# Patient Record
Sex: MALE | Race: WHITE | NOT HISPANIC OR LATINO | URBAN - METROPOLITAN AREA
[De-identification: names, ages, dates, MRNs, and addresses within clinical notes are randomized per-mention and may not be internally consistent; named-entity substitution may affect disease eponyms.]

---

## 2015-12-30 RX ORDER — DOCUSATE SODIUM 100 MG
3 CAPSULE ORAL
Qty: 0 | Refills: 0 | COMMUNITY
Start: 2015-12-30

## 2017-04-06 ENCOUNTER — INPATIENT (INPATIENT)
Facility: HOSPITAL | Age: 82
LOS: 6 days | Discharge: HOME | DRG: 202 | End: 2017-04-13
Attending: INTERNAL MEDICINE | Admitting: INTERNAL MEDICINE
Payer: MEDICARE

## 2017-04-06 VITALS
RESPIRATION RATE: 18 BRPM | DIASTOLIC BLOOD PRESSURE: 65 MMHG | TEMPERATURE: 103 F | HEART RATE: 85 BPM | OXYGEN SATURATION: 95 % | HEIGHT: 67 IN | WEIGHT: 165.35 LBS | SYSTOLIC BLOOD PRESSURE: 165 MMHG

## 2017-04-06 PROCEDURE — 71010: CPT | Mod: 26

## 2017-04-06 RX ORDER — ASPIRIN/CALCIUM CARB/MAGNESIUM 324 MG
81 TABLET ORAL ONCE
Qty: 0 | Refills: 0 | Status: COMPLETED | OUTPATIENT
Start: 2017-04-06 | End: 2017-04-06

## 2017-04-07 DIAGNOSIS — R07.9 CHEST PAIN, UNSPECIFIED: ICD-10-CM

## 2017-04-07 DIAGNOSIS — I63.9 CEREBRAL INFARCTION, UNSPECIFIED: ICD-10-CM

## 2017-04-07 DIAGNOSIS — D64.9 ANEMIA, UNSPECIFIED: ICD-10-CM

## 2017-04-07 DIAGNOSIS — Z41.8 ENCOUNTER FOR OTHER PROCEDURES FOR PURPOSES OTHER THAN REMEDYING HEALTH STATE: ICD-10-CM

## 2017-04-07 DIAGNOSIS — I10 ESSENTIAL (PRIMARY) HYPERTENSION: ICD-10-CM

## 2017-04-07 DIAGNOSIS — J11.1 INFLUENZA DUE TO UNIDENTIFIED INFLUENZA VIRUS WITH OTHER RESPIRATORY MANIFESTATIONS: ICD-10-CM

## 2017-04-07 DIAGNOSIS — J45.901 UNSPECIFIED ASTHMA WITH (ACUTE) EXACERBATION: ICD-10-CM

## 2017-04-07 LAB
ACANTHOCYTES BLD QL SMEAR: SLIGHT — SIGNIFICANT CHANGE UP
ALBUMIN SERPL ELPH-MCNC: 3.2 G/DL — LOW (ref 3.5–5)
ALP SERPL-CCNC: 46 U/L — SIGNIFICANT CHANGE UP (ref 40–120)
ALT FLD-CCNC: 19 U/L DA — SIGNIFICANT CHANGE UP (ref 10–60)
ANION GAP SERPL CALC-SCNC: 8 MMOL/L — SIGNIFICANT CHANGE UP (ref 5–17)
ANISOCYTOSIS BLD QL: SLIGHT — SIGNIFICANT CHANGE UP
APTT BLD: 31.5 SEC — SIGNIFICANT CHANGE UP (ref 27.5–37.4)
AST SERPL-CCNC: 17 U/L — SIGNIFICANT CHANGE UP (ref 10–40)
BASE EXCESS BLDA CALC-SCNC: -2.5 MMOL/L — LOW (ref -2–2)
BASOPHILS # BLD AUTO: 0.1 K/UL — SIGNIFICANT CHANGE UP (ref 0–0.2)
BASOPHILS NFR BLD AUTO: 1.1 % — SIGNIFICANT CHANGE UP (ref 0–2)
BILIRUB SERPL-MCNC: 0.4 MG/DL — SIGNIFICANT CHANGE UP (ref 0.2–1.2)
BUN SERPL-MCNC: 20 MG/DL — HIGH (ref 7–18)
CALCIUM SERPL-MCNC: 8.2 MG/DL — LOW (ref 8.4–10.5)
CHLORIDE SERPL-SCNC: 101 MMOL/L — SIGNIFICANT CHANGE UP (ref 96–108)
CK MB BLD-MCNC: 0.9 % — SIGNIFICANT CHANGE UP (ref 0–3.5)
CK MB BLD-MCNC: <0.9 % — SIGNIFICANT CHANGE UP (ref 0–3.5)
CK MB CFR SERPL CALC: 2.1 NG/ML — SIGNIFICANT CHANGE UP (ref 0–3.6)
CK MB CFR SERPL CALC: <1 NG/ML — SIGNIFICANT CHANGE UP (ref 0–3.6)
CK SERPL-CCNC: 114 U/L — SIGNIFICANT CHANGE UP (ref 35–232)
CK SERPL-CCNC: 162 U/L — SIGNIFICANT CHANGE UP (ref 35–232)
CK SERPL-CCNC: 232 U/L — SIGNIFICANT CHANGE UP (ref 35–232)
CO2 SERPL-SCNC: 28 MMOL/L — SIGNIFICANT CHANGE UP (ref 22–31)
CREAT SERPL-MCNC: 1.14 MG/DL — SIGNIFICANT CHANGE UP (ref 0.5–1.3)
EOSINOPHIL # BLD AUTO: 0 K/UL — SIGNIFICANT CHANGE UP (ref 0–0.5)
EOSINOPHIL NFR BLD AUTO: 0.1 % — SIGNIFICANT CHANGE UP (ref 0–6)
FLUBV RNA SPEC QL NAA+PROBE: DETECTED
GLUCOSE SERPL-MCNC: 99 MG/DL — SIGNIFICANT CHANGE UP (ref 70–99)
HCO3 BLDA-SCNC: 20 MMOL/L — LOW (ref 23–27)
HCT VFR BLD CALC: 36.5 % — LOW (ref 39–50)
HGB BLD-MCNC: 11.4 G/DL — LOW (ref 13–17)
HOROWITZ INDEX BLDA+IHG-RTO: 21 — SIGNIFICANT CHANGE UP
HYPERCHROMIA BLD QL AUTO: SLIGHT — SIGNIFICANT CHANGE UP
INR BLD: 1.27 RATIO — HIGH (ref 0.88–1.16)
LACTATE SERPL-SCNC: 1 MMOL/L — SIGNIFICANT CHANGE UP (ref 0.7–2)
LG PLATELETS BLD QL AUTO: SLIGHT — SIGNIFICANT CHANGE UP
LYMPHOCYTES # BLD AUTO: 0.7 K/UL — LOW (ref 1–3.3)
LYMPHOCYTES # BLD AUTO: 9.1 % — LOW (ref 13–44)
MACROCYTES BLD QL: SLIGHT — SIGNIFICANT CHANGE UP
MACROCYTES OVAL BLD QL SMEAR: SLIGHT — SIGNIFICANT CHANGE UP
MANUAL DIF COMMENT BLD-IMP: SIGNIFICANT CHANGE UP
MCHC RBC-ENTMCNC: 22.7 PG — LOW (ref 27–34)
MCHC RBC-ENTMCNC: 31.3 GM/DL — LOW (ref 32–36)
MCV RBC AUTO: 72.5 FL — LOW (ref 80–100)
MICROCYTES BLD QL: SLIGHT — SIGNIFICANT CHANGE UP
MONOCYTES # BLD AUTO: 0.9 K/UL — SIGNIFICANT CHANGE UP (ref 0–0.9)
MONOCYTES NFR BLD AUTO: 11.9 % — SIGNIFICANT CHANGE UP (ref 2–14)
NEUTROPHILS # BLD AUTO: 5.8 K/UL — SIGNIFICANT CHANGE UP (ref 1.8–7.4)
NEUTROPHILS NFR BLD AUTO: 77.8 % — HIGH (ref 43–77)
NT-PROBNP SERPL-SCNC: 2530 PG/ML — HIGH (ref 0–450)
OVALOCYTES BLD QL SMEAR: SLIGHT — SIGNIFICANT CHANGE UP
PCO2 BLDA: 29 MMHG — LOW (ref 32–46)
PH BLDA: 7.45 — SIGNIFICANT CHANGE UP (ref 7.35–7.45)
PLAT MORPH BLD: NORMAL — SIGNIFICANT CHANGE UP
PLATELET # BLD AUTO: 204 K/UL — SIGNIFICANT CHANGE UP (ref 150–400)
PO2 BLDA: 81 MMHG — SIGNIFICANT CHANGE UP (ref 74–108)
POIKILOCYTOSIS BLD QL AUTO: SLIGHT — SIGNIFICANT CHANGE UP
POLYCHROMASIA BLD QL SMEAR: SLIGHT — SIGNIFICANT CHANGE UP
POTASSIUM SERPL-MCNC: 4.2 MMOL/L — SIGNIFICANT CHANGE UP (ref 3.5–5.3)
POTASSIUM SERPL-SCNC: 4.2 MMOL/L — SIGNIFICANT CHANGE UP (ref 3.5–5.3)
PROT SERPL-MCNC: 7 G/DL — SIGNIFICANT CHANGE UP (ref 6–8.3)
PROTHROM AB SERPL-ACNC: 13.9 SEC — HIGH (ref 9.8–12.7)
RAPID RVP RESULT: DETECTED
RBC # BLD: 5.03 M/UL — SIGNIFICANT CHANGE UP (ref 4.2–5.8)
RBC # FLD: 17.1 % — HIGH (ref 10.3–14.5)
RBC BLD AUTO: ABNORMAL
SAO2 % BLDA: 96 % — SIGNIFICANT CHANGE UP (ref 92–96)
SODIUM SERPL-SCNC: 137 MMOL/L — SIGNIFICANT CHANGE UP (ref 135–145)
SPHEROCYTES BLD QL SMEAR: SLIGHT — SIGNIFICANT CHANGE UP
TROPONIN I SERPL-MCNC: 0.02 NG/ML — SIGNIFICANT CHANGE UP (ref 0–0.04)
TROPONIN I SERPL-MCNC: 0.02 NG/ML — SIGNIFICANT CHANGE UP (ref 0–0.04)
TROPONIN I SERPL-MCNC: <0.015 NG/ML — SIGNIFICANT CHANGE UP (ref 0–0.04)
WBC # BLD: 7.5 K/UL — SIGNIFICANT CHANGE UP (ref 3.8–10.5)
WBC # FLD AUTO: 7.5 K/UL — SIGNIFICANT CHANGE UP (ref 3.8–10.5)

## 2017-04-07 PROCEDURE — 99285 EMERGENCY DEPT VISIT HI MDM: CPT | Mod: 25

## 2017-04-07 RX ORDER — MONTELUKAST 4 MG/1
10 TABLET, CHEWABLE ORAL AT BEDTIME
Qty: 0 | Refills: 0 | Status: DISCONTINUED | OUTPATIENT
Start: 2017-04-07 | End: 2017-04-13

## 2017-04-07 RX ORDER — ASPIRIN/CALCIUM CARB/MAGNESIUM 324 MG
81 TABLET ORAL DAILY
Qty: 0 | Refills: 0 | Status: DISCONTINUED | OUTPATIENT
Start: 2017-04-07 | End: 2017-04-13

## 2017-04-07 RX ORDER — SODIUM CHLORIDE 9 MG/ML
1000 INJECTION INTRAMUSCULAR; INTRAVENOUS; SUBCUTANEOUS
Qty: 0 | Refills: 0 | Status: DISCONTINUED | OUTPATIENT
Start: 2017-04-07 | End: 2017-04-07

## 2017-04-07 RX ORDER — ATORVASTATIN CALCIUM 80 MG/1
40 TABLET, FILM COATED ORAL AT BEDTIME
Qty: 0 | Refills: 0 | Status: DISCONTINUED | OUTPATIENT
Start: 2017-04-07 | End: 2017-04-13

## 2017-04-07 RX ORDER — PANTOPRAZOLE SODIUM 20 MG/1
40 TABLET, DELAYED RELEASE ORAL
Qty: 0 | Refills: 0 | Status: DISCONTINUED | OUTPATIENT
Start: 2017-04-07 | End: 2017-04-13

## 2017-04-07 RX ORDER — METOPROLOL TARTRATE 50 MG
25 TABLET ORAL DAILY
Qty: 0 | Refills: 0 | Status: DISCONTINUED | OUTPATIENT
Start: 2017-04-07 | End: 2017-04-07

## 2017-04-07 RX ORDER — ENOXAPARIN SODIUM 100 MG/ML
40 INJECTION SUBCUTANEOUS DAILY
Qty: 0 | Refills: 0 | Status: DISCONTINUED | OUTPATIENT
Start: 2017-04-07 | End: 2017-04-13

## 2017-04-07 RX ORDER — METOPROLOL TARTRATE 50 MG
25 TABLET ORAL DAILY
Qty: 0 | Refills: 0 | Status: DISCONTINUED | OUTPATIENT
Start: 2017-04-07 | End: 2017-04-13

## 2017-04-07 RX ORDER — TIOTROPIUM BROMIDE 18 UG/1
1 CAPSULE ORAL; RESPIRATORY (INHALATION) DAILY
Qty: 0 | Refills: 0 | Status: DISCONTINUED | OUTPATIENT
Start: 2017-04-07 | End: 2017-04-09

## 2017-04-07 RX ORDER — ACETAMINOPHEN 500 MG
650 TABLET ORAL EVERY 6 HOURS
Qty: 0 | Refills: 0 | Status: DISCONTINUED | OUTPATIENT
Start: 2017-04-07 | End: 2017-04-13

## 2017-04-07 RX ORDER — ACETAMINOPHEN 500 MG
650 TABLET ORAL ONCE
Qty: 0 | Refills: 0 | Status: COMPLETED | OUTPATIENT
Start: 2017-04-07 | End: 2017-04-07

## 2017-04-07 RX ORDER — IPRATROPIUM/ALBUTEROL SULFATE 18-103MCG
3 AEROSOL WITH ADAPTER (GRAM) INHALATION EVERY 6 HOURS
Qty: 0 | Refills: 0 | Status: DISCONTINUED | OUTPATIENT
Start: 2017-04-07 | End: 2017-04-09

## 2017-04-07 RX ORDER — IPRATROPIUM/ALBUTEROL SULFATE 18-103MCG
3 AEROSOL WITH ADAPTER (GRAM) INHALATION
Qty: 0 | Refills: 0 | Status: COMPLETED | OUTPATIENT
Start: 2017-04-07 | End: 2017-04-07

## 2017-04-07 RX ORDER — ALBUTEROL 90 UG/1
1 AEROSOL, METERED ORAL EVERY 4 HOURS
Qty: 0 | Refills: 0 | Status: COMPLETED | OUTPATIENT
Start: 2017-04-07 | End: 2018-03-06

## 2017-04-07 RX ORDER — DOCUSATE SODIUM 100 MG
100 CAPSULE ORAL DAILY
Qty: 0 | Refills: 0 | Status: DISCONTINUED | OUTPATIENT
Start: 2017-04-07 | End: 2017-04-13

## 2017-04-07 RX ORDER — INSULIN LISPRO 100/ML
VIAL (ML) SUBCUTANEOUS
Qty: 0 | Refills: 0 | Status: DISCONTINUED | OUTPATIENT
Start: 2017-04-07 | End: 2017-04-13

## 2017-04-07 RX ORDER — ASPIRIN/CALCIUM CARB/MAGNESIUM 324 MG
81 TABLET ORAL DAILY
Qty: 0 | Refills: 0 | Status: DISCONTINUED | OUTPATIENT
Start: 2017-04-07 | End: 2017-04-07

## 2017-04-07 RX ORDER — TAMSULOSIN HYDROCHLORIDE 0.4 MG/1
0.4 CAPSULE ORAL AT BEDTIME
Qty: 0 | Refills: 0 | Status: DISCONTINUED | OUTPATIENT
Start: 2017-04-07 | End: 2017-04-13

## 2017-04-07 RX ORDER — CLOPIDOGREL BISULFATE 75 MG/1
75 TABLET, FILM COATED ORAL DAILY
Qty: 0 | Refills: 0 | Status: DISCONTINUED | OUTPATIENT
Start: 2017-04-07 | End: 2017-04-13

## 2017-04-07 RX ADMIN — Medication 3 MILLILITER(S): at 04:00

## 2017-04-07 RX ADMIN — Medication 40 MILLIGRAM(S): at 15:53

## 2017-04-07 RX ADMIN — Medication 650 MILLIGRAM(S): at 04:00

## 2017-04-07 RX ADMIN — Medication 3 MILLILITER(S): at 17:05

## 2017-04-07 RX ADMIN — Medication 3 MILLILITER(S): at 05:04

## 2017-04-07 RX ADMIN — ATORVASTATIN CALCIUM 40 MILLIGRAM(S): 80 TABLET, FILM COATED ORAL at 21:05

## 2017-04-07 RX ADMIN — Medication 75 MILLIGRAM(S): at 21:05

## 2017-04-07 RX ADMIN — Medication 81 MILLIGRAM(S): at 12:51

## 2017-04-07 RX ADMIN — ENOXAPARIN SODIUM 40 MILLIGRAM(S): 100 INJECTION SUBCUTANEOUS at 12:51

## 2017-04-07 RX ADMIN — Medication 81 MILLIGRAM(S): at 00:14

## 2017-04-07 RX ADMIN — Medication 3 MILLILITER(S): at 20:57

## 2017-04-07 RX ADMIN — Medication 100 MILLIGRAM(S): at 21:05

## 2017-04-07 RX ADMIN — Medication 75 MILLIGRAM(S): at 16:05

## 2017-04-07 RX ADMIN — TAMSULOSIN HYDROCHLORIDE 0.4 MILLIGRAM(S): 0.4 CAPSULE ORAL at 21:05

## 2017-04-07 RX ADMIN — Medication 125 MILLIGRAM(S): at 03:16

## 2017-04-07 RX ADMIN — Medication 3 MILLILITER(S): at 11:43

## 2017-04-07 RX ADMIN — Medication 3 MILLILITER(S): at 03:17

## 2017-04-07 RX ADMIN — Medication 40 MILLIGRAM(S): at 21:05

## 2017-04-07 NOTE — ED PROVIDER NOTE - MUSCULOSKELETAL, MLM
Spine appears normal, range of motion is not limited, no muscle or joint tenderness, no calves tenderness

## 2017-04-07 NOTE — ED PROVIDER NOTE - CARE PLAN
Principal Discharge DX:	Chest pain  Secondary Diagnosis:	Asthma exacerbation  Secondary Diagnosis:	PNA (pneumonia)

## 2017-04-07 NOTE — H&P ADULT. - HISTORY OF PRESENT ILLNESS
82 y.o. male son translates with h/o asthma, c/o sob for 2 days, coughing, chest congestion, Lt sided chest pain on & off, no fever, no wheezing, appetite decreased, weakness, pt went to Urgent Care & sent to ED, no sick contact, no flu vaccine 83 yo Male from home, independent at baseline, [facility] Asthma, CVA (2015) p/w complaints of sob for 2 days, coughing, chest congestion, Lt sided chest pain on & off, no fever, no wheezing, appetite decreased, weakness, pt went to Urgent Care & sent to ED, no sick contact, no flu vaccine 81 yo Male from home, independent at baseline, PMH Asthma, HTN, HLD, CVA (2015) p/w complaints of sob for 2 days, coughing, chest congestion, Lt sided chest pain on & off, no fever, no wheezing, appetite decreased, weakness, pt went to Urgent Care & sent to ED, no sick contact, no flu vaccine 83 yo Male from home, [place] speaking, independent at baseline, PMH of Asthma, HTN, HLD, CVA (2015) p/w complaints of chest pain , sob for 2 days, cough, chest congestion. History was obtained from son at bedside, as per the son, patient has been complaining of cough x 2 days which is dry and associated with sob. Patient also complains of generalized weakness. Patient went to Urgent care clinic yesterday and was pending Xray, but at the clinic patient complained of sharp substernal pain, non radiating , 8/10 in severity associated with diaphoresis but no radiation and EMS was called. On arrival of EMS patient was found to have sP02% of 90%.  Patient also complains of generalzied body aches and weakness. Patient complains of Orthopnea but no PND. Denies any other complaints other than above  mentioned.    Ed course:  temp: 103, Influenza b positive, ekg: NSR

## 2017-04-07 NOTE — ED PROVIDER NOTE - OBJECTIVE STATEMENT
82 y.o. male son translates with h/o asthma, c/o sob for 2 days, coughing, chest congestion, Lt sided chest pain on & off, no fever, no wheezing, appetite decreased, weakness, pt went to Urgent Care & sent to ED, no sick contact 82 y.o. male son translates with h/o asthma, c/o sob for 2 days, coughing, chest congestion, Lt sided chest pain on & off, no fever, no wheezing, appetite decreased, weakness, pt went to Urgent Care & sent to ED, no sick contact, no flu vaccine

## 2017-04-07 NOTE — H&P ADULT. - PROBLEM SELECTOR PLAN 1
Asthma exacerbation 2/2 to Influenza B positive +ve  c/w tamiflu   Supportive management  c/w Steroids and taper as needed, Patient with diffuse wheezing mild  Peak flow daily

## 2017-04-07 NOTE — H&P ADULT. - ASSESSMENT
81 yo Male from home, Malagasy speaking, independent at baseline, PMH of Asthma, HTN, HLD, CVA (2015) p/w complaints of chest pain , sob for 2 days, cough, chest congestion found to have Influenza B and admitted for r/o ACS

## 2017-04-07 NOTE — H&P ADULT. - NEUROLOGICAL DETAILS
deep reflexes intact/sensation intact/cranial nerves intact/responds to pain/superficial reflexes intact/alert and oriented x 3/no spontaneous movement

## 2017-04-07 NOTE — H&P ADULT. - RS GEN PE MLT RESP DETAILS PC
wheezes/breath sounds equal/no intercostal retractions/airway patent/no chest wall tenderness/respirations non-labored/good air movement

## 2017-04-07 NOTE — ED PROVIDER NOTE - ENMT, MLM
Airway patent, Nasal mucosa clear. Mouth with sl dry mucosa. Throat has no vesicles, no oropharyngeal exudates and uvula is midline.

## 2017-04-07 NOTE — H&P ADULT. - PROBLEM SELECTOR PLAN 3
Patient with hx of HTN, HLD and Age being risk factors coming with Chest pain and complains of orthopnea with elevated Bnp  admit to tele, trend cardiac enzymes  f/up echo Cardio: Dr. Donald  EKG: WNL

## 2017-04-08 LAB
24R-OH-CALCIDIOL SERPL-MCNC: 27.6 NG/ML — LOW (ref 30–100)
ALBUMIN SERPL ELPH-MCNC: 3.2 G/DL — LOW (ref 3.5–5)
ALP SERPL-CCNC: 43 U/L — SIGNIFICANT CHANGE UP (ref 40–120)
ALT FLD-CCNC: 26 U/L DA — SIGNIFICANT CHANGE UP (ref 10–60)
ANION GAP SERPL CALC-SCNC: 10 MMOL/L — SIGNIFICANT CHANGE UP (ref 5–17)
AST SERPL-CCNC: 34 U/L — SIGNIFICANT CHANGE UP (ref 10–40)
BASOPHILS # BLD AUTO: 0 K/UL — SIGNIFICANT CHANGE UP (ref 0–0.2)
BASOPHILS NFR BLD AUTO: 0.2 % — SIGNIFICANT CHANGE UP (ref 0–2)
BILIRUB SERPL-MCNC: 0.3 MG/DL — SIGNIFICANT CHANGE UP (ref 0.2–1.2)
BUN SERPL-MCNC: 30 MG/DL — HIGH (ref 7–18)
CALCIUM SERPL-MCNC: 8.5 MG/DL — SIGNIFICANT CHANGE UP (ref 8.4–10.5)
CHLORIDE SERPL-SCNC: 107 MMOL/L — SIGNIFICANT CHANGE UP (ref 96–108)
CHOLEST SERPL-MCNC: 154 MG/DL — SIGNIFICANT CHANGE UP (ref 10–199)
CO2 SERPL-SCNC: 24 MMOL/L — SIGNIFICANT CHANGE UP (ref 22–31)
CREAT SERPL-MCNC: 1.23 MG/DL — SIGNIFICANT CHANGE UP (ref 0.5–1.3)
EOSINOPHIL # BLD AUTO: 0 K/UL — SIGNIFICANT CHANGE UP (ref 0–0.5)
EOSINOPHIL NFR BLD AUTO: 0 % — SIGNIFICANT CHANGE UP (ref 0–6)
FERRITIN SERPL-MCNC: 52.3 NG/ML — SIGNIFICANT CHANGE UP (ref 30–400)
FOLATE SERPL-MCNC: 11.3 NG/ML — SIGNIFICANT CHANGE UP (ref 4.8–24.2)
GLUCOSE SERPL-MCNC: 131 MG/DL — HIGH (ref 70–99)
HBA1C BLD-MCNC: 5.9 % — HIGH (ref 4–5.6)
HCT VFR BLD CALC: 38.4 % — LOW (ref 39–50)
HDLC SERPL-MCNC: 60 MG/DL — SIGNIFICANT CHANGE UP (ref 40–125)
HGB BLD-MCNC: 12 G/DL — LOW (ref 13–17)
INR BLD: 1.12 RATIO — SIGNIFICANT CHANGE UP (ref 0.88–1.16)
IRON SATN MFR SERPL: 14 UG/DL — LOW (ref 65–170)
IRON SATN MFR SERPL: 3 % — LOW (ref 20–55)
LIPID PNL WITH DIRECT LDL SERPL: 83 MG/DL — SIGNIFICANT CHANGE UP
LYMPHOCYTES # BLD AUTO: 1.1 K/UL — SIGNIFICANT CHANGE UP (ref 1–3.3)
LYMPHOCYTES # BLD AUTO: 8.2 % — LOW (ref 13–44)
MAGNESIUM SERPL-MCNC: 2.6 MG/DL — HIGH (ref 1.8–2.4)
MCHC RBC-ENTMCNC: 22.7 PG — LOW (ref 27–34)
MCHC RBC-ENTMCNC: 31.3 GM/DL — LOW (ref 32–36)
MCV RBC AUTO: 72.5 FL — LOW (ref 80–100)
MONOCYTES # BLD AUTO: 0.5 K/UL — SIGNIFICANT CHANGE UP (ref 0–0.9)
MONOCYTES NFR BLD AUTO: 4.2 % — SIGNIFICANT CHANGE UP (ref 2–14)
NEUTROPHILS # BLD AUTO: 11.4 K/UL — HIGH (ref 1.8–7.4)
NEUTROPHILS NFR BLD AUTO: 87.4 % — HIGH (ref 43–77)
PHOSPHATE SERPL-MCNC: 3.6 MG/DL — SIGNIFICANT CHANGE UP (ref 2.5–4.5)
PLATELET # BLD AUTO: 212 K/UL — SIGNIFICANT CHANGE UP (ref 150–400)
POTASSIUM SERPL-MCNC: 4.3 MMOL/L — SIGNIFICANT CHANGE UP (ref 3.5–5.3)
POTASSIUM SERPL-SCNC: 4.3 MMOL/L — SIGNIFICANT CHANGE UP (ref 3.5–5.3)
PROT SERPL-MCNC: 7.1 G/DL — SIGNIFICANT CHANGE UP (ref 6–8.3)
PROTHROM AB SERPL-ACNC: 12.2 SEC — SIGNIFICANT CHANGE UP (ref 9.8–12.7)
RBC # BLD: 5.3 M/UL — SIGNIFICANT CHANGE UP (ref 4.2–5.8)
RBC # FLD: 16.8 % — HIGH (ref 10.3–14.5)
SODIUM SERPL-SCNC: 141 MMOL/L — SIGNIFICANT CHANGE UP (ref 135–145)
TIBC SERPL-MCNC: 442 UG/DL — SIGNIFICANT CHANGE UP (ref 250–450)
TOTAL CHOLESTEROL/HDL RATIO MEASUREMENT: 2.6 RATIO — LOW (ref 3.4–9.6)
TRIGL SERPL-MCNC: 56 MG/DL — SIGNIFICANT CHANGE UP (ref 10–149)
TSH SERPL-MCNC: 0.73 UU/ML — SIGNIFICANT CHANGE UP (ref 0.34–4.82)
UIBC SERPL-MCNC: 428 UG/DL — HIGH (ref 110–370)
VIT B12 SERPL-MCNC: 515 PG/ML — SIGNIFICANT CHANGE UP (ref 243–894)
WBC # BLD: 13.1 K/UL — HIGH (ref 3.8–10.5)
WBC # FLD AUTO: 13.1 K/UL — HIGH (ref 3.8–10.5)

## 2017-04-08 RX ADMIN — Medication 40 MILLIGRAM(S): at 06:39

## 2017-04-08 RX ADMIN — CLOPIDOGREL BISULFATE 75 MILLIGRAM(S): 75 TABLET, FILM COATED ORAL at 12:55

## 2017-04-08 RX ADMIN — Medication 81 MILLIGRAM(S): at 12:55

## 2017-04-08 RX ADMIN — Medication 100 MILLIGRAM(S): at 12:55

## 2017-04-08 RX ADMIN — Medication 75 MILLIGRAM(S): at 06:39

## 2017-04-08 RX ADMIN — ENOXAPARIN SODIUM 40 MILLIGRAM(S): 100 INJECTION SUBCUTANEOUS at 12:54

## 2017-04-08 RX ADMIN — Medication 1: at 12:56

## 2017-04-08 RX ADMIN — Medication 75 MILLIGRAM(S): at 17:23

## 2017-04-08 RX ADMIN — Medication 3 MILLILITER(S): at 14:21

## 2017-04-08 RX ADMIN — Medication 40 MILLIGRAM(S): at 22:42

## 2017-04-08 RX ADMIN — Medication 3 MILLILITER(S): at 20:22

## 2017-04-08 RX ADMIN — ATORVASTATIN CALCIUM 40 MILLIGRAM(S): 80 TABLET, FILM COATED ORAL at 22:42

## 2017-04-08 RX ADMIN — Medication 40 MILLIGRAM(S): at 13:05

## 2017-04-08 RX ADMIN — MONTELUKAST 10 MILLIGRAM(S): 4 TABLET, CHEWABLE ORAL at 12:55

## 2017-04-08 RX ADMIN — Medication 25 MILLIGRAM(S): at 06:39

## 2017-04-08 RX ADMIN — PANTOPRAZOLE SODIUM 40 MILLIGRAM(S): 20 TABLET, DELAYED RELEASE ORAL at 06:39

## 2017-04-08 RX ADMIN — Medication 3 MILLILITER(S): at 08:05

## 2017-04-08 RX ADMIN — Medication 1: at 17:23

## 2017-04-08 RX ADMIN — TAMSULOSIN HYDROCHLORIDE 0.4 MILLIGRAM(S): 0.4 CAPSULE ORAL at 22:42

## 2017-04-09 LAB
ANION GAP SERPL CALC-SCNC: 7 MMOL/L — SIGNIFICANT CHANGE UP (ref 5–17)
BUN SERPL-MCNC: 31 MG/DL — HIGH (ref 7–18)
CALCIUM SERPL-MCNC: 8.3 MG/DL — LOW (ref 8.4–10.5)
CHLORIDE SERPL-SCNC: 106 MMOL/L — SIGNIFICANT CHANGE UP (ref 96–108)
CO2 SERPL-SCNC: 28 MMOL/L — SIGNIFICANT CHANGE UP (ref 22–31)
CREAT SERPL-MCNC: 1.14 MG/DL — SIGNIFICANT CHANGE UP (ref 0.5–1.3)
GLUCOSE SERPL-MCNC: 123 MG/DL — HIGH (ref 70–99)
HCT VFR BLD CALC: 35.7 % — LOW (ref 39–50)
HGB BLD-MCNC: 11.2 G/DL — LOW (ref 13–17)
MCHC RBC-ENTMCNC: 23 PG — LOW (ref 27–34)
MCHC RBC-ENTMCNC: 31.2 GM/DL — LOW (ref 32–36)
MCV RBC AUTO: 73.6 FL — LOW (ref 80–100)
PLATELET # BLD AUTO: 234 K/UL — SIGNIFICANT CHANGE UP (ref 150–400)
POTASSIUM SERPL-MCNC: 4.8 MMOL/L — SIGNIFICANT CHANGE UP (ref 3.5–5.3)
POTASSIUM SERPL-SCNC: 4.8 MMOL/L — SIGNIFICANT CHANGE UP (ref 3.5–5.3)
RBC # BLD: 4.85 M/UL — SIGNIFICANT CHANGE UP (ref 4.2–5.8)
RBC # FLD: 17.3 % — HIGH (ref 10.3–14.5)
SODIUM SERPL-SCNC: 141 MMOL/L — SIGNIFICANT CHANGE UP (ref 135–145)
WBC # BLD: 17.6 K/UL — HIGH (ref 3.8–10.5)
WBC # FLD AUTO: 17.6 K/UL — HIGH (ref 3.8–10.5)

## 2017-04-09 RX ORDER — TIOTROPIUM BROMIDE 18 UG/1
1 CAPSULE ORAL; RESPIRATORY (INHALATION) DAILY
Qty: 0 | Refills: 0 | Status: DISCONTINUED | OUTPATIENT
Start: 2017-04-09 | End: 2017-04-13

## 2017-04-09 RX ORDER — ALBUTEROL 90 UG/1
1 AEROSOL, METERED ORAL EVERY 4 HOURS
Qty: 0 | Refills: 0 | Status: DISCONTINUED | OUTPATIENT
Start: 2017-04-09 | End: 2017-04-13

## 2017-04-09 RX ADMIN — Medication 75 MILLIGRAM(S): at 18:50

## 2017-04-09 RX ADMIN — Medication 40 MILLIGRAM(S): at 15:38

## 2017-04-09 RX ADMIN — ENOXAPARIN SODIUM 40 MILLIGRAM(S): 100 INJECTION SUBCUTANEOUS at 11:39

## 2017-04-09 RX ADMIN — Medication 3: at 11:38

## 2017-04-09 RX ADMIN — ATORVASTATIN CALCIUM 40 MILLIGRAM(S): 80 TABLET, FILM COATED ORAL at 22:26

## 2017-04-09 RX ADMIN — Medication 81 MILLIGRAM(S): at 11:39

## 2017-04-09 RX ADMIN — ALBUTEROL 1 PUFF(S): 90 AEROSOL, METERED ORAL at 22:25

## 2017-04-09 RX ADMIN — MONTELUKAST 10 MILLIGRAM(S): 4 TABLET, CHEWABLE ORAL at 11:39

## 2017-04-09 RX ADMIN — Medication 25 MILLIGRAM(S): at 06:15

## 2017-04-09 RX ADMIN — ALBUTEROL 1 PUFF(S): 90 AEROSOL, METERED ORAL at 18:51

## 2017-04-09 RX ADMIN — TAMSULOSIN HYDROCHLORIDE 0.4 MILLIGRAM(S): 0.4 CAPSULE ORAL at 22:26

## 2017-04-09 RX ADMIN — CLOPIDOGREL BISULFATE 75 MILLIGRAM(S): 75 TABLET, FILM COATED ORAL at 11:39

## 2017-04-09 RX ADMIN — Medication 75 MILLIGRAM(S): at 06:15

## 2017-04-09 RX ADMIN — Medication 3 MILLILITER(S): at 08:07

## 2017-04-09 RX ADMIN — Medication 100 MILLIGRAM(S): at 11:39

## 2017-04-09 RX ADMIN — PANTOPRAZOLE SODIUM 40 MILLIGRAM(S): 20 TABLET, DELAYED RELEASE ORAL at 06:15

## 2017-04-09 RX ADMIN — Medication 3 MILLILITER(S): at 02:22

## 2017-04-09 RX ADMIN — TIOTROPIUM BROMIDE 1 CAPSULE(S): 18 CAPSULE ORAL; RESPIRATORY (INHALATION) at 22:44

## 2017-04-09 RX ADMIN — Medication 40 MILLIGRAM(S): at 22:26

## 2017-04-09 RX ADMIN — Medication 40 MILLIGRAM(S): at 06:15

## 2017-04-10 LAB
ANION GAP SERPL CALC-SCNC: 7 MMOL/L — SIGNIFICANT CHANGE UP (ref 5–17)
BUN SERPL-MCNC: 30 MG/DL — HIGH (ref 7–18)
CALCIUM SERPL-MCNC: 8.8 MG/DL — SIGNIFICANT CHANGE UP (ref 8.4–10.5)
CHLORIDE SERPL-SCNC: 103 MMOL/L — SIGNIFICANT CHANGE UP (ref 96–108)
CO2 SERPL-SCNC: 30 MMOL/L — SIGNIFICANT CHANGE UP (ref 22–31)
CREAT SERPL-MCNC: 1.06 MG/DL — SIGNIFICANT CHANGE UP (ref 0.5–1.3)
GLUCOSE SERPL-MCNC: 107 MG/DL — HIGH (ref 70–99)
HCT VFR BLD CALC: 41.9 % — SIGNIFICANT CHANGE UP (ref 39–50)
HGB BLD-MCNC: 12.7 G/DL — LOW (ref 13–17)
MCHC RBC-ENTMCNC: 22.3 PG — LOW (ref 27–34)
MCHC RBC-ENTMCNC: 30.3 GM/DL — LOW (ref 32–36)
MCV RBC AUTO: 73.8 FL — LOW (ref 80–100)
PLATELET # BLD AUTO: 246 K/UL — SIGNIFICANT CHANGE UP (ref 150–400)
POTASSIUM SERPL-MCNC: 4.8 MMOL/L — SIGNIFICANT CHANGE UP (ref 3.5–5.3)
POTASSIUM SERPL-SCNC: 4.8 MMOL/L — SIGNIFICANT CHANGE UP (ref 3.5–5.3)
RBC # BLD: 5.68 M/UL — SIGNIFICANT CHANGE UP (ref 4.2–5.8)
RBC # FLD: 17.6 % — HIGH (ref 10.3–14.5)
SODIUM SERPL-SCNC: 140 MMOL/L — SIGNIFICANT CHANGE UP (ref 135–145)
WBC # BLD: 13.7 K/UL — HIGH (ref 3.8–10.5)
WBC # FLD AUTO: 13.7 K/UL — HIGH (ref 3.8–10.5)

## 2017-04-10 RX ORDER — CHOLECALCIFEROL (VITAMIN D3) 125 MCG
1000 CAPSULE ORAL DAILY
Qty: 0 | Refills: 0 | Status: DISCONTINUED | OUTPATIENT
Start: 2017-04-10 | End: 2017-04-13

## 2017-04-10 RX ADMIN — PANTOPRAZOLE SODIUM 40 MILLIGRAM(S): 20 TABLET, DELAYED RELEASE ORAL at 06:21

## 2017-04-10 RX ADMIN — TIOTROPIUM BROMIDE 1 CAPSULE(S): 18 CAPSULE ORAL; RESPIRATORY (INHALATION) at 17:38

## 2017-04-10 RX ADMIN — ALBUTEROL 1 PUFF(S): 90 AEROSOL, METERED ORAL at 22:25

## 2017-04-10 RX ADMIN — ALBUTEROL 1 PUFF(S): 90 AEROSOL, METERED ORAL at 17:37

## 2017-04-10 RX ADMIN — TAMSULOSIN HYDROCHLORIDE 0.4 MILLIGRAM(S): 0.4 CAPSULE ORAL at 22:19

## 2017-04-10 RX ADMIN — Medication 75 MILLIGRAM(S): at 17:38

## 2017-04-10 RX ADMIN — Medication 100 MILLIGRAM(S): at 12:26

## 2017-04-10 RX ADMIN — Medication 81 MILLIGRAM(S): at 12:26

## 2017-04-10 RX ADMIN — Medication 25 MILLIGRAM(S): at 06:21

## 2017-04-10 RX ADMIN — ATORVASTATIN CALCIUM 40 MILLIGRAM(S): 80 TABLET, FILM COATED ORAL at 22:19

## 2017-04-10 RX ADMIN — ENOXAPARIN SODIUM 40 MILLIGRAM(S): 100 INJECTION SUBCUTANEOUS at 12:26

## 2017-04-10 RX ADMIN — ALBUTEROL 1 PUFF(S): 90 AEROSOL, METERED ORAL at 12:26

## 2017-04-10 RX ADMIN — Medication 40 MILLIGRAM(S): at 06:21

## 2017-04-10 RX ADMIN — Medication 1000 UNIT(S): at 17:38

## 2017-04-10 RX ADMIN — MONTELUKAST 10 MILLIGRAM(S): 4 TABLET, CHEWABLE ORAL at 22:19

## 2017-04-10 RX ADMIN — Medication 75 MILLIGRAM(S): at 06:21

## 2017-04-10 RX ADMIN — ALBUTEROL 1 PUFF(S): 90 AEROSOL, METERED ORAL at 07:03

## 2017-04-10 RX ADMIN — Medication 40 MILLIGRAM(S): at 15:54

## 2017-04-10 RX ADMIN — CLOPIDOGREL BISULFATE 75 MILLIGRAM(S): 75 TABLET, FILM COATED ORAL at 12:26

## 2017-04-10 NOTE — DISCHARGE NOTE ADULT - HOSPITAL COURSE
81 yo Male from home, Citizen of [place] speaking, independent at baseline, PMH of Asthma, HTN, HLD, CVA (2015) p/w complaints of chest pain , sob for 2 days, cough, chest congestion. History was obtained from son at bedside, as per the son, patient has been complaining of cough x 2 days which is dry and associated with sob. Patient also complains of generalized weakness. Patient went to Urgent care clinic yesterday and was pending Xray, but at the clinic patient complained of sharp substernal pain, non radiating , 8/10 in severity associated with diaphoresis but no radiation and EMS was called. On arrival of EMS patient was found to have sP02% of 90%.  Patient also complains of generalzied body aches and weakness. Patient complains of Orthopnea but no PND. Denies any other complaints other than above  mentioned.      Patient was admitted for Asthma exacerbation secondary to Influenza B positive +ve. We started Tamiflu and contact isolation. Iv Steroids were started and tapered slowly. Patient had diffuse wheezing on admission that improved with iv steroids.     Patient had Chest with hx of HTN, HLD and Age so he was admitted to tele, trended cardiac enzymes that remained -ve. CARDIO Dr Donald was consulted and Echo was done and Ejection Fraction Visual Estimate: 56 %. ACS was ruled out and tele monitoring was discontinued.     Patient is stable for discharge as per attendings and follow up with PCP within week of discharge. 83 yo Male from home, [place]an speaking, independent at baseline, PMH of Asthma, HTN, HLD, CVA (2015) p/w complaints of chest pain , sob for 2 days, cough, chest congestion. History was obtained from son at bedside, as per the son, patient has been complaining of cough x 2 days which is dry and associated with sob. Patient also complains of generalized weakness. Patient went to Urgent care clinic yesterday and was pending Xray, but at the clinic patient complained of sharp substernal pain, non radiating , 8/10 in severity associated with diaphoresis but no radiation and EMS was called. On arrival of EMS patient was found to have sP02% of 90%.  Patient also complains of generalzied body aches and weakness. Patient complains of Orthopnea but no PND. Denies any other complaints other than above  mentioned.    Patient was admitted for Asthma exacerbation secondary to Influenza B positive +ve. We started Tamiflu and contact isolation. Iv Steroids were started and tapered slowly. Patient had diffuse wheezing on admission that improved with iv steroids. Patient has diffuse crackels, treated with Levaquin PO.     Patient had Chest with hx of HTN, HLD and Age so he was admitted to tele, trended cardiac enzymes that remained -ve. CARDIO Dr Donald was consulted and Echo was done and Ejection Fraction Visual Estimate: 56 %. ACS was ruled out and tele monitoring was discontinued.     Patient is stable for discharge as per attendings and follow up with PCP within week of discharge.

## 2017-04-10 NOTE — DISCHARGE NOTE ADULT - NS AS DC STROKE ED MATERIALS
Stroke Education Booklet/Call 911 for Stroke/Need for Followup After Discharge/Risk Factors for Stroke/Prescribed Medications/Stroke Warning Signs and Symptoms

## 2017-04-10 NOTE — DISCHARGE NOTE ADULT - PATIENT PORTAL LINK FT
“You can access the Follow[facility] Patient Portal, offered by [facility], by registering with the following website: [email or web address]”

## 2017-04-10 NOTE — DISCHARGE NOTE ADULT - CARE PLAN
Principal Discharge DX:	Asthma exacerbation  Secondary Diagnosis:	HTN (hypertension)  Secondary Diagnosis:	Hypercholesterolemia Principal Discharge DX:	Asthma exacerbation  Goal:	Prevent exacerbation  Instructions for follow-up, activity and diet:	YOu was admitted for asthma exacerbation secondary to Influenza and Bronchitis. We treated Influenza with Tamiflu for 5 days. Continue Levaquin for 6 more days for bronchitis. Stay well hydrated. We are discharging on prednisone, tapering dose. Prescription are send to your pharmacy. Seek medical attention if you have severe shortness of breath or wheezing. Be complaint with medication and follow up with PCP within a week of discharge as outpatient.  Secondary Diagnosis:	HTN (hypertension)  Instructions for follow-up, activity and diet:	Continue medical management, Monitor BP regularly, follow up with PCP as outpatient.  Secondary Diagnosis:	Hypercholesterolemia  Instructions for follow-up, activity and diet:	Continue statin and follow lipid panel in 3 months. Principal Discharge DX:	Asthma exacerbation  Goal:	Prevent exacerbation  Instructions for follow-up, activity and diet:	YOu was admitted for asthma exacerbation secondary to Influenza and Bronchitis. We treated Influenza with Tamiflu for 5 days. Continue Levaquin for 6 more days for bronchitis. Stay well hydrated. We are discharging on prednisone, tapering dose. Prescription are send to your pharmacy. Seek medical attention if you have severe shortness of breath or wheezing. Be complaint with medication and follow up with PCP within a week of discharge as outpatient.  Secondary Diagnosis:	HTN (hypertension)  Goal:	Goal BP<150/90  Instructions for follow-up, activity and diet:	Continue medical management, Monitor BP regularly, follow up with PCP as outpatient.  Secondary Diagnosis:	Hypercholesterolemia  Goal:	Control hypercholesteremia.  Instructions for follow-up, activity and diet:	Continue statin and follow lipid panel in 3 months.  Secondary Diagnosis:	Bronchitis  Goal:	Treat acute episode.  Instructions for follow-up, activity and diet:	Continue Levaquin as prescribed. Follow up with your primary care doctor within week of discharge.

## 2017-04-10 NOTE — DISCHARGE NOTE ADULT - MEDICATION SUMMARY - MEDICATIONS TO TAKE
I will START or STAY ON the medications listed below when I get home from the hospital:    predniSONE 10 mg oral tablet  -- 3 tab(s) by mouth once a day for 3 days, then 2 tabs by mouth for 3 days, then 1 tab for 3 days  -- It is very important that you take or use this exactly as directed.  Do not skip doses or discontinue unless directed by your doctor.  Obtain medical advice before taking any non-prescription drugs as some may affect the action of this medication.  Take with food or milk.    -- Indication: For Asthma exacerbation    aspirin 81 mg oral tablet, chewable  -- 1 tab(s) by mouth once a day  -- Indication: For Cardioprotective    tamsulosin 0.4 mg oral capsule  -- 1 cap(s) by mouth once a day (at bedtime)  -- Indication: For BPH    atorvastatin 40 mg oral tablet  -- 1 tab(s) by mouth once a day (at bedtime)  -- Indication: For HLD    clopidogrel 75 mg oral tablet  -- 1 tab(s) by mouth once a day  -- Indication: For Cardioprotective    Lopressor  -- 25 milligram(s) by mouth 2 times a day  -- Indication: For HTN (hypertension)    albuterol CFC free 90 mcg/inh inhalation aerosol  -- 2 puff(s) inhaled every 6 hours  -- Indication: For Asthma exacerbation    budesonide-formoterol 160 mcg-4.5 mcg/inh inhalation aerosol  -- 2 puff(s) inhaled every 6 hours, As Needed for shortness of breath  -- Indication: For Asthma exacerbation    tiotropium 18 mcg inhalation capsule  -- 1 cap(s) inhaled once a day  -- Indication: For Asthma exacerbation    docusate sodium 100 mg oral capsule  -- 1 cap(s) by mouth once a day  -- Indication: For Constipation    montelukast 10 mg oral tablet  -- 1 tab(s) by mouth once a day  -- Indication: For Asthma exacerbation    levoFLOXacin 500 mg oral tablet  -- 1 tab(s) by mouth every 24 hours  -- Indication: For Bronchitis    Drisdol 50,000 intl units (1.25 mg) oral capsule  -- 1 cap(s) by mouth 2 times a week  -- Indication: For Vitamin supplement I will START or STAY ON the medications listed below when I get home from the hospital:    predniSONE 10 mg oral tablet  -- 3 tab(s) by mouth once a day for 3 days, then 2 tabs by mouth for 3 days, then 1 tab for 3 days  -- It is very important that you take or use this exactly as directed.  Do not skip doses or discontinue unless directed by your doctor.  Obtain medical advice before taking any non-prescription drugs as some may affect the action of this medication.  Take with food or milk.    -- Indication: For Asthma exacerbation    aspirin 81 mg oral tablet, chewable  -- 1 tab(s) by mouth once a day  -- Indication: For Cardioprotective    tamsulosin 0.4 mg oral capsule  -- 1 cap(s) by mouth once a day (at bedtime)  -- Indication: For BPH    atorvastatin 40 mg oral tablet  -- 1 tab(s) by mouth once a day (at bedtime)  -- Indication: For HLD    clopidogrel 75 mg oral tablet  -- 1 tab(s) by mouth once a day  -- Indication: For Cardioprotective    Lopressor  -- 25 milligram(s) by mouth 2 times a day  -- Indication: For HTN (hypertension)    albuterol CFC free 90 mcg/inh inhalation aerosol  -- 2 puff(s) inhaled every 6 hours  -- Indication: For Asthma exacerbation    budesonide-formoterol 160 mcg-4.5 mcg/inh inhalation aerosol  -- 2 puff(s) inhaled every 6 hours, As Needed for shortness of breath  -- Indication: For Asthma exacerbation    tiotropium 18 mcg inhalation capsule  -- 1 cap(s) inhaled once a day  -- Indication: For Asthma exacerbation    Proventil HFA 90 mcg/inh inhalation aerosol  -- 2 puff(s) inhaled 2 times a day  -- For inhalation only.  It is very important that you take or use this exactly as directed.  Do not skip doses or discontinue unless directed by your doctor.  Obtain medical advice before taking any non-prescription drugs as some may affect the action of this medication.  Shake well before use.    -- Indication: For Shortness of breath    docusate sodium 100 mg oral capsule  -- 1 cap(s) by mouth once a day  -- Indication: For Constipation    montelukast 10 mg oral tablet  -- 1 tab(s) by mouth once a day  -- Indication: For Asthma exacerbation    levoFLOXacin 500 mg oral tablet  -- 1 tab(s) by mouth every 24 hours  -- Indication: For Bronchitis    Drisdol 50,000 intl units (1.25 mg) oral capsule  -- 1 cap(s) by mouth 2 times a week  -- Indication: For Vitamin supplement

## 2017-04-11 LAB
ANION GAP SERPL CALC-SCNC: 6 MMOL/L — SIGNIFICANT CHANGE UP (ref 5–17)
BUN SERPL-MCNC: 30 MG/DL — HIGH (ref 7–18)
CALCIUM SERPL-MCNC: 8.5 MG/DL — SIGNIFICANT CHANGE UP (ref 8.4–10.5)
CHLORIDE SERPL-SCNC: 105 MMOL/L — SIGNIFICANT CHANGE UP (ref 96–108)
CO2 SERPL-SCNC: 32 MMOL/L — HIGH (ref 22–31)
CREAT SERPL-MCNC: 1.11 MG/DL — SIGNIFICANT CHANGE UP (ref 0.5–1.3)
GLUCOSE SERPL-MCNC: 85 MG/DL — SIGNIFICANT CHANGE UP (ref 70–99)
HCT VFR BLD CALC: 40.6 % — SIGNIFICANT CHANGE UP (ref 39–50)
HGB BLD-MCNC: 12.5 G/DL — LOW (ref 13–17)
MAGNESIUM SERPL-MCNC: 2.4 MG/DL — SIGNIFICANT CHANGE UP (ref 1.8–2.4)
MCHC RBC-ENTMCNC: 22.7 PG — LOW (ref 27–34)
MCHC RBC-ENTMCNC: 30.9 GM/DL — LOW (ref 32–36)
MCV RBC AUTO: 73.4 FL — LOW (ref 80–100)
PHOSPHATE SERPL-MCNC: 3 MG/DL — SIGNIFICANT CHANGE UP (ref 2.5–4.5)
PLATELET # BLD AUTO: 207 K/UL — SIGNIFICANT CHANGE UP (ref 150–400)
POTASSIUM SERPL-MCNC: 4.1 MMOL/L — SIGNIFICANT CHANGE UP (ref 3.5–5.3)
POTASSIUM SERPL-SCNC: 4.1 MMOL/L — SIGNIFICANT CHANGE UP (ref 3.5–5.3)
RBC # BLD: 5.52 M/UL — SIGNIFICANT CHANGE UP (ref 4.2–5.8)
RBC # FLD: 17.1 % — HIGH (ref 10.3–14.5)
SODIUM SERPL-SCNC: 143 MMOL/L — SIGNIFICANT CHANGE UP (ref 135–145)
WBC # BLD: 13 K/UL — HIGH (ref 3.8–10.5)
WBC # FLD AUTO: 13 K/UL — HIGH (ref 3.8–10.5)

## 2017-04-11 RX ADMIN — Medication 81 MILLIGRAM(S): at 12:11

## 2017-04-11 RX ADMIN — Medication 40 MILLIGRAM(S): at 06:55

## 2017-04-11 RX ADMIN — ENOXAPARIN SODIUM 40 MILLIGRAM(S): 100 INJECTION SUBCUTANEOUS at 12:12

## 2017-04-11 RX ADMIN — MONTELUKAST 10 MILLIGRAM(S): 4 TABLET, CHEWABLE ORAL at 22:12

## 2017-04-11 RX ADMIN — Medication 75 MILLIGRAM(S): at 17:19

## 2017-04-11 RX ADMIN — TIOTROPIUM BROMIDE 1 CAPSULE(S): 18 CAPSULE ORAL; RESPIRATORY (INHALATION) at 12:11

## 2017-04-11 RX ADMIN — ATORVASTATIN CALCIUM 40 MILLIGRAM(S): 80 TABLET, FILM COATED ORAL at 22:12

## 2017-04-11 RX ADMIN — ALBUTEROL 1 PUFF(S): 90 AEROSOL, METERED ORAL at 22:13

## 2017-04-11 RX ADMIN — ALBUTEROL 1 PUFF(S): 90 AEROSOL, METERED ORAL at 03:35

## 2017-04-11 RX ADMIN — CLOPIDOGREL BISULFATE 75 MILLIGRAM(S): 75 TABLET, FILM COATED ORAL at 12:11

## 2017-04-11 RX ADMIN — Medication 1000 UNIT(S): at 12:11

## 2017-04-11 RX ADMIN — ALBUTEROL 1 PUFF(S): 90 AEROSOL, METERED ORAL at 12:11

## 2017-04-11 RX ADMIN — TAMSULOSIN HYDROCHLORIDE 0.4 MILLIGRAM(S): 0.4 CAPSULE ORAL at 22:12

## 2017-04-11 RX ADMIN — Medication 25 MILLIGRAM(S): at 06:55

## 2017-04-11 RX ADMIN — Medication 75 MILLIGRAM(S): at 06:56

## 2017-04-11 RX ADMIN — ALBUTEROL 1 PUFF(S): 90 AEROSOL, METERED ORAL at 06:55

## 2017-04-11 RX ADMIN — Medication 100 MILLIGRAM(S): at 12:11

## 2017-04-11 RX ADMIN — Medication 1: at 12:12

## 2017-04-11 RX ADMIN — PANTOPRAZOLE SODIUM 40 MILLIGRAM(S): 20 TABLET, DELAYED RELEASE ORAL at 06:55

## 2017-04-11 RX ADMIN — ALBUTEROL 1 PUFF(S): 90 AEROSOL, METERED ORAL at 16:06

## 2017-04-12 LAB
CULTURE RESULTS: SIGNIFICANT CHANGE UP
CULTURE RESULTS: SIGNIFICANT CHANGE UP
SPECIMEN SOURCE: SIGNIFICANT CHANGE UP
SPECIMEN SOURCE: SIGNIFICANT CHANGE UP

## 2017-04-12 PROCEDURE — 71010: CPT | Mod: 26

## 2017-04-12 RX ORDER — ALBUTEROL 90 UG/1
2 AEROSOL, METERED ORAL
Qty: 1 | Refills: 0 | OUTPATIENT
Start: 2017-04-12

## 2017-04-12 RX ADMIN — ALBUTEROL 1 PUFF(S): 90 AEROSOL, METERED ORAL at 14:25

## 2017-04-12 RX ADMIN — ATORVASTATIN CALCIUM 40 MILLIGRAM(S): 80 TABLET, FILM COATED ORAL at 21:37

## 2017-04-12 RX ADMIN — CLOPIDOGREL BISULFATE 75 MILLIGRAM(S): 75 TABLET, FILM COATED ORAL at 11:34

## 2017-04-12 RX ADMIN — TAMSULOSIN HYDROCHLORIDE 0.4 MILLIGRAM(S): 0.4 CAPSULE ORAL at 21:37

## 2017-04-12 RX ADMIN — PANTOPRAZOLE SODIUM 40 MILLIGRAM(S): 20 TABLET, DELAYED RELEASE ORAL at 05:24

## 2017-04-12 RX ADMIN — Medication 75 MILLIGRAM(S): at 05:24

## 2017-04-12 RX ADMIN — Medication 100 MILLIGRAM(S): at 11:34

## 2017-04-12 RX ADMIN — ALBUTEROL 1 PUFF(S): 90 AEROSOL, METERED ORAL at 11:34

## 2017-04-12 RX ADMIN — Medication 75 MILLIGRAM(S): at 17:34

## 2017-04-12 RX ADMIN — ALBUTEROL 1 PUFF(S): 90 AEROSOL, METERED ORAL at 17:34

## 2017-04-12 RX ADMIN — Medication 81 MILLIGRAM(S): at 11:34

## 2017-04-12 RX ADMIN — Medication 1000 UNIT(S): at 11:34

## 2017-04-12 RX ADMIN — Medication 25 MILLIGRAM(S): at 05:25

## 2017-04-12 RX ADMIN — ALBUTEROL 1 PUFF(S): 90 AEROSOL, METERED ORAL at 21:37

## 2017-04-12 RX ADMIN — ENOXAPARIN SODIUM 40 MILLIGRAM(S): 100 INJECTION SUBCUTANEOUS at 11:34

## 2017-04-12 RX ADMIN — ALBUTEROL 1 PUFF(S): 90 AEROSOL, METERED ORAL at 05:24

## 2017-04-12 RX ADMIN — ALBUTEROL 1 PUFF(S): 90 AEROSOL, METERED ORAL at 05:23

## 2017-04-12 RX ADMIN — MONTELUKAST 10 MILLIGRAM(S): 4 TABLET, CHEWABLE ORAL at 21:37

## 2017-04-12 RX ADMIN — TIOTROPIUM BROMIDE 1 CAPSULE(S): 18 CAPSULE ORAL; RESPIRATORY (INHALATION) at 11:34

## 2017-04-12 RX ADMIN — Medication 40 MILLIGRAM(S): at 05:24

## 2017-04-13 VITALS
HEART RATE: 69 BPM | OXYGEN SATURATION: 97 % | TEMPERATURE: 98 F | RESPIRATION RATE: 18 BRPM | SYSTOLIC BLOOD PRESSURE: 118 MMHG | DIASTOLIC BLOOD PRESSURE: 55 MMHG

## 2017-04-13 PROCEDURE — 93005 ELECTROCARDIOGRAM TRACING: CPT

## 2017-04-13 PROCEDURE — 85730 THROMBOPLASTIN TIME PARTIAL: CPT

## 2017-04-13 PROCEDURE — 80053 COMPREHEN METABOLIC PANEL: CPT

## 2017-04-13 PROCEDURE — 84484 ASSAY OF TROPONIN QUANT: CPT

## 2017-04-13 PROCEDURE — 84443 ASSAY THYROID STIM HORMONE: CPT

## 2017-04-13 PROCEDURE — 85027 COMPLETE CBC AUTOMATED: CPT

## 2017-04-13 PROCEDURE — 99285 EMERGENCY DEPT VISIT HI MDM: CPT | Mod: 25

## 2017-04-13 PROCEDURE — 82306 VITAMIN D 25 HYDROXY: CPT

## 2017-04-13 PROCEDURE — 87581 M.PNEUMON DNA AMP PROBE: CPT

## 2017-04-13 PROCEDURE — 83036 HEMOGLOBIN GLYCOSYLATED A1C: CPT

## 2017-04-13 PROCEDURE — 82803 BLOOD GASES ANY COMBINATION: CPT

## 2017-04-13 PROCEDURE — 80048 BASIC METABOLIC PNL TOTAL CA: CPT

## 2017-04-13 PROCEDURE — 87633 RESP VIRUS 12-25 TARGETS: CPT

## 2017-04-13 PROCEDURE — 82746 ASSAY OF FOLIC ACID SERUM: CPT

## 2017-04-13 PROCEDURE — 84100 ASSAY OF PHOSPHORUS: CPT

## 2017-04-13 PROCEDURE — 82607 VITAMIN B-12: CPT

## 2017-04-13 PROCEDURE — 82553 CREATINE MB FRACTION: CPT

## 2017-04-13 PROCEDURE — 80061 LIPID PANEL: CPT

## 2017-04-13 PROCEDURE — 83550 IRON BINDING TEST: CPT

## 2017-04-13 PROCEDURE — 94640 AIRWAY INHALATION TREATMENT: CPT

## 2017-04-13 PROCEDURE — 82550 ASSAY OF CK (CPK): CPT

## 2017-04-13 PROCEDURE — 96374 THER/PROPH/DIAG INJ IV PUSH: CPT

## 2017-04-13 PROCEDURE — 87486 CHLMYD PNEUM DNA AMP PROBE: CPT

## 2017-04-13 PROCEDURE — 83880 ASSAY OF NATRIURETIC PEPTIDE: CPT

## 2017-04-13 PROCEDURE — 85610 PROTHROMBIN TIME: CPT

## 2017-04-13 PROCEDURE — 96375 TX/PRO/DX INJ NEW DRUG ADDON: CPT

## 2017-04-13 PROCEDURE — 93306 TTE W/DOPPLER COMPLETE: CPT

## 2017-04-13 PROCEDURE — 71045 X-RAY EXAM CHEST 1 VIEW: CPT

## 2017-04-13 PROCEDURE — 87798 DETECT AGENT NOS DNA AMP: CPT

## 2017-04-13 PROCEDURE — 87040 BLOOD CULTURE FOR BACTERIA: CPT

## 2017-04-13 PROCEDURE — 83605 ASSAY OF LACTIC ACID: CPT

## 2017-04-13 PROCEDURE — 82728 ASSAY OF FERRITIN: CPT

## 2017-04-13 PROCEDURE — 83735 ASSAY OF MAGNESIUM: CPT

## 2017-04-13 RX ORDER — TIOTROPIUM BROMIDE 18 UG/1
1 CAPSULE ORAL; RESPIRATORY (INHALATION)
Qty: 1 | Refills: 0 | OUTPATIENT
Start: 2017-04-13 | End: 2017-05-13

## 2017-04-13 RX ORDER — BUDESONIDE AND FORMOTEROL FUMARATE DIHYDRATE 160; 4.5 UG/1; UG/1
2 AEROSOL RESPIRATORY (INHALATION)
Qty: 1 | Refills: 0 | OUTPATIENT
Start: 2017-04-13 | End: 2017-05-13

## 2017-04-13 RX ORDER — MONTELUKAST 4 MG/1
1 TABLET, CHEWABLE ORAL
Qty: 30 | Refills: 0 | OUTPATIENT
Start: 2017-04-13 | End: 2017-05-13

## 2017-04-13 RX ADMIN — Medication 1000 UNIT(S): at 12:23

## 2017-04-13 RX ADMIN — Medication 40 MILLIGRAM(S): at 05:47

## 2017-04-13 RX ADMIN — Medication 81 MILLIGRAM(S): at 12:23

## 2017-04-13 RX ADMIN — ALBUTEROL 1 PUFF(S): 90 AEROSOL, METERED ORAL at 12:23

## 2017-04-13 RX ADMIN — ENOXAPARIN SODIUM 40 MILLIGRAM(S): 100 INJECTION SUBCUTANEOUS at 12:24

## 2017-04-13 RX ADMIN — PANTOPRAZOLE SODIUM 40 MILLIGRAM(S): 20 TABLET, DELAYED RELEASE ORAL at 05:47

## 2017-04-13 RX ADMIN — Medication 75 MILLIGRAM(S): at 05:47

## 2017-04-13 RX ADMIN — ALBUTEROL 1 PUFF(S): 90 AEROSOL, METERED ORAL at 05:46

## 2017-04-13 RX ADMIN — Medication 25 MILLIGRAM(S): at 05:47

## 2017-04-13 RX ADMIN — ALBUTEROL 1 PUFF(S): 90 AEROSOL, METERED ORAL at 01:56

## 2017-04-13 RX ADMIN — CLOPIDOGREL BISULFATE 75 MILLIGRAM(S): 75 TABLET, FILM COATED ORAL at 12:23

## 2017-04-13 RX ADMIN — TIOTROPIUM BROMIDE 1 CAPSULE(S): 18 CAPSULE ORAL; RESPIRATORY (INHALATION) at 12:23

## 2017-04-13 RX ADMIN — Medication 100 MILLIGRAM(S): at 12:24

## 2017-04-17 DIAGNOSIS — J45.901 UNSPECIFIED ASTHMA WITH (ACUTE) EXACERBATION: ICD-10-CM

## 2017-04-17 DIAGNOSIS — Z79.82 LONG TERM (CURRENT) USE OF ASPIRIN: ICD-10-CM

## 2017-04-17 DIAGNOSIS — Z79.02 LONG TERM (CURRENT) USE OF ANTITHROMBOTICS/ANTIPLATELETS: ICD-10-CM

## 2017-04-17 DIAGNOSIS — I10 ESSENTIAL (PRIMARY) HYPERTENSION: ICD-10-CM

## 2017-04-17 DIAGNOSIS — J20.8 ACUTE BRONCHITIS DUE TO OTHER SPECIFIED ORGANISMS: ICD-10-CM

## 2017-04-17 DIAGNOSIS — D64.9 ANEMIA, UNSPECIFIED: ICD-10-CM

## 2017-04-17 DIAGNOSIS — I25.2 OLD MYOCARDIAL INFARCTION: ICD-10-CM

## 2017-04-17 DIAGNOSIS — J11.1 INFLUENZA DUE TO UNIDENTIFIED INFLUENZA VIRUS WITH OTHER RESPIRATORY MANIFESTATIONS: ICD-10-CM

## 2017-04-17 DIAGNOSIS — Z86.73 PERSONAL HISTORY OF TRANSIENT ISCHEMIC ATTACK (TIA), AND CEREBRAL INFARCTION WITHOUT RESIDUAL DEFICITS: ICD-10-CM

## 2017-04-17 DIAGNOSIS — E78.5 HYPERLIPIDEMIA, UNSPECIFIED: ICD-10-CM

## 2017-06-22 NOTE — DISCHARGE NOTE ADULT - PLAN OF CARE
Pt not in room.  Has gone for US Prevent exacerbation Continue medical management, Monitor BP regularly, follow up with PCP as outpatient. YOu was admitted for asthma exacerbation secondary to Influenza and Bronchitis. We treated Influenza with Tamiflu for 5 days. Continue Levaquin for 6 more days for bronchitis. Stay well hydrated. We are discharging on prednisone, tapering dose. Prescription are send to your pharmacy. Seek medical attention if you have severe shortness of breath or wheezing. Be complaint with medication and follow up with PCP within a week of discharge as outpatient. Continue statin and follow lipid panel in 3 months. Continue Levaquin as prescribed. Follow up with your primary care doctor within week of discharge. Treat acute episode. Goal BP<150/90 Control hypercholesteremia.

## 2017-11-01 ENCOUNTER — OUTPATIENT (OUTPATIENT)
Dept: OUTPATIENT SERVICES | Facility: HOSPITAL | Age: 82
LOS: 1 days | End: 2017-11-01
Payer: MEDICARE

## 2017-11-01 PROCEDURE — G9001: CPT

## 2017-11-29 ENCOUNTER — INPATIENT (INPATIENT)
Facility: HOSPITAL | Age: 82
LOS: 8 days | Discharge: HOME | DRG: 313 | End: 2017-12-08
Attending: INTERNAL MEDICINE | Admitting: INTERNAL MEDICINE
Payer: MEDICARE

## 2017-11-29 VITALS
SYSTOLIC BLOOD PRESSURE: 170 MMHG | HEART RATE: 72 BPM | TEMPERATURE: 98 F | WEIGHT: 149.91 LBS | DIASTOLIC BLOOD PRESSURE: 65 MMHG | HEIGHT: 66 IN | RESPIRATION RATE: 18 BRPM | OXYGEN SATURATION: 99 %

## 2017-11-29 DIAGNOSIS — R07.9 CHEST PAIN, UNSPECIFIED: ICD-10-CM

## 2017-11-29 DIAGNOSIS — J45.909 UNSPECIFIED ASTHMA, UNCOMPLICATED: ICD-10-CM

## 2017-11-29 DIAGNOSIS — Z29.9 ENCOUNTER FOR PROPHYLACTIC MEASURES, UNSPECIFIED: ICD-10-CM

## 2017-11-29 DIAGNOSIS — E78.5 HYPERLIPIDEMIA, UNSPECIFIED: ICD-10-CM

## 2017-11-29 DIAGNOSIS — I10 ESSENTIAL (PRIMARY) HYPERTENSION: ICD-10-CM

## 2017-11-29 DIAGNOSIS — R60.0 LOCALIZED EDEMA: ICD-10-CM

## 2017-11-29 DIAGNOSIS — L03.90 CELLULITIS, UNSPECIFIED: ICD-10-CM

## 2017-11-29 LAB
ALBUMIN SERPL ELPH-MCNC: 2.9 G/DL — LOW (ref 3.5–5)
ALP SERPL-CCNC: 54 U/L — SIGNIFICANT CHANGE UP (ref 40–120)
ALT FLD-CCNC: 13 U/L DA — SIGNIFICANT CHANGE UP (ref 10–60)
ANION GAP SERPL CALC-SCNC: 6 MMOL/L — SIGNIFICANT CHANGE UP (ref 5–17)
APTT BLD: 33.5 SEC — SIGNIFICANT CHANGE UP (ref 27.5–37.4)
AST SERPL-CCNC: 12 U/L — SIGNIFICANT CHANGE UP (ref 10–40)
BASOPHILS # BLD AUTO: 0.1 K/UL — SIGNIFICANT CHANGE UP (ref 0–0.2)
BASOPHILS NFR BLD AUTO: 0.9 % — SIGNIFICANT CHANGE UP (ref 0–2)
BILIRUB SERPL-MCNC: 0.3 MG/DL — SIGNIFICANT CHANGE UP (ref 0.2–1.2)
BUN SERPL-MCNC: 21 MG/DL — HIGH (ref 7–18)
CALCIUM SERPL-MCNC: 8.2 MG/DL — LOW (ref 8.4–10.5)
CHLORIDE SERPL-SCNC: 100 MMOL/L — SIGNIFICANT CHANGE UP (ref 96–108)
CK MB BLD-MCNC: <1.6 % — SIGNIFICANT CHANGE UP (ref 0–3.5)
CK MB CFR SERPL CALC: <1 NG/ML — SIGNIFICANT CHANGE UP (ref 0–3.6)
CK SERPL-CCNC: 61 U/L — SIGNIFICANT CHANGE UP (ref 35–232)
CO2 SERPL-SCNC: 30 MMOL/L — SIGNIFICANT CHANGE UP (ref 22–31)
CREAT SERPL-MCNC: 0.88 MG/DL — SIGNIFICANT CHANGE UP (ref 0.5–1.3)
EOSINOPHIL # BLD AUTO: 0.2 K/UL — SIGNIFICANT CHANGE UP (ref 0–0.5)
EOSINOPHIL NFR BLD AUTO: 2.1 % — SIGNIFICANT CHANGE UP (ref 0–6)
GLUCOSE SERPL-MCNC: 115 MG/DL — HIGH (ref 70–99)
HCT VFR BLD CALC: 35 % — LOW (ref 39–50)
HGB BLD-MCNC: 10.4 G/DL — LOW (ref 13–17)
INR BLD: 1.23 RATIO — HIGH (ref 0.88–1.16)
LYMPHOCYTES # BLD AUTO: 2.2 K/UL — SIGNIFICANT CHANGE UP (ref 1–3.3)
LYMPHOCYTES # BLD AUTO: 21.5 % — SIGNIFICANT CHANGE UP (ref 13–44)
MCHC RBC-ENTMCNC: 21.2 PG — LOW (ref 27–34)
MCHC RBC-ENTMCNC: 29.8 GM/DL — LOW (ref 32–36)
MCV RBC AUTO: 71 FL — LOW (ref 80–100)
MONOCYTES # BLD AUTO: 1 K/UL — HIGH (ref 0–0.9)
MONOCYTES NFR BLD AUTO: 9.8 % — SIGNIFICANT CHANGE UP (ref 2–14)
NEUTROPHILS # BLD AUTO: 6.8 K/UL — SIGNIFICANT CHANGE UP (ref 1.8–7.4)
NEUTROPHILS NFR BLD AUTO: 65.6 % — SIGNIFICANT CHANGE UP (ref 43–77)
NT-PROBNP SERPL-SCNC: 695 PG/ML — HIGH (ref 0–450)
PLATELET # BLD AUTO: 321 K/UL — SIGNIFICANT CHANGE UP (ref 150–400)
POTASSIUM SERPL-MCNC: 3.5 MMOL/L — SIGNIFICANT CHANGE UP (ref 3.5–5.3)
POTASSIUM SERPL-SCNC: 3.5 MMOL/L — SIGNIFICANT CHANGE UP (ref 3.5–5.3)
PROT SERPL-MCNC: 7.3 G/DL — SIGNIFICANT CHANGE UP (ref 6–8.3)
PROTHROM AB SERPL-ACNC: 13.5 SEC — HIGH (ref 9.8–12.7)
RBC # BLD: 4.93 M/UL — SIGNIFICANT CHANGE UP (ref 4.2–5.8)
RBC # FLD: 16.5 % — HIGH (ref 10.3–14.5)
SODIUM SERPL-SCNC: 136 MMOL/L — SIGNIFICANT CHANGE UP (ref 135–145)
TROPONIN I SERPL-MCNC: <0.015 NG/ML — SIGNIFICANT CHANGE UP (ref 0–0.04)
WBC # BLD: 10.3 K/UL — SIGNIFICANT CHANGE UP (ref 3.8–10.5)
WBC # FLD AUTO: 10.3 K/UL — SIGNIFICANT CHANGE UP (ref 3.8–10.5)

## 2017-11-29 PROCEDURE — 99285 EMERGENCY DEPT VISIT HI MDM: CPT

## 2017-11-29 PROCEDURE — 73610 X-RAY EXAM OF ANKLE: CPT | Mod: 26,LT

## 2017-11-29 PROCEDURE — 71010: CPT | Mod: 26

## 2017-11-29 RX ORDER — MONTELUKAST 4 MG/1
10 TABLET, CHEWABLE ORAL AT BEDTIME
Qty: 0 | Refills: 0 | Status: DISCONTINUED | OUTPATIENT
Start: 2017-11-29 | End: 2017-12-08

## 2017-11-29 RX ORDER — ATORVASTATIN CALCIUM 80 MG/1
40 TABLET, FILM COATED ORAL AT BEDTIME
Qty: 0 | Refills: 0 | Status: DISCONTINUED | OUTPATIENT
Start: 2017-11-29 | End: 2017-12-08

## 2017-11-29 RX ORDER — CLOPIDOGREL BISULFATE 75 MG/1
75 TABLET, FILM COATED ORAL DAILY
Qty: 0 | Refills: 0 | Status: DISCONTINUED | OUTPATIENT
Start: 2017-11-29 | End: 2017-12-08

## 2017-11-29 RX ORDER — ENOXAPARIN SODIUM 100 MG/ML
40 INJECTION SUBCUTANEOUS DAILY
Qty: 0 | Refills: 0 | Status: DISCONTINUED | OUTPATIENT
Start: 2017-11-29 | End: 2017-12-08

## 2017-11-29 RX ORDER — TAMSULOSIN HYDROCHLORIDE 0.4 MG/1
0.8 CAPSULE ORAL AT BEDTIME
Qty: 0 | Refills: 0 | Status: DISCONTINUED | OUTPATIENT
Start: 2017-11-29 | End: 2017-12-08

## 2017-11-29 RX ORDER — FUROSEMIDE 40 MG
20 TABLET ORAL DAILY
Qty: 0 | Refills: 0 | Status: DISCONTINUED | OUTPATIENT
Start: 2017-11-29 | End: 2017-11-30

## 2017-11-29 RX ORDER — MONTELUKAST 4 MG/1
10 TABLET, CHEWABLE ORAL DAILY
Qty: 0 | Refills: 0 | Status: DISCONTINUED | OUTPATIENT
Start: 2017-11-29 | End: 2017-11-29

## 2017-11-29 RX ORDER — IPRATROPIUM/ALBUTEROL SULFATE 18-103MCG
3 AEROSOL WITH ADAPTER (GRAM) INHALATION EVERY 6 HOURS
Qty: 0 | Refills: 0 | Status: DISCONTINUED | OUTPATIENT
Start: 2017-11-29 | End: 2017-12-08

## 2017-11-29 RX ORDER — ASPIRIN/CALCIUM CARB/MAGNESIUM 324 MG
81 TABLET ORAL DAILY
Qty: 0 | Refills: 0 | Status: DISCONTINUED | OUTPATIENT
Start: 2017-11-29 | End: 2017-12-08

## 2017-11-29 RX ORDER — DOCUSATE SODIUM 100 MG
100 CAPSULE ORAL THREE TIMES A DAY
Qty: 0 | Refills: 0 | Status: DISCONTINUED | OUTPATIENT
Start: 2017-11-29 | End: 2017-12-03

## 2017-11-29 RX ORDER — FINASTERIDE 5 MG/1
5 TABLET, FILM COATED ORAL DAILY
Qty: 0 | Refills: 0 | Status: DISCONTINUED | OUTPATIENT
Start: 2017-11-29 | End: 2017-12-08

## 2017-11-29 RX ORDER — METOPROLOL TARTRATE 50 MG
12.5 TABLET ORAL
Qty: 0 | Refills: 0 | Status: DISCONTINUED | OUTPATIENT
Start: 2017-11-29 | End: 2017-12-04

## 2017-11-29 RX ORDER — FUROSEMIDE 40 MG
40 TABLET ORAL ONCE
Qty: 0 | Refills: 0 | Status: COMPLETED | OUTPATIENT
Start: 2017-11-29 | End: 2017-11-29

## 2017-11-29 RX ADMIN — Medication 40 MILLIGRAM(S): at 19:35

## 2017-11-29 RX ADMIN — Medication 100 MILLIGRAM(S): at 20:16

## 2017-11-29 NOTE — H&P ADULT - PROBLEM SELECTOR PLAN 1
- -pressure like, moderate, left sided, non radiating pain  -EKG: NSR  -T1 negative, f/u T2 and T3  -f/u ECHO  -monitor on tele  -c/w aspirin, statin, metoprolol. -pressure like, moderate, left sided, non radiating pain  -EKG: NSR  -T1 negative, f/u T2 and T3  -Last ECHO from April 2017 showed EF of 56% and grade 2 diastolic dysfunction.   -Heart score of 4 , so loderate risk of adverse cardiac events  -JAMES score of 2 (age and ASA use)  -monitor on tele  -f/u repeat ECHO.  -c/w aspirin, statin, metoprolol. -pressure like, moderate, left sided, non radiating pain  -EKG: NSR  -T1 negative, f/u T2 and T3  -Last ECHO from April 2017 showed EF of 56% and grade 2 diastolic dysfunction.   -Heart score of 4 , so loderate risk of adverse cardiac events  -JAMES score of 2 (age and ASA use)  -monitor on tele  -f/u repeat ECHO.  -c/w aspirin, statin, metoprolol.  -Cardio Dr. Donald consulted

## 2017-11-29 NOTE — ED ADULT NURSE NOTE - OBJECTIVE STATEMENT
Pt co having chest pain that started today. co worsening of bilateral leg pain that has been ongoing for 5 day. co sob. EMS provided gave aspirin enroute, pt states symptoms is relief in the ED. Bilateral leg swelling, Pitting +2

## 2017-11-29 NOTE — H&P ADULT - ASSESSMENT
82 years old male from home, [place] citizen speaking, ambulates with cane at baseline, PMH of Asthma, HTN, HLD, CVA (2015) p/w complaints of chest pain and b/l leg edema. Admitted to telemetry floor for further management.

## 2017-11-29 NOTE — H&P ADULT - PROBLEM SELECTOR PLAN 2
-warm, red and tender extremities  -likely 2/2 cellulitis or cardiac related  -c/w Lasix and f/u ECHO  -c/w IV abx. -warm, red and tender extremities  -could be secondary to cellulitis or CHF  -c/w Lasix and f/u ECHO  -c/w IV abx, f/u blood cultures.

## 2017-11-29 NOTE — ED PROVIDER NOTE - OBJECTIVE STATEMENT
81 y/o M w/ PMHx of asthma, CVA, HTN, HLD presents to ED c/o sudden onset chest pain x 1 hour and b/l LE swelling. Pt denies any cough, vomiting, fever, nausea or any other complaints. Pt is on Aspirin and Lasix; Lasix has not helped LE swelling since he has been put on it. PMD: Dr. Jt Grove.

## 2017-11-29 NOTE — ED ADULT NURSE REASSESSMENT NOTE - NS ED NURSE REASSESS COMMENT FT1
patient received lying supine aox3 breathing spontaneously on room air. Breathing not distressed. IV access in-situ. Lower extremities appears slightly swollen and red. Admitting MD at bedside. Son at bedside.

## 2017-11-29 NOTE — H&P ADULT - HISTORY OF PRESENT ILLNESS
82 years old male from home, Macanese speaking, ambulates with cane at baseline, PMH of Asthma, HTN, HLD, CVA (2015) p/w complaints of chest pain and b/l leg edema. Patient is AAO x 3 and preferred son at bedside to translate. Patient states that chest pain started at 5 pm today, now resolved but described as pressure like, moderate, left sided, non radiating. Also has b/l leg edema x 5 days, was seen by PCP who referred to a Cardiologist who started him on Lasix which he took for 2 days with not much relief. Denies any fever, chills, sob, palpitations, nausea, vomiting, diarrhea, abdominal pain, recent travel, trauma or sick contacts. Does have increased urinary frequency 2/2 BPH as on meds ft likel and constipation also. 82 years old male from home, Turkmen speaking, ambulates with cane at baseline, PMH of Asthma, HTN, HLD, CVA (2015) p/w complaints of chest pain and b/l leg edema. Patient is AAO x 3 and preferred son at bedside to translate. Patient states that chest pain started at 5 pm today, now resolved but described as pressure like, moderate, left sided, non radiating. Also has b/l leg edema x 5 days, was seen by PCP who referred to a Cardiologist who started him on Lasix which he took for 2 days with not much relief. Denies any fever, chills, sob, palpitations, nausea, vomiting, diarrhea, abdominal pain, recent travel, trauma or sick contacts. Does have increased urinary frequency, likely 2/2 BPH as on meds for it. Also complains of constipation, last BM 2 days back.

## 2017-11-29 NOTE — ED PROVIDER NOTE - MEDICAL DECISION MAKING DETAILS
82 y/9o M presenting w/ chest pain. Will need to r/o ACS. Check BMP, give additional diuretics. Pt w/ possible CHF exacerbation, will need to be admitted.

## 2017-11-30 DIAGNOSIS — R69 ILLNESS, UNSPECIFIED: ICD-10-CM

## 2017-11-30 PROBLEM — E78.00 PURE HYPERCHOLESTEROLEMIA, UNSPECIFIED: Chronic | Status: ACTIVE | Noted: 2017-04-07

## 2017-11-30 PROBLEM — I10 ESSENTIAL (PRIMARY) HYPERTENSION: Chronic | Status: ACTIVE | Noted: 2017-04-07

## 2017-11-30 LAB
24R-OH-CALCIDIOL SERPL-MCNC: 16.7 NG/ML — LOW (ref 30–80)
ANION GAP SERPL CALC-SCNC: 7 MMOL/L — SIGNIFICANT CHANGE UP (ref 5–17)
APPEARANCE UR: CLEAR — SIGNIFICANT CHANGE UP
BASOPHILS # BLD AUTO: 0.1 K/UL — SIGNIFICANT CHANGE UP (ref 0–0.2)
BASOPHILS NFR BLD AUTO: 1.1 % — SIGNIFICANT CHANGE UP (ref 0–2)
BILIRUB UR-MCNC: NEGATIVE — SIGNIFICANT CHANGE UP
BUN SERPL-MCNC: 19 MG/DL — HIGH (ref 7–18)
CALCIUM SERPL-MCNC: 8.4 MG/DL — SIGNIFICANT CHANGE UP (ref 8.4–10.5)
CHLORIDE SERPL-SCNC: 100 MMOL/L — SIGNIFICANT CHANGE UP (ref 96–108)
CHOLEST SERPL-MCNC: 136 MG/DL — SIGNIFICANT CHANGE UP (ref 10–199)
CK MB BLD-MCNC: <1.9 % — SIGNIFICANT CHANGE UP (ref 0–3.5)
CK MB BLD-MCNC: <2.1 % — SIGNIFICANT CHANGE UP (ref 0–3.5)
CK MB CFR SERPL CALC: <1 NG/ML — SIGNIFICANT CHANGE UP (ref 0–3.6)
CK MB CFR SERPL CALC: <1 NG/ML — SIGNIFICANT CHANGE UP (ref 0–3.6)
CK SERPL-CCNC: 48 U/L — SIGNIFICANT CHANGE UP (ref 35–232)
CK SERPL-CCNC: 53 U/L — SIGNIFICANT CHANGE UP (ref 35–232)
CO2 SERPL-SCNC: 30 MMOL/L — SIGNIFICANT CHANGE UP (ref 22–31)
COLOR SPEC: YELLOW — SIGNIFICANT CHANGE UP
CREAT SERPL-MCNC: 0.88 MG/DL — SIGNIFICANT CHANGE UP (ref 0.5–1.3)
DIFF PNL FLD: ABNORMAL
EOSINOPHIL # BLD AUTO: 0.1 K/UL — SIGNIFICANT CHANGE UP (ref 0–0.5)
EOSINOPHIL NFR BLD AUTO: 1.3 % — SIGNIFICANT CHANGE UP (ref 0–6)
FERRITIN SERPL-MCNC: 25 NG/ML — LOW (ref 30–400)
FOLATE SERPL-MCNC: 18.8 NG/ML — SIGNIFICANT CHANGE UP (ref 4.8–24.2)
GLUCOSE SERPL-MCNC: 95 MG/DL — SIGNIFICANT CHANGE UP (ref 70–99)
GLUCOSE UR QL: NEGATIVE — SIGNIFICANT CHANGE UP
HBA1C BLD-MCNC: 5.4 % — SIGNIFICANT CHANGE UP (ref 4–5.6)
HCT VFR BLD CALC: 32.6 % — LOW (ref 39–50)
HDLC SERPL-MCNC: 49 MG/DL — SIGNIFICANT CHANGE UP (ref 40–125)
HGB BLD-MCNC: 9.9 G/DL — LOW (ref 13–17)
IRON SATN MFR SERPL: 18 UG/DL — LOW (ref 65–170)
IRON SATN MFR SERPL: 5 % — LOW (ref 20–55)
KETONES UR-MCNC: NEGATIVE — SIGNIFICANT CHANGE UP
LEUKOCYTE ESTERASE UR-ACNC: NEGATIVE — SIGNIFICANT CHANGE UP
LIPID PNL WITH DIRECT LDL SERPL: 77 MG/DL — SIGNIFICANT CHANGE UP
LYMPHOCYTES # BLD AUTO: 2.5 K/UL — SIGNIFICANT CHANGE UP (ref 1–3.3)
LYMPHOCYTES # BLD AUTO: 28.1 % — SIGNIFICANT CHANGE UP (ref 13–44)
MAGNESIUM SERPL-MCNC: 2.2 MG/DL — SIGNIFICANT CHANGE UP (ref 1.6–2.6)
MCHC RBC-ENTMCNC: 21.6 PG — LOW (ref 27–34)
MCHC RBC-ENTMCNC: 30.4 GM/DL — LOW (ref 32–36)
MCV RBC AUTO: 71 FL — LOW (ref 80–100)
MONOCYTES # BLD AUTO: 1 K/UL — HIGH (ref 0–0.9)
MONOCYTES NFR BLD AUTO: 11.4 % — SIGNIFICANT CHANGE UP (ref 2–14)
NEUTROPHILS # BLD AUTO: 5.3 K/UL — SIGNIFICANT CHANGE UP (ref 1.8–7.4)
NEUTROPHILS NFR BLD AUTO: 58 % — SIGNIFICANT CHANGE UP (ref 43–77)
NITRITE UR-MCNC: NEGATIVE — SIGNIFICANT CHANGE UP
PH UR: 6 — SIGNIFICANT CHANGE UP (ref 5–8)
PHOSPHATE SERPL-MCNC: 3.5 MG/DL — SIGNIFICANT CHANGE UP (ref 2.5–4.5)
PLATELET # BLD AUTO: 264 K/UL — SIGNIFICANT CHANGE UP (ref 150–400)
POTASSIUM SERPL-MCNC: 3.3 MMOL/L — LOW (ref 3.5–5.3)
POTASSIUM SERPL-SCNC: 3.3 MMOL/L — LOW (ref 3.5–5.3)
PROT UR-MCNC: NEGATIVE — SIGNIFICANT CHANGE UP
RBC # BLD: 4.6 M/UL — SIGNIFICANT CHANGE UP (ref 4.2–5.8)
RBC # FLD: 16.4 % — HIGH (ref 10.3–14.5)
SODIUM SERPL-SCNC: 137 MMOL/L — SIGNIFICANT CHANGE UP (ref 135–145)
SP GR SPEC: 1.01 — SIGNIFICANT CHANGE UP (ref 1.01–1.02)
TIBC SERPL-MCNC: 376 UG/DL — SIGNIFICANT CHANGE UP (ref 250–450)
TOTAL CHOLESTEROL/HDL RATIO MEASUREMENT: 2.8 RATIO — LOW (ref 3.4–9.6)
TRANSFERRIN SERPL-MCNC: 305 MG/DL — SIGNIFICANT CHANGE UP (ref 200–360)
TRIGL SERPL-MCNC: 50 MG/DL — SIGNIFICANT CHANGE UP (ref 10–149)
TROPONIN I SERPL-MCNC: <0.015 NG/ML — SIGNIFICANT CHANGE UP (ref 0–0.04)
TROPONIN I SERPL-MCNC: <0.015 NG/ML — SIGNIFICANT CHANGE UP (ref 0–0.04)
TSH SERPL-MCNC: 1.64 UU/ML — SIGNIFICANT CHANGE UP (ref 0.34–4.82)
UIBC SERPL-MCNC: 358 UG/DL — SIGNIFICANT CHANGE UP (ref 110–370)
UROBILINOGEN FLD QL: NEGATIVE — SIGNIFICANT CHANGE UP
VIT B12 SERPL-MCNC: 523 PG/ML — SIGNIFICANT CHANGE UP (ref 243–894)
WBC # BLD: 9.1 K/UL — SIGNIFICANT CHANGE UP (ref 3.8–10.5)
WBC # FLD AUTO: 9.1 K/UL — SIGNIFICANT CHANGE UP (ref 3.8–10.5)

## 2017-11-30 RX ORDER — AMPICILLIN SODIUM AND SULBACTAM SODIUM 250; 125 MG/ML; MG/ML
INJECTION, POWDER, FOR SUSPENSION INTRAMUSCULAR; INTRAVENOUS
Qty: 0 | Refills: 0 | Status: DISCONTINUED | OUTPATIENT
Start: 2017-11-30 | End: 2017-12-08

## 2017-11-30 RX ORDER — AMPICILLIN SODIUM AND SULBACTAM SODIUM 250; 125 MG/ML; MG/ML
1.5 INJECTION, POWDER, FOR SUSPENSION INTRAMUSCULAR; INTRAVENOUS EVERY 6 HOURS
Qty: 0 | Refills: 0 | Status: DISCONTINUED | OUTPATIENT
Start: 2017-11-30 | End: 2017-12-08

## 2017-11-30 RX ORDER — AMPICILLIN SODIUM AND SULBACTAM SODIUM 250; 125 MG/ML; MG/ML
1.5 INJECTION, POWDER, FOR SUSPENSION INTRAMUSCULAR; INTRAVENOUS ONCE
Qty: 0 | Refills: 0 | Status: COMPLETED | OUTPATIENT
Start: 2017-11-30 | End: 2017-11-30

## 2017-11-30 RX ORDER — FUROSEMIDE 40 MG
20 TABLET ORAL DAILY
Qty: 0 | Refills: 0 | Status: DISCONTINUED | OUTPATIENT
Start: 2017-12-01 | End: 2017-12-03

## 2017-11-30 RX ADMIN — Medication 100 MILLIGRAM(S): at 06:18

## 2017-11-30 RX ADMIN — FINASTERIDE 5 MILLIGRAM(S): 5 TABLET, FILM COATED ORAL at 13:22

## 2017-11-30 RX ADMIN — CLOPIDOGREL BISULFATE 75 MILLIGRAM(S): 75 TABLET, FILM COATED ORAL at 13:20

## 2017-11-30 RX ADMIN — Medication 12.5 MILLIGRAM(S): at 17:27

## 2017-11-30 RX ADMIN — TAMSULOSIN HYDROCHLORIDE 0.8 MILLIGRAM(S): 0.4 CAPSULE ORAL at 23:21

## 2017-11-30 RX ADMIN — Medication 81 MILLIGRAM(S): at 13:21

## 2017-11-30 RX ADMIN — ATORVASTATIN CALCIUM 40 MILLIGRAM(S): 80 TABLET, FILM COATED ORAL at 23:22

## 2017-11-30 RX ADMIN — Medication 3 MILLILITER(S): at 21:11

## 2017-11-30 RX ADMIN — AMPICILLIN SODIUM AND SULBACTAM SODIUM 100 GRAM(S): 250; 125 INJECTION, POWDER, FOR SUSPENSION INTRAMUSCULAR; INTRAVENOUS at 08:32

## 2017-11-30 RX ADMIN — Medication 12.5 MILLIGRAM(S): at 06:16

## 2017-11-30 RX ADMIN — AMPICILLIN SODIUM AND SULBACTAM SODIUM 100 GRAM(S): 250; 125 INJECTION, POWDER, FOR SUSPENSION INTRAMUSCULAR; INTRAVENOUS at 23:21

## 2017-11-30 RX ADMIN — Medication 100 MILLIGRAM(S): at 23:22

## 2017-11-30 RX ADMIN — Medication 100 MILLIGRAM(S): at 13:21

## 2017-11-30 RX ADMIN — MONTELUKAST 10 MILLIGRAM(S): 4 TABLET, CHEWABLE ORAL at 23:23

## 2017-11-30 RX ADMIN — Medication 3 MILLILITER(S): at 13:18

## 2017-11-30 RX ADMIN — AMPICILLIN SODIUM AND SULBACTAM SODIUM 100 GRAM(S): 250; 125 INJECTION, POWDER, FOR SUSPENSION INTRAMUSCULAR; INTRAVENOUS at 17:27

## 2017-11-30 RX ADMIN — Medication 3 MILLILITER(S): at 04:10

## 2017-11-30 RX ADMIN — ENOXAPARIN SODIUM 40 MILLIGRAM(S): 100 INJECTION SUBCUTANEOUS at 13:22

## 2017-11-30 RX ADMIN — Medication 20 MILLIGRAM(S): at 06:18

## 2017-11-30 RX ADMIN — AMPICILLIN SODIUM AND SULBACTAM SODIUM 100 GRAM(S): 250; 125 INJECTION, POWDER, FOR SUSPENSION INTRAMUSCULAR; INTRAVENOUS at 13:19

## 2017-11-30 NOTE — PATIENT PROFILE ADULT. - PATIENT REPRESENTATIVE NAME
Fouzia Patel ~~~~ Lma ([facility]) [phone] Fouzia Patel ~~~~ Lam (son) [phone] (call first)

## 2017-11-30 NOTE — CONSULT NOTE ADULT - SUBJECTIVE AND OBJECTIVE BOX
HISTORY OF PRESENT ILLNESS: HPI:  82 years old male from home, [place]ine[place] speaking, ambulates with cane at baseline, PMH of Asthma, HTN, HLD, CVA (2015) p/w complaints of chest pain and b/l leg edema. Patient is AAO x 3 and preferred son at bedside to translate. Patient states that chest pain started at 5 pm today, now resolved but described as pressure like, moderate, left sided, non radiating. Also has b/l leg edema x 5 days, was seen by PCP who referred to a Cardiologist who started him on Lasix which he took for 2 days with not much relief. Denies any fever, chills, sob, palpitations, nausea, vomiting, diarrhea, abdominal pain, recent travel, trauma or sick contacts. Does have increased urinary frequency, likely 2/2 BPH as on meds for it. Also complains of constipation, last BM 2 days back. (29 Nov 2017 21:42)      PAST MEDICAL & SURGICAL HISTORY:  Hypercholesterolemia  HTN (hypertension)  CVA (cerebral vascular accident)  Asthma  No significant past surgical history          MEDICATIONS:  MEDICATIONS  (STANDING):  ALBUTerol/ipratropium for Nebulization 3 milliLiter(s) Nebulizer every 6 hours  ampicillin/sulbactam  IVPB      ampicillin/sulbactam  IVPB 1.5 Gram(s) IV Intermittent every 6 hours  aspirin  chewable 81 milliGRAM(s) Oral daily  atorvastatin 40 milliGRAM(s) Oral at bedtime  clopidogrel Tablet 75 milliGRAM(s) Oral daily  docusate sodium 100 milliGRAM(s) Oral three times a day  enoxaparin Injectable 40 milliGRAM(s) SubCutaneous daily  finasteride 5 milliGRAM(s) Oral daily  metoprolol     tartrate 12.5 milliGRAM(s) Oral two times a day  montelukast 10 milliGRAM(s) Oral at bedtime  tamsulosin 0.8 milliGRAM(s) Oral at bedtime      Allergies    No Known Allergies    Intolerances        FAMILY HISTORY:  No pertinent family history in first degree relatives    Non-contributary for premature coronary disease or sudden cardiac death    SOCIAL HISTORY:    [X ] Non-smoker  [ ] Smoker  [ ] Alcohol      REVIEW OF SYSTEMS:  [ X]chest pain  [  ]shortness of breath  [  ]palpitations  [  ]syncope  [ ]near syncope [ ]upper extremity weakness   [ ] lower extremity weakness  [  ]diplopia  [  ]altered mental status   [  ]fevers  [ ]chills [ ]nausea  [ ]vomitting  [  ]dysphagia    [ ]abdominal pain  [ ]melena  [ ]BRBPR    [  ]epistaxis  [  ]rash    [X ]lower extremity edema        [X ] All others negative	  [ ] Unable to obtain    PHYSICAL EXAM:  T(C): 36.8 (11-30-17 @ 12:12), Max: 37.2 (11-29-17 @ 19:43)  HR: 79 (11-30-17 @ 12:12) (61 - 88)  BP: 165/70 (11-30-17 @ 12:12) (124/44 - 170/65)  RR: 18 (11-30-17 @ 12:12) (18 - 18)  SpO2: 100% (11-30-17 @ 12:12) (98% - 100%)  Wt(kg): --  I&O's Summary    29 Nov 2017 07:01  -  30 Nov 2017 07:00  --------------------------------------------------------  IN: 60 mL / OUT: 0 mL / NET: 60 mL          HEENT:   Normal oral mucosa, PERRL, EOMI	  Lymphatic: No obvious lymphadenopathy ,  Mild edema  Cardiovascular: Normal S1 S2, No JVD,  1/6 CAROL murmur , Peripheral pulses palpable 2+ bilaterally  Respiratory: Lungs clear to auscultation, normal effort 	  Gastrointestinal:  Soft, Non-tender, + BS	  Skin: No rashes, No cyanosis, warm to touch  Musculoskeletal: Normal range of motion, normal strength  Psychiatry:  Appropriate Mood & affect     TELEMETRY: 	  Sinus   ECG:  	Sinus NSR 71 BPM LVH  RADIOLOGY:    CXR:  No infiltrate      	  LABS:	 	    CARDIAC MARKERS:  CARDIAC MARKERS ( 30 Nov 2017 06:26 )  <0.015 ng/mL / x     / 48 U/L / x     / <1.0 ng/mL  CARDIAC MARKERS ( 30 Nov 2017 00:25 )  <0.015 ng/mL / x     / 53 U/L / x     / <1.0 ng/mL  CARDIAC MARKERS ( 29 Nov 2017 18:32 )  <0.015 ng/mL / x     / 61 U/L / x     / <1.0 ng/mL                              9.9    9.1   )-----------( 264      ( 30 Nov 2017 06:26 )             32.6     Hb Trend: 9.9<--, 10.4<--    11-30    137  |  100  |  19<H>  ----------------------------<  95  3.3<L>   |  30  |  0.88    Ca    8.4      30 Nov 2017 06:26  Phos  3.5     11-30  Mg     2.2     11-30    TPro  7.3  /  Alb  2.9<L>  /  TBili  0.3  /  DBili  x   /  AST  12  /  ALT  13  /  AlkPhos  54  11-29    Creatinine Trend: 0.88<--, 0.88<--    Coags:      proBNP: Serum Pro-Brain Natriuretic Peptide: 695 pg/mL (11-29 @ 18:32)    Lipid Profile:   HgA1c: Hemoglobin A1C, Whole Blood: 5.4 % (11-30 @ 10:08)    TSH: Thyroid Stimulating Hormone, Serum: 1.64 uU/mL (11-30 @ 06:26)      ASSESSMENT/PLAN: 	82y Male HTN, HLD, CVA (2015) p/w atypical chest pain and b/l leg edema r/o for MI    - Doubt CHF, f/u echo and lower extremity dopplers  - Stress in AM if above wnl    I once again thank you for allowing me to participate in the care of your patient.  If you have any questions or concerns please do not hesitate to contact me.    Naveed Donald MD, FACC  [facility], [facility]  [address] [place] [address]  PHONE:  [phone]  BEEPER : [phone]

## 2017-12-01 DIAGNOSIS — D50.9 IRON DEFICIENCY ANEMIA, UNSPECIFIED: ICD-10-CM

## 2017-12-01 LAB
ANION GAP SERPL CALC-SCNC: 8 MMOL/L — SIGNIFICANT CHANGE UP (ref 5–17)
BUN SERPL-MCNC: 20 MG/DL — HIGH (ref 7–18)
CALCIUM SERPL-MCNC: 8.4 MG/DL — SIGNIFICANT CHANGE UP (ref 8.4–10.5)
CHLORIDE SERPL-SCNC: 101 MMOL/L — SIGNIFICANT CHANGE UP (ref 96–108)
CO2 SERPL-SCNC: 28 MMOL/L — SIGNIFICANT CHANGE UP (ref 22–31)
CREAT SERPL-MCNC: 0.84 MG/DL — SIGNIFICANT CHANGE UP (ref 0.5–1.3)
GLUCOSE SERPL-MCNC: 103 MG/DL — HIGH (ref 70–99)
HCT VFR BLD CALC: 32.3 % — LOW (ref 39–50)
HGB BLD-MCNC: 9.8 G/DL — LOW (ref 13–17)
MCHC RBC-ENTMCNC: 21.9 PG — LOW (ref 27–34)
MCHC RBC-ENTMCNC: 30.2 GM/DL — LOW (ref 32–36)
MCV RBC AUTO: 72.5 FL — LOW (ref 80–100)
PLATELET # BLD AUTO: 247 K/UL — SIGNIFICANT CHANGE UP (ref 150–400)
POTASSIUM SERPL-MCNC: 3.5 MMOL/L — SIGNIFICANT CHANGE UP (ref 3.5–5.3)
POTASSIUM SERPL-SCNC: 3.5 MMOL/L — SIGNIFICANT CHANGE UP (ref 3.5–5.3)
RBC # BLD: 4.45 M/UL — SIGNIFICANT CHANGE UP (ref 4.2–5.8)
RBC # FLD: 16.4 % — HIGH (ref 10.3–14.5)
SODIUM SERPL-SCNC: 137 MMOL/L — SIGNIFICANT CHANGE UP (ref 135–145)
WBC # BLD: 7.9 K/UL — SIGNIFICANT CHANGE UP (ref 3.8–10.5)
WBC # FLD AUTO: 7.9 K/UL — SIGNIFICANT CHANGE UP (ref 3.8–10.5)

## 2017-12-01 PROCEDURE — 93970 EXTREMITY STUDY: CPT | Mod: 26

## 2017-12-01 RX ORDER — FERROUS SULFATE 325(65) MG
325 TABLET ORAL DAILY
Qty: 0 | Refills: 0 | Status: DISCONTINUED | OUTPATIENT
Start: 2017-12-01 | End: 2017-12-08

## 2017-12-01 RX ORDER — ERGOCALCIFEROL 1.25 MG/1
50000 CAPSULE ORAL
Qty: 0 | Refills: 0 | Status: DISCONTINUED | OUTPATIENT
Start: 2017-12-01 | End: 2017-12-08

## 2017-12-01 RX ORDER — SENNA PLUS 8.6 MG/1
2 TABLET ORAL AT BEDTIME
Qty: 0 | Refills: 0 | Status: DISCONTINUED | OUTPATIENT
Start: 2017-12-01 | End: 2017-12-08

## 2017-12-01 RX ORDER — DOCUSATE SODIUM 100 MG
100 CAPSULE ORAL DAILY
Qty: 0 | Refills: 0 | Status: DISCONTINUED | OUTPATIENT
Start: 2017-12-01 | End: 2017-12-01

## 2017-12-01 RX ADMIN — Medication 325 MILLIGRAM(S): at 14:44

## 2017-12-01 RX ADMIN — ENOXAPARIN SODIUM 40 MILLIGRAM(S): 100 INJECTION SUBCUTANEOUS at 11:54

## 2017-12-01 RX ADMIN — Medication 3 MILLILITER(S): at 03:32

## 2017-12-01 RX ADMIN — Medication 12.5 MILLIGRAM(S): at 18:13

## 2017-12-01 RX ADMIN — SENNA PLUS 2 TABLET(S): 8.6 TABLET ORAL at 23:19

## 2017-12-01 RX ADMIN — CLOPIDOGREL BISULFATE 75 MILLIGRAM(S): 75 TABLET, FILM COATED ORAL at 11:54

## 2017-12-01 RX ADMIN — AMPICILLIN SODIUM AND SULBACTAM SODIUM 100 GRAM(S): 250; 125 INJECTION, POWDER, FOR SUSPENSION INTRAMUSCULAR; INTRAVENOUS at 18:13

## 2017-12-01 RX ADMIN — Medication 81 MILLIGRAM(S): at 11:53

## 2017-12-01 RX ADMIN — MONTELUKAST 10 MILLIGRAM(S): 4 TABLET, CHEWABLE ORAL at 23:19

## 2017-12-01 RX ADMIN — AMPICILLIN SODIUM AND SULBACTAM SODIUM 100 GRAM(S): 250; 125 INJECTION, POWDER, FOR SUSPENSION INTRAMUSCULAR; INTRAVENOUS at 06:42

## 2017-12-01 RX ADMIN — Medication 12.5 MILLIGRAM(S): at 06:42

## 2017-12-01 RX ADMIN — Medication 3 MILLILITER(S): at 08:23

## 2017-12-01 RX ADMIN — Medication 3 MILLILITER(S): at 20:15

## 2017-12-01 RX ADMIN — AMPICILLIN SODIUM AND SULBACTAM SODIUM 100 GRAM(S): 250; 125 INJECTION, POWDER, FOR SUSPENSION INTRAMUSCULAR; INTRAVENOUS at 23:20

## 2017-12-01 RX ADMIN — ATORVASTATIN CALCIUM 40 MILLIGRAM(S): 80 TABLET, FILM COATED ORAL at 23:20

## 2017-12-01 RX ADMIN — Medication 100 MILLIGRAM(S): at 12:00

## 2017-12-01 RX ADMIN — TAMSULOSIN HYDROCHLORIDE 0.8 MILLIGRAM(S): 0.4 CAPSULE ORAL at 23:19

## 2017-12-01 RX ADMIN — Medication 100 MILLIGRAM(S): at 23:19

## 2017-12-01 RX ADMIN — FINASTERIDE 5 MILLIGRAM(S): 5 TABLET, FILM COATED ORAL at 11:54

## 2017-12-01 RX ADMIN — ERGOCALCIFEROL 50000 UNIT(S): 1.25 CAPSULE ORAL at 14:44

## 2017-12-01 RX ADMIN — Medication 3 MILLILITER(S): at 14:21

## 2017-12-01 RX ADMIN — Medication 100 MILLIGRAM(S): at 06:42

## 2017-12-01 RX ADMIN — AMPICILLIN SODIUM AND SULBACTAM SODIUM 100 GRAM(S): 250; 125 INJECTION, POWDER, FOR SUSPENSION INTRAMUSCULAR; INTRAVENOUS at 11:52

## 2017-12-01 RX ADMIN — Medication 20 MILLIGRAM(S): at 06:42

## 2017-12-01 NOTE — PROGRESS NOTE ADULT - SUBJECTIVE AND OBJECTIVE BOX
Subjective:  pt seen and examined, no complaints on exam.   no chest pain or nausea on exam   ROS - , NAD noted     ALBUTerol/ipratropium for Nebulization 3 milliLiter(s) Nebulizer every 6 hours  ampicillin/sulbactam  IVPB      ampicillin/sulbactam  IVPB 1.5 Gram(s) IV Intermittent every 6 hours  aspirin  chewable 81 milliGRAM(s) Oral daily  atorvastatin 40 milliGRAM(s) Oral at bedtime  clopidogrel Tablet 75 milliGRAM(s) Oral daily  docusate sodium 100 milliGRAM(s) Oral three times a day  enoxaparin Injectable 40 milliGRAM(s) SubCutaneous daily  finasteride 5 milliGRAM(s) Oral daily  furosemide   Injectable 20 milliGRAM(s) IV Push daily  metoprolol     tartrate 12.5 milliGRAM(s) Oral two times a day  montelukast 10 milliGRAM(s) Oral at bedtime  tamsulosin 0.8 milliGRAM(s) Oral at bedtime                            9.9    9.1   )-----------( 264      ( 30 Nov 2017 06:26 )             32.6       Hemoglobin: 9.9 g/dL (11-30 @ 06:26)  Hemoglobin: 10.4 g/dL (11-29 @ 18:32)      11-30    137  |  100  |  19<H>  ----------------------------<  95  3.3<L>   |  30  |  0.88    Ca    8.4      30 Nov 2017 06:26  Phos  3.5     11-30  Mg     2.2     11-30    TPro  7.3  /  Alb  2.9<L>  /  TBili  0.3  /  DBili  x   /  AST  12  /  ALT  13  /  AlkPhos  54  11-29    Creatinine Trend: 0.88<--, 0.88<--    COAGS:     CARDIAC MARKERS ( 30 Nov 2017 06:26 )  <0.015 ng/mL / x     / 48 U/L / x     / <1.0 ng/mL  CARDIAC MARKERS ( 30 Nov 2017 00:25 )  <0.015 ng/mL / x     / 53 U/L / x     / <1.0 ng/mL  CARDIAC MARKERS ( 29 Nov 2017 18:32 )  <0.015 ng/mL / x     / 61 U/L / x     / <1.0 ng/mL        T(C): 36.2 (12-01-17 @ 05:09), Max: 36.8 (11-30-17 @ 12:12)  HR: 77 (12-01-17 @ 05:09) (61 - 88)  BP: 140/50 (12-01-17 @ 05:09) (124/44 - 168/82)  RR: 16 (12-01-17 @ 05:09) (16 - 18)  SpO2: 97% (12-01-17 @ 05:09) (97% - 100%)  Wt(kg): --    I&O's Summary    29 Nov 2017 07:01  -  30 Nov 2017 07:00  --------------------------------------------------------  IN: 60 mL / OUT: 0 mL / NET: 60 mL      HEENT:   Normal oral mucosa, PERRL, EOMI	  Lymphatic: No lymphadenopathy , no edema  Cardiovascular: Normal S1 S2, No JVD, No murmurs , Peripheral pulses palpable 2+ bilaterally  Respiratory: Lungs clear to auscultation, normal effort 	  Gastrointestinal:  Soft, Non-tender, + BS	      TELEMETRY: 	 NSR-    DIAGNOSTIC TESTING:  [ ] Echocardiogram: < from: Transthoracic Echocardiogram (04.08.17 @ 09:10) >  CONCLUSIONS:  1. Densly calcified mitral valve leaflet, mitral annulus  calcification. Mild-moderate mitral regurgitation.  2. Calcified trileaflet aortic valve with decreased  opening. Mild aortic stenosis. Mild-moderate aortic  regurgitation.  3. Normal aortic root.  4. Normal left atrium.  5. Normal left ventricular internal dimensions and wall  thicknesses.  6. Normal Left Ventricular Systolic Function,  (EF = 55 to  60%)  7. Grade II diastolic dysfunction  8. Normal right atrium.  9. Normal right ventricular size and function.  10. There is mild-moderate tricuspid regurgitation.  11. Normal pericardium with no pericardial effusion.    < end of copied text >    [ ]  Catheterization:  [ ] Stress Test:    OTHER: 	        ASSESSMENT/PLAN: 	82y Male HTN, HLD, CVA (2015) p/w atypical chest pain and b/l leg edema r/o for MI    Dapt for CVA,    GI / DVT prophylaxis.   keep K>4, mag >2.0  cont ABX per medicine   Keep net neg with LAsix iv   HR stable with low dose BB  echo pending , NST pending   cardiac markers neg x 3   further plans post above tests.  D/W Dr Donald

## 2017-12-01 NOTE — PROGRESS NOTE ADULT - PROBLEM SELECTOR PLAN 2
-warm, red and tender extremities  -could be secondary to cellulitis or CHF  -c/w Lasix and and Abx   -- No DVT on doppler  - blood cultures negative

## 2017-12-01 NOTE — PROGRESS NOTE ADULT - ASSESSMENT
82 years old male from home, [place] speaking, ambulates with cane at baseline, PMH of Asthma, HTN, HLD, CVA (2015) p/w complaints of chest pain and b/l leg edema. Admitted to telemetry floor for further management.

## 2017-12-01 NOTE — PROGRESS NOTE ADULT - PROBLEM SELECTOR PLAN 3
patient Hb 9.8, with low MC  microcytic anemia with low serm iron and low ferritin   - f/u FOBT to r/o any blood loss  - continue with Iron supplementation

## 2017-12-01 NOTE — PROGRESS NOTE ADULT - SUBJECTIVE AND OBJECTIVE BOX
PGY 1 Note discussed with supervising resident and primary attending    Patient is a 82y old  Male who presents with a chief complaint of chest pain and b/l leg edema (2017 21:42)      INTERVAL HPI/OVERNIGHT EVENTS: patient seen and examined at bed side, patient denied any complaints, current symptoms resolving    MEDICATIONS  (STANDING):  ALBUTerol/ipratropium for Nebulization 3 milliLiter(s) Nebulizer every 6 hours  ampicillin/sulbactam  IVPB      ampicillin/sulbactam  IVPB 1.5 Gram(s) IV Intermittent every 6 hours  aspirin  chewable 81 milliGRAM(s) Oral daily  atorvastatin 40 milliGRAM(s) Oral at bedtime  clopidogrel Tablet 75 milliGRAM(s) Oral daily  docusate sodium 100 milliGRAM(s) Oral three times a day  enoxaparin Injectable 40 milliGRAM(s) SubCutaneous daily  ergocalciferol 83545 Unit(s) Oral <User Schedule>  ferrous    sulfate 325 milliGRAM(s) Oral daily  finasteride 5 milliGRAM(s) Oral daily  furosemide   Injectable 20 milliGRAM(s) IV Push daily  metoprolol     tartrate 12.5 milliGRAM(s) Oral two times a day  montelukast 10 milliGRAM(s) Oral at bedtime  senna 2 Tablet(s) Oral at bedtime  tamsulosin 0.8 milliGRAM(s) Oral at bedtime    MEDICATIONS  (PRN):      __________________________________________________  REVIEW OF SYSTEMS:    CONSTITUTIONAL: No fever,   EYES: no acute visual disturbances  NECK: No pain or stiffness  RESPIRATORY: No cough; No shortness of breath  CARDIOVASCULAR: No chest pain, no palpitations  GASTROINTESTINAL: No pain. No nausea or vomiting; No diarrhea   NEUROLOGICAL: No headache or numbness, no tremors  MUSCULOSKELETAL: No joint pain, no muscle pain  GENITOURINARY: no dysuria, no frequency, no hesitancy  PSYCHIATRY: no depression , no anxiety  ALL OTHER  ROS negative        Vital Signs Last 24 Hrs  T(C): 36.7 (01 Dec 2017 14:11), Max: 36.7 (01 Dec 2017 01:54)  T(F): 98 (01 Dec 2017 14:11), Max: 98 (01 Dec 2017 01:54)  HR: 91 (01 Dec 2017 18:11) (67 - 91)  BP: 130/91 (01 Dec 2017 18:11) (130/91 - 146/43)  BP(mean): --  RR: 16 (01 Dec 2017 14:11) (15 - 16)  SpO2: 96% (01 Dec 2017 14:11) (96% - 100%)    ________________________________________________  PHYSICAL EXAM:  GENERAL: NAD  HEENT: Normocephalic;  conjunctivae and sclerae clear; moist mucous membranes;   NECK : supple  CHEST/LUNG: Clear to auscultation bilaterally with good air entry   HEART: S1 S2  regular; no murmurs, gallops or rubs  ABDOMEN: Soft, Nontender, Nondistended; Bowel sounds present  EXTREMITIES: lower ext redness and mild painful    SKIN: warm and dry; no rash  NERVOUS SYSTEM:  Awake and alert; Oriented  to place, person and time ; no new deficits    _________________________________________________  LABS:                        9.8    7.9   )-----------( 247      ( 01 Dec 2017 06:20 )             32.3     12-01    137  |  101  |  20<H>  ----------------------------<  103<H>  3.5   |  28  |  0.84    Ca    8.4      01 Dec 2017 06:20  Phos  3.5     11-30  Mg     2.2     11-30        Urinalysis Basic - ( 2017 03:33 )    Color: Yellow / Appearance: Clear / S.010 / pH: x  Gluc: x / Ketone: Negative  / Bili: Negative / Urobili: Negative   Blood: x / Protein: Negative / Nitrite: Negative   Leuk Esterase: Negative / RBC: 2-5 /HPF / WBC 0-2 /HPF   Sq Epi: x / Non Sq Epi: x / Bacteria: Trace /HPF      CAPILLARY BLOOD GLUCOSE            RADIOLOGY & ADDITIONAL TESTS:    Imaging Personally Reviewed:  YES  CXR No focal lung consolidation or pneumothorax.  ankel Xray no fracture seen   - Venous Doppler shows no DVT  Consultant(s) Notes Reviewed:   YES    Care Discussed with Consultants : Yes     Plan of care was discussed with patient and /or primary care giver; all questions and concerns were addressed and care was aligned with patient's wishes.

## 2017-12-02 LAB
ANION GAP SERPL CALC-SCNC: 10 MMOL/L — SIGNIFICANT CHANGE UP (ref 5–17)
BUN SERPL-MCNC: 20 MG/DL — HIGH (ref 7–18)
CALCIUM SERPL-MCNC: 8.9 MG/DL — SIGNIFICANT CHANGE UP (ref 8.4–10.5)
CHLORIDE SERPL-SCNC: 100 MMOL/L — SIGNIFICANT CHANGE UP (ref 96–108)
CO2 SERPL-SCNC: 28 MMOL/L — SIGNIFICANT CHANGE UP (ref 22–31)
CREAT SERPL-MCNC: 0.98 MG/DL — SIGNIFICANT CHANGE UP (ref 0.5–1.3)
GLUCOSE SERPL-MCNC: 98 MG/DL — SIGNIFICANT CHANGE UP (ref 70–99)
HCT VFR BLD CALC: 36 % — LOW (ref 39–50)
HGB BLD-MCNC: 10.8 G/DL — LOW (ref 13–17)
MCHC RBC-ENTMCNC: 21.8 PG — LOW (ref 27–34)
MCHC RBC-ENTMCNC: 29.8 GM/DL — LOW (ref 32–36)
MCV RBC AUTO: 72.9 FL — LOW (ref 80–100)
PLATELET # BLD AUTO: 334 K/UL — SIGNIFICANT CHANGE UP (ref 150–400)
POTASSIUM SERPL-MCNC: 3.5 MMOL/L — SIGNIFICANT CHANGE UP (ref 3.5–5.3)
POTASSIUM SERPL-SCNC: 3.5 MMOL/L — SIGNIFICANT CHANGE UP (ref 3.5–5.3)
RBC # BLD: 4.94 M/UL — SIGNIFICANT CHANGE UP (ref 4.2–5.8)
RBC # FLD: 16.4 % — HIGH (ref 10.3–14.5)
SODIUM SERPL-SCNC: 138 MMOL/L — SIGNIFICANT CHANGE UP (ref 135–145)
WBC # BLD: 10.8 K/UL — HIGH (ref 3.8–10.5)
WBC # FLD AUTO: 10.8 K/UL — HIGH (ref 3.8–10.5)

## 2017-12-02 RX ORDER — ACETAMINOPHEN 500 MG
650 TABLET ORAL EVERY 6 HOURS
Qty: 0 | Refills: 0 | Status: DISCONTINUED | OUTPATIENT
Start: 2017-12-02 | End: 2017-12-08

## 2017-12-02 RX ORDER — ACETAMINOPHEN 500 MG
650 TABLET ORAL ONCE
Qty: 0 | Refills: 0 | Status: COMPLETED | OUTPATIENT
Start: 2017-12-02 | End: 2017-12-02

## 2017-12-02 RX ADMIN — Medication 12.5 MILLIGRAM(S): at 05:39

## 2017-12-02 RX ADMIN — Medication 3 MILLILITER(S): at 20:40

## 2017-12-02 RX ADMIN — SENNA PLUS 2 TABLET(S): 8.6 TABLET ORAL at 23:27

## 2017-12-02 RX ADMIN — Medication 650 MILLIGRAM(S): at 06:02

## 2017-12-02 RX ADMIN — ENOXAPARIN SODIUM 40 MILLIGRAM(S): 100 INJECTION SUBCUTANEOUS at 11:26

## 2017-12-02 RX ADMIN — AMPICILLIN SODIUM AND SULBACTAM SODIUM 100 GRAM(S): 250; 125 INJECTION, POWDER, FOR SUSPENSION INTRAMUSCULAR; INTRAVENOUS at 23:27

## 2017-12-02 RX ADMIN — Medication 325 MILLIGRAM(S): at 11:25

## 2017-12-02 RX ADMIN — TAMSULOSIN HYDROCHLORIDE 0.8 MILLIGRAM(S): 0.4 CAPSULE ORAL at 23:27

## 2017-12-02 RX ADMIN — Medication 12.5 MILLIGRAM(S): at 17:16

## 2017-12-02 RX ADMIN — Medication 100 MILLIGRAM(S): at 12:04

## 2017-12-02 RX ADMIN — FINASTERIDE 5 MILLIGRAM(S): 5 TABLET, FILM COATED ORAL at 11:26

## 2017-12-02 RX ADMIN — Medication 20 MILLIGRAM(S): at 05:39

## 2017-12-02 RX ADMIN — Medication 100 MILLIGRAM(S): at 05:39

## 2017-12-02 RX ADMIN — Medication 81 MILLIGRAM(S): at 11:25

## 2017-12-02 RX ADMIN — Medication 650 MILLIGRAM(S): at 23:27

## 2017-12-02 RX ADMIN — Medication 100 MILLIGRAM(S): at 23:27

## 2017-12-02 RX ADMIN — AMPICILLIN SODIUM AND SULBACTAM SODIUM 100 GRAM(S): 250; 125 INJECTION, POWDER, FOR SUSPENSION INTRAMUSCULAR; INTRAVENOUS at 17:17

## 2017-12-02 RX ADMIN — Medication 3 MILLILITER(S): at 15:43

## 2017-12-02 RX ADMIN — AMPICILLIN SODIUM AND SULBACTAM SODIUM 100 GRAM(S): 250; 125 INJECTION, POWDER, FOR SUSPENSION INTRAMUSCULAR; INTRAVENOUS at 05:39

## 2017-12-02 RX ADMIN — MONTELUKAST 10 MILLIGRAM(S): 4 TABLET, CHEWABLE ORAL at 23:27

## 2017-12-02 RX ADMIN — ATORVASTATIN CALCIUM 40 MILLIGRAM(S): 80 TABLET, FILM COATED ORAL at 23:27

## 2017-12-02 RX ADMIN — Medication 650 MILLIGRAM(S): at 07:07

## 2017-12-02 RX ADMIN — CLOPIDOGREL BISULFATE 75 MILLIGRAM(S): 75 TABLET, FILM COATED ORAL at 11:25

## 2017-12-02 RX ADMIN — AMPICILLIN SODIUM AND SULBACTAM SODIUM 100 GRAM(S): 250; 125 INJECTION, POWDER, FOR SUSPENSION INTRAMUSCULAR; INTRAVENOUS at 11:26

## 2017-12-02 RX ADMIN — Medication 3 MILLILITER(S): at 09:35

## 2017-12-02 NOTE — PROGRESS NOTE ADULT - SUBJECTIVE AND OBJECTIVE BOX
· Subjective and Objective: 	  PGY 1 Note discussed with supervising resident and primary attending  No acurte events for overnoight,hemodinamicly stable    Patient is a 82y old  Male who presents with a chief complaint of chest pain and b/l leg edema (2017 21:42)      INTERVAL HPI/OVERNIGHT EVENTS: patient seen and examined at bed side, patient denied any complaints, current symptoms resolving    MEDICATIONS  (STANDING):  ALBUTerol/ipratropium for Nebulization 3 milliLiter(s) Nebulizer every 6 hours  ampicillin/sulbactam  IVPB      ampicillin/sulbactam  IVPB 1.5 Gram(s) IV Intermittent every 6 hours  aspirin  chewable 81 milliGRAM(s) Oral daily  atorvastatin 40 milliGRAM(s) Oral at bedtime  clopidogrel Tablet 75 milliGRAM(s) Oral daily  docusate sodium 100 milliGRAM(s) Oral three times a day  enoxaparin Injectable 40 milliGRAM(s) SubCutaneous daily  ergocalciferol 18655 Unit(s) Oral <User Schedule>  ferrous    sulfate 325 milliGRAM(s) Oral daily  finasteride 5 milliGRAM(s) Oral daily  furosemide   Injectable 20 milliGRAM(s) IV Push daily  metoprolol     tartrate 12.5 milliGRAM(s) Oral two times a day  montelukast 10 milliGRAM(s) Oral at bedtime  senna 2 Tablet(s) Oral at bedtime  tamsulosin 0.8 milliGRAM(s) Oral at bedtime    MEDICATIONS  (PRN):      __________________________________________________  REVIEW OF SYSTEMS:    CONSTITUTIONAL: No fever,   EYES: no acute visual disturbances  NECK: No pain or stiffness  RESPIRATORY: No cough; No shortness of breath  CARDIOVASCULAR: No chest pain, no palpitations  GASTROINTESTINAL: No pain. No nausea or vomiting; No diarrhea   NEUROLOGICAL: No headache or numbness, no tremors  MUSCULOSKELETAL: No joint pain, no muscle pain  GENITOURINARY: no dysuria, no frequency, no hesitancy  PSYCHIATRY: no depression , no anxiety  ALL OTHER  ROS negative        Vital Signs Last 24 Hrs  T(C): 36.7 (01 Dec 2017 14:11), Max: 36.7 (01 Dec 2017 01:54)  T(F): 98 (01 Dec 2017 14:11), Max: 98 (01 Dec 2017 01:54)  HR: 91 (01 Dec 2017 18:11) (67 - 91)  BP: 130/91 (01 Dec 2017 18:11) (130/91 - 146/43)  BP(mean): --  RR: 16 (01 Dec 2017 14:11) (15 - 16)  SpO2: 96% (01 Dec 2017 14:11) (96% - 100%)    ________________________________________________  PHYSICAL EXAM:  GENERAL: NAD  HEENT: Normocephalic;  conjunctivae and sclerae clear; moist mucous membranes;   NECK : supple  CHEST/LUNG: Clear to auscultation bilaterally with good air entry   HEART: S1 S2  regular; no murmurs, gallops or rubs  ABDOMEN: Soft, Nontender, Nondistended; Bowel sounds present  EXTREMITIES: lower ext redness and mild painful    SKIN: warm and dry; no rash  NERVOUS SYSTEM:  Awake and alert; Oriented  to place, person and time ; no new deficits    _________________________________________________  LABS:                        9.8    7.9   )-----------( 247      ( 01 Dec 2017 06:20 )             32.3     12-    137  |  101  |  20<H>  ----------------------------<  103<H>  3.5   |  28  |  0.84    Ca    8.4      01 Dec 2017 06:20  Phos  3.5     11-30  Mg     2.2     11-30        Urinalysis Basic - ( 2017 03:33 )    Color: Yellow / Appearance: Clear / S.010 / pH: x  Gluc: x / Ketone: Negative  / Bili: Negative / Urobili: Negative   Blood: x / Protein: Negative / Nitrite: Negative   Leuk Esterase: Negative / RBC: 2-5 /HPF / WBC 0-2 /HPF   Sq Epi: x / Non Sq Epi: x / Bacteria: Trace /HPF      CAPILLARY BLOOD GLUCOSE            RADIOLOGY & ADDITIONAL TESTS:    Imaging Personally Reviewed:  YES  CXR No focal lung consolidation or pneumothorax.  ankel Xray no fracture seen   - Venous Doppler shows no DVT  Consultant(s) Notes Reviewed:   YES    Care Discussed with Consultants : Yes     Plan of care was discussed with patient and /or primary care giver; all questions and concerns were addressed and care was aligned with patient's wishes.        Assessment and Plan:   · Assessment		  82 years old male from home, [place] speaking, ambulates with cane at baseline, PMH of Asthma, HTN, HLD, CVA () p/w complaints of chest pain and b/l leg edema. Admitted to telemetry floor for further management.      Problem/Plan - 1:  ·  Problem: Chest pain.  Plan: -pressure like, moderate, left sided, non radiating pain  -EKG: NSR  -Tropnin X3 negative   -Last ECHO from 2017 showed EF of 56% and grade 2 diastolic dysfunction. repeat ECHO shows similar findings   -Heart score of 4 , so loderate risk of adverse cardiac events  -JAMES score of 2 (age and ASA use)  -c/w aspirin, statin, metoprolol.  -Cardio Dr. Odilon traore noted  - Stress test on Monday.      Problem/Plan - 2:  ·  Problem: Leg edema.  Plan: -warm, red and tender extremities  -could be secondary to cellulitis or CHF  -c/w Lasix and and Abx   -- No DVT on doppler  - blood cultures negative.      Problem/Plan - 3:  ·  Problem: Anemia, iron deficiency.  Plan: patient Hb 9.8, with low MC  microcytic anemia with low serm iron and low ferritin   - f/u FOBT to r/o any blood loss  - continue with Iron supplementation.      Problem/Plan - 4:  ·  Problem: Asthma.  Plan: -stable currently  -c/w nebs.      Problem/Plan - 5:  ·  Problem: HTN (hypertension).  Plan: -c/w metoprolol with parameters  -monitor BP.      Problem/Plan - 6:  Problem: Hyperlipidemia. Plan: -c/w statin  lipid profile wnl.     Problem/Plan - 7:  ·  Problem: Prophylactic measure.  Plan: -Improved VTE score of 2 (age and immobilization)   -Lovenox for DVT ppx.

## 2017-12-02 NOTE — PROGRESS NOTE ADULT - SUBJECTIVE AND OBJECTIVE BOX
Subjective:  pt seen and examined, no complaints on exam.   no chest pain or nausea on exam   ROS -    ALBUTerol/ipratropium for Nebulization 3 milliLiter(s) Nebulizer every 6 hours  ampicillin/sulbactam  IVPB      ampicillin/sulbactam  IVPB 1.5 Gram(s) IV Intermittent every 6 hours  aspirin  chewable 81 milliGRAM(s) Oral daily  atorvastatin 40 milliGRAM(s) Oral at bedtime  clopidogrel Tablet 75 milliGRAM(s) Oral daily  docusate sodium 100 milliGRAM(s) Oral three times a day  enoxaparin Injectable 40 milliGRAM(s) SubCutaneous daily  ergocalciferol 13163 Unit(s) Oral <User Schedule>  ferrous    sulfate 325 milliGRAM(s) Oral daily  finasteride 5 milliGRAM(s) Oral daily  furosemide   Injectable 20 milliGRAM(s) IV Push daily  metoprolol     tartrate 12.5 milliGRAM(s) Oral two times a day  montelukast 10 milliGRAM(s) Oral at bedtime  senna 2 Tablet(s) Oral at bedtime  tamsulosin 0.8 milliGRAM(s) Oral at bedtime                            9.8    7.9   )-----------( 247      ( 01 Dec 2017 06:20 )             32.3       Hemoglobin: 9.8 g/dL (12-01 @ 06:20)  Hemoglobin: 9.9 g/dL (11-30 @ 06:26)  Hemoglobin: 10.4 g/dL (11-29 @ 18:32)      12-01    137  |  101  |  20<H>  ----------------------------<  103<H>  3.5   |  28  |  0.84    Ca    8.4      01 Dec 2017 06:20  Phos  3.5     11-30  Mg     2.2     11-30      Creatinine Trend: 0.84<--, 0.88<--, 0.88<--    COAGS:     CARDIAC MARKERS ( 30 Nov 2017 06:26 )  <0.015 ng/mL / x     / 48 U/L / x     / <1.0 ng/mL  CARDIAC MARKERS ( 30 Nov 2017 00:25 )  <0.015 ng/mL / x     / 53 U/L / x     / <1.0 ng/mL  CARDIAC MARKERS ( 29 Nov 2017 18:32 )  <0.015 ng/mL / x     / 61 U/L / x     / <1.0 ng/mL        T(C): 37.4 (12-02-17 @ 02:04), Max: 37.4 (12-02-17 @ 02:04)  HR: 93 (12-02-17 @ 02:04) (77 - 93)  BP: 141/58 (12-02-17 @ 02:04) (130/91 - 142/50)  RR: 16 (12-02-17 @ 02:04) (15 - 16)  SpO2: 98% (12-02-17 @ 02:04) (96% - 98%)  Wt(kg): --    I&O's Summary    01 Dec 2017 07:01  -  02 Dec 2017 05:07  --------------------------------------------------------  IN: 0 mL / OUT: 500 mL / NET: -500 mL    HEENT:   Normal oral mucosa, PERRL, EOMI	  Lymphatic: No lymphadenopathy , no edema  Cardiovascular: Normal S1 S2, No JVD, No murmurs , Peripheral pulses palpable 2+ bilaterally  Respiratory: Lungs clear to auscultation, normal effort 	  Gastrointestinal:  Soft, Non-tender, + BS	      TELEMETRY: 	 NSR-        DIAGNOSTIC TESTING:  [ ] Echocardiogram:   < from: Transthoracic Echocardiogram (04.08.17 @ 09:10) >  CONCLUSIONS:  1. Densly calcified mitral valve leaflet, mitral annulus  calcification. Mild-moderate mitral regurgitation.  2. Calcified trileaflet aortic valve with decreased  opening. Mild aortic stenosis. Mild-moderate aortic  regurgitation.  3. Normal aortic root.  4. Normal left atrium.  5. Normal left ventricular internal dimensions and wall  thicknesses.  6. Normal Left Ventricular Systolic Function,  (EF = 55 to  60%)  7. Grade II diastolic dysfunction    < from: Transthoracic Echocardiogram (12.01.17 @ 09:10) >  CONCLUSIONS:  1. Mitral annular calcification. Mild mitral regurgitation.    2. Calcified trileaflet aortic valve with decreased  opening. Peak transaortic valve gradient equals 25 mm Hg,  mean transaortic valve gradient equals 2 mm Hg, estimated  aortic valve area equals 1.9 sqcm (by continuity equation),  consistent with mild aortic stenosis. Moderate aortic  regurgitation.  3. Normal aortic root, arch and descending thoracic aorta.  4. Mild left atrial enlargement.  5. Mild concentric left ventricular hypertrophy.  6. Normal Left Ventricular Systolic Function,  (EF = 55 to  60%) Peak left ventricular outflow tract gradient equals  8.8 mm Hg.  7. Grade II diastolic dysfunction.  8. Right atrium was not well visualized.  9. Right ventricle not well visualized.  10. RA Pressure is 10 mm Hg.  11. RV systolic pressure is 39 mm Hg.  12. Normal pericardium with no pericardial effusion.    < end of copied text >  8. Normal right atrium.  9. Normal right ventricular size and function.  10. There is mild-moderate tricuspid regurgitation.  11. Normal pericardium with no pericardial effusion.    < end of copied text >    [ ]  Catheterization:  [ ] Stress Test:    OTHER: 	        ASSESSMENT/PLAN: 	82y Male HTN, HLD, CVA (2015) p/w atypical chest pain and b/l leg edema r/o for MI    Dapt for CVA,    GI / DVT prophylaxis.   keep K>4, mag >2.0  cont ABX per medicine   Keep net neg with LAsix iv   HR stable with low dose BB  refusing NST   cardiac markers neg x 3   tele stable   D/W Dr Donald Subjective:  pt seen and examined, no complaints on exam.   no chest pain or nausea on exam   ROS -    ALBUTerol/ipratropium for Nebulization 3 milliLiter(s) Nebulizer every 6 hours  ampicillin/sulbactam  IVPB      ampicillin/sulbactam  IVPB 1.5 Gram(s) IV Intermittent every 6 hours  aspirin  chewable 81 milliGRAM(s) Oral daily  atorvastatin 40 milliGRAM(s) Oral at bedtime  clopidogrel Tablet 75 milliGRAM(s) Oral daily  docusate sodium 100 milliGRAM(s) Oral three times a day  enoxaparin Injectable 40 milliGRAM(s) SubCutaneous daily  ergocalciferol 46471 Unit(s) Oral <User Schedule>  ferrous    sulfate 325 milliGRAM(s) Oral daily  finasteride 5 milliGRAM(s) Oral daily  furosemide   Injectable 20 milliGRAM(s) IV Push daily  metoprolol     tartrate 12.5 milliGRAM(s) Oral two times a day  montelukast 10 milliGRAM(s) Oral at bedtime  senna 2 Tablet(s) Oral at bedtime  tamsulosin 0.8 milliGRAM(s) Oral at bedtime                            9.8    7.9   )-----------( 247      ( 01 Dec 2017 06:20 )             32.3       Hemoglobin: 9.8 g/dL (12-01 @ 06:20)  Hemoglobin: 9.9 g/dL (11-30 @ 06:26)  Hemoglobin: 10.4 g/dL (11-29 @ 18:32)      12-01    137  |  101  |  20<H>  ----------------------------<  103<H>  3.5   |  28  |  0.84    Ca    8.4      01 Dec 2017 06:20  Phos  3.5     11-30  Mg     2.2     11-30      Creatinine Trend: 0.84<--, 0.88<--, 0.88<--    COAGS:     CARDIAC MARKERS ( 30 Nov 2017 06:26 )  <0.015 ng/mL / x     / 48 U/L / x     / <1.0 ng/mL  CARDIAC MARKERS ( 30 Nov 2017 00:25 )  <0.015 ng/mL / x     / 53 U/L / x     / <1.0 ng/mL  CARDIAC MARKERS ( 29 Nov 2017 18:32 )  <0.015 ng/mL / x     / 61 U/L / x     / <1.0 ng/mL        T(C): 37.4 (12-02-17 @ 02:04), Max: 37.4 (12-02-17 @ 02:04)  HR: 93 (12-02-17 @ 02:04) (77 - 93)  BP: 141/58 (12-02-17 @ 02:04) (130/91 - 142/50)  RR: 16 (12-02-17 @ 02:04) (15 - 16)  SpO2: 98% (12-02-17 @ 02:04) (96% - 98%)  Wt(kg): --    I&O's Summary    01 Dec 2017 07:01  -  02 Dec 2017 05:07  --------------------------------------------------------  IN: 0 mL / OUT: 500 mL / NET: -500 mL    HEENT:   Normal oral mucosa, PERRL, EOMI	  Lymphatic: No lymphadenopathy , no edema  Cardiovascular: Normal S1 S2, No JVD, No murmurs , Peripheral pulses palpable 2+ bilaterally  Respiratory: Lungs clear to auscultation, normal effort 	  Gastrointestinal:  Soft, Non-tender, + BS	      TELEMETRY: 	 NSR-        DIAGNOSTIC TESTING:  [ ] Echocardiogram:   < from: Transthoracic Echocardiogram (04.08.17 @ 09:10) >  CONCLUSIONS:  1. Densly calcified mitral valve leaflet, mitral annulus  calcification. Mild-moderate mitral regurgitation.  2. Calcified trileaflet aortic valve with decreased  opening. Mild aortic stenosis. Mild-moderate aortic  regurgitation.  3. Normal aortic root.  4. Normal left atrium.  5. Normal left ventricular internal dimensions and wall  thicknesses.  6. Normal Left Ventricular Systolic Function,  (EF = 55 to  60%)  7. Grade II diastolic dysfunction    < from: Transthoracic Echocardiogram (12.01.17 @ 09:10) >  CONCLUSIONS:  1. Mitral annular calcification. Mild mitral regurgitation.    2. Calcified trileaflet aortic valve with decreased  opening. Peak transaortic valve gradient equals 25 mm Hg,  mean transaortic valve gradient equals 2 mm Hg, estimated  aortic valve area equals 1.9 sqcm (by continuity equation),  consistent with mild aortic stenosis. Moderate aortic  regurgitation.  3. Normal aortic root, arch and descending thoracic aorta.  4. Mild left atrial enlargement.  5. Mild concentric left ventricular hypertrophy.  6. Normal Left Ventricular Systolic Function,  (EF = 55 to  60%) Peak left ventricular outflow tract gradient equals  8.8 mm Hg.  7. Grade II diastolic dysfunction.  8. Right atrium was not well visualized.  9. Right ventricle not well visualized.  10. RA Pressure is 10 mm Hg.  11. RV systolic pressure is 39 mm Hg.  12. Normal pericardium with no pericardial effusion.    < end of copied text >  8. Normal right atrium.  9. Normal right ventricular size and function.  10. There is mild-moderate tricuspid regurgitation.  11. Normal pericardium with no pericardial effusion.    < end of copied text >    [ ]  Catheterization:  [ ] Stress Test:    OTHER: 	        ASSESSMENT/PLAN: 	82y Male HTN, HLD, CVA (2015) p/w atypical chest pain and b/l leg edema r/o for MI    Dapt for CVA,    GI / DVT prophylaxis.   keep K>4, mag >2.0  cont ABX per medicine   Keep net neg with LAsix iv   HR stable with low dose BB  cardiac markers neg x 3   tele stable   D/W Dr Donald

## 2017-12-03 LAB
ANION GAP SERPL CALC-SCNC: 10 MMOL/L — SIGNIFICANT CHANGE UP (ref 5–17)
BUN SERPL-MCNC: 18 MG/DL — SIGNIFICANT CHANGE UP (ref 7–18)
CALCIUM SERPL-MCNC: 8.8 MG/DL — SIGNIFICANT CHANGE UP (ref 8.4–10.5)
CHLORIDE SERPL-SCNC: 100 MMOL/L — SIGNIFICANT CHANGE UP (ref 96–108)
CO2 SERPL-SCNC: 27 MMOL/L — SIGNIFICANT CHANGE UP (ref 22–31)
CREAT SERPL-MCNC: 0.92 MG/DL — SIGNIFICANT CHANGE UP (ref 0.5–1.3)
GLUCOSE SERPL-MCNC: 100 MG/DL — HIGH (ref 70–99)
HCT VFR BLD CALC: 34.3 % — LOW (ref 39–50)
HGB BLD-MCNC: 10.6 G/DL — LOW (ref 13–17)
MCHC RBC-ENTMCNC: 22.4 PG — LOW (ref 27–34)
MCHC RBC-ENTMCNC: 30.8 GM/DL — LOW (ref 32–36)
MCV RBC AUTO: 72.7 FL — LOW (ref 80–100)
PLATELET # BLD AUTO: 321 K/UL — SIGNIFICANT CHANGE UP (ref 150–400)
POTASSIUM SERPL-MCNC: 3.7 MMOL/L — SIGNIFICANT CHANGE UP (ref 3.5–5.3)
POTASSIUM SERPL-SCNC: 3.7 MMOL/L — SIGNIFICANT CHANGE UP (ref 3.5–5.3)
RBC # BLD: 4.72 M/UL — SIGNIFICANT CHANGE UP (ref 4.2–5.8)
RBC # FLD: 16.7 % — HIGH (ref 10.3–14.5)
SODIUM SERPL-SCNC: 137 MMOL/L — SIGNIFICANT CHANGE UP (ref 135–145)
WBC # BLD: 10.7 K/UL — HIGH (ref 3.8–10.5)
WBC # FLD AUTO: 10.7 K/UL — HIGH (ref 3.8–10.5)

## 2017-12-03 RX ORDER — FUROSEMIDE 40 MG
40 TABLET ORAL ONCE
Qty: 0 | Refills: 0 | Status: COMPLETED | OUTPATIENT
Start: 2017-12-03 | End: 2017-12-03

## 2017-12-03 RX ORDER — DOCUSATE SODIUM 100 MG
100 CAPSULE ORAL THREE TIMES A DAY
Qty: 0 | Refills: 0 | Status: DISCONTINUED | OUTPATIENT
Start: 2017-12-03 | End: 2017-12-08

## 2017-12-03 RX ORDER — DOCUSATE SODIUM 100 MG
150 CAPSULE ORAL
Qty: 0 | Refills: 0 | Status: DISCONTINUED | OUTPATIENT
Start: 2017-12-03 | End: 2017-12-03

## 2017-12-03 RX ADMIN — Medication 325 MILLIGRAM(S): at 11:36

## 2017-12-03 RX ADMIN — SENNA PLUS 2 TABLET(S): 8.6 TABLET ORAL at 22:59

## 2017-12-03 RX ADMIN — Medication 12.5 MILLIGRAM(S): at 17:43

## 2017-12-03 RX ADMIN — ENOXAPARIN SODIUM 40 MILLIGRAM(S): 100 INJECTION SUBCUTANEOUS at 11:35

## 2017-12-03 RX ADMIN — Medication 3 MILLILITER(S): at 08:23

## 2017-12-03 RX ADMIN — Medication 12.5 MILLIGRAM(S): at 05:50

## 2017-12-03 RX ADMIN — FINASTERIDE 5 MILLIGRAM(S): 5 TABLET, FILM COATED ORAL at 11:36

## 2017-12-03 RX ADMIN — Medication 100 MILLIGRAM(S): at 13:12

## 2017-12-03 RX ADMIN — Medication 20 MILLIGRAM(S): at 05:49

## 2017-12-03 RX ADMIN — AMPICILLIN SODIUM AND SULBACTAM SODIUM 100 GRAM(S): 250; 125 INJECTION, POWDER, FOR SUSPENSION INTRAMUSCULAR; INTRAVENOUS at 11:34

## 2017-12-03 RX ADMIN — Medication 650 MILLIGRAM(S): at 00:15

## 2017-12-03 RX ADMIN — Medication 3 MILLILITER(S): at 20:43

## 2017-12-03 RX ADMIN — Medication 3 MILLILITER(S): at 14:26

## 2017-12-03 RX ADMIN — Medication 81 MILLIGRAM(S): at 11:35

## 2017-12-03 RX ADMIN — AMPICILLIN SODIUM AND SULBACTAM SODIUM 100 GRAM(S): 250; 125 INJECTION, POWDER, FOR SUSPENSION INTRAMUSCULAR; INTRAVENOUS at 17:43

## 2017-12-03 RX ADMIN — Medication 40 MILLIGRAM(S): at 13:12

## 2017-12-03 RX ADMIN — MONTELUKAST 10 MILLIGRAM(S): 4 TABLET, CHEWABLE ORAL at 22:59

## 2017-12-03 RX ADMIN — AMPICILLIN SODIUM AND SULBACTAM SODIUM 100 GRAM(S): 250; 125 INJECTION, POWDER, FOR SUSPENSION INTRAMUSCULAR; INTRAVENOUS at 05:51

## 2017-12-03 RX ADMIN — Medication 100 MILLIGRAM(S): at 22:59

## 2017-12-03 RX ADMIN — ATORVASTATIN CALCIUM 40 MILLIGRAM(S): 80 TABLET, FILM COATED ORAL at 22:59

## 2017-12-03 RX ADMIN — AMPICILLIN SODIUM AND SULBACTAM SODIUM 100 GRAM(S): 250; 125 INJECTION, POWDER, FOR SUSPENSION INTRAMUSCULAR; INTRAVENOUS at 23:00

## 2017-12-03 RX ADMIN — TAMSULOSIN HYDROCHLORIDE 0.8 MILLIGRAM(S): 0.4 CAPSULE ORAL at 22:59

## 2017-12-03 RX ADMIN — CLOPIDOGREL BISULFATE 75 MILLIGRAM(S): 75 TABLET, FILM COATED ORAL at 11:35

## 2017-12-03 NOTE — PROGRESS NOTE ADULT - SUBJECTIVE AND OBJECTIVE BOX
· Subjective and Objective: 	    No acurte events for overnoight,hemodinamicly stable    Patient is a 82y old  Male who presents with a chief complaint of chest pain and b/l leg edema (29 Nov 2017 21:42)      INTERVAL HPI/OVERNIGHT EVENTS: patient seen and examined at bed side, patient denied any complaints, current symptoms resolving    MEDICATIONS  (STANDING):  ALBUTerol/ipratropium for Nebulization 3 milliLiter(s) Nebulizer every 6 hours  ampicillin/sulbactam  IVPB      ampicillin/sulbactam  IVPB 1.5 Gram(s) IV Intermittent every 6 hours  aspirin  chewable 81 milliGRAM(s) Oral daily  atorvastatin 40 milliGRAM(s) Oral at bedtime  clopidogrel Tablet 75 milliGRAM(s) Oral daily  docusate sodium 100 milliGRAM(s) Oral three times a day  enoxaparin Injectable 40 milliGRAM(s) SubCutaneous daily  ergocalciferol 32934 Unit(s) Oral <User Schedule>  ferrous    sulfate 325 milliGRAM(s) Oral daily  finasteride 5 milliGRAM(s) Oral daily  furosemide   Injectable 20 milliGRAM(s) IV Push daily  metoprolol     tartrate 12.5 milliGRAM(s) Oral two times a day  montelukast 10 milliGRAM(s) Oral at bedtime  senna 2 Tablet(s) Oral at bedtime  tamsulosin 0.8 milliGRAM(s) Oral at bedtime    MEDICATIONS  (PRN):      __________________________________________________  REVIEW OF SYSTEMS:    CONSTITUTIONAL: No fever,   EYES: no acute visual disturbances  NECK: No pain or stiffness  RESPIRATORY: No cough; No shortness of breath  CARDIOVASCULAR: No chest pain, no palpitations  GASTROINTESTINAL: No pain. No nausea or vomiting; No diarrhea   NEUROLOGICAL: No headache or numbness, no tremors  MUSCULOSKELETAL: No joint pain, no muscle pain  GENITOURINARY: no dysuria, no frequency, no hesitancy  PSYCHIATRY: no depression , no anxiety  ALL OTHER  ROS negative        Vital Signs Last 24 Hrs  T(C): 36.7 (01 Dec 2017 14:11), Max: 36.7 (01 Dec 2017 01:54)  T(F): 98 (01 Dec 2017 14:11), Max: 98 (01 Dec 2017 01:54)  HR: 91 (01 Dec 2017 18:11) (67 - 91)  BP: 130/91 (01 Dec 2017 18:11) (130/91 - 146/43)  BP(mean): --  RR: 16 (01 Dec 2017 14:11) (15 - 16)  SpO2: 96% (01 Dec 2017 14:11) (96% - 100%)    ________________________________________________  PHYSICAL EXAM:  GENERAL: NAD  HEENT: Normocephalic;  conjunctivae and sclerae clear; moist mucous membranes;   NECK : supple  CHEST/LUNG: Clear to auscultation bilaterally with good air entry   HEART: S1 S2  regular; no murmurs, gallops or rubs  ABDOMEN: Soft, Nontender, Nondistended; Bowel sounds present  EXTREMITIES: lower ext redness and mild painful    SKIN: warm and dry; no rash  NERVOUS SYSTEM:  Awake and alert; Oriented  to place, person and time ; no new deficits    _________________________________________________    Plan of care was discussed with patient and /or primary care giver; all questions and concerns were addressed and care was aligned with patient's wishes.        Assessment and Plan:   · Assessment		  82 years old male from home, [place] speaking, ambulates with cane at baseline, PMH of Asthma, HTN, HLD, CVA (2015) p/w complaints of chest pain and b/l leg edema. Admitted to telemetry floor for further management.      Problem/Plan - 1:  ·  Problem: Chest pain.  Plan: -pressure like, moderate, left sided, non radiating pain  -EKG: NSR    -Last ECHO from April 2017 showed EF of 56% and grade 2 diastolic dysfunction. repeat ECHO shows similar findings  -c/w aspirin, statin, metoprolol.  -Cardio Dr. Odilon traore noted  - Stress test on Monday.      Problem/Plan - 2:  ·  Problem: Leg edema.  Plan: -warm, red and tender extremities  -could be secondary to cellulitis or CHF  -c/w Lasix and and Abx   .      Problem/Plan - 3:  ·  Problem: Anemia, iron deficiency.  Plan: patient Hb 9.8, with low MC  microcytic anemia with low serm iron and low ferritin   - f/u FOBT to r/o any blood loss  - continue with Iron supplementation.      Problem/Plan - 4:  ·  Problem: Asthma.  Plan: -stable currently  -c/w nebs.      Problem/Plan - 5:  ·  Problem: HTN (hypertension).  Plan: -c/w metoprolol with parameters  -monitor BP.      Problem/Plan - 6:  Problem: Hyperlipidemia. Plan: -c/w statin  lipid profile wnl.     Problem/Plan - 7:  ·  Problem: Prophylactic measure.  Plan: -Improved VTE score of 2 (age and immobilization)   -Lovenox for DVT ppx.

## 2017-12-03 NOTE — PROGRESS NOTE ADULT - SUBJECTIVE AND OBJECTIVE BOX
PGY 1 Note discussed with supervising resident and primary attending    Patient is a 82y old  Male who presents with a chief complaint of chest pain and b/l leg edema (29 Nov 2017 21:42)      INTERVAL HPI/OVERNIGHT EVENTS: patient seen and examined at bed side, patient have leg pain, along with mild reddens of both legs offers no new complaints; current symptoms resolving    MEDICATIONS  (STANDING):  ALBUTerol/ipratropium for Nebulization 3 milliLiter(s) Nebulizer every 6 hours  ampicillin/sulbactam  IVPB      ampicillin/sulbactam  IVPB 1.5 Gram(s) IV Intermittent every 6 hours  aspirin  chewable 81 milliGRAM(s) Oral daily  atorvastatin 40 milliGRAM(s) Oral at bedtime  clopidogrel Tablet 75 milliGRAM(s) Oral daily  docusate sodium 100 milliGRAM(s) Oral three times a day  enoxaparin Injectable 40 milliGRAM(s) SubCutaneous daily  ergocalciferol 68675 Unit(s) Oral <User Schedule>  ferrous    sulfate 325 milliGRAM(s) Oral daily  finasteride 5 milliGRAM(s) Oral daily  metoprolol     tartrate 12.5 milliGRAM(s) Oral two times a day  montelukast 10 milliGRAM(s) Oral at bedtime  senna 2 Tablet(s) Oral at bedtime  tamsulosin 0.8 milliGRAM(s) Oral at bedtime    MEDICATIONS  (PRN):  acetaminophen   Tablet. 650 milliGRAM(s) Oral every 6 hours PRN Moderate Pain (4 - 6)      __________________________________________________  REVIEW OF SYSTEMS:    CONSTITUTIONAL: No fever,   EYES: no acute visual disturbances  NECK: No pain or stiffness  RESPIRATORY: No cough; No shortness of breath  CARDIOVASCULAR: No chest pain, no palpitations  GASTROINTESTINAL: No pain. No nausea or vomiting; No diarrhea   NEUROLOGICAL: No headache or numbness, no tremors  MUSCULOSKELETAL: No joint pain, no muscle pain  GENITOURINARY: no dysuria, no frequency, no hesitancy  PSYCHIATRY: no depression , no anxiety  ALL OTHER  ROS negative        Vital Signs Last 24 Hrs  T(C): 36.6 (03 Dec 2017 14:37), Max: 37.2 (02 Dec 2017 21:09)  T(F): 97.8 (03 Dec 2017 14:37), Max: 98.9 (02 Dec 2017 21:09)  HR: 82 (03 Dec 2017 14:37) (75 - 86)  BP: 158/51 (03 Dec 2017 14:37) (137/93 - 183/62)  BP(mean): --  RR: 18 (03 Dec 2017 14:37) (17 - 19)  SpO2: 99% (03 Dec 2017 14:37) (97% - 99%)    ________________________________________________  PHYSICAL EXAM:  GENERAL: NAD  HEENT: Normocephalic;  conjunctivae and sclerae clear; moist mucous membranes;   NECK : supple  CHEST/LUNG: Clear to auscultation bilaterally with good air entry   HEART: S1 S2  regular; no murmurs, gallops or rubs  ABDOMEN: Soft, Nontender, Nondistended; Bowel sounds present  EXTREMITIES: no cyanosis; no edema; no calf tenderness  SKIN: warm and dry; no rash  NERVOUS SYSTEM:  Awake and alert; Oriented  to place, person and time ; no new deficits    _________________________________________________  LABS:                        10.6   10.7  )-----------( 321      ( 03 Dec 2017 08:20 )             34.3     12-03    137  |  100  |  18  ----------------------------<  100<H>  3.7   |  27  |  0.92    Ca    8.8      03 Dec 2017 08:20          CAPILLARY BLOOD GLUCOSE            RADIOLOGY & ADDITIONAL TESTS:        Consultant(s) Notes Reviewed:   YES    Care Discussed with Consultants : yes    Plan of care was discussed with patient and /or primary care giver; all questions and concerns were addressed and care was aligned with patient's wishes.

## 2017-12-03 NOTE — PROGRESS NOTE ADULT - SUBJECTIVE AND OBJECTIVE BOX
Subjective:  pt seen and examined, no complaints on exam.   no chest pain or nausea on exam     acetaminophen   Tablet. 650 milliGRAM(s) Oral every 6 hours PRN  ALBUTerol/ipratropium for Nebulization 3 milliLiter(s) Nebulizer every 6 hours  ampicillin/sulbactam  IVPB      ampicillin/sulbactam  IVPB 1.5 Gram(s) IV Intermittent every 6 hours  aspirin  chewable 81 milliGRAM(s) Oral daily  atorvastatin 40 milliGRAM(s) Oral at bedtime  clopidogrel Tablet 75 milliGRAM(s) Oral daily  docusate sodium 100 milliGRAM(s) Oral three times a day  enoxaparin Injectable 40 milliGRAM(s) SubCutaneous daily  ergocalciferol 79773 Unit(s) Oral <User Schedule>  ferrous    sulfate 325 milliGRAM(s) Oral daily  finasteride 5 milliGRAM(s) Oral daily  furosemide   Injectable 20 milliGRAM(s) IV Push daily  metoprolol     tartrate 12.5 milliGRAM(s) Oral two times a day  montelukast 10 milliGRAM(s) Oral at bedtime  senna 2 Tablet(s) Oral at bedtime  tamsulosin 0.8 milliGRAM(s) Oral at bedtime                            10.8   10.8  )-----------( 334      ( 02 Dec 2017 07:38 )             36.0       Hemoglobin: 10.8 g/dL (12-02 @ 07:38)  Hemoglobin: 9.8 g/dL (12-01 @ 06:20)  Hemoglobin: 9.9 g/dL (11-30 @ 06:26)  Hemoglobin: 10.4 g/dL (11-29 @ 18:32)      12-02    138  |  100  |  20<H>  ----------------------------<  98  3.5   |  28  |  0.98    Ca    8.9      02 Dec 2017 07:38      Creatinine Trend: 0.98<--, 0.84<--, 0.88<--, 0.88<--    COAGS:           T(C): 36.2 (12-03-17 @ 05:04), Max: 37.2 (12-02-17 @ 21:09)  HR: 84 (12-03-17 @ 05:04) (75 - 99)  BP: 148/54 (12-03-17 @ 05:04) (124/64 - 183/62)  RR: 19 (12-03-17 @ 05:04) (17 - 19)  SpO2: 97% (12-03-17 @ 05:04) (96% - 99%)  Wt(kg): --    I&O's Summary    02 Dec 2017 07:01  -  03 Dec 2017 07:00  --------------------------------------------------------  IN: 340 mL / OUT: 0 mL / NET: 340 mL        HEENT:   Normal oral mucosa, PERRL, EOMI	  Lymphatic: No lymphadenopathy , no edema  Cardiovascular: Normal S1 S2, No JVD, No murmurs , Peripheral pulses palpable 2+ bilaterally  Respiratory: Lungs clear to auscultation, normal effort 	  Gastrointestinal:  Soft, Non-tender, + BS	      TELEMETRY: 	 NSR-        DIAGNOSTIC TESTING:  [ ] Echocardiogram:   < from: Transthoracic Echocardiogram (04.08.17 @ 09:10) >  CONCLUSIONS:  1. Densly calcified mitral valve leaflet, mitral annulus  calcification. Mild-moderate mitral regurgitation.  2. Calcified trileaflet aortic valve with decreased  opening. Mild aortic stenosis. Mild-moderate aortic  regurgitation.  3. Normal aortic root.  4. Normal left atrium.  5. Normal left ventricular internal dimensions and wall  thicknesses.  6. Normal Left Ventricular Systolic Function,  (EF = 55 to  60%)  7. Grade II diastolic dysfunction    < from: Transthoracic Echocardiogram (12.01.17 @ 09:10) >  CONCLUSIONS:  1. Mitral annular calcification. Mild mitral regurgitation.    2. Calcified trileaflet aortic valve with decreased  opening. Peak transaortic valve gradient equals 25 mm Hg,  mean transaortic valve gradient equals 2 mm Hg, estimated  aortic valve area equals 1.9 sqcm (by continuity equation),  consistent with mild aortic stenosis. Moderate aortic  regurgitation.  3. Normal aortic root, arch and descending thoracic aorta.  4. Mild left atrial enlargement.  5. Mild concentric left ventricular hypertrophy.  6. Normal Left Ventricular Systolic Function,  (EF = 55 to  60%) Peak left ventricular outflow tract gradient equals  8.8 mm Hg.  7. Grade II diastolic dysfunction.  8. Right atrium was not well visualized.  9. Right ventricle not well visualized.  10. RA Pressure is 10 mm Hg.  11. RV systolic pressure is 39 mm Hg.  12. Normal pericardium with no pericardial effusion.    < end of copied text >  8. Normal right atrium.  9. Normal right ventricular size and function.  10. There is mild-moderate tricuspid regurgitation.  11. Normal pericardium with no pericardial effusion.    < end of copied text >    [ ]  Catheterization:  [ ] Stress Test:    OTHER: 	        ASSESSMENT/PLAN: 	82y Male HTN, HLD, CVA (2015) p/w atypical chest pain and b/l leg edema r/o for MI    Dapt for CVA,    GI / DVT prophylaxis.   keep K>4, mag >2.0  cont ABX per medicine   Keep net neg with LAsix iv   HR stable with low dose BB  cardiac markers neg x 3   tele stable   unchanged echo , nl lv fx .   NST in am ( pharmacological )   D/W Dr Donald

## 2017-12-03 NOTE — PROGRESS NOTE ADULT - ASSESSMENT
82 years old male from home, [place] speaking, ambulates with cane at baseline, PMH of Asthma, HTN, HLD, CVA (2015) p/w complaints of chest pain and b/l leg edema. Admitted to telemetry floor for further management.

## 2017-12-03 NOTE — PROGRESS NOTE ADULT - PROBLEM SELECTOR PLAN 2
-warm, red and tender extremities  -could be secondary to cellulitis or CHF  -c/w Lasix and and Abx   -- No DVT on doppler  - blood cultures negative  - wiil increase lasix to 40 mg iv

## 2017-12-04 LAB
ANION GAP SERPL CALC-SCNC: 11 MMOL/L — SIGNIFICANT CHANGE UP (ref 5–17)
BUN SERPL-MCNC: 19 MG/DL — HIGH (ref 7–18)
CALCIUM SERPL-MCNC: 8.8 MG/DL — SIGNIFICANT CHANGE UP (ref 8.4–10.5)
CHLORIDE SERPL-SCNC: 97 MMOL/L — SIGNIFICANT CHANGE UP (ref 96–108)
CO2 SERPL-SCNC: 28 MMOL/L — SIGNIFICANT CHANGE UP (ref 22–31)
CREAT SERPL-MCNC: 0.79 MG/DL — SIGNIFICANT CHANGE UP (ref 0.5–1.3)
GLUCOSE SERPL-MCNC: 93 MG/DL — SIGNIFICANT CHANGE UP (ref 70–99)
HCT VFR BLD CALC: 34.6 % — LOW (ref 39–50)
HGB BLD-MCNC: 10.3 G/DL — LOW (ref 13–17)
MAGNESIUM SERPL-MCNC: 2.3 MG/DL — SIGNIFICANT CHANGE UP (ref 1.6–2.6)
MCHC RBC-ENTMCNC: 21.1 PG — LOW (ref 27–34)
MCHC RBC-ENTMCNC: 29.8 GM/DL — LOW (ref 32–36)
MCV RBC AUTO: 70.9 FL — LOW (ref 80–100)
PHOSPHATE SERPL-MCNC: 3.1 MG/DL — SIGNIFICANT CHANGE UP (ref 2.5–4.5)
PLATELET # BLD AUTO: 317 K/UL — SIGNIFICANT CHANGE UP (ref 150–400)
POTASSIUM SERPL-MCNC: 3.4 MMOL/L — LOW (ref 3.5–5.3)
POTASSIUM SERPL-SCNC: 3.4 MMOL/L — LOW (ref 3.5–5.3)
PROCALCITONIN SERPL-MCNC: 0.22 NG/ML — HIGH (ref 0–0.04)
RBC # BLD: 4.88 M/UL — SIGNIFICANT CHANGE UP (ref 4.2–5.8)
RBC # FLD: 16.5 % — HIGH (ref 10.3–14.5)
SODIUM SERPL-SCNC: 136 MMOL/L — SIGNIFICANT CHANGE UP (ref 135–145)
WBC # BLD: 11.3 K/UL — HIGH (ref 3.8–10.5)
WBC # FLD AUTO: 11.3 K/UL — HIGH (ref 3.8–10.5)

## 2017-12-04 RX ORDER — GABAPENTIN 400 MG/1
300 CAPSULE ORAL DAILY
Qty: 0 | Refills: 0 | Status: DISCONTINUED | OUTPATIENT
Start: 2017-12-04 | End: 2017-12-05

## 2017-12-04 RX ORDER — METOPROLOL TARTRATE 50 MG
25 TABLET ORAL
Qty: 0 | Refills: 0 | Status: DISCONTINUED | OUTPATIENT
Start: 2017-12-04 | End: 2017-12-05

## 2017-12-04 RX ORDER — FUROSEMIDE 40 MG
40 TABLET ORAL DAILY
Qty: 0 | Refills: 0 | Status: DISCONTINUED | OUTPATIENT
Start: 2017-12-04 | End: 2017-12-08

## 2017-12-04 RX ORDER — POTASSIUM CHLORIDE 20 MEQ
20 PACKET (EA) ORAL
Qty: 0 | Refills: 0 | Status: COMPLETED | OUTPATIENT
Start: 2017-12-04 | End: 2017-12-04

## 2017-12-04 RX ADMIN — TAMSULOSIN HYDROCHLORIDE 0.8 MILLIGRAM(S): 0.4 CAPSULE ORAL at 21:46

## 2017-12-04 RX ADMIN — FINASTERIDE 5 MILLIGRAM(S): 5 TABLET, FILM COATED ORAL at 12:13

## 2017-12-04 RX ADMIN — Medication 81 MILLIGRAM(S): at 12:14

## 2017-12-04 RX ADMIN — AMPICILLIN SODIUM AND SULBACTAM SODIUM 100 GRAM(S): 250; 125 INJECTION, POWDER, FOR SUSPENSION INTRAMUSCULAR; INTRAVENOUS at 23:35

## 2017-12-04 RX ADMIN — Medication 20 MILLIEQUIVALENT(S): at 17:30

## 2017-12-04 RX ADMIN — Medication 25 MILLIGRAM(S): at 17:30

## 2017-12-04 RX ADMIN — MONTELUKAST 10 MILLIGRAM(S): 4 TABLET, CHEWABLE ORAL at 21:44

## 2017-12-04 RX ADMIN — Medication 25 MILLIGRAM(S): at 06:01

## 2017-12-04 RX ADMIN — Medication 1 ENEMA: at 18:39

## 2017-12-04 RX ADMIN — Medication 20 MILLIEQUIVALENT(S): at 15:29

## 2017-12-04 RX ADMIN — SENNA PLUS 2 TABLET(S): 8.6 TABLET ORAL at 21:46

## 2017-12-04 RX ADMIN — Medication 100 MILLIGRAM(S): at 06:01

## 2017-12-04 RX ADMIN — Medication 100 MILLIGRAM(S): at 15:28

## 2017-12-04 RX ADMIN — ENOXAPARIN SODIUM 40 MILLIGRAM(S): 100 INJECTION SUBCUTANEOUS at 12:14

## 2017-12-04 RX ADMIN — ATORVASTATIN CALCIUM 40 MILLIGRAM(S): 80 TABLET, FILM COATED ORAL at 21:46

## 2017-12-04 RX ADMIN — Medication 20 MILLIEQUIVALENT(S): at 12:15

## 2017-12-04 RX ADMIN — CLOPIDOGREL BISULFATE 75 MILLIGRAM(S): 75 TABLET, FILM COATED ORAL at 12:13

## 2017-12-04 RX ADMIN — AMPICILLIN SODIUM AND SULBACTAM SODIUM 100 GRAM(S): 250; 125 INJECTION, POWDER, FOR SUSPENSION INTRAMUSCULAR; INTRAVENOUS at 05:59

## 2017-12-04 RX ADMIN — Medication 100 MILLIGRAM(S): at 21:46

## 2017-12-04 RX ADMIN — Medication 3 MILLILITER(S): at 20:45

## 2017-12-04 RX ADMIN — Medication 40 MILLIGRAM(S): at 21:45

## 2017-12-04 RX ADMIN — Medication 325 MILLIGRAM(S): at 12:14

## 2017-12-04 RX ADMIN — Medication 3 MILLILITER(S): at 14:07

## 2017-12-04 RX ADMIN — AMPICILLIN SODIUM AND SULBACTAM SODIUM 100 GRAM(S): 250; 125 INJECTION, POWDER, FOR SUSPENSION INTRAMUSCULAR; INTRAVENOUS at 12:14

## 2017-12-04 RX ADMIN — AMPICILLIN SODIUM AND SULBACTAM SODIUM 100 GRAM(S): 250; 125 INJECTION, POWDER, FOR SUSPENSION INTRAMUSCULAR; INTRAVENOUS at 17:30

## 2017-12-04 NOTE — PROGRESS NOTE ADULT - PROBLEM SELECTOR PLAN 3
Hb 9.8 --> 10.6  microcytic anemia with low serm iron and low ferritin   F/u FOBT  C/w Fe supplements

## 2017-12-04 NOTE — PROGRESS NOTE ADULT - SUBJECTIVE AND OBJECTIVE BOX
PGY 1 Note discussed with supervising resident and primary attending    Patient is a 82y old  Male who presents with a chief complaint of chest pain and b/l leg edema (29 Nov 2017 21:42)      INTERVAL HPI/OVERNIGHT EVENTS: offers no new complaints; current symptoms resolving    MEDICATIONS  (STANDING):  ALBUTerol/ipratropium for Nebulization 3 milliLiter(s) Nebulizer every 6 hours  ampicillin/sulbactam  IVPB      ampicillin/sulbactam  IVPB 1.5 Gram(s) IV Intermittent every 6 hours  aspirin  chewable 81 milliGRAM(s) Oral daily  atorvastatin 40 milliGRAM(s) Oral at bedtime  clopidogrel Tablet 75 milliGRAM(s) Oral daily  docusate sodium 100 milliGRAM(s) Oral three times a day  enoxaparin Injectable 40 milliGRAM(s) SubCutaneous daily  ergocalciferol 64501 Unit(s) Oral <User Schedule>  ferrous    sulfate 325 milliGRAM(s) Oral daily  finasteride 5 milliGRAM(s) Oral daily  metoprolol     tartrate 12.5 milliGRAM(s) Oral two times a day  montelukast 10 milliGRAM(s) Oral at bedtime  senna 2 Tablet(s) Oral at bedtime  tamsulosin 0.8 milliGRAM(s) Oral at bedtime    MEDICATIONS  (PRN):  acetaminophen   Tablet. 650 milliGRAM(s) Oral every 6 hours PRN Moderate Pain (4 - 6)      __________________________________________________  REVIEW OF SYSTEMS:    CONSTITUTIONAL: No fever,   EYES: no acute visual disturbances  NECK: No pain or stiffness  RESPIRATORY: No cough; No shortness of breath  CARDIOVASCULAR: No chest pain, no palpitations  GASTROINTESTINAL: No pain. No nausea or vomiting; No diarrhea   NEUROLOGICAL: No headache or numbness, no tremors  MUSCULOSKELETAL: No joint pain, no muscle pain  GENITOURINARY: no dysuria, no frequency, no hesitancy  PSYCHIATRY: no depression , no anxiety  ALL OTHER  ROS negative        Vital Signs Last 24 Hrs  T(C): 37.3 (04 Dec 2017 04:52), Max: 37.4 (03 Dec 2017 17:38)  T(F): 99.2 (04 Dec 2017 04:52), Max: 99.4 (03 Dec 2017 17:38)  HR: 101 (04 Dec 2017 04:52) (78 - 118)  BP: 173/68 (04 Dec 2017 04:52) (142/61 - 173/68)  BP(mean): --  RR: 17 (04 Dec 2017 04:52) (15 - 18)  SpO2: 97% (04 Dec 2017 04:52) (96% - 99%)    ________________________________________________  PHYSICAL EXAM:  GENERAL: NAD  HEENT: Normocephalic;  conjunctivae and sclerae clear; moist mucous membranes;   NECK : supple  CHEST/LUNG: Clear to auscultation bilaterally with good air entry   HEART: S1 S2  regular; no murmurs, gallops or rubs  ABDOMEN: Soft, Nontender, Nondistended; Bowel sounds present  EXTREMITIES: no cyanosis; no edema; no calf tenderness  SKIN: warm and dry; no rash  NERVOUS SYSTEM:  Awake and alert; Oriented  to place, person and time ; no new deficits    _________________________________________________  LABS:                        10.6   10.7  )-----------( 321      ( 03 Dec 2017 08:20 )             34.3     12-03    137  |  100  |  18  ----------------------------<  100<H>  3.7   |  27  |  0.92    Ca    8.8      03 Dec 2017 08:20      RADIOLOGY & ADDITIONAL TESTS:    ECHO: CONCLUSIONS:  1. Mitral annular calcification. Mild mitral regurgitation.  2. Calcified trileaflet aortic valve with decreased  opening. Peak transaortic valve gradient equals 25 mm Hg,  mean transaortic valve gradient equals 2 mm Hg, estimated  aortic valve area equals 1.9 sqcm (by continuity equation),  consistent with mild aortic stenosis. Moderate aortic  regurgitation.  3. Normal aortic root, arch and descending thoracic aorta.  4. Mild left atrial enlargement.  5. Mild concentric left ventricular hypertrophy.  6. Normal Left Ventricular Systolic Function,  (EF = 55 to  60%) Peak left ventricular outflow tract gradient equals  8.8 mm Hg.  7. Grade II diastolic dysfunction.  8. Right atrium was not well visualized.  9. Right ventricle not well visualized.  10. RA Pressure is 10 mm Hg.  11. RV systolic pressure is 39 mm Hg.  12. Normal pericardium with no pericardial effusion    Plan of care was discussed with patient and /or primary care giver; all questions and concerns were addressed and care was aligned with patient's wishes. PGY 1 Note discussed with supervising resident and primary attending    Patient is a 82y old  Male who presents with a chief complaint of chest pain and b/l leg edema (29 Nov 2017 21:42)      INTERVAL HPI/OVERNIGHT EVENTS: offers no new complaints; current symptoms resolving    MEDICATIONS  (STANDING):  ALBUTerol/ipratropium for Nebulization 3 milliLiter(s) Nebulizer every 6 hours  ampicillin/sulbactam  IVPB      ampicillin/sulbactam  IVPB 1.5 Gram(s) IV Intermittent every 6 hours  aspirin  chewable 81 milliGRAM(s) Oral daily  atorvastatin 40 milliGRAM(s) Oral at bedtime  clopidogrel Tablet 75 milliGRAM(s) Oral daily  docusate sodium 100 milliGRAM(s) Oral three times a day  enoxaparin Injectable 40 milliGRAM(s) SubCutaneous daily  ergocalciferol 98584 Unit(s) Oral <User Schedule>  ferrous    sulfate 325 milliGRAM(s) Oral daily  finasteride 5 milliGRAM(s) Oral daily  metoprolol     tartrate 12.5 milliGRAM(s) Oral two times a day  montelukast 10 milliGRAM(s) Oral at bedtime  senna 2 Tablet(s) Oral at bedtime  tamsulosin 0.8 milliGRAM(s) Oral at bedtime    MEDICATIONS  (PRN):  acetaminophen   Tablet. 650 milliGRAM(s) Oral every 6 hours PRN Moderate Pain (4 - 6)      __________________________________________________  REVIEW OF SYSTEMS:    CONSTITUTIONAL: No fever,   EYES: no acute visual disturbances  NECK: No pain or stiffness  RESPIRATORY: No cough; No shortness of breath  CARDIOVASCULAR: No chest pain, no palpitations  GASTROINTESTINAL: No pain. No nausea or vomiting; No diarrhea   NEUROLOGICAL: No headache or numbness, no tremors  MUSCULOSKELETAL: B/L LE pain and redness  GENITOURINARY: no dysuria, no frequency, no hesitancy  PSYCHIATRY: no depression , no anxiety  ALL OTHER  ROS negative        Vital Signs Last 24 Hrs  T(C): 37.3 (04 Dec 2017 04:52), Max: 37.4 (03 Dec 2017 17:38)  T(F): 99.2 (04 Dec 2017 04:52), Max: 99.4 (03 Dec 2017 17:38)  HR: 101 (04 Dec 2017 04:52) (78 - 118)  BP: 173/68 (04 Dec 2017 04:52) (142/61 - 173/68)  BP(mean): --  RR: 17 (04 Dec 2017 04:52) (15 - 18)  SpO2: 97% (04 Dec 2017 04:52) (96% - 99%)    ________________________________________________  PHYSICAL EXAM:  GENERAL: NAD Vincentian speaking male   HEENT: Normocephalic;  conjunctivae and sclerae clear; moist mucous membranes  NECK : supple  CHEST/LUNG: Clear to auscultation bilaterally with good air entry   HEART: S1 S2  regular; no murmurs, gallops or rubs  ABDOMEN: Soft, Nontender, Nondistended; Bowel sounds present  EXTREMITIES: B/l LE ankle and feet edema, swelling and pain  NERVOUS SYSTEM:  Awake and alert x 3    _________________________________________________  LABS:                        10.6   10.7  )-----------( 321      ( 03 Dec 2017 08:20 )             34.3     12-03    137  |  100  |  18  ----------------------------<  100<H>  3.7   |  27  |  0.92    Ca    8.8      03 Dec 2017 08:20      RADIOLOGY & ADDITIONAL TESTS:    ECHO: CONCLUSIONS:  1. Mitral annular calcification. Mild mitral regurgitation.  2. Calcified trileaflet aortic valve with decreased  opening. Peak transaortic valve gradient equals 25 mm Hg,  mean transaortic valve gradient equals 2 mm Hg, estimated  aortic valve area equals 1.9 sqcm (by continuity equation),  consistent with mild aortic stenosis. Moderate aortic  regurgitation.  3. Normal aortic root, arch and descending thoracic aorta.  4. Mild left atrial enlargement.  5. Mild concentric left ventricular hypertrophy.  6. Normal Left Ventricular Systolic Function,  (EF = 55 to  60%) Peak left ventricular outflow tract gradient equals  8.8 mm Hg.  7. Grade II diastolic dysfunction.  8. Right atrium was not well visualized.  9. Right ventricle not well visualized.  10. RA Pressure is 10 mm Hg.  11. RV systolic pressure is 39 mm Hg.  12. Normal pericardium with no pericardial effusion    Plan of care was discussed with patient and /or primary care giver; all questions and concerns were addressed and care was aligned with patient's wishes.

## 2017-12-04 NOTE — PROGRESS NOTE ADULT - ASSESSMENT
82 years old male from home, Nigerian speaking, ambulates with cane at baseline, PMH of Asthma, HTN, HLD, CVA (2015) p/w complaints of chest pain and b/l leg edema. Admitted to telemetry floor for further management.

## 2017-12-04 NOTE — PROGRESS NOTE ADULT - SUBJECTIVE AND OBJECTIVE BOX
Patient Denies CP, SOB     acetaminophen   Tablet. 650 milliGRAM(s) Oral every 6 hours PRN  ALBUTerol/ipratropium for Nebulization 3 milliLiter(s) Nebulizer every 6 hours  ampicillin/sulbactam  IVPB      ampicillin/sulbactam  IVPB 1.5 Gram(s) IV Intermittent every 6 hours  aspirin  chewable 81 milliGRAM(s) Oral daily  atorvastatin 40 milliGRAM(s) Oral at bedtime  clopidogrel Tablet 75 milliGRAM(s) Oral daily  docusate sodium 100 milliGRAM(s) Oral three times a day  enoxaparin Injectable 40 milliGRAM(s) SubCutaneous daily  ergocalciferol 72462 Unit(s) Oral <User Schedule>  ferrous    sulfate 325 milliGRAM(s) Oral daily  finasteride 5 milliGRAM(s) Oral daily  furosemide    Tablet 40 milliGRAM(s) Oral daily  metoprolol     tartrate 25 milliGRAM(s) Oral two times a day  montelukast 10 milliGRAM(s) Oral at bedtime  potassium chloride    Tablet ER 20 milliEquivalent(s) Oral every 2 hours  senna 2 Tablet(s) Oral at bedtime  tamsulosin 0.8 milliGRAM(s) Oral at bedtime                            10.3   11.3  )-----------( 317      ( 04 Dec 2017 06:37 )             34.6       Hemoglobin: 10.3 g/dL (12-04 @ 06:37)  Hemoglobin: 10.6 g/dL (12-03 @ 08:20)  Hemoglobin: 10.8 g/dL (12-02 @ 07:38)  Hemoglobin: 9.8 g/dL (12-01 @ 06:20)  Hemoglobin: 9.9 g/dL (11-30 @ 06:26)      12-04    136  |  97  |  19<H>  ----------------------------<  93  3.4<L>   |  28  |  0.79    Ca    8.8      04 Dec 2017 06:37  Phos  3.1     12-04  Mg     2.3     12-04      Creatinine Trend: 0.79<--, 0.92<--, 0.98<--, 0.84<--, 0.88<--, 0.88<--    COAGS:           T(C): 37.3 (12-04-17 @ 04:52), Max: 37.4 (12-03-17 @ 17:38)  HR: 91 (12-04-17 @ 06:54) (78 - 118)  BP: 158/59 (12-04-17 @ 06:54) (142/61 - 173/68)  RR: 17 (12-04-17 @ 04:52) (15 - 18)  SpO2: 97% (12-04-17 @ 04:52) (96% - 99%)  Wt(kg): --    I&O's Summary    03 Dec 2017 07:01  -  04 Dec 2017 07:00  --------------------------------------------------------  IN: 240 mL / OUT: 300 mL / NET: -60 mL          HEENT:   Normal oral mucosa, PERRL, EOMI	  Lymphatic: No lymphadenopathy , no edema  Cardiovascular: Normal S1 S2, No JVD, No murmurs , Peripheral pulses palpable 2+ bilaterally  Respiratory: Lungs clear to auscultation, normal effort 	  Gastrointestinal:  Soft, Non-tender, + BS	      TELEMETRY: 	 NSR-        DIAGNOSTIC TESTING:  [ ] Echocardiogram:   < from: Transthoracic Echocardiogram (04.08.17 @ 09:10) >  CONCLUSIONS:  1. Densly calcified mitral valve leaflet, mitral annulus  calcification. Mild-moderate mitral regurgitation.  2. Calcified trileaflet aortic valve with decreased  opening. Mild aortic stenosis. Mild-moderate aortic  regurgitation.  3. Normal aortic root.  4. Normal left atrium.  5. Normal left ventricular internal dimensions and wall  thicknesses.  6. Normal Left Ventricular Systolic Function,  (EF = 55 to  60%)  7. Grade II diastolic dysfunction    < from: Transthoracic Echocardiogram (12.01.17 @ 09:10) >  CONCLUSIONS:  1. Mitral annular calcification. Mild mitral regurgitation.    2. Calcified trileaflet aortic valve with decreased  opening. Peak transaortic valve gradient equals 25 mm Hg,  mean transaortic valve gradient equals 2 mm Hg, estimated  aortic valve area equals 1.9 sqcm (by continuity equation),  consistent with mild aortic stenosis. Moderate aortic  regurgitation.  3. Normal aortic root, arch and descending thoracic aorta.  4. Mild left atrial enlargement.  5. Mild concentric left ventricular hypertrophy.  6. Normal Left Ventricular Systolic Function,  (EF = 55 to  60%) Peak left ventricular outflow tract gradient equals  8.8 mm Hg.  7. Grade II diastolic dysfunction.  8. Right atrium was not well visualized.  9. Right ventricle not well visualized.  10. RA Pressure is 10 mm Hg.  11. RV systolic pressure is 39 mm Hg.  12. Normal pericardium with no pericardial effusion.    < end of copied text >  8. Normal right atrium.  9. Normal right ventricular size and function.  10. There is mild-moderate tricuspid regurgitation.  11. Normal pericardium with no pericardial effusion.    < end of copied text >    [ ]  Catheterization:  [ ] Stress Test:    OTHER: 	        ASSESSMENT/PLAN: 	82y Male HTN, HLD, CVA (2015) p/w atypical chest pain and b/l leg edema r/o for MI, Normal LV function, moderate AI    - F/u nuclear stress today  - cont ASA, Plavix and statin for CVA  - If nuclear stres negative, plan for outpt cardiac f/u      Naveed Donald MD, FACC  [facility], [facility]  [address] [place] [address]  PHONE:  [phone]  BEEPER : [phone]

## 2017-12-04 NOTE — PROGRESS NOTE ADULT - PROBLEM SELECTOR PLAN 2
Warm, red and tender extremities  Mild leukocytosis  Could be secondary to cellulitis or CHF  Clear CXR  Normal LE doppler  BCx: NTD  C/w Unasyn  F/u Procalcitonin Warm, red and tender extremities  Mild leukocytosis  Could be secondary to cellulitis or CHF  Clear CXR  Normal LE doppler  BCx: NTD  C/w Unasyn and 40 PO Lasix  F/u Procalcitonin  Will call ID if WBC continue to rise

## 2017-12-05 ENCOUNTER — TRANSCRIPTION ENCOUNTER (OUTPATIENT)
Age: 82
End: 2017-12-05

## 2017-12-05 LAB
ANION GAP SERPL CALC-SCNC: 9 MMOL/L — SIGNIFICANT CHANGE UP (ref 5–17)
BUN SERPL-MCNC: 23 MG/DL — HIGH (ref 7–18)
CALCIUM SERPL-MCNC: 8.4 MG/DL — SIGNIFICANT CHANGE UP (ref 8.4–10.5)
CHLORIDE SERPL-SCNC: 98 MMOL/L — SIGNIFICANT CHANGE UP (ref 96–108)
CO2 SERPL-SCNC: 28 MMOL/L — SIGNIFICANT CHANGE UP (ref 22–31)
CREAT SERPL-MCNC: 0.82 MG/DL — SIGNIFICANT CHANGE UP (ref 0.5–1.3)
CULTURE RESULTS: SIGNIFICANT CHANGE UP
CULTURE RESULTS: SIGNIFICANT CHANGE UP
GLUCOSE SERPL-MCNC: 98 MG/DL — SIGNIFICANT CHANGE UP (ref 70–99)
HCT VFR BLD CALC: 31.9 % — LOW (ref 39–50)
HGB BLD-MCNC: 9.5 G/DL — LOW (ref 13–17)
MAGNESIUM SERPL-MCNC: 2.5 MG/DL — SIGNIFICANT CHANGE UP (ref 1.6–2.6)
MCHC RBC-ENTMCNC: 21 PG — LOW (ref 27–34)
MCHC RBC-ENTMCNC: 29.7 GM/DL — LOW (ref 32–36)
MCV RBC AUTO: 70.6 FL — LOW (ref 80–100)
PHOSPHATE SERPL-MCNC: 2.8 MG/DL — SIGNIFICANT CHANGE UP (ref 2.5–4.5)
PLATELET # BLD AUTO: 334 K/UL — SIGNIFICANT CHANGE UP (ref 150–400)
POTASSIUM SERPL-MCNC: 3.5 MMOL/L — SIGNIFICANT CHANGE UP (ref 3.5–5.3)
POTASSIUM SERPL-SCNC: 3.5 MMOL/L — SIGNIFICANT CHANGE UP (ref 3.5–5.3)
RBC # BLD: 4.53 M/UL — SIGNIFICANT CHANGE UP (ref 4.2–5.8)
RBC # FLD: 16.2 % — HIGH (ref 10.3–14.5)
SODIUM SERPL-SCNC: 135 MMOL/L — SIGNIFICANT CHANGE UP (ref 135–145)
SPECIMEN SOURCE: SIGNIFICANT CHANGE UP
SPECIMEN SOURCE: SIGNIFICANT CHANGE UP
WBC # BLD: 11.7 K/UL — HIGH (ref 3.8–10.5)
WBC # FLD AUTO: 11.7 K/UL — HIGH (ref 3.8–10.5)

## 2017-12-05 RX ORDER — MONTELUKAST 4 MG/1
1 TABLET, CHEWABLE ORAL
Qty: 0 | Refills: 0 | COMMUNITY

## 2017-12-05 RX ORDER — CEFUROXIME AXETIL 250 MG
1 TABLET ORAL
Qty: 10 | Refills: 0 | OUTPATIENT
Start: 2017-12-05 | End: 2017-12-10

## 2017-12-05 RX ORDER — FERROUS SULFATE 325(65) MG
1 TABLET ORAL
Qty: 30 | Refills: 0
Start: 2017-12-05 | End: 2018-01-04

## 2017-12-05 RX ORDER — SENNA PLUS 8.6 MG/1
2 TABLET ORAL
Qty: 0 | Refills: 0 | COMMUNITY
Start: 2017-12-05

## 2017-12-05 RX ORDER — METOPROLOL TARTRATE 50 MG
50 TABLET ORAL
Qty: 0 | Refills: 0 | Status: DISCONTINUED | OUTPATIENT
Start: 2017-12-05 | End: 2017-12-08

## 2017-12-05 RX ORDER — FUROSEMIDE 40 MG
1 TABLET ORAL
Qty: 0 | Refills: 0 | COMMUNITY

## 2017-12-05 RX ORDER — SILODOSIN 4 MG/1
1 CAPSULE ORAL
Qty: 0 | Refills: 0 | COMMUNITY

## 2017-12-05 RX ORDER — METOPROLOL TARTRATE 50 MG
1 TABLET ORAL
Qty: 60 | Refills: 0 | OUTPATIENT
Start: 2017-12-05 | End: 2018-01-04

## 2017-12-05 RX ORDER — FUROSEMIDE 40 MG
1 TABLET ORAL
Qty: 30 | Refills: 0 | OUTPATIENT
Start: 2017-12-05 | End: 2018-01-04

## 2017-12-05 RX ORDER — TAMSULOSIN HYDROCHLORIDE 0.4 MG/1
2 CAPSULE ORAL
Qty: 0 | Refills: 0 | COMMUNITY
Start: 2017-12-05

## 2017-12-05 RX ORDER — POLYETHYLENE GLYCOL 3350 17 G/17G
17 POWDER, FOR SOLUTION ORAL DAILY
Qty: 0 | Refills: 0 | Status: COMPLETED | OUTPATIENT
Start: 2017-12-05 | End: 2017-12-07

## 2017-12-05 RX ORDER — METOPROLOL TARTRATE 50 MG
25 TABLET ORAL
Qty: 0 | Refills: 0 | COMMUNITY

## 2017-12-05 RX ORDER — GABAPENTIN 400 MG/1
300 CAPSULE ORAL
Qty: 0 | Refills: 0 | Status: DISCONTINUED | OUTPATIENT
Start: 2017-12-05 | End: 2017-12-08

## 2017-12-05 RX ORDER — GABAPENTIN 400 MG/1
1 CAPSULE ORAL
Qty: 0 | Refills: 0 | COMMUNITY
Start: 2017-12-05

## 2017-12-05 RX ADMIN — FINASTERIDE 5 MILLIGRAM(S): 5 TABLET, FILM COATED ORAL at 11:33

## 2017-12-05 RX ADMIN — Medication 81 MILLIGRAM(S): at 11:34

## 2017-12-05 RX ADMIN — Medication 3 MILLILITER(S): at 20:46

## 2017-12-05 RX ADMIN — Medication 3 MILLILITER(S): at 08:33

## 2017-12-05 RX ADMIN — Medication 100 MILLIGRAM(S): at 14:19

## 2017-12-05 RX ADMIN — TAMSULOSIN HYDROCHLORIDE 0.8 MILLIGRAM(S): 0.4 CAPSULE ORAL at 22:00

## 2017-12-05 RX ADMIN — AMPICILLIN SODIUM AND SULBACTAM SODIUM 100 GRAM(S): 250; 125 INJECTION, POWDER, FOR SUSPENSION INTRAMUSCULAR; INTRAVENOUS at 18:15

## 2017-12-05 RX ADMIN — Medication 100 MILLIGRAM(S): at 22:00

## 2017-12-05 RX ADMIN — Medication 325 MILLIGRAM(S): at 11:34

## 2017-12-05 RX ADMIN — Medication 100 MILLIGRAM(S): at 05:28

## 2017-12-05 RX ADMIN — GABAPENTIN 300 MILLIGRAM(S): 400 CAPSULE ORAL at 11:33

## 2017-12-05 RX ADMIN — SENNA PLUS 2 TABLET(S): 8.6 TABLET ORAL at 22:00

## 2017-12-05 RX ADMIN — ATORVASTATIN CALCIUM 40 MILLIGRAM(S): 80 TABLET, FILM COATED ORAL at 22:00

## 2017-12-05 RX ADMIN — GABAPENTIN 300 MILLIGRAM(S): 400 CAPSULE ORAL at 18:15

## 2017-12-05 RX ADMIN — Medication 650 MILLIGRAM(S): at 23:00

## 2017-12-05 RX ADMIN — ENOXAPARIN SODIUM 40 MILLIGRAM(S): 100 INJECTION SUBCUTANEOUS at 11:34

## 2017-12-05 RX ADMIN — AMPICILLIN SODIUM AND SULBACTAM SODIUM 100 GRAM(S): 250; 125 INJECTION, POWDER, FOR SUSPENSION INTRAMUSCULAR; INTRAVENOUS at 11:33

## 2017-12-05 RX ADMIN — Medication 50 MILLIGRAM(S): at 18:15

## 2017-12-05 RX ADMIN — AMPICILLIN SODIUM AND SULBACTAM SODIUM 100 GRAM(S): 250; 125 INJECTION, POWDER, FOR SUSPENSION INTRAMUSCULAR; INTRAVENOUS at 23:48

## 2017-12-05 RX ADMIN — POLYETHYLENE GLYCOL 3350 17 GRAM(S): 17 POWDER, FOR SOLUTION ORAL at 14:20

## 2017-12-05 RX ADMIN — MONTELUKAST 10 MILLIGRAM(S): 4 TABLET, CHEWABLE ORAL at 22:00

## 2017-12-05 RX ADMIN — Medication 650 MILLIGRAM(S): at 22:00

## 2017-12-05 RX ADMIN — Medication 25 MILLIGRAM(S): at 05:28

## 2017-12-05 RX ADMIN — Medication 40 MILLIGRAM(S): at 05:37

## 2017-12-05 RX ADMIN — Medication 3 MILLILITER(S): at 15:44

## 2017-12-05 RX ADMIN — AMPICILLIN SODIUM AND SULBACTAM SODIUM 100 GRAM(S): 250; 125 INJECTION, POWDER, FOR SUSPENSION INTRAMUSCULAR; INTRAVENOUS at 05:27

## 2017-12-05 RX ADMIN — CLOPIDOGREL BISULFATE 75 MILLIGRAM(S): 75 TABLET, FILM COATED ORAL at 14:19

## 2017-12-05 NOTE — PROGRESS NOTE ADULT - ASSESSMENT
82 years old male from home, Maldivian speaking, ambulates with cane at baseline, PMH of Asthma, HTN, HLD, CVA (2015) p/w complaints of chest pain and b/l leg edema. Admitted to telemetry floor for further management.

## 2017-12-05 NOTE — DISCHARGE NOTE ADULT - HOSPITAL COURSE
82 years old male from home, [place] speaking, ambulates with cane at baseline, PMH of Asthma, HTN, HLD, CVA (2015) p/w complaints of chest pain and b/l leg edema. Patient is AAO x 3 and preferred son at bedside to translate. Patient states that chest pain started at 5 pm today, now resolved but described as pressure like, moderate, left sided, non radiating. Also has b/l leg edema x 5 days, was seen by PCP who referred to a Cardiologist who started him on Lasix which he took for 2 days with not much relief. Denies any fever, chills, sob, palpitations, nausea, vomiting, diarrhea, abdominal pain, recent travel, trauma or sick contacts. Does have increased urinary frequency, likely 2/2 BPH as on meds for it. Also complains of constipation, last BM 2 days back.    Chest pain.  Plan: Pressure like, moderate, left sided, non radiating pain on admission  Heart score 4; JAMES score 2. EKG: NSR. Troponin X3 negative. Echo: 55% EF . Normal stress test  C/w aspirin, statin, metoprolol.  Dr. Donald on board.     Leg edema.  Plan: Warm, red and tender extremities  Mild leukocytosis 10-->11. Could be secondary to cellulitis. Clear CXR  Normal LE doppler. BCx: NTD. C/w Unasyn and 40 PO Lasix. C/w Gabapentin for burning pain  Procalcitonin 0.22. Pt will be discharged on PO ceftin to complete a total of 10 days      Anemia, iron deficiency.  Plan: Hb 9 - 10 Stable  microcytic anemia with low serm iron and low ferritin   F/u FOBT. C/w Fe supplements.     Asthma.  Plan: Stable. C/w Bronchodilators.     HTN (hypertension).  Plan: C/w Metoprolol 50 BID for elevated HR.     Hyperlipidemia. Plan: C/w statin  Lipid profile Normal.    Pt is stable to go home as per attending. 82 years old male from home, Luxembourger speaking, ambulates with cane at baseline, PMH of Asthma, HTN, HLD, CVA (2015) p/w complaints of chest pain and b/l leg edema. Patient is AAO x 3 and preferred son at bedside to translate. Patient states that chest pain started at 5 pm today, now resolved but described as pressure like, moderate, left sided, non radiating. Also has b/l leg edema x 5 days, was seen by PCP who referred to a Cardiologist who started him on Lasix which he took for 2 days with not much relief. Denies any fever, chills, sob, palpitations, nausea, vomiting, diarrhea, abdominal pain, recent travel, trauma or sick contacts. Does have increased urinary frequency, likely 2/2 BPH as on meds for it. Also complains of constipation, last BM 2 days back.    Chest pain.  Plan: Pressure like, moderate, left sided, non radiating pain on admission  Heart score 4; JAMES score 2. EKG: NSR. Troponin X3 negative. Echo: 55% EF . Normal stress test  C/w aspirin, statin, metoprolol.  Dr. Donald on board.     Leg edema.  Plan: Warm, red and tender extremities  Mild leukocytosis 10--11. Could be secondary to cellulitis which resolved with antibiotics Clear CXR  Normal LE doppler. BCx: NTD. C/w Unasyn and 40 PO Lasix. C/w Gabapentin for burning pain  Procalcitonin 0.22. Pt will be discharged on PO ceftin to complete a total of 10 days      Anemia, iron deficiency.  Plan: Hb 9 - 10 Stable  microcytic anemia with low serm iron and low ferritin C/w Fe supplements.     Asthma.  Plan: Stable. C/w Bronchodilators.     HTN (hypertension).  Plan: C/w Metoprolol 50 BID for elevated HR.     Hyperlipidemia. Plan: C/w statin  Lipid profile Normal.    Pt is stable to go home as per attending.

## 2017-12-05 NOTE — DISCHARGE NOTE ADULT - CARE PROVIDER_API CALL
Ashish Khan), Internal Medicine  [address] [facility]  [address]  [place] [address]  Phone: [phone]  Fax: [phone]    Naveed Donald), Cardiovascular Disease  [address] [facility] [address] [place] [address]  Phone: [phone]  Fax: [phone]

## 2017-12-05 NOTE — DISCHARGE NOTE ADULT - PATIENT PORTAL LINK FT
“You can access the [facility] Patient Portal, offered by [facility], by registering with the following website: [email or web address]”

## 2017-12-05 NOTE — PROGRESS NOTE ADULT - SUBJECTIVE AND OBJECTIVE BOX
PGY 1 Note discussed with supervising resident and primary attending    Patient is a 82y old  Male who presents with a chief complaint of chest pain and b/l leg edema (29 Nov 2017 21:42)      INTERVAL HPI/OVERNIGHT EVENTS: offers no new complaints; current symptoms resolving    MEDICATIONS  (STANDING):  ALBUTerol/ipratropium for Nebulization 3 milliLiter(s) Nebulizer every 6 hours  ampicillin/sulbactam  IVPB      ampicillin/sulbactam  IVPB 1.5 Gram(s) IV Intermittent every 6 hours  aspirin  chewable 81 milliGRAM(s) Oral daily  atorvastatin 40 milliGRAM(s) Oral at bedtime  clopidogrel Tablet 75 milliGRAM(s) Oral daily  docusate sodium 100 milliGRAM(s) Oral three times a day  enoxaparin Injectable 40 milliGRAM(s) SubCutaneous daily  ergocalciferol 86444 Unit(s) Oral <User Schedule>  ferrous    sulfate 325 milliGRAM(s) Oral daily  finasteride 5 milliGRAM(s) Oral daily  furosemide    Tablet 40 milliGRAM(s) Oral daily  gabapentin 300 milliGRAM(s) Oral daily  metoprolol     tartrate 25 milliGRAM(s) Oral two times a day  montelukast 10 milliGRAM(s) Oral at bedtime  senna 2 Tablet(s) Oral at bedtime  tamsulosin 0.8 milliGRAM(s) Oral at bedtime    MEDICATIONS  (PRN):  acetaminophen   Tablet. 650 milliGRAM(s) Oral every 6 hours PRN Moderate Pain (4 - 6)      __________________________________________________  REVIEW OF SYSTEMS:    CONSTITUTIONAL: No fever,   EYES: no acute visual disturbances  NECK: No pain or stiffness  RESPIRATORY: No cough; No shortness of breath  CARDIOVASCULAR: No chest pain, no palpitations  GASTROINTESTINAL: No pain. No nausea or vomiting; No diarrhea   NEUROLOGICAL: No headache or numbness, no tremors  MUSCULOSKELETAL: B/L LE pain and redness with burning pain  GENITOURINARY: no dysuria, no frequency, no hesitancy  PSYCHIATRY: no depression , no anxiety  ALL OTHER  ROS negative       Vital Signs Last 24 Hrs  T(C): 36.3 (05 Dec 2017 05:35), Max: 37.1 (04 Dec 2017 11:58)  T(F): 97.4 (05 Dec 2017 05:35), Max: 98.8 (04 Dec 2017 11:58)  HR: 71 (05 Dec 2017 06:20) (71 - 109)  BP: 161/53 (05 Dec 2017 06:20) (140/48 - 177/66)  BP(mean): --  RR: 16 (05 Dec 2017 05:35) (16 - 18)  SpO2: 100% (05 Dec 2017 05:35) (97% - 100%)    ________________________________________________  PHYSICAL EXAM:  GENERAL: NAD North Korean speaking male   HEENT: Normocephalic;  conjunctivae and sclerae clear; moist mucous membranes  NECK : supple  CHEST/LUNG: Clear to auscultation bilaterally with good air entry   HEART: S1 S2  regular; no murmurs, gallops or rubs  ABDOMEN: Soft, Nontender, Nondistended; Bowel sounds present  EXTREMITIES: B/l LE ankle and feet edema, swelling and pain  NERVOUS SYSTEM:  Awake and alert x 3    _________________________________________________  LABS:                        9.5    11.7  )-----------( 334      ( 05 Dec 2017 06:41 )             31.9     12-05    135  |  98  |  23<H>  ----------------------------<  98  3.5   |  28  |  0.82    Ca    8.4      05 Dec 2017 06:41  Phos  2.8     12-05  Mg     2.5     12-05      RADIOLOGY & ADDITIONAL TESTS:    Stress test: Normal Study  * Negative ECG evidence of ischemia after IV  administartion of Regadenoson.  * Myocardial Perfusion SPECT results are normal.  * No clear evidence of ischemia or infarct.  * Post-stress resting myocardial perfusion gated SPECT  imaging was performed (LVEF = 57 %) with no regional wall  motion abnormalities.      Plan of care was discussed with patient and /or primary care giver; all questions and concerns were addressed and care was aligned with patient's wishes.

## 2017-12-05 NOTE — PROGRESS NOTE ADULT - PROBLEM SELECTOR PLAN 2
Warm, red and tender extremities  Mild leukocytosis 10-->11  Could be secondary to cellulitis  Clear CXR  Normal LE doppler  BCx: NTD  C/w Unasyn and 40 PO Lasix  C/w Gabapentin for burning pain  Procalcitonin 0.22  Will call ID if WBC continue to rise

## 2017-12-05 NOTE — PROGRESS NOTE ADULT - PROBLEM SELECTOR PLAN 3
Hb 9 - 10 Stable  microcytic anemia with low serm iron and low ferritin   F/u FOBT  C/w Fe supplements

## 2017-12-05 NOTE — DISCHARGE NOTE ADULT - MEDICATION SUMMARY - MEDICATIONS TO TAKE
I will START or STAY ON the medications listed below when I get home from the hospital:    finasteride 5 mg oral tablet  -- 1 tab(s) by mouth once a day  -- Indication: For BPH    aspirin 81 mg oral tablet, chewable  -- 1 tab(s) by mouth once a day  -- Indication: For Cardioprotection    Flomax 0.4 mg oral capsule  -- 2 cap(s) by mouth once a day (at bedtime)  -- Indication: For BPH    gabapentin 300 mg oral capsule  -- 1 cap(s) by mouth 2 times a day  -- Indication: For Pain    atorvastatin 40 mg oral tablet  -- 1 tab(s) by mouth once a day (at bedtime)  -- Indication: For HLD    clopidogrel 75 mg oral tablet  -- 1 tab(s) by mouth once a day  -- Indication: For Anticoagulation    metoprolol tartrate 50 mg oral tablet  -- 1 tab(s) by mouth 2 times a day   -- It is very important that you take or use this exactly as directed.  Do not skip doses or discontinue unless directed by your doctor.  May cause drowsiness.  Alcohol may intensify this effect.  Use care when operating dangerous machinery.  Some non-prescription drugs may aggravate your condition.  Read all labels carefully.  If a warning appears, check with your doctor before taking.  Take with food or milk.  This drug may impair the ability to drive or operate machinery.  Use care until you become familiar with its effects.    -- Indication: For HTN (hypertension)    Ceftin 250 mg oral tablet  -- 1 tab(s) by mouth 2 times a day   -- Finish all this medication unless otherwise directed by prescriber.  Medication should be taken with plenty of water.  Take with food or milk.    -- Indication: For Cellulitis    Lasix 40 mg oral tablet  -- 1 tab(s) by mouth once a day   -- Avoid prolonged or excessive exposure to direct and/or artificial sunlight while taking this medication.  It is very important that you take or use this exactly as directed.  Do not skip doses or discontinue unless directed by your doctor.  It may be advisable to drink a full glass orange juice or eat a banana daily while taking this medication.    -- Indication: For ChF    ferrous sulfate 325 mg (65 mg elemental iron) oral delayed release tablet  -- 1 tab(s) by mouth once a day   -- May discolor urine or feces.  Swallow whole.  Do not crush.    -- Indication: For Anemia, iron deficiency    senna oral tablet  -- 2 tab(s) by mouth once a day (at bedtime)  -- Indication: For Constipation    docusate sodium 100 mg oral capsule  -- 3 cap(s) by mouth once a day (at bedtime)  -- Indication: For Constipation I will START or STAY ON the medications listed below when I get home from the hospital:    finasteride 5 mg oral tablet  -- 1 tab(s) by mouth once a day  -- Indication: For BPH    aspirin 81 mg oral tablet, chewable  -- 1 tab(s) by mouth once a day  -- Indication: For Cardioprotection    Flomax 0.4 mg oral capsule  -- 2 cap(s) by mouth once a day (at bedtime)  -- Indication: For BPH    gabapentin 300 mg oral capsule  -- 1 cap(s) by mouth 2 times a day  -- Indication: For Pain    atorvastatin 40 mg oral tablet  -- 1 tab(s) by mouth once a day (at bedtime)  -- Indication: For HLD    clopidogrel 75 mg oral tablet  -- 1 tab(s) by mouth once a day  -- Indication: For Anticoagulation    metoprolol tartrate 50 mg oral tablet  -- 1 tab(s) by mouth 2 times a day   -- It is very important that you take or use this exactly as directed.  Do not skip doses or discontinue unless directed by your doctor.  May cause drowsiness.  Alcohol may intensify this effect.  Use care when operating dangerous machinery.  Some non-prescription drugs may aggravate your condition.  Read all labels carefully.  If a warning appears, check with your doctor before taking.  Take with food or milk.  This drug may impair the ability to drive or operate machinery.  Use care until you become familiar with its effects.    -- Indication: For HTN (hypertension)    Ceftin 250 mg oral tablet  -- 1 tab(s) by mouth 2 times a day   -- Finish all this medication unless otherwise directed by prescriber.  Medication should be taken with plenty of water.  Take with food or milk.    -- Indication: For Cellulits    Lasix 40 mg oral tablet  -- 1 tab(s) by mouth once a day   -- Avoid prolonged or excessive exposure to direct and/or artificial sunlight while taking this medication.  It is very important that you take or use this exactly as directed.  Do not skip doses or discontinue unless directed by your doctor.  It may be advisable to drink a full glass orange juice or eat a banana daily while taking this medication.    -- Indication: For ChF    ferrous sulfate 325 mg (65 mg elemental iron) oral delayed release tablet  -- 1 tab(s) by mouth once a day   -- May discolor urine or feces.  Swallow whole.  Do not crush.    -- Indication: For Anemia, iron deficiency    senna oral tablet  -- 2 tab(s) by mouth once a day (at bedtime)  -- Indication: For Constipation    docusate sodium 100 mg oral capsule  -- 3 cap(s) by mouth once a day (at bedtime)  -- Indication: For Constipation

## 2017-12-05 NOTE — DISCHARGE NOTE ADULT - PLAN OF CARE
Prevent chest pain Heart score 4; JAMES score 2. EKG: NSR. Troponin X3 negative. Echo: 55% EF . Normal stress test  keep taking aspirin, statin, 50 twice daily metoprolol. Prevent infection spread Keep taking 40 po lasix and follow up with PCP in 2 weeks of discharge Pt was given IV unasyn in hospital for LE cellulitis. Keep taking PO ceftin to complete a total of 10 days. Keep taking gabapentin for burning pain of legs Keep /90 Keep taking 50 mg metoprolol twice daily and follow up with in 2 weeks Keep taking Fe supplements and follow up with PCP in 2-3 weeks for repeat blood work up Heart score 4; JAMES score 2. EKG: NSR. Troponin X3 negative. Echo: 55% EF . Normal stress test  keep taking aspirin, statin, 50 twice daily metoprolol. Follow up with cardiology after discharge. Return to Hospital if chest pain happens again. Pt was given IV unasyn in hospital for LE cellulitis. Keep taking PO ceftin to complete a total of 10 days. Keep taking gabapentin for burning pain of legs and follow up with PCP in 1 week of discharge Keep taking 50 mg metoprolol twice daily and Lasix and follow up with in 2 weeks

## 2017-12-05 NOTE — DIETITIAN INITIAL EVALUATION ADULT. - NS AS NUTRI INTERV MEALS SNACK
continue diet Rx as ordered/Composition of meals/snacks/Diets modified for specific foods and ingredients

## 2017-12-05 NOTE — DISCHARGE NOTE ADULT - CARE PLAN
Principal Discharge DX:	Chest pain  Goal:	Prevent chest pain  Instructions for follow-up, activity and diet:	Heart score 4; JAMES score 2. EKG: NSR. Troponin X3 negative. Echo: 55% EF . Normal stress test  keep taking aspirin, statin, 50 twice daily metoprolol.  Secondary Diagnosis:	Edema  Goal:	Prevent infection spread  Instructions for follow-up, activity and diet:	Keep taking 40 po lasix and follow up with PCP in 2 weeks of discharge  Secondary Diagnosis:	Cellulitis  Instructions for follow-up, activity and diet:	Pt was given IV unasyn in hospital for LE cellulitis. Keep taking PO ceftin to complete a total of 10 days. Keep taking gabapentin for burning pain of legs  Secondary Diagnosis:	HTN (hypertension)  Goal:	Keep /90  Instructions for follow-up, activity and diet:	Keep taking 50 mg metoprolol twice daily and follow up with in 2 weeks  Secondary Diagnosis:	Anemia, iron deficiency  Instructions for follow-up, activity and diet:	Keep taking Fe supplements and follow up with PCP in 2-3 weeks for repeat blood work up Principal Discharge DX:	Chest pain  Goal:	Prevent chest pain  Instructions for follow-up, activity and diet:	Heart score 4; JAMES score 2. EKG: NSR. Troponin X3 negative. Echo: 55% EF . Normal stress test  keep taking aspirin, statin, 50 twice daily metoprolol. Follow up with cardiology after discharge. Return to Hospital if chest pain happens again.  Secondary Diagnosis:	Edema  Goal:	Prevent infection spread  Instructions for follow-up, activity and diet:	Keep taking 40 po lasix and follow up with PCP in 2 weeks of discharge  Secondary Diagnosis:	Cellulitis  Goal:	Prevent infection spread  Instructions for follow-up, activity and diet:	Pt was given IV unasyn in hospital for LE cellulitis. Keep taking PO ceftin to complete a total of 10 days. Keep taking gabapentin for burning pain of legs and follow up with PCP in 1 week of discharge  Secondary Diagnosis:	HTN (hypertension)  Goal:	Keep /90  Instructions for follow-up, activity and diet:	Keep taking 50 mg metoprolol twice daily and Lasix and follow up with in 2 weeks  Secondary Diagnosis:	Anemia, iron deficiency  Instructions for follow-up, activity and diet:	Keep taking Fe supplements and follow up with PCP in 2-3 weeks for repeat blood work up

## 2017-12-05 NOTE — DIETITIAN INITIAL EVALUATION ADULT. - SOURCE
other (specify)/chart review family/significant other/other (specify)/patient/son @ bedside; chart review. Pt speaks [place], preferes family to contact RD/ for him

## 2017-12-05 NOTE — DISCHARGE NOTE ADULT - MEDICATION SUMMARY - MEDICATIONS TO STOP TAKING
I will STOP taking the medications listed below when I get home from the hospital:    Lopressor  -- 25 milligram(s) by mouth once a day    furosemide 20 mg oral tablet  -- 1 tab(s) by mouth once a day

## 2017-12-05 NOTE — PROGRESS NOTE ADULT - SUBJECTIVE AND OBJECTIVE BOX
Subjective:  pt seen and examined, no complaints on exam.   no chest pain or nausea on exam     acetaminophen   Tablet. 650 milliGRAM(s) Oral every 6 hours PRN  ALBUTerol/ipratropium for Nebulization 3 milliLiter(s) Nebulizer every 6 hours  ampicillin/sulbactam  IVPB      ampicillin/sulbactam  IVPB 1.5 Gram(s) IV Intermittent every 6 hours  aspirin  chewable 81 milliGRAM(s) Oral daily  atorvastatin 40 milliGRAM(s) Oral at bedtime  clopidogrel Tablet 75 milliGRAM(s) Oral daily  docusate sodium 100 milliGRAM(s) Oral three times a day  enoxaparin Injectable 40 milliGRAM(s) SubCutaneous daily  ergocalciferol 25491 Unit(s) Oral <User Schedule>  ferrous    sulfate 325 milliGRAM(s) Oral daily  finasteride 5 milliGRAM(s) Oral daily  furosemide    Tablet 40 milliGRAM(s) Oral daily  gabapentin 300 milliGRAM(s) Oral daily  metoprolol     tartrate 25 milliGRAM(s) Oral two times a day  montelukast 10 milliGRAM(s) Oral at bedtime  senna 2 Tablet(s) Oral at bedtime  tamsulosin 0.8 milliGRAM(s) Oral at bedtime                            10.3   11.3  )-----------( 317      ( 04 Dec 2017 06:37 )             34.6       Hemoglobin: 10.3 g/dL (12-04 @ 06:37)  Hemoglobin: 10.6 g/dL (12-03 @ 08:20)  Hemoglobin: 10.8 g/dL (12-02 @ 07:38)  Hemoglobin: 9.8 g/dL (12-01 @ 06:20)  Hemoglobin: 9.9 g/dL (11-30 @ 06:26)      12-04    136  |  97  |  19<H>  ----------------------------<  93  3.4<L>   |  28  |  0.79    Ca    8.8      04 Dec 2017 06:37  Phos  3.1     12-04  Mg     2.3     12-04      Creatinine Trend: 0.79<--, 0.92<--, 0.98<--, 0.84<--, 0.88<--, 0.88<--    COAGS:     T(C): 36.3 (12-05-17 @ 05:35), Max: 37.1 (12-04-17 @ 11:58)  HR: 71 (12-05-17 @ 06:20) (71 - 109)  BP: 161/53 (12-05-17 @ 06:20) (140/48 - 177/66)  RR: 16 (12-05-17 @ 05:35) (16 - 18)  SpO2: 100% (12-05-17 @ 05:35) (97% - 100%)  Wt(kg): --    I&O's Summary    03 Dec 2017 07:01  -  04 Dec 2017 07:00  --------------------------------------------------------  IN: 240 mL / OUT: 300 mL / NET: -60 mL    04 Dec 2017 07:01  -  05 Dec 2017 06:24  --------------------------------------------------------  IN: 200 mL / OUT: 0 mL / NET: 200 mL        HEENT:   Normal oral mucosa, PERRL, EOMI	  Lymphatic: No lymphadenopathy , no edema  Cardiovascular: Normal S1 S2, No JVD, No murmurs , Peripheral pulses palpable 2+ bilaterally  Respiratory: Lungs clear to auscultation, normal effort 	  Gastrointestinal:  Soft, Non-tender, + BS	      TELEMETRY: 	 NSR-        DIAGNOSTIC TESTING:  [ ] Echocardiogram:   < from: Transthoracic Echocardiogram (04.08.17 @ 09:10) >  CONCLUSIONS:  1. Densly calcified mitral valve leaflet, mitral annulus  calcification. Mild-moderate mitral regurgitation.  2. Calcified trileaflet aortic valve with decreased  opening. Mild aortic stenosis. Mild-moderate aortic  regurgitation.  3. Normal aortic root.  4. Normal left atrium.  5. Normal left ventricular internal dimensions and wall  thicknesses.  6. Normal Left Ventricular Systolic Function,  (EF = 55 to  60%)  7. Grade II diastolic dysfunction    < from: Transthoracic Echocardiogram (12.01.17 @ 09:10) >  CONCLUSIONS:  1. Mitral annular calcification. Mild mitral regurgitation.    2. Calcified trileaflet aortic valve with decreased  opening. Peak transaortic valve gradient equals 25 mm Hg,  mean transaortic valve gradient equals 2 mm Hg, estimated  aortic valve area equals 1.9 sqcm (by continuity equation),  consistent with mild aortic stenosis. Moderate aortic  regurgitation.  3. Normal aortic root, arch and descending thoracic aorta.  4. Mild left atrial enlargement.  5. Mild concentric left ventricular hypertrophy.  6. Normal Left Ventricular Systolic Function,  (EF = 55 to  60%) Peak left ventricular outflow tract gradient equals  8.8 mm Hg.  7. Grade II diastolic dysfunction.  8. Right atrium was not well visualized.  9. Right ventricle not well visualized.  10. RA Pressure is 10 mm Hg.  11. RV systolic pressure is 39 mm Hg.  12. Normal pericardium with no pericardial effusion.    < end of copied text >  8. Normal right atrium.  9. Normal right ventricular size and function.  10. There is mild-moderate tricuspid regurgitation.  11. Normal pericardium with no pericardial effusion.    < end of copied text >    [ ]  Catheterization:  [ ] Stress Test:  < from: Nuclear Stress Test-Pharmacologic (12.04.17 @ 06:13) >    IMPRESSIONS:Normal Study  * Negative ECG evidence of ischemia after IV  administartion of Regadenoson.  * Myocardial Perfusion SPECT results are normal.  * No clear evidence of ischemia or infarct.  * Post-stress resting myocardial perfusion gated SPECT  imaging was performed (LVEF = 57 %) with no regional wall  motion abnormalities.    < end of copied text >    OTHER: 	        ASSESSMENT/PLAN: 	82y Male HTN, HLD, CVA (2015) p/w atypical chest pain and b/l leg edema r/o for MI    Dapt for CVA,    GI / DVT prophylaxis.   keep K>4, mag >2.0  cont ABX per medicine   Keep net neg with LAsix iv   HR stable with low dose BB  cardiac markers neg x 3   tele stable   unchanged echo , nl lv fx .   NST- normal - no ischemia   no further CV work up in patient   D/W Dr Donald Subjective:  pt seen and examined, no complaints on exam.   no chest pain or nausea on exam     acetaminophen   Tablet. 650 milliGRAM(s) Oral every 6 hours PRN  ALBUTerol/ipratropium for Nebulization 3 milliLiter(s) Nebulizer every 6 hours  ampicillin/sulbactam  IVPB      ampicillin/sulbactam  IVPB 1.5 Gram(s) IV Intermittent every 6 hours  aspirin  chewable 81 milliGRAM(s) Oral daily  atorvastatin 40 milliGRAM(s) Oral at bedtime  clopidogrel Tablet 75 milliGRAM(s) Oral daily  docusate sodium 100 milliGRAM(s) Oral three times a day  enoxaparin Injectable 40 milliGRAM(s) SubCutaneous daily  ergocalciferol 30531 Unit(s) Oral <User Schedule>  ferrous    sulfate 325 milliGRAM(s) Oral daily  finasteride 5 milliGRAM(s) Oral daily  furosemide    Tablet 40 milliGRAM(s) Oral daily  gabapentin 300 milliGRAM(s) Oral daily  metoprolol     tartrate 25 milliGRAM(s) Oral two times a day  montelukast 10 milliGRAM(s) Oral at bedtime  senna 2 Tablet(s) Oral at bedtime  tamsulosin 0.8 milliGRAM(s) Oral at bedtime                            10.3   11.3  )-----------( 317      ( 04 Dec 2017 06:37 )             34.6       Hemoglobin: 10.3 g/dL (12-04 @ 06:37)  Hemoglobin: 10.6 g/dL (12-03 @ 08:20)  Hemoglobin: 10.8 g/dL (12-02 @ 07:38)  Hemoglobin: 9.8 g/dL (12-01 @ 06:20)  Hemoglobin: 9.9 g/dL (11-30 @ 06:26)      12-04    136  |  97  |  19<H>  ----------------------------<  93  3.4<L>   |  28  |  0.79    Ca    8.8      04 Dec 2017 06:37  Phos  3.1     12-04  Mg     2.3     12-04      Creatinine Trend: 0.79<--, 0.92<--, 0.98<--, 0.84<--, 0.88<--, 0.88<--    COAGS:     T(C): 36.3 (12-05-17 @ 05:35), Max: 37.1 (12-04-17 @ 11:58)  HR: 71 (12-05-17 @ 06:20) (71 - 109)  BP: 161/53 (12-05-17 @ 06:20) (140/48 - 177/66)  RR: 16 (12-05-17 @ 05:35) (16 - 18)  SpO2: 100% (12-05-17 @ 05:35) (97% - 100%)  Wt(kg): --    I&O's Summary    03 Dec 2017 07:01  -  04 Dec 2017 07:00  --------------------------------------------------------  IN: 240 mL / OUT: 300 mL / NET: -60 mL    04 Dec 2017 07:01  -  05 Dec 2017 06:24  --------------------------------------------------------  IN: 200 mL / OUT: 0 mL / NET: 200 mL        HEENT:   Normal oral mucosa, PERRL, EOMI	  Lymphatic: No lymphadenopathy , no edema  Cardiovascular: Normal S1 S2, No JVD, No murmurs , Peripheral pulses palpable 2+ bilaterally  Respiratory: Lungs clear to auscultation, normal effort 	  Gastrointestinal:  Soft, Non-tender, + BS	      TELEMETRY: 	 NSR- breif PAT       DIAGNOSTIC TESTING:  [ ] Echocardiogram:   < from: Transthoracic Echocardiogram (04.08.17 @ 09:10) >  CONCLUSIONS:  1. Densly calcified mitral valve leaflet, mitral annulus  calcification. Mild-moderate mitral regurgitation.  2. Calcified trileaflet aortic valve with decreased  opening. Mild aortic stenosis. Mild-moderate aortic  regurgitation.  3. Normal aortic root.  4. Normal left atrium.  5. Normal left ventricular internal dimensions and wall  thicknesses.  6. Normal Left Ventricular Systolic Function,  (EF = 55 to  60%)  7. Grade II diastolic dysfunction    < from: Transthoracic Echocardiogram (12.01.17 @ 09:10) >  CONCLUSIONS:  1. Mitral annular calcification. Mild mitral regurgitation.    2. Calcified trileaflet aortic valve with decreased  opening. Peak transaortic valve gradient equals 25 mm Hg,  mean transaortic valve gradient equals 2 mm Hg, estimated  aortic valve area equals 1.9 sqcm (by continuity equation),  consistent with mild aortic stenosis. Moderate aortic  regurgitation.  3. Normal aortic root, arch and descending thoracic aorta.  4. Mild left atrial enlargement.  5. Mild concentric left ventricular hypertrophy.  6. Normal Left Ventricular Systolic Function,  (EF = 55 to  60%) Peak left ventricular outflow tract gradient equals  8.8 mm Hg.  7. Grade II diastolic dysfunction.  8. Right atrium was not well visualized.  9. Right ventricle not well visualized.  10. RA Pressure is 10 mm Hg.  11. RV systolic pressure is 39 mm Hg.  12. Normal pericardium with no pericardial effusion.    < end of copied text >  8. Normal right atrium.  9. Normal right ventricular size and function.  10. There is mild-moderate tricuspid regurgitation.  11. Normal pericardium with no pericardial effusion.    < end of copied text >    [ ]  Catheterization:  [ ] Stress Test:  < from: Nuclear Stress Test-Pharmacologic (12.04.17 @ 06:13) >    IMPRESSIONS:Normal Study  * Negative ECG evidence of ischemia after IV  administartion of Regadenoson.  * Myocardial Perfusion SPECT results are normal.  * No clear evidence of ischemia or infarct.  * Post-stress resting myocardial perfusion gated SPECT  imaging was performed (LVEF = 57 %) with no regional wall  motion abnormalities.    < end of copied text >    OTHER: 	        ASSESSMENT/PLAN: 	82y Male HTN, HLD, CVA (2015) p/w atypical chest pain and b/l leg edema r/o for MI    Dapt for CVA,    GI / DVT prophylaxis.   keep K>4, mag >2.0  cont ABX per medicine   Keep net neg with LAsix iv   HR stable with low dose BB  cardiac markers neg x 3   tele stable   unchanged echo , nl lv fx .   NST- normal - no ischemia   no further CV work up in patient   D/W Dr Donald

## 2017-12-05 NOTE — DIETITIAN INITIAL EVALUATION ADULT. - OTHER INFO
nutrition assessment for length of stay; lives home with HHA help; skin intact; limited intake history/wt change data available at present nutrition assessment for length of stay; lives home with HHA help; skin intact; denied GI distress, chewing or swallowing problem at present, no specific food choices reported; not interested in diet education/nutrition information at present

## 2017-12-05 NOTE — DIETITIAN INITIAL EVALUATION ADULT. - NS FNS WEIGHT USED FOR CALC
ideal/Ht=5' 6"   FTU=989 lb   Admission md=447 lb   BMI=24.1    Current dt=364.6 lb 12/2/17-->160.4 lb 12/4/17 ?, may due to scale variance , also on diuretic Tx noted

## 2017-12-06 LAB
ANION GAP SERPL CALC-SCNC: 8 MMOL/L — SIGNIFICANT CHANGE UP (ref 5–17)
BUN SERPL-MCNC: 24 MG/DL — HIGH (ref 7–18)
CALCIUM SERPL-MCNC: 8.3 MG/DL — LOW (ref 8.4–10.5)
CHLORIDE SERPL-SCNC: 100 MMOL/L — SIGNIFICANT CHANGE UP (ref 96–108)
CO2 SERPL-SCNC: 29 MMOL/L — SIGNIFICANT CHANGE UP (ref 22–31)
CREAT SERPL-MCNC: 0.98 MG/DL — SIGNIFICANT CHANGE UP (ref 0.5–1.3)
GLUCOSE SERPL-MCNC: 95 MG/DL — SIGNIFICANT CHANGE UP (ref 70–99)
HCT VFR BLD CALC: 29.3 % — LOW (ref 39–50)
HGB BLD-MCNC: 8.9 G/DL — LOW (ref 13–17)
MAGNESIUM SERPL-MCNC: 2.7 MG/DL — HIGH (ref 1.6–2.6)
MCHC RBC-ENTMCNC: 21.9 PG — LOW (ref 27–34)
MCHC RBC-ENTMCNC: 30.3 GM/DL — LOW (ref 32–36)
MCV RBC AUTO: 72.3 FL — LOW (ref 80–100)
PHOSPHATE SERPL-MCNC: 2.9 MG/DL — SIGNIFICANT CHANGE UP (ref 2.5–4.5)
PLATELET # BLD AUTO: 300 K/UL — SIGNIFICANT CHANGE UP (ref 150–400)
POTASSIUM SERPL-MCNC: 3.6 MMOL/L — SIGNIFICANT CHANGE UP (ref 3.5–5.3)
POTASSIUM SERPL-SCNC: 3.6 MMOL/L — SIGNIFICANT CHANGE UP (ref 3.5–5.3)
RBC # BLD: 4.05 M/UL — LOW (ref 4.2–5.8)
RBC # FLD: 16.2 % — HIGH (ref 10.3–14.5)
SODIUM SERPL-SCNC: 137 MMOL/L — SIGNIFICANT CHANGE UP (ref 135–145)
WBC # BLD: 10 K/UL — SIGNIFICANT CHANGE UP (ref 3.8–10.5)
WBC # FLD AUTO: 10 K/UL — SIGNIFICANT CHANGE UP (ref 3.8–10.5)

## 2017-12-06 RX ADMIN — FINASTERIDE 5 MILLIGRAM(S): 5 TABLET, FILM COATED ORAL at 14:58

## 2017-12-06 RX ADMIN — MONTELUKAST 10 MILLIGRAM(S): 4 TABLET, CHEWABLE ORAL at 21:48

## 2017-12-06 RX ADMIN — Medication 50 MILLIGRAM(S): at 05:31

## 2017-12-06 RX ADMIN — GABAPENTIN 300 MILLIGRAM(S): 400 CAPSULE ORAL at 18:05

## 2017-12-06 RX ADMIN — Medication 100 MILLIGRAM(S): at 14:59

## 2017-12-06 RX ADMIN — Medication 650 MILLIGRAM(S): at 05:31

## 2017-12-06 RX ADMIN — TAMSULOSIN HYDROCHLORIDE 0.8 MILLIGRAM(S): 0.4 CAPSULE ORAL at 21:48

## 2017-12-06 RX ADMIN — ENOXAPARIN SODIUM 40 MILLIGRAM(S): 100 INJECTION SUBCUTANEOUS at 14:58

## 2017-12-06 RX ADMIN — Medication 81 MILLIGRAM(S): at 14:57

## 2017-12-06 RX ADMIN — Medication 325 MILLIGRAM(S): at 14:57

## 2017-12-06 RX ADMIN — ATORVASTATIN CALCIUM 40 MILLIGRAM(S): 80 TABLET, FILM COATED ORAL at 21:48

## 2017-12-06 RX ADMIN — AMPICILLIN SODIUM AND SULBACTAM SODIUM 100 GRAM(S): 250; 125 INJECTION, POWDER, FOR SUSPENSION INTRAMUSCULAR; INTRAVENOUS at 14:57

## 2017-12-06 RX ADMIN — Medication 3 MILLILITER(S): at 09:58

## 2017-12-06 RX ADMIN — AMPICILLIN SODIUM AND SULBACTAM SODIUM 100 GRAM(S): 250; 125 INJECTION, POWDER, FOR SUSPENSION INTRAMUSCULAR; INTRAVENOUS at 18:04

## 2017-12-06 RX ADMIN — Medication 650 MILLIGRAM(S): at 06:31

## 2017-12-06 RX ADMIN — SENNA PLUS 2 TABLET(S): 8.6 TABLET ORAL at 21:48

## 2017-12-06 RX ADMIN — POLYETHYLENE GLYCOL 3350 17 GRAM(S): 17 POWDER, FOR SOLUTION ORAL at 14:58

## 2017-12-06 RX ADMIN — Medication 100 MILLIGRAM(S): at 05:31

## 2017-12-06 RX ADMIN — AMPICILLIN SODIUM AND SULBACTAM SODIUM 100 GRAM(S): 250; 125 INJECTION, POWDER, FOR SUSPENSION INTRAMUSCULAR; INTRAVENOUS at 05:31

## 2017-12-06 RX ADMIN — Medication 40 MILLIGRAM(S): at 05:31

## 2017-12-06 RX ADMIN — CLOPIDOGREL BISULFATE 75 MILLIGRAM(S): 75 TABLET, FILM COATED ORAL at 14:56

## 2017-12-06 RX ADMIN — Medication 50 MILLIGRAM(S): at 18:05

## 2017-12-06 RX ADMIN — Medication 3 MILLILITER(S): at 14:36

## 2017-12-06 RX ADMIN — Medication 3 MILLILITER(S): at 21:16

## 2017-12-06 RX ADMIN — Medication 100 MILLIGRAM(S): at 21:48

## 2017-12-06 RX ADMIN — GABAPENTIN 300 MILLIGRAM(S): 400 CAPSULE ORAL at 05:31

## 2017-12-06 NOTE — PROGRESS NOTE ADULT - SUBJECTIVE AND OBJECTIVE BOX
PGY 1 Note discussed with supervising resident and primary attending    Patient is a 82y old  Male who presents with a chief complaint of chest pain and b/l leg edema (05 Dec 2017 13:14)      INTERVAL HPI/OVERNIGHT EVENTS: offers no new complaints; current symptoms resolving    MEDICATIONS  (STANDING):  ALBUTerol/ipratropium for Nebulization 3 milliLiter(s) Nebulizer every 6 hours  ampicillin/sulbactam  IVPB      ampicillin/sulbactam  IVPB 1.5 Gram(s) IV Intermittent every 6 hours  aspirin  chewable 81 milliGRAM(s) Oral daily  atorvastatin 40 milliGRAM(s) Oral at bedtime  clopidogrel Tablet 75 milliGRAM(s) Oral daily  docusate sodium 100 milliGRAM(s) Oral three times a day  enoxaparin Injectable 40 milliGRAM(s) SubCutaneous daily  ergocalciferol 08035 Unit(s) Oral <User Schedule>  ferrous    sulfate 325 milliGRAM(s) Oral daily  finasteride 5 milliGRAM(s) Oral daily  furosemide    Tablet 40 milliGRAM(s) Oral daily  gabapentin 300 milliGRAM(s) Oral two times a day  metoprolol     tartrate 50 milliGRAM(s) Oral two times a day  montelukast 10 milliGRAM(s) Oral at bedtime  polyethylene glycol 3350 17 Gram(s) Oral daily  senna 2 Tablet(s) Oral at bedtime  tamsulosin 0.8 milliGRAM(s) Oral at bedtime    MEDICATIONS  (PRN):  acetaminophen   Tablet. 650 milliGRAM(s) Oral every 6 hours PRN Moderate Pain (4 - 6)      __________________________________________________  REVIEW OF SYSTEMS:    CONSTITUTIONAL: No fever,   EYES: no acute visual disturbances  NECK: No pain or stiffness  RESPIRATORY: No cough; No shortness of breath  CARDIOVASCULAR: No chest pain, no palpitations  GASTROINTESTINAL: No pain. No nausea or vomiting; No diarrhea   NEUROLOGICAL: No headache or numbness, no tremors  MUSCULOSKELETAL: B/L LE pain and redness with burning pain  GENITOURINARY: no dysuria, no frequency, no hesitancy  PSYCHIATRY: no depression , no anxiety  ALL OTHER  ROS negative        Vital Signs Last 24 Hrs  T(C): 36.6 (06 Dec 2017 04:48), Max: 36.6 (05 Dec 2017 21:27)  T(F): 97.9 (06 Dec 2017 04:48), Max: 97.9 (06 Dec 2017 04:48)  HR: 78 (06 Dec 2017 04:48) (78 - 81)  BP: 135/44 (06 Dec 2017 04:48) (124/53 - 139/48)  BP(mean): --  RR: 18 (06 Dec 2017 04:48) (16 - 18)  SpO2: 97% (06 Dec 2017 04:48) (97% - 100%)    ________________________________________________  PHYSICAL EXAM:  GENERAL: NAD [place] speaking male   HEENT: Normocephalic;  conjunctivae and sclerae clear; moist mucous membranes  NECK : supple  CHEST/LUNG: Clear to auscultation bilaterally with good air entry   HEART: S1 S2  regular; no murmurs, gallops or rubs  ABDOMEN: Soft, Nontender, Nondistended; Bowel sounds present  EXTREMITIES: B/l LE ankle and feet edema, swelling and redness  NERVOUS SYSTEM:  Awake and alert x 3    _________________________________________________  LABS:                        9.5    11.7  )-----------( 334      ( 05 Dec 2017 06:41 )             31.9     12-05    135  |  98  |  23<H>  ----------------------------<  98  3.5   |  28  |  0.82    Ca    8.4      05 Dec 2017 06:41  Phos  2.8     12-05  Mg     2.5     12-05        Plan of care was discussed with patient and /or primary care giver; all questions and concerns were addressed and care was aligned with patient's wishes.

## 2017-12-06 NOTE — PROGRESS NOTE ADULT - PROBLEM SELECTOR PLAN 3
Hb 9 - 10 Stable  microcytic anemia with low serm iron and low ferritin   C/w Fe supplements Hb 9 - 10 Stable  microcytic anemia with low serum iron and low ferritin   C/w Fe supplements

## 2017-12-06 NOTE — PROGRESS NOTE ADULT - ASSESSMENT
82 years old male from home, Ecuadorean speaking, ambulates with cane at baseline, PMH of Asthma, HTN, HLD, CVA (2015) p/w complaints of chest pain and b/l leg edema. Admitted to telemetry floor for further management. 82 years old male from home, [place]ian speaking, ambulates with cane at baseline, PMH of Asthma, HTN, HLD, CVA (2015) p/w complaints of chest pain and b/l leg edema. Admitted to telemetry floor for further management.     No Physical Tx needed upon discharge 82 years old male from home, [place]ambican speaking, ambulates with cane at baseline, PMH of Asthma, HTN, HLD, CVA (2015) p/w complaints of chest pain and b/l leg edema. Admitted to telemetry floor for further management.     No Physical Tx needed upon discharge  Spoke to the son for the possibility of discharge, He was saying to take patient home on Friday but 24 hour notice is given.

## 2017-12-06 NOTE — PROGRESS NOTE ADULT - SUBJECTIVE AND OBJECTIVE BOX
Subjective:  pt seen and examined, no complaints on exam.   no chest pain or nausea on exam     acetaminophen   Tablet. 650 milliGRAM(s) Oral every 6 hours PRN  ALBUTerol/ipratropium for Nebulization 3 milliLiter(s) Nebulizer every 6 hours  ampicillin/sulbactam  IVPB      ampicillin/sulbactam  IVPB 1.5 Gram(s) IV Intermittent every 6 hours  aspirin  chewable 81 milliGRAM(s) Oral daily  atorvastatin 40 milliGRAM(s) Oral at bedtime  clopidogrel Tablet 75 milliGRAM(s) Oral daily  docusate sodium 100 milliGRAM(s) Oral three times a day  enoxaparin Injectable 40 milliGRAM(s) SubCutaneous daily  ergocalciferol 74298 Unit(s) Oral <User Schedule>  ferrous    sulfate 325 milliGRAM(s) Oral daily  finasteride 5 milliGRAM(s) Oral daily  furosemide    Tablet 40 milliGRAM(s) Oral daily  gabapentin 300 milliGRAM(s) Oral two times a day  metoprolol     tartrate 50 milliGRAM(s) Oral two times a day  montelukast 10 milliGRAM(s) Oral at bedtime  polyethylene glycol 3350 17 Gram(s) Oral daily  senna 2 Tablet(s) Oral at bedtime  tamsulosin 0.8 milliGRAM(s) Oral at bedtime                            9.5    11.7  )-----------( 334      ( 05 Dec 2017 06:41 )             31.9       Hemoglobin: 9.5 g/dL (12-05 @ 06:41)  Hemoglobin: 10.3 g/dL (12-04 @ 06:37)  Hemoglobin: 10.6 g/dL (12-03 @ 08:20)  Hemoglobin: 10.8 g/dL (12-02 @ 07:38)      12-05    135  |  98  |  23<H>  ----------------------------<  98  3.5   |  28  |  0.82    Ca    8.4      05 Dec 2017 06:41  Phos  2.8     12-05  Mg     2.5     12-05      Creatinine Trend: 0.82<--, 0.79<--, 0.92<--, 0.98<--, 0.84<--, 0.88<--    COAGS:           T(C): 36.6 (12-06-17 @ 04:48), Max: 36.6 (12-05-17 @ 21:27)  HR: 78 (12-06-17 @ 04:48) (78 - 81)  BP: 135/44 (12-06-17 @ 04:48) (124/53 - 139/48)  RR: 18 (12-06-17 @ 04:48) (16 - 18)  SpO2: 97% (12-06-17 @ 04:48) (97% - 100%)  Wt(kg): --    I&O's Summary    04 Dec 2017 07:01  -  05 Dec 2017 07:00  --------------------------------------------------------  IN: 200 mL / OUT: 0 mL / NET: 200 mL    05 Dec 2017 07:01  -  06 Dec 2017 06:48  --------------------------------------------------------  IN: 100 mL / OUT: 0 mL / NET: 100 mL        HEENT:   Normal oral mucosa, PERRL, EOMI	  Lymphatic: No lymphadenopathy , no edema  Cardiovascular: Normal S1 S2, No JVD, No murmurs , Peripheral pulses palpable 2+ bilaterally  Respiratory: Lungs clear to auscultation, normal effort 	  Gastrointestinal:  Soft, Non-tender, + BS	      TELEMETRY: 	 off       DIAGNOSTIC TESTING:  [ ] Echocardiogram:   < from: Transthoracic Echocardiogram (04.08.17 @ 09:10) >  CONCLUSIONS:  1. Densly calcified mitral valve leaflet, mitral annulus  calcification. Mild-moderate mitral regurgitation.  2. Calcified trileaflet aortic valve with decreased  opening. Mild aortic stenosis. Mild-moderate aortic  regurgitation.  3. Normal aortic root.  4. Normal left atrium.  5. Normal left ventricular internal dimensions and wall  thicknesses.  6. Normal Left Ventricular Systolic Function,  (EF = 55 to  60%)  7. Grade II diastolic dysfunction    < from: Transthoracic Echocardiogram (12.01.17 @ 09:10) >  CONCLUSIONS:  1. Mitral annular calcification. Mild mitral regurgitation.    2. Calcified trileaflet aortic valve with decreased  opening. Peak transaortic valve gradient equals 25 mm Hg,  mean transaortic valve gradient equals 2 mm Hg, estimated  aortic valve area equals 1.9 sqcm (by continuity equation),  consistent with mild aortic stenosis. Moderate aortic  regurgitation.  3. Normal aortic root, arch and descending thoracic aorta.  4. Mild left atrial enlargement.  5. Mild concentric left ventricular hypertrophy.  6. Normal Left Ventricular Systolic Function,  (EF = 55 to  60%) Peak left ventricular outflow tract gradient equals  8.8 mm Hg.  7. Grade II diastolic dysfunction.  8. Right atrium was not well visualized.  9. Right ventricle not well visualized.  10. RA Pressure is 10 mm Hg.  11. RV systolic pressure is 39 mm Hg.  12. Normal pericardium with no pericardial effusion.    < end of copied text >  8. Normal right atrium.  9. Normal right ventricular size and function.  10. There is mild-moderate tricuspid regurgitation.  11. Normal pericardium with no pericardial effusion.    < end of copied text >    [ ]  Catheterization:  [ ] Stress Test:  < from: Nuclear Stress Test-Pharmacologic (12.04.17 @ 06:13) >    IMPRESSIONS:Normal Study  * Negative ECG evidence of ischemia after IV  administartion of Regadenoson.  * Myocardial Perfusion SPECT results are normal.  * No clear evidence of ischemia or infarct.  * Post-stress resting myocardial perfusion gated SPECT  imaging was performed (LVEF = 57 %) with no regional wall  motion abnormalities.    < end of copied text >    OTHER: 	        ASSESSMENT/PLAN: 	82y Male HTN, HLD, CVA (2015) p/w atypical chest pain and b/l leg edema r/o for MI    Dapt for CVA,    GI / DVT prophylaxis.   keep K>4, mag >2.0  cont ABX per medicine   Keep net neg with LAsix po   HR stable with  BB  cardiac markers neg x 3   tele stable   unchanged echo , nl lv fx .   NST- normal - no ischemia   no further CV work up in patient   D/C planning   D/W Dr Donald

## 2017-12-06 NOTE — PROGRESS NOTE ADULT - PROBLEM SELECTOR PLAN 2
Warm, red and tender extremities  Mild leukocytosis 10-->11  Could be secondary to cellulitis  Clear CXR  Normal LE doppler  BCx: NTD  C/w Unasyn (discharge on Ceftin) and 40 PO Lasix  C/w Gabapentin for burning pain  Procalcitonin 0.22  Will call ID if WBC continue to rise Warm, red and tender extremities  Mild leukocytosis resolving  Could be secondary to cellulitis  Clear CXR  Normal LE doppler  BCx: NTD  C/w Unasyn (discharge on Ceftin) and 40 PO Lasix  C/w Gabapentin for burning pain  Procalcitonin 0.22

## 2017-12-07 LAB
ANION GAP SERPL CALC-SCNC: 7 MMOL/L — SIGNIFICANT CHANGE UP (ref 5–17)
BUN SERPL-MCNC: 20 MG/DL — HIGH (ref 7–18)
CALCIUM SERPL-MCNC: 8.2 MG/DL — LOW (ref 8.4–10.5)
CHLORIDE SERPL-SCNC: 100 MMOL/L — SIGNIFICANT CHANGE UP (ref 96–108)
CO2 SERPL-SCNC: 29 MMOL/L — SIGNIFICANT CHANGE UP (ref 22–31)
CREAT SERPL-MCNC: 0.84 MG/DL — SIGNIFICANT CHANGE UP (ref 0.5–1.3)
GLUCOSE SERPL-MCNC: 111 MG/DL — HIGH (ref 70–99)
HCT VFR BLD CALC: 32.8 % — LOW (ref 39–50)
HGB BLD-MCNC: 9.7 G/DL — LOW (ref 13–17)
MAGNESIUM SERPL-MCNC: 2.5 MG/DL — SIGNIFICANT CHANGE UP (ref 1.6–2.6)
MCHC RBC-ENTMCNC: 21.1 PG — LOW (ref 27–34)
MCHC RBC-ENTMCNC: 29.6 GM/DL — LOW (ref 32–36)
MCV RBC AUTO: 71.2 FL — LOW (ref 80–100)
PHOSPHATE SERPL-MCNC: 2.5 MG/DL — SIGNIFICANT CHANGE UP (ref 2.5–4.5)
PLATELET # BLD AUTO: 353 K/UL — SIGNIFICANT CHANGE UP (ref 150–400)
POTASSIUM SERPL-MCNC: 3.4 MMOL/L — LOW (ref 3.5–5.3)
POTASSIUM SERPL-SCNC: 3.4 MMOL/L — LOW (ref 3.5–5.3)
RBC # BLD: 4.61 M/UL — SIGNIFICANT CHANGE UP (ref 4.2–5.8)
RBC # FLD: 16.4 % — HIGH (ref 10.3–14.5)
SODIUM SERPL-SCNC: 136 MMOL/L — SIGNIFICANT CHANGE UP (ref 135–145)
WBC # BLD: 9.7 K/UL — SIGNIFICANT CHANGE UP (ref 3.8–10.5)
WBC # FLD AUTO: 9.7 K/UL — SIGNIFICANT CHANGE UP (ref 3.8–10.5)

## 2017-12-07 RX ORDER — ATORVASTATIN CALCIUM 80 MG/1
1 TABLET, FILM COATED ORAL
Qty: 30 | Refills: 0 | OUTPATIENT
Start: 2017-12-07 | End: 2018-01-06

## 2017-12-07 RX ORDER — POTASSIUM CHLORIDE 20 MEQ
20 PACKET (EA) ORAL
Qty: 0 | Refills: 0 | Status: COMPLETED | OUTPATIENT
Start: 2017-12-07 | End: 2017-12-07

## 2017-12-07 RX ORDER — GABAPENTIN 400 MG/1
1 CAPSULE ORAL
Qty: 60 | Refills: 0 | OUTPATIENT
Start: 2017-12-07 | End: 2018-01-06

## 2017-12-07 RX ORDER — FUROSEMIDE 40 MG
1 TABLET ORAL
Qty: 30 | Refills: 0 | OUTPATIENT
Start: 2017-12-07 | End: 2018-01-06

## 2017-12-07 RX ORDER — CEFUROXIME AXETIL 250 MG
1 TABLET ORAL
Qty: 6 | Refills: 0 | OUTPATIENT
Start: 2017-12-07 | End: 2017-12-10

## 2017-12-07 RX ADMIN — Medication 3 MILLILITER(S): at 21:28

## 2017-12-07 RX ADMIN — MONTELUKAST 10 MILLIGRAM(S): 4 TABLET, CHEWABLE ORAL at 21:21

## 2017-12-07 RX ADMIN — Medication 3 MILLILITER(S): at 14:53

## 2017-12-07 RX ADMIN — Medication 100 MILLIGRAM(S): at 05:36

## 2017-12-07 RX ADMIN — ENOXAPARIN SODIUM 40 MILLIGRAM(S): 100 INJECTION SUBCUTANEOUS at 12:55

## 2017-12-07 RX ADMIN — Medication 325 MILLIGRAM(S): at 12:55

## 2017-12-07 RX ADMIN — SENNA PLUS 2 TABLET(S): 8.6 TABLET ORAL at 21:21

## 2017-12-07 RX ADMIN — Medication 20 MILLIEQUIVALENT(S): at 12:55

## 2017-12-07 RX ADMIN — Medication 100 MILLIGRAM(S): at 16:21

## 2017-12-07 RX ADMIN — GABAPENTIN 300 MILLIGRAM(S): 400 CAPSULE ORAL at 05:36

## 2017-12-07 RX ADMIN — Medication 81 MILLIGRAM(S): at 12:55

## 2017-12-07 RX ADMIN — Medication 3 MILLILITER(S): at 03:53

## 2017-12-07 RX ADMIN — AMPICILLIN SODIUM AND SULBACTAM SODIUM 100 GRAM(S): 250; 125 INJECTION, POWDER, FOR SUSPENSION INTRAMUSCULAR; INTRAVENOUS at 18:38

## 2017-12-07 RX ADMIN — AMPICILLIN SODIUM AND SULBACTAM SODIUM 100 GRAM(S): 250; 125 INJECTION, POWDER, FOR SUSPENSION INTRAMUSCULAR; INTRAVENOUS at 12:55

## 2017-12-07 RX ADMIN — CLOPIDOGREL BISULFATE 75 MILLIGRAM(S): 75 TABLET, FILM COATED ORAL at 12:55

## 2017-12-07 RX ADMIN — POLYETHYLENE GLYCOL 3350 17 GRAM(S): 17 POWDER, FOR SOLUTION ORAL at 12:55

## 2017-12-07 RX ADMIN — Medication 100 MILLIGRAM(S): at 21:21

## 2017-12-07 RX ADMIN — TAMSULOSIN HYDROCHLORIDE 0.8 MILLIGRAM(S): 0.4 CAPSULE ORAL at 21:21

## 2017-12-07 RX ADMIN — AMPICILLIN SODIUM AND SULBACTAM SODIUM 100 GRAM(S): 250; 125 INJECTION, POWDER, FOR SUSPENSION INTRAMUSCULAR; INTRAVENOUS at 00:12

## 2017-12-07 RX ADMIN — ATORVASTATIN CALCIUM 40 MILLIGRAM(S): 80 TABLET, FILM COATED ORAL at 21:21

## 2017-12-07 RX ADMIN — FINASTERIDE 5 MILLIGRAM(S): 5 TABLET, FILM COATED ORAL at 12:55

## 2017-12-07 RX ADMIN — GABAPENTIN 300 MILLIGRAM(S): 400 CAPSULE ORAL at 17:59

## 2017-12-07 RX ADMIN — Medication 50 MILLIGRAM(S): at 18:38

## 2017-12-07 RX ADMIN — Medication 3 MILLILITER(S): at 08:53

## 2017-12-07 RX ADMIN — Medication 40 MILLIGRAM(S): at 05:36

## 2017-12-07 RX ADMIN — AMPICILLIN SODIUM AND SULBACTAM SODIUM 100 GRAM(S): 250; 125 INJECTION, POWDER, FOR SUSPENSION INTRAMUSCULAR; INTRAVENOUS at 05:36

## 2017-12-07 RX ADMIN — Medication 20 MILLIEQUIVALENT(S): at 16:23

## 2017-12-07 RX ADMIN — Medication 50 MILLIGRAM(S): at 05:36

## 2017-12-07 NOTE — PROGRESS NOTE ADULT - PROBLEM SELECTOR PLAN 2
Warm, red and tender extremities  Mild leukocytosis resolving  Could be secondary to cellulitis  Clear CXR  Normal LE doppler  BCx: NTD  C/w Unasyn (discharge on Ceftin) and 40 PO Lasix  C/w Gabapentin for burning pain  Procalcitonin 0.22

## 2017-12-07 NOTE — PROGRESS NOTE ADULT - ASSESSMENT
82 years old male from home, Croatian speaking, ambulates with cane at baseline, PMH of Asthma, HTN, HLD, CVA (2015) p/w complaints of chest pain and b/l leg edema. Admitted to telemetry floor for further management.     No Physical Tx needed upon discharge  Discharge planning today.

## 2017-12-07 NOTE — PROGRESS NOTE ADULT - SUBJECTIVE AND OBJECTIVE BOX
Subjective:  pt seen and examined, no complaints on exam.   no chest pain or nausea on exam     acetaminophen   Tablet. 650 milliGRAM(s) Oral every 6 hours PRN  ALBUTerol/ipratropium for Nebulization 3 milliLiter(s) Nebulizer every 6 hours  ampicillin/sulbactam  IVPB      ampicillin/sulbactam  IVPB 1.5 Gram(s) IV Intermittent every 6 hours  aspirin  chewable 81 milliGRAM(s) Oral daily  atorvastatin 40 milliGRAM(s) Oral at bedtime  clopidogrel Tablet 75 milliGRAM(s) Oral daily  docusate sodium 100 milliGRAM(s) Oral three times a day  enoxaparin Injectable 40 milliGRAM(s) SubCutaneous daily  ergocalciferol 44971 Unit(s) Oral <User Schedule>  ferrous    sulfate 325 milliGRAM(s) Oral daily  finasteride 5 milliGRAM(s) Oral daily  furosemide    Tablet 40 milliGRAM(s) Oral daily  gabapentin 300 milliGRAM(s) Oral two times a day  metoprolol     tartrate 50 milliGRAM(s) Oral two times a day  montelukast 10 milliGRAM(s) Oral at bedtime  polyethylene glycol 3350 17 Gram(s) Oral daily  senna 2 Tablet(s) Oral at bedtime  tamsulosin 0.8 milliGRAM(s) Oral at bedtime                            9.7    9.7   )-----------( 353      ( 07 Dec 2017 06:13 )             32.8       Hemoglobin: 9.7 g/dL (12-07 @ 06:13)  Hemoglobin: 8.9 g/dL (12-06 @ 07:56)  Hemoglobin: 9.5 g/dL (12-05 @ 06:41)  Hemoglobin: 10.3 g/dL (12-04 @ 06:37)  Hemoglobin: 10.6 g/dL (12-03 @ 08:20)      12-06    137  |  100  |  24<H>  ----------------------------<  95  3.6   |  29  |  0.98    Ca    8.3<L>      06 Dec 2017 07:56  Phos  2.9     12-06  Mg     2.7     12-06      Creatinine Trend: 0.98<--, 0.82<--, 0.79<--, 0.92<--, 0.98<--, 0.84<--    COAGS:           T(C): 36.7 (12-07-17 @ 05:21), Max: 36.7 (12-07-17 @ 05:21)  HR: 97 (12-07-17 @ 05:21) (89 - 97)  BP: 136/54 (12-07-17 @ 05:21) (130/35 - 136/54)  RR: 16 (12-07-17 @ 05:21) (16 - 18)  SpO2: 98% (12-07-17 @ 05:21) (98% - 100%)  Wt(kg): --    I&O's Summary    05 Dec 2017 07:01  -  06 Dec 2017 07:00  --------------------------------------------------------  IN: 100 mL / OUT: 0 mL / NET: 100 mL      HEENT:   Normal oral mucosa, PERRL, EOMI	  Lymphatic: No lymphadenopathy , no edema  Cardiovascular: Normal S1 S2, No JVD, No murmurs , Peripheral pulses palpable 2+ bilaterally  Respiratory: Lungs clear to auscultation, normal effort 	  Gastrointestinal:  Soft, Non-tender, + BS	      TELEMETRY: 	 off       DIAGNOSTIC TESTING:  [ ] Echocardiogram:   < from: Transthoracic Echocardiogram (04.08.17 @ 09:10) >  CONCLUSIONS:  1. Densly calcified mitral valve leaflet, mitral annulus  calcification. Mild-moderate mitral regurgitation.  2. Calcified trileaflet aortic valve with decreased  opening. Mild aortic stenosis. Mild-moderate aortic  regurgitation.  3. Normal aortic root.  4. Normal left atrium.  5. Normal left ventricular internal dimensions and wall  thicknesses.  6. Normal Left Ventricular Systolic Function,  (EF = 55 to  60%)  7. Grade II diastolic dysfunction    < from: Transthoracic Echocardiogram (12.01.17 @ 09:10) >  CONCLUSIONS:  1. Mitral annular calcification. Mild mitral regurgitation.    2. Calcified trileaflet aortic valve with decreased  opening. Peak transaortic valve gradient equals 25 mm Hg,  mean transaortic valve gradient equals 2 mm Hg, estimated  aortic valve area equals 1.9 sqcm (by continuity equation),  consistent with mild aortic stenosis. Moderate aortic  regurgitation.  3. Normal aortic root, arch and descending thoracic aorta.  4. Mild left atrial enlargement.  5. Mild concentric left ventricular hypertrophy.  6. Normal Left Ventricular Systolic Function,  (EF = 55 to  60%) Peak left ventricular outflow tract gradient equals  8.8 mm Hg.  7. Grade II diastolic dysfunction.  8. Right atrium was not well visualized.  9. Right ventricle not well visualized.  10. RA Pressure is 10 mm Hg.  11. RV systolic pressure is 39 mm Hg.  12. Normal pericardium with no pericardial effusion.    < end of copied text >  8. Normal right atrium.  9. Normal right ventricular size and function.  10. There is mild-moderate tricuspid regurgitation.  11. Normal pericardium with no pericardial effusion.    < end of copied text >    [ ]  Catheterization:  [ ] Stress Test:  < from: Nuclear Stress Test-Pharmacologic (12.04.17 @ 06:13) >    IMPRESSIONS:Normal Study  * Negative ECG evidence of ischemia after IV  administartion of Regadenoson.  * Myocardial Perfusion SPECT results are normal.  * No clear evidence of ischemia or infarct.  * Post-stress resting myocardial perfusion gated SPECT  imaging was performed (LVEF = 57 %) with no regional wall  motion abnormalities.    < end of copied text >    OTHER: 	        ASSESSMENT/PLAN: 	82y Male HTN, HLD, CVA (2015) p/w atypical chest pain and b/l leg edema r/o for MI    Dapt for CVA,    GI / DVT prophylaxis.   keep K>4, mag >2.0  cont ABX per medicine   Keep net neg with LAsix po   HR stable with  BB  cardiac markers neg x 3   unchanged echo , nl lv fx .   NST- normal - no ischemia   no further CV work up in patient   D/C planning   D/W Dr Donald

## 2017-12-07 NOTE — PROGRESS NOTE ADULT - SUBJECTIVE AND OBJECTIVE BOX
PGY 1 Note discussed with supervising resident and primary attending    Patient is a 82y old  Male who presents with a chief complaint of chest pain and b/l leg edema (05 Dec 2017 13:14)      INTERVAL HPI/OVERNIGHT EVENTS: offers no new complaints; current symptoms resolving    MEDICATIONS  (STANDING):  ALBUTerol/ipratropium for Nebulization 3 milliLiter(s) Nebulizer every 6 hours  ampicillin/sulbactam  IVPB      ampicillin/sulbactam  IVPB 1.5 Gram(s) IV Intermittent every 6 hours  aspirin  chewable 81 milliGRAM(s) Oral daily  atorvastatin 40 milliGRAM(s) Oral at bedtime  clopidogrel Tablet 75 milliGRAM(s) Oral daily  docusate sodium 100 milliGRAM(s) Oral three times a day  enoxaparin Injectable 40 milliGRAM(s) SubCutaneous daily  ergocalciferol 11635 Unit(s) Oral <User Schedule>  ferrous    sulfate 325 milliGRAM(s) Oral daily  finasteride 5 milliGRAM(s) Oral daily  furosemide    Tablet 40 milliGRAM(s) Oral daily  gabapentin 300 milliGRAM(s) Oral two times a day  metoprolol     tartrate 50 milliGRAM(s) Oral two times a day  montelukast 10 milliGRAM(s) Oral at bedtime  polyethylene glycol 3350 17 Gram(s) Oral daily  senna 2 Tablet(s) Oral at bedtime  tamsulosin 0.8 milliGRAM(s) Oral at bedtime    MEDICATIONS  (PRN):  acetaminophen   Tablet. 650 milliGRAM(s) Oral every 6 hours PRN Moderate Pain (4 - 6)      __________________________________________________  REVIEW OF SYSTEMS:    CONSTITUTIONAL: No fever,   EYES: no acute visual disturbances  NECK: No pain or stiffness  RESPIRATORY: No cough; No shortness of breath  CARDIOVASCULAR: No chest pain, no palpitations  GASTROINTESTINAL: No pain. No nausea or vomiting; No diarrhea   NEUROLOGICAL: No headache or numbness, no tremors  MUSCULOSKELETAL: B/L LE pain and redness with burning pain  GENITOURINARY: no dysuria, no frequency, no hesitancy  PSYCHIATRY: no depression , no anxiety  ALL OTHER  ROS negative       Vital Signs Last 24 Hrs  T(C): 36.7 (07 Dec 2017 05:21), Max: 36.7 (07 Dec 2017 05:21)  T(F): 98 (07 Dec 2017 05:21), Max: 98 (07 Dec 2017 05:21)  HR: 97 (07 Dec 2017 05:21) (89 - 97)  BP: 136/54 (07 Dec 2017 05:21) (130/35 - 136/54)  BP(mean): --  RR: 16 (07 Dec 2017 05:21) (16 - 18)  SpO2: 98% (07 Dec 2017 05:21) (98% - 100%)    ________________________________________________  PHYSICAL EXAM:    GENERAL: NAD [place] speaking male   HEENT: Normocephalic;  conjunctivae and sclerae clear; moist mucous membranes  NECK : supple  CHEST/LUNG: Clear to auscultation bilaterally with good air entry   HEART: S1 S2  regular; no murmurs, gallops or rubs  ABDOMEN: Soft, Nontender, Nondistended; Bowel sounds present  EXTREMITIES: B/l LE ankle and feet edema, swelling and redness  NERVOUS SYSTEM:  Awake and alert x 3    _________________________________________________  LABS:                        8.9    10.0  )-----------( 300      ( 06 Dec 2017 07:56 )             29.3     12-06    137  |  100  |  24<H>  ----------------------------<  95  3.6   |  29  |  0.98    Ca    8.3<L>      06 Dec 2017 07:56  Phos  2.9     12-06  Mg     2.7     12-06        Plan of care was discussed with patient and /or primary care giver; all questions and concerns were addressed and care was aligned with patient's wishes.

## 2017-12-08 VITALS
TEMPERATURE: 98 F | HEART RATE: 96 BPM | DIASTOLIC BLOOD PRESSURE: 48 MMHG | OXYGEN SATURATION: 98 % | SYSTOLIC BLOOD PRESSURE: 146 MMHG | RESPIRATION RATE: 17 BRPM | WEIGHT: 157.63 LBS

## 2017-12-08 LAB
ANION GAP SERPL CALC-SCNC: 7 MMOL/L — SIGNIFICANT CHANGE UP (ref 5–17)
BUN SERPL-MCNC: 16 MG/DL — SIGNIFICANT CHANGE UP (ref 7–18)
CALCIUM SERPL-MCNC: 8.3 MG/DL — LOW (ref 8.4–10.5)
CHLORIDE SERPL-SCNC: 100 MMOL/L — SIGNIFICANT CHANGE UP (ref 96–108)
CO2 SERPL-SCNC: 30 MMOL/L — SIGNIFICANT CHANGE UP (ref 22–31)
CREAT SERPL-MCNC: 0.84 MG/DL — SIGNIFICANT CHANGE UP (ref 0.5–1.3)
GLUCOSE SERPL-MCNC: 95 MG/DL — SIGNIFICANT CHANGE UP (ref 70–99)
HCT VFR BLD CALC: 29.2 % — LOW (ref 39–50)
HGB BLD-MCNC: 8.9 G/DL — LOW (ref 13–17)
MAGNESIUM SERPL-MCNC: 2.6 MG/DL — SIGNIFICANT CHANGE UP (ref 1.6–2.6)
MCHC RBC-ENTMCNC: 21.9 PG — LOW (ref 27–34)
MCHC RBC-ENTMCNC: 30.4 GM/DL — LOW (ref 32–36)
MCV RBC AUTO: 72 FL — LOW (ref 80–100)
PHOSPHATE SERPL-MCNC: 3.1 MG/DL — SIGNIFICANT CHANGE UP (ref 2.5–4.5)
PLATELET # BLD AUTO: 325 K/UL — SIGNIFICANT CHANGE UP (ref 150–400)
POTASSIUM SERPL-MCNC: 3.6 MMOL/L — SIGNIFICANT CHANGE UP (ref 3.5–5.3)
POTASSIUM SERPL-SCNC: 3.6 MMOL/L — SIGNIFICANT CHANGE UP (ref 3.5–5.3)
RBC # BLD: 4.06 M/UL — LOW (ref 4.2–5.8)
RBC # FLD: 16.4 % — HIGH (ref 10.3–14.5)
SODIUM SERPL-SCNC: 137 MMOL/L — SIGNIFICANT CHANGE UP (ref 135–145)
WBC # BLD: 8.7 K/UL — SIGNIFICANT CHANGE UP (ref 3.8–10.5)
WBC # FLD AUTO: 8.7 K/UL — SIGNIFICANT CHANGE UP (ref 3.8–10.5)

## 2017-12-08 RX ADMIN — AMPICILLIN SODIUM AND SULBACTAM SODIUM 100 GRAM(S): 250; 125 INJECTION, POWDER, FOR SUSPENSION INTRAMUSCULAR; INTRAVENOUS at 00:20

## 2017-12-08 RX ADMIN — ERGOCALCIFEROL 50000 UNIT(S): 1.25 CAPSULE ORAL at 12:25

## 2017-12-08 RX ADMIN — Medication 3 MILLILITER(S): at 03:28

## 2017-12-08 RX ADMIN — Medication 81 MILLIGRAM(S): at 12:25

## 2017-12-08 RX ADMIN — Medication 325 MILLIGRAM(S): at 12:25

## 2017-12-08 RX ADMIN — Medication 3 MILLILITER(S): at 09:40

## 2017-12-08 RX ADMIN — GABAPENTIN 300 MILLIGRAM(S): 400 CAPSULE ORAL at 05:42

## 2017-12-08 RX ADMIN — Medication 100 MILLIGRAM(S): at 05:42

## 2017-12-08 RX ADMIN — Medication 50 MILLIGRAM(S): at 05:42

## 2017-12-08 RX ADMIN — Medication 40 MILLIGRAM(S): at 05:42

## 2017-12-08 RX ADMIN — FINASTERIDE 5 MILLIGRAM(S): 5 TABLET, FILM COATED ORAL at 12:25

## 2017-12-08 RX ADMIN — CLOPIDOGREL BISULFATE 75 MILLIGRAM(S): 75 TABLET, FILM COATED ORAL at 12:25

## 2017-12-08 RX ADMIN — AMPICILLIN SODIUM AND SULBACTAM SODIUM 100 GRAM(S): 250; 125 INJECTION, POWDER, FOR SUSPENSION INTRAMUSCULAR; INTRAVENOUS at 05:42

## 2017-12-08 RX ADMIN — AMPICILLIN SODIUM AND SULBACTAM SODIUM 100 GRAM(S): 250; 125 INJECTION, POWDER, FOR SUSPENSION INTRAMUSCULAR; INTRAVENOUS at 12:25

## 2017-12-08 RX ADMIN — ENOXAPARIN SODIUM 40 MILLIGRAM(S): 100 INJECTION SUBCUTANEOUS at 12:25

## 2017-12-08 NOTE — PROGRESS NOTE ADULT - PROBLEM SELECTOR PROBLEM 3
Anemia, iron deficiency

## 2017-12-08 NOTE — PROGRESS NOTE ADULT - PROBLEM SELECTOR PROBLEM 6
Hyperlipidemia

## 2017-12-08 NOTE — PROGRESS NOTE ADULT - PROBLEM SELECTOR PLAN 7
Improved VTE score of 2 (age and immobilization)   Lovenox for DVT ppx.  Colace, Senna, Miralax for Constipation  Proscar and Flomax for BPH
Improved VTE score of 2 (age and immobilization)   Lovenox for DVT ppx.
-Improved VTE score of 2 (age and immobilization)   -Lovenox for DVT ppx.
Improved VTE score of 2 (age and immobilization)   Lovenox for DVT ppx.  Colace, Senna, Miralax for Constipation  Proscar and Flomax for BPH
-Improved VTE score of 2 (age and immobilization)   -Lovenox for DVT ppx.
Improved VTE score of 2 (age and immobilization)   Lovenox for DVT ppx.
Improved VTE score of 2 (age and immobilization)   Lovenox for DVT ppx.  Colace, Senna, Miralax for Constipation  Proscar and Flomax for BPH

## 2017-12-08 NOTE — PROGRESS NOTE ADULT - ASSESSMENT
82 years old male from home, [place]wandan speaking, ambulates with cane at baseline, PMH of Asthma, HTN, HLD, CVA (2015) p/w complaints of chest pain and b/l leg edema. Admitted to telemetry floor for further management.     No Physical Tx needed upon discharge  Discharge planning today.

## 2017-12-08 NOTE — PROGRESS NOTE ADULT - PROBLEM SELECTOR PLAN 4
Stable  C/w Bronchodilators
-stable currently  -c/w nebs
Stable  C/w Bronchodilators
-stable currently  -c/w nebs
Stable  C/w Bronchodilators

## 2017-12-08 NOTE — PROGRESS NOTE ADULT - ATTENDING COMMENTS
Agree with above assessment and plan as outlined above.     - D/C planning per will Donald MD, [facility], [facility]  [address] [place] [address]  PHONE:  [phone]  BEEPER : [phone]
Agree with above assessment and plan as outlined above.     - D/C planning per will Donald MD, [facility], [facility]  [address] [place] [address]  PHONE:  [phone]  BEEPER : [phone]
Agree with above assessment and plan as outlined above.    - D/C planning for today    Naveed Donald MD, FACC  [facility], [facility]  [address] [place] [address]  PHONE:  [phone]  BEEPER : [phone]
Agree with above assessment and plan as outlined above.    - for Pharm nuc in AM    Naveed Donald MD, FACC  [facility], [facility]  [address] [place] [address]  PHONE:  [phone]  BEEPER : [phone]
Patient seen and examined, agree with above assessment and plan as transcribed above.    - awaiting echo and lower extremity dopplers  - Refused stress this AM, understands there is a risk he may have undiagnosed CAD  - Cont empiric ASA, Statin, BB    Naveed Donald MD, FACC  [facility], [facility]  [address] [place] [address]  PHONE:  [phone]  BEEPER : [phone]
Agree with above assessment and plan as outlined above.    - No need for further cardiac w/u  - D/C per Med  - D/C tele    Naveed Donald MD, FACC  [facility], [facility]  [address] [place] [address]  PHONE:  [phone]  BEEPER : [phone]
Agree with above assessment and plan as outlined above.    - Pt now agreeable to pharmacological stress, plan for stress monday    Naveed Donald MD, FACC  [facility], [facility]  [address] [place] [address]  PHONE:  [phone]  BEEPER : [phone]

## 2017-12-08 NOTE — PROGRESS NOTE ADULT - PROBLEM SELECTOR PLAN 2
Warm, red and tender extremities; Resolving  Mild leukocytosis resolving  Could be secondary to cellulitis  Clear CXR  Normal LE doppler  BCx: NTD  C/w Unasyn (discharge on Ceftin) and 40 PO Lasix  C/w Gabapentin for burning pain  Procalcitonin 0.22

## 2017-12-08 NOTE — PROGRESS NOTE ADULT - PROBLEM SELECTOR PLAN 6
C/w statin  Lipid profile Normal
C/w statin  Lipid profile Normal
-c/w statin  lipid profile wnl
C/w statin  Lipid profile Normal
-c/w statin  lipid profile wnl
C/w statin  Lipid profile Normal
C/w statin  Lipid profile Normal

## 2017-12-08 NOTE — PROGRESS NOTE ADULT - SUBJECTIVE AND OBJECTIVE BOX
PGY 1 Note discussed with supervising resident and primary attending    Patient is a 82y old  Male who presents with a chief complaint of chest pain and b/l leg edema (05 Dec 2017 13:14)      INTERVAL HPI/OVERNIGHT EVENTS: offers no new complaints; current symptoms resolving    MEDICATIONS  (STANDING):  ALBUTerol/ipratropium for Nebulization 3 milliLiter(s) Nebulizer every 6 hours  ampicillin/sulbactam  IVPB      ampicillin/sulbactam  IVPB 1.5 Gram(s) IV Intermittent every 6 hours  aspirin  chewable 81 milliGRAM(s) Oral daily  atorvastatin 40 milliGRAM(s) Oral at bedtime  clopidogrel Tablet 75 milliGRAM(s) Oral daily  docusate sodium 100 milliGRAM(s) Oral three times a day  enoxaparin Injectable 40 milliGRAM(s) SubCutaneous daily  ergocalciferol 62308 Unit(s) Oral <User Schedule>  ferrous    sulfate 325 milliGRAM(s) Oral daily  finasteride 5 milliGRAM(s) Oral daily  furosemide    Tablet 40 milliGRAM(s) Oral daily  gabapentin 300 milliGRAM(s) Oral two times a day  metoprolol     tartrate 50 milliGRAM(s) Oral two times a day  montelukast 10 milliGRAM(s) Oral at bedtime  senna 2 Tablet(s) Oral at bedtime  tamsulosin 0.8 milliGRAM(s) Oral at bedtime    MEDICATIONS  (PRN):  acetaminophen   Tablet. 650 milliGRAM(s) Oral every 6 hours PRN Moderate Pain (4 - 6)      __________________________________________________  REVIEW OF SYSTEMS:    CONSTITUTIONAL: No fever,   EYES: no acute visual disturbances  NECK: No pain or stiffness  RESPIRATORY: No cough; No shortness of breath  CARDIOVASCULAR: No chest pain, no palpitations  GASTROINTESTINAL: No pain. No nausea or vomiting; No diarrhea   NEUROLOGICAL: No headache or numbness, no tremors  MUSCULOSKELETAL: B/L LE pain and redness with burning pain  GENITOURINARY: no dysuria, no frequency, no hesitancy  PSYCHIATRY: no depression , no anxiety  ALL OTHER  ROS negative      Vital Signs Last 24 Hrs  T(C): 36.4 (08 Dec 2017 05:11), Max: 36.6 (07 Dec 2017 14:59)  T(F): 97.6 (08 Dec 2017 05:11), Max: 97.8 (07 Dec 2017 14:59)  HR: 96 (08 Dec 2017 05:11) (75 - 96)  BP: 146/48 (08 Dec 2017 05:11) (118/44 - 146/48)  BP(mean): --  RR: 17 (08 Dec 2017 05:11) (16 - 18)  SpO2: 98% (08 Dec 2017 05:11) (98% - 98%)    ________________________________________________  PHYSICAL EXAM:  GENERAL: NAD Citizen of [place] speaking male   HEENT: Normocephalic;  conjunctivae and sclerae clear; moist mucous membranes  NECK : supple  CHEST/LUNG: Clear to auscultation bilaterally with good air entry   HEART: S1 S2  regular; no murmurs, gallops or rubs  ABDOMEN: Soft, Nontender, Nondistended; Bowel sounds present  EXTREMITIES: B/l LE ankle and feet edema, swelling and redness  NERVOUS SYSTEM:  Awake and alert x 3    _________________________________________________  LABS:                        9.7    9.7   )-----------( 353      ( 07 Dec 2017 06:13 )             32.8     12-07    136  |  100  |  20<H>  ----------------------------<  111<H>  3.4<L>   |  29  |  0.84    Ca    8.2<L>      07 Dec 2017 06:13  Phos  2.5     12-07  Mg     2.5     12-07        Plan of care was discussed with patient and /or primary care giver; all questions and concerns were addressed and care was aligned with patient's wishes.

## 2017-12-08 NOTE — PROGRESS NOTE ADULT - SUBJECTIVE AND OBJECTIVE BOX
Subjective:  pt seen and examined, no complaints on exam.     acetaminophen   Tablet. 650 milliGRAM(s) Oral every 6 hours PRN  ALBUTerol/ipratropium for Nebulization 3 milliLiter(s) Nebulizer every 6 hours  ampicillin/sulbactam  IVPB      ampicillin/sulbactam  IVPB 1.5 Gram(s) IV Intermittent every 6 hours  aspirin  chewable 81 milliGRAM(s) Oral daily  atorvastatin 40 milliGRAM(s) Oral at bedtime  clopidogrel Tablet 75 milliGRAM(s) Oral daily  docusate sodium 100 milliGRAM(s) Oral three times a day  enoxaparin Injectable 40 milliGRAM(s) SubCutaneous daily  ergocalciferol 00993 Unit(s) Oral <User Schedule>  ferrous    sulfate 325 milliGRAM(s) Oral daily  finasteride 5 milliGRAM(s) Oral daily  furosemide    Tablet 40 milliGRAM(s) Oral daily  gabapentin 300 milliGRAM(s) Oral two times a day  metoprolol     tartrate 50 milliGRAM(s) Oral two times a day  montelukast 10 milliGRAM(s) Oral at bedtime  senna 2 Tablet(s) Oral at bedtime  tamsulosin 0.8 milliGRAM(s) Oral at bedtime                            9.7    9.7   )-----------( 353      ( 07 Dec 2017 06:13 )             32.8       Hemoglobin: 9.7 g/dL (12-07 @ 06:13)  Hemoglobin: 8.9 g/dL (12-06 @ 07:56)  Hemoglobin: 9.5 g/dL (12-05 @ 06:41)  Hemoglobin: 10.3 g/dL (12-04 @ 06:37)  Hemoglobin: 10.6 g/dL (12-03 @ 08:20)      12-07    136  |  100  |  20<H>  ----------------------------<  111<H>  3.4<L>   |  29  |  0.84    Ca    8.2<L>      07 Dec 2017 06:13  Phos  2.5     12-07  Mg     2.5     12-07      Creatinine Trend: 0.84<--, 0.98<--, 0.82<--, 0.79<--, 0.92<--, 0.98<--    COAGS:           T(C): 36.4 (12-08-17 @ 05:11), Max: 36.6 (12-07-17 @ 14:59)  HR: 96 (12-08-17 @ 05:11) (75 - 96)  BP: 146/48 (12-08-17 @ 05:11) (118/44 - 146/48)  RR: 17 (12-08-17 @ 05:11) (16 - 18)  SpO2: 98% (12-08-17 @ 05:11) (98% - 98%)  Wt(kg): --    I&O's Summary      HEENT:   Normal oral mucosa, PERRL, EOMI	  Lymphatic: No lymphadenopathy , no edema  Cardiovascular: Normal S1 S2, No JVD, No murmurs , Peripheral pulses palpable 2+ bilaterally  Respiratory: Lungs clear to auscultation, normal effort 	  Gastrointestinal:  Soft, Non-tender, + BS	      TELEMETRY: 	 off       DIAGNOSTIC TESTING:  [ ] Echocardiogram:   < from: Transthoracic Echocardiogram (04.08.17 @ 09:10) >  CONCLUSIONS:  1. Densly calcified mitral valve leaflet, mitral annulus  calcification. Mild-moderate mitral regurgitation.  2. Calcified trileaflet aortic valve with decreased  opening. Mild aortic stenosis. Mild-moderate aortic  regurgitation.  3. Normal aortic root.  4. Normal left atrium.  5. Normal left ventricular internal dimensions and wall  thicknesses.  6. Normal Left Ventricular Systolic Function,  (EF = 55 to  60%)  7. Grade II diastolic dysfunction    < from: Transthoracic Echocardiogram (12.01.17 @ 09:10) >  CONCLUSIONS:  1. Mitral annular calcification. Mild mitral regurgitation.    2. Calcified trileaflet aortic valve with decreased  opening. Peak transaortic valve gradient equals 25 mm Hg,  mean transaortic valve gradient equals 2 mm Hg, estimated  aortic valve area equals 1.9 sqcm (by continuity equation),  consistent with mild aortic stenosis. Moderate aortic  regurgitation.  3. Normal aortic root, arch and descending thoracic aorta.  4. Mild left atrial enlargement.  5. Mild concentric left ventricular hypertrophy.  6. Normal Left Ventricular Systolic Function,  (EF = 55 to  60%) Peak left ventricular outflow tract gradient equals  8.8 mm Hg.  7. Grade II diastolic dysfunction.  8. Right atrium was not well visualized.  9. Right ventricle not well visualized.  10. RA Pressure is 10 mm Hg.  11. RV systolic pressure is 39 mm Hg.  12. Normal pericardium with no pericardial effusion.    < end of copied text >  8. Normal right atrium.  9. Normal right ventricular size and function.  10. There is mild-moderate tricuspid regurgitation.  11. Normal pericardium with no pericardial effusion.    < end of copied text >    [ ]  Catheterization:  [ ] Stress Test:  < from: Nuclear Stress Test-Pharmacologic (12.04.17 @ 06:13) >    IMPRESSIONS:Normal Study  * Negative ECG evidence of ischemia after IV  administartion of Regadenoson.  * Myocardial Perfusion SPECT results are normal.  * No clear evidence of ischemia or infarct.  * Post-stress resting myocardial perfusion gated SPECT  imaging was performed (LVEF = 57 %) with no regional wall  motion abnormalities.    < end of copied text >    OTHER: 	        ASSESSMENT/PLAN: 	82y Male HTN, HLD, CVA (2015) p/w atypical chest pain and b/l leg edema r/o for MI  unchanged echo , nl lv fx .   NST- normal - no ischemia   Dapt for CVA,    GI / DVT prophylaxis.   keep K>4, mag >2.0  cont ABX per medicine   Keep net neg with LAsix po   cont BB   no further CV work up in patient   D/C planning   D/W Dr Donald

## 2017-12-08 NOTE — PROGRESS NOTE ADULT - PROVIDER SPECIALTY LIST ADULT
Cardiology
Internal Medicine
Cardiology
Cardiology
Internal Medicine
Internal Medicine

## 2017-12-18 ENCOUNTER — TRANSCRIPTION ENCOUNTER (OUTPATIENT)
Age: 82
End: 2017-12-18

## 2017-12-19 ENCOUNTER — INPATIENT (INPATIENT)
Facility: HOSPITAL | Age: 82
LOS: 8 days | Discharge: HOME | DRG: 607 | End: 2017-12-28
Attending: GENERAL PRACTICE | Admitting: GENERAL PRACTICE
Payer: MEDICARE

## 2017-12-19 ENCOUNTER — APPOINTMENT (OUTPATIENT)
Dept: INFECTIOUS DISEASE | Facility: CLINIC | Age: 82
End: 2017-12-19
Payer: MEDICARE

## 2017-12-19 VITALS
BODY MASS INDEX: 26.16 KG/M2 | WEIGHT: 157 LBS | OXYGEN SATURATION: 100 % | DIASTOLIC BLOOD PRESSURE: 73 MMHG | HEIGHT: 65 IN | SYSTOLIC BLOOD PRESSURE: 148 MMHG | HEART RATE: 112 BPM | TEMPERATURE: 97.7 F

## 2017-12-19 VITALS
OXYGEN SATURATION: 100 % | SYSTOLIC BLOOD PRESSURE: 169 MMHG | RESPIRATION RATE: 18 BRPM | HEART RATE: 108 BPM | DIASTOLIC BLOOD PRESSURE: 82 MMHG

## 2017-12-19 DIAGNOSIS — R06.2 WHEEZING: ICD-10-CM

## 2017-12-19 DIAGNOSIS — R00.0 TACHYCARDIA, UNSPECIFIED: ICD-10-CM

## 2017-12-19 DIAGNOSIS — R21 RASH AND OTHER NONSPECIFIC SKIN ERUPTION: ICD-10-CM

## 2017-12-19 DIAGNOSIS — Z78.9 OTHER SPECIFIED HEALTH STATUS: ICD-10-CM

## 2017-12-19 DIAGNOSIS — R23.3 SPONTANEOUS ECCHYMOSES: ICD-10-CM

## 2017-12-19 DIAGNOSIS — R06.02 SHORTNESS OF BREATH: ICD-10-CM

## 2017-12-19 DIAGNOSIS — Z86.73 PERSONAL HISTORY OF TRANSIENT ISCHEMIC ATTACK (TIA), AND CEREBRAL INFARCTION W/OUT RESIDUAL DEFICITS: ICD-10-CM

## 2017-12-19 DIAGNOSIS — N40.0 BENIGN PROSTATIC HYPERPLASIA WITHOUT LOWER URINARY TRACT SYMPMS: ICD-10-CM

## 2017-12-19 DIAGNOSIS — R06.82 TACHYPNEA, NOT ELSEWHERE CLASSIFIED: ICD-10-CM

## 2017-12-19 DIAGNOSIS — Z87.09 PERSONAL HISTORY OF OTHER DISEASES OF THE RESPIRATORY SYSTEM: ICD-10-CM

## 2017-12-19 PROBLEM — Z00.00 ENCOUNTER FOR PREVENTIVE HEALTH EXAMINATION: Status: ACTIVE | Noted: 2017-12-19

## 2017-12-19 LAB
ALBUMIN SERPL ELPH-MCNC: 2.9 G/DL — LOW (ref 3.3–5)
ALP SERPL-CCNC: 171 U/L — HIGH (ref 40–120)
ALT FLD-CCNC: 38 U/L RC — SIGNIFICANT CHANGE UP (ref 10–45)
ANION GAP SERPL CALC-SCNC: 17 MMOL/L — SIGNIFICANT CHANGE UP (ref 5–17)
ANISOCYTOSIS BLD QL: SLIGHT — SIGNIFICANT CHANGE UP
AST SERPL-CCNC: 33 U/L — SIGNIFICANT CHANGE UP (ref 10–40)
BASOPHILS # BLD AUTO: 0 K/UL — SIGNIFICANT CHANGE UP (ref 0–0.2)
BASOPHILS NFR BLD AUTO: 0.1 % — SIGNIFICANT CHANGE UP (ref 0–2)
BILIRUB SERPL-MCNC: 0.4 MG/DL — SIGNIFICANT CHANGE UP (ref 0.2–1.2)
BUN SERPL-MCNC: 38 MG/DL — HIGH (ref 7–23)
CALCIUM SERPL-MCNC: 8.8 MG/DL — SIGNIFICANT CHANGE UP (ref 8.4–10.5)
CHLORIDE SERPL-SCNC: 94 MMOL/L — LOW (ref 96–108)
CO2 SERPL-SCNC: 25 MMOL/L — SIGNIFICANT CHANGE UP (ref 22–31)
CREAT SERPL-MCNC: 2.15 MG/DL — HIGH (ref 0.5–1.3)
DACRYOCYTES BLD QL SMEAR: SLIGHT — SIGNIFICANT CHANGE UP
ELLIPTOCYTES BLD QL SMEAR: SLIGHT — SIGNIFICANT CHANGE UP
EOSINOPHIL # BLD AUTO: 0.1 K/UL — SIGNIFICANT CHANGE UP (ref 0–0.5)
EOSINOPHIL NFR BLD AUTO: 1 % — SIGNIFICANT CHANGE UP (ref 0–6)
GLUCOSE SERPL-MCNC: 104 MG/DL — HIGH (ref 70–99)
HCT VFR BLD CALC: 33.5 % — LOW (ref 39–50)
HGB BLD-MCNC: 10.2 G/DL — LOW (ref 13–17)
HIV 1 & 2 AB SERPL IA.RAPID: SIGNIFICANT CHANGE UP
HYPOCHROMIA BLD QL: SLIGHT — SIGNIFICANT CHANGE UP
LYMPHOCYTES # BLD AUTO: 1.7 K/UL — SIGNIFICANT CHANGE UP (ref 1–3.3)
LYMPHOCYTES # BLD AUTO: 16.6 % — SIGNIFICANT CHANGE UP (ref 13–44)
MACROCYTES BLD QL: SLIGHT — SIGNIFICANT CHANGE UP
MCHC RBC-ENTMCNC: 21.7 PG — LOW (ref 27–34)
MCHC RBC-ENTMCNC: 30.3 GM/DL — LOW (ref 32–36)
MCV RBC AUTO: 71.6 FL — LOW (ref 80–100)
MICROCYTES BLD QL: SIGNIFICANT CHANGE UP
MONOCYTES # BLD AUTO: 0.7 K/UL — SIGNIFICANT CHANGE UP (ref 0–0.9)
MONOCYTES NFR BLD AUTO: 6.6 % — SIGNIFICANT CHANGE UP (ref 2–14)
NEUTROPHILS # BLD AUTO: 7.6 K/UL — HIGH (ref 1.8–7.4)
NEUTROPHILS NFR BLD AUTO: 75.6 % — SIGNIFICANT CHANGE UP (ref 43–77)
PLAT MORPH BLD: NORMAL — SIGNIFICANT CHANGE UP
PLATELET # BLD AUTO: 377 K/UL — SIGNIFICANT CHANGE UP (ref 150–400)
POIKILOCYTOSIS BLD QL AUTO: SLIGHT — SIGNIFICANT CHANGE UP
POLYCHROMASIA BLD QL SMEAR: SLIGHT — SIGNIFICANT CHANGE UP
POTASSIUM SERPL-MCNC: 4.4 MMOL/L — SIGNIFICANT CHANGE UP (ref 3.5–5.3)
POTASSIUM SERPL-SCNC: 4.4 MMOL/L — SIGNIFICANT CHANGE UP (ref 3.5–5.3)
PROT SERPL-MCNC: 7.3 G/DL — SIGNIFICANT CHANGE UP (ref 6–8.3)
RAPID RVP RESULT: SIGNIFICANT CHANGE UP
RBC # BLD: 4.68 M/UL — SIGNIFICANT CHANGE UP (ref 4.2–5.8)
RBC # FLD: 16.2 % — HIGH (ref 10.3–14.5)
RBC BLD AUTO: ABNORMAL
RHEUMATOID FACT SERPL-ACNC: 11 IU/ML — SIGNIFICANT CHANGE UP (ref 0–13.9)
SCHISTOCYTES BLD QL AUTO: SLIGHT — SIGNIFICANT CHANGE UP
SODIUM SERPL-SCNC: 136 MMOL/L — SIGNIFICANT CHANGE UP (ref 135–145)
TARGETS BLD QL SMEAR: SLIGHT — SIGNIFICANT CHANGE UP
WBC # BLD: 10.1 K/UL — SIGNIFICANT CHANGE UP (ref 3.8–10.5)
WBC # FLD AUTO: 10.1 K/UL — SIGNIFICANT CHANGE UP (ref 3.8–10.5)

## 2017-12-19 PROCEDURE — 84145 PROCALCITONIN (PCT): CPT

## 2017-12-19 PROCEDURE — 82746 ASSAY OF FOLIC ACID SERUM: CPT

## 2017-12-19 PROCEDURE — 87040 BLOOD CULTURE FOR BACTERIA: CPT

## 2017-12-19 PROCEDURE — 94640 AIRWAY INHALATION TREATMENT: CPT

## 2017-12-19 PROCEDURE — 84466 ASSAY OF TRANSFERRIN: CPT

## 2017-12-19 PROCEDURE — 81001 URINALYSIS AUTO W/SCOPE: CPT

## 2017-12-19 PROCEDURE — 83036 HEMOGLOBIN GLYCOSYLATED A1C: CPT

## 2017-12-19 PROCEDURE — 80048 BASIC METABOLIC PNL TOTAL CA: CPT

## 2017-12-19 PROCEDURE — 85027 COMPLETE CBC AUTOMATED: CPT

## 2017-12-19 PROCEDURE — A9502: CPT

## 2017-12-19 PROCEDURE — 93306 TTE W/DOPPLER COMPLETE: CPT

## 2017-12-19 PROCEDURE — 73610 X-RAY EXAM OF ANKLE: CPT

## 2017-12-19 PROCEDURE — 78452 HT MUSCLE IMAGE SPECT MULT: CPT

## 2017-12-19 PROCEDURE — 84484 ASSAY OF TROPONIN QUANT: CPT

## 2017-12-19 PROCEDURE — 93017 CV STRESS TEST TRACING ONLY: CPT

## 2017-12-19 PROCEDURE — 84100 ASSAY OF PHOSPHORUS: CPT

## 2017-12-19 PROCEDURE — 82306 VITAMIN D 25 HYDROXY: CPT

## 2017-12-19 PROCEDURE — 99285 EMERGENCY DEPT VISIT HI MDM: CPT | Mod: 25

## 2017-12-19 PROCEDURE — 85730 THROMBOPLASTIN TIME PARTIAL: CPT

## 2017-12-19 PROCEDURE — 85610 PROTHROMBIN TIME: CPT

## 2017-12-19 PROCEDURE — 80061 LIPID PANEL: CPT

## 2017-12-19 PROCEDURE — 82550 ASSAY OF CK (CPK): CPT

## 2017-12-19 PROCEDURE — 71010: CPT | Mod: 26

## 2017-12-19 PROCEDURE — 83550 IRON BINDING TEST: CPT

## 2017-12-19 PROCEDURE — 96375 TX/PRO/DX INJ NEW DRUG ADDON: CPT

## 2017-12-19 PROCEDURE — 82553 CREATINE MB FRACTION: CPT

## 2017-12-19 PROCEDURE — 96374 THER/PROPH/DIAG INJ IV PUSH: CPT

## 2017-12-19 PROCEDURE — 80053 COMPREHEN METABOLIC PANEL: CPT

## 2017-12-19 PROCEDURE — 83735 ASSAY OF MAGNESIUM: CPT

## 2017-12-19 PROCEDURE — 99223 1ST HOSP IP/OBS HIGH 75: CPT

## 2017-12-19 PROCEDURE — 82607 VITAMIN B-12: CPT

## 2017-12-19 PROCEDURE — 82728 ASSAY OF FERRITIN: CPT

## 2017-12-19 PROCEDURE — 84443 ASSAY THYROID STIM HORMONE: CPT

## 2017-12-19 PROCEDURE — 93970 EXTREMITY STUDY: CPT

## 2017-12-19 PROCEDURE — 83880 ASSAY OF NATRIURETIC PEPTIDE: CPT

## 2017-12-19 PROCEDURE — 99205 OFFICE O/P NEW HI 60 MIN: CPT

## 2017-12-19 PROCEDURE — 71045 X-RAY EXAM CHEST 1 VIEW: CPT

## 2017-12-19 PROCEDURE — 93005 ELECTROCARDIOGRAM TRACING: CPT

## 2017-12-19 PROCEDURE — 97162 PT EVAL MOD COMPLEX 30 MIN: CPT

## 2017-12-19 PROCEDURE — 99285 EMERGENCY DEPT VISIT HI MDM: CPT | Mod: GC

## 2017-12-19 RX ORDER — SODIUM CHLORIDE 9 MG/ML
500 INJECTION INTRAMUSCULAR; INTRAVENOUS; SUBCUTANEOUS ONCE
Qty: 0 | Refills: 0 | Status: COMPLETED | OUTPATIENT
Start: 2017-12-19 | End: 2017-12-19

## 2017-12-19 RX ORDER — SODIUM CHLORIDE 9 MG/ML
1000 INJECTION INTRAMUSCULAR; INTRAVENOUS; SUBCUTANEOUS
Qty: 0 | Refills: 0 | Status: DISCONTINUED | OUTPATIENT
Start: 2017-12-19 | End: 2017-12-20

## 2017-12-19 RX ADMIN — SODIUM CHLORIDE 200 MILLILITER(S): 9 INJECTION INTRAMUSCULAR; INTRAVENOUS; SUBCUTANEOUS at 21:17

## 2017-12-19 RX ADMIN — SODIUM CHLORIDE 500 MILLILITER(S): 9 INJECTION INTRAMUSCULAR; INTRAVENOUS; SUBCUTANEOUS at 19:13

## 2017-12-19 NOTE — CONSULT NOTE ADULT - ATTENDING COMMENTS
82M with rash/TRINI  -possible drug allergy -?lasix  -possible HSP  -DERM eval pending  -not septic  -hold off abx  -F/u bcx  -Hep serology and HIV -ve  -tissue biopsy if possible by Dylan Leigh  Attending Physician   Division of Infectious Disease  Pager #[phone]  After 5pm/weekend #[phone]

## 2017-12-19 NOTE — H&P ADULT - EXTREMITIES COMMENTS
confluent erythema/edema of b/l feet and hands (burning per patient)  multiple clusters of non blenching petechial rash b/l shins, knees, upper arms, low back

## 2017-12-19 NOTE — ED PROVIDER NOTE - OBJECTIVE STATEMENT
82M h/o asthma, HTN, HLD BIBEMS from ID clinic for tachypnea, wheezing. En route has was given 2 nebulizer treatments which he missed before his appointment and in ED he is breathing well. He had been admitted at OSH several weeks ago for b/l foot pain/redness/swelling/rash, discharged on Abx for cellulitis. He was at his ID appointment for worsening rash that has not covered his legs b/l, lower back, b/l arms, palms. Patient describes rash as painful and burning, not pruritic. He has no complaints otherwise, no fever, chills, recent illness, colored sputum production, back pain, dysuria, hematuria. 82M h/o asthma, HTN, HLD BIBEMS from ID clinic for tachypnea, wheezing. En route has was given 2 nebulizer treatments which he missed before his appointment and in ED he is breathing well. He had been admitted at OSH several weeks ago for b/l foot pain/redness/swelling/rash, discharged on Abx for cellulitis. He was at his ID appointment for worsening rash that has now covered his legs b/l, lower back, b/l arms, palms. Patient describes rash as painful and burning, not pruritic. He has no complaints otherwise, no fever, chills, recent illness, colored sputum production, back pain, dysuria, hematuria.

## 2017-12-19 NOTE — ED PROVIDER NOTE - NOTES
for evaluation and possible biopsy Seen on arrival. Multiple labs requested. Admit to medicine. Will continue to follow inpatient

## 2017-12-19 NOTE — H&P ADULT - PROBLEM SELECTOR PLAN 1
concerning for vasculitis, infectious (IE), cryoglobulin, ITP, HSP  - will check serologies  - rash b/l feet and hands  - Derm and rheum eval - need to call in am  - ID eval appreciated, will hold Abx for now and monitor clinically

## 2017-12-19 NOTE — ED PROVIDER NOTE - MEDICAL DECISION MAKING DETAILS
82M p/w petechial rash, non blanching. Vasculitis vs. platelet dysfunction. Will get full workup for vasculitis, CBC, CMP, CXR. Admit with ID, derm f/u. 82M p/w petechial rash, non blanching. Vasculitis vs. platelet dysfunction. Will get full workup for vasculitis, CBC, CMP, CXR. Admit with ID, derm f/u.    Beba Jennings MD - Attending Physician: Pt with acute worsening of subacute rash, sent in from ID clinic. Concern for possible vasculitis given petechiae and worsening desquamation. New TRINI on labs. Admit for further care

## 2017-12-19 NOTE — H&P ADULT - HISTORY OF PRESENT ILLNESS
82M h/o asthma, HTN, HLD BIBEMS from ID clinic for tachypnea, wheezing. En route has was given 2 nebulizer treatments which he missed before his appointment and in ED he is breathing well. He had been admitted at OSH several weeks ago for b/l foot pain/redness/swelling/rash, discharged on Abx for cellulitis. He was at his ID appointment for worsening rash that has now covered his legs b/l, lower back, b/l arms, palms. Patient describes rash as painful and burning, not pruritic. He has no complaints otherwise, no fever, chills, recent illness, colored sputum production, back pain, dysuria, hematuria. 82M h/o asthma, HTN, HLD, BPH BIBEMS from ID clinic for tachypnea, wheezing. En route has was given 2 nebulizer treatments with improvement of symptoms.  Per patient's daughter, Tanvi ([phone]), patient developed "cluster of small red lesions" on b/l feet initially 3 wks ago, went to PMD, worsen with the medication PMD prescribed (unsure what med, ?Lasix).  The rash started to extend up to b/l shins, then patient was brought to [facility] ED, admitted 11/29-12/8, treated with Abx     he is breathing well. He had been admitted at OSH several weeks ago for b/l foot pain/redness/swelling/rash, discharged on Abx for cellulitis. He was at his ID appointment for worsening rash that has now covered his legs b/l, lower back, b/l arms, palms. Patient describes rash as painful and burning, not pruritic. He has no complaints otherwise, no fever, chills, recent illness, colored sputum production, back pain, dysuria, hematuria. 82M h/o asthma, HTN, HLD, BPH BIBEMS from ID clinic for tachypnea, wheezing. En route has was given 2 nebulizer treatments with improvement of symptoms.  Per patient's daughter, Tanvi ([phone]), patient developed "clusters of small red lesions" on b/l feet initially 3 wks ago, went to PMD, worsen with the medication PMD prescribed (unsure what med, ?Lasix).  The rash started to extend up to b/l shins, then patient was brought to [facility] ED, admitted 11/29-12/8, treated with Abx (Clinda x1, Unasyn for 9 days to Ceftin), Lasix 40 iv transition to 40 Po upon discharge.  Since discharge, the rash started to spread up to b/l hands and arms associated with burning sensation ans swelling.  No throat/tongue swelling, fever/chill.  Patient was seen in ID clinic earlier today for evaluation of the rash, found to be in respiratory distress, sent to ED.  Currently feeling "normal", c/o b/l feet burning pain

## 2017-12-19 NOTE — CONSULT NOTE ADULT - SUBJECTIVE AND OBJECTIVE BOX
Patient is a 82y old  Male who presents with a chief complaint of   HPI:  82 male with asthma, enlarged prostate, CVA no residual weakness recently admitted to [facility] 11/29 - 12/8/2017 for chest pain and skin lesions to his lower extremities with lower extremity swelling and inability to walk. Since hospitalization, swelling has improved, but rash has worsened. Family states the rash was on his shins and spread to his lower extremities. At [facility], he was diagnosed with cellulitis and has been taking cefuroxime 250 mg po bid for 2 weeks, however, the rash has spread diffusely thoughout his body involving his lower extremities, groin, abdomen, back and upper extremities, palms, sparing the face. Has been having occasional nausea and vomiting since discharge. Feels SOB. Denies fevers and chills. No recent travel. No sick contacts. Lives with his wife only. Was seen by Dermatologist Dr. Hernandez after discharge 12/14/17 and family was told it was a fungal dermatitis. Rashes feel like burning. Lesions on his feet started one month ago prior to admission to [facility] on his feet and now has spread throughout his body. He was born in [place]. Came to [place] in [place]. No history of skin disease or rashes. He came to ID clinic today and on examination was found to be toxic appearing, tachypnea, and tachycardic with diffuse petechial body rash and sent to the hospital for further workup.       REVIEW OF SYSTEMS  All as below unless noted otherwise    General:  Denies fever and chills. 	  Ophthalmologic: Denies any visual complaints. 	  ENMT: No throat pain.  Respiratory: No cough, sputum. No shortness of breath.   Cardiovascular: No chest pain.   Gastrointestinal: No nausea or vomiting. No abdominal pain or diarrhea.   Genitourinary: No burning urine, no frequency. No flank pain  Musculoskeletal: No joint swelling or pain.   Neurological: No confusion. 	  Skin: No rash    PAST MEDICAL & SURGICAL HISTORY:  Hypercholesterolemia  HTN (hypertension)  CVA (cerebral vascular accident)  Asthma  No significant past surgical history    FAMILY HISTORY:  No pertinent family history in first degree relatives    SOCIAL HISTORY:  non smoker      ANTIMICROBIALS:  none      OTHER MEDS:        Allergies  No Known Allergies            Vital Signs Last 24 Hrs  T(C): --  T(F): --  HR: 108 (19 Dec 2017 16:18) (108 - 108)  BP: 169/82 (19 Dec 2017 16:18) (169/82 - 169/82)  RR: 18 (19 Dec 2017 16:18) (18 - 18)  SpO2: 100% (19 Dec 2017 16:18) (100% - 100%)          PHYSICAL EXAM:  patient in no acute respiratory distress.  Constitutional: Comfortable. Awake and alert  Eyes: PERRL EOMI. No discharge.  ENMT: No sinus tenderness.  No pharyngeal erythema.   Neck: Supple  Respiratory: Good air entry bilaterally, no wheezes, rhonchi, or crackles  Cardiovascular:S1 S2 wnl, No murmurs  Gastrointestinal: Soft, no tenderness , bowel sounds present,   Genitourinary: No suprapubic tenderness. no CVA tenderness   Musculoskeletal: No joint swelling. No edema.  Vascular: peripheral pulses felt  Neurological: No grossly focal deficits  Skin: No rash   Lymph Nodes: No palpable Lymphadenopathy   Psychiatric: Affect normal        Labs not yet sent      MICROBIOLOGY:  none new      RADIOLOGY:  none new Patient is a 82y old  Male who presents with a chief complaint of   HPI:  82 male with asthma, enlarged prostate, CVA no residual weakness recently admitted to [facility] 11/29 - 12/8/2017 for chest pain and skin lesions to his lower extremities with lower extremity swelling and inability to walk. Since hospitalization, swelling has improved, but rash has worsened. Family states the rash was on his shins and spread to his lower extremities. At [facility], he was diagnosed with cellulitis and has been taking cefuroxime 250 mg po bid for 2 weeks, however, the rash has spread diffusely thoughout his body involving his lower extremities, groin, abdomen, back and upper extremities, palms, sparing the face. Has been having occasional nausea and vomiting since discharge. Feels SOB. Denies fevers and chills. No recent travel. No sick contacts. Lives with his wife only. Was seen by Dermatologist Dr. Hernandez after discharge 12/14/17 and family was told it was a fungal dermatitis. Rashes feel like burning. Lesions on his feet started one month ago prior to admission to [facility] on his feet and now has spread throughout his body. He was born in [place]. Came to [place] in [place]. No history of skin disease or rashes. He came to ID clinic today and on examination was found to be toxic appearing, tachypnea, and tachycardic with diffuse petechial body rash and sent to the hospital for further workup.       REVIEW OF SYSTEMS  All as below unless noted otherwise    General:  Denies fever and chills. 	  Ophthalmologic: Denies any visual complaints. 	  ENMT: No throat pain.  Respiratory: No cough, sputum. No shortness of breath.   Cardiovascular: No chest pain.   Gastrointestinal: No nausea or vomiting. No abdominal pain or diarrhea.   Genitourinary: No burning urine, no frequency. No flank pain  Musculoskeletal: No joint swelling or pain.   Neurological: No confusion. 	  Skin: No rash    PAST MEDICAL & SURGICAL HISTORY:  Hypercholesterolemia  HTN (hypertension)  CVA (cerebral vascular accident)  Asthma  No significant past surgical history    FAMILY HISTORY:  No pertinent family history in first degree relatives    SOCIAL HISTORY:  non smoker      ANTIMICROBIALS:  none      OTHER MEDS:    ? prednisone on all scripts     Allergies  No Known Allergies    Vital Signs Last 24 Hrs  T(C): --  T(F): --  HR: 108 (19 Dec 2017 16:18) (108 - 108)  BP: 169/82 (19 Dec 2017 16:18) (169/82 - 169/82)  RR: 18 (19 Dec 2017 16:18) (18 - 18)  SpO2: 100% (19 Dec 2017 16:18) (100% - 100%)      PHYSICAL EXAM:  patient in no acute respiratory distress.  Constitutional: Comfortable. Awake and alert  Eyes: PERRL EOMI. No discharge.  ENMT: No sinus tenderness.  No pharyngeal erythema.   Neck: Supple  Respiratory: Good air entry bilaterally, no wheezes, rhonchi, or crackles  Cardiovascular:S1 S2 wnl, No murmurs  Gastrointestinal: Soft, no tenderness , bowel sounds present,   Genitourinary: No suprapubic tenderness. no CVA tenderness   Musculoskeletal: No joint swelling. No edema.  Vascular: peripheral pulses felt  Neurological: No grossly focal deficits  Skin: No rash   Lymph Nodes: No palpable Lymphadenopathy   Psychiatric: Affect normal        Labs not yet sent      MICROBIOLOGY:  none new      RADIOLOGY:  none new Patient is a 82y old  Male who presents with a chief complaint of   HPI:  82 male with asthma, enlarged prostate, CVA no residual weakness recently admitted to [facility] 11/29 - 12/8/2017 for chest pain and skin lesions to his lower extremities with lower extremity swelling and inability to walk. Since hospitalization, swelling has improved, but rash has worsened. Family states the rash was on his shins and spread to his lower extremities. At [facility], he was diagnosed with cellulitis and has been taking cefuroxime 250 mg po bid for 2 weeks, however, the rash has spread diffusely thoughout his body involving his lower extremities, groin, abdomen, back and upper extremities, palms, sparing the face. Has been having occasional nausea and vomiting since discharge. Feels SOB. Denies fevers and chills. No recent travel. No sick contacts. Lives with his wife only. Was seen by Dermatologist Dr. Hernandez after discharge 12/14/17 and family was told it was a fungal dermatitis. Rashes feel like burning. Lesions on his feet started one month ago prior to admission to [facility] on his feet and now has spread throughout his body. He was born in [place]. Came to [place] in [place]. No history of skin disease or rashes. He came to ID clinic today and on examination was found to be toxic appearing, tachypnea, and tachycardic with diffuse petechial body rash and sent to the hospital for further workup. Rash started 1 month ago before the new medication. As per son and daughter at bedside patient is not taking prednisone.  Denies fever and chills. the bronchodilators in ER improved his shortness of breath.       REVIEW OF SYSTEMS  All as below unless noted otherwise    General:  Denies fever and chills. 	  Ophthalmologic: Denies any visual complaints. 	  ENMT: No throat pain.  Respiratory: No cough, sputum. No shortness of breath.   Cardiovascular: No chest pain.   Gastrointestinal: No nausea or vomiting. No abdominal pain or diarrhea.   Genitourinary: No burning urine, no frequency. No flank pain  Musculoskeletal: No joint swelling or pain.   Neurological: No confusion. 	  Skin: full body rash    PAST MEDICAL & SURGICAL HISTORY:  Hypercholesterolemia  HTN (hypertension)  CVA (cerebral vascular accident)  Asthma  No significant past surgical history    FAMILY HISTORY:  No pertinent family history in first degree relatives    SOCIAL HISTORY:  non smoker-       ANTIMICROBIALS:  none      OTHER MEDS:    ? prednisone on all scripts     Allergies  No Known Allergies    Vital Signs Last 24 Hrs  T(C): --  T(F): --  HR: 108 (19 Dec 2017 16:18) (108 - 108)  BP: 169/82 (19 Dec 2017 16:18) (169/82 - 169/82)  RR: 18 (19 Dec 2017 16:18) (18 - 18)  SpO2: 100% (19 Dec 2017 16:18) (100% - 100%)      PHYSICAL EXAM:  patient in no acute respiratory distress.  Constitutional: Comfortable. Awake and alert  Eyes: PERRL EOMI. No discharge.  ENMT: No sinus tenderness.  No pharyngeal erythema. Has some white spots in the back of his throat.   Neck: Supple  Respiratory: Good air entry bilaterally, no wheezes, rhonchi, or crackles  Cardiovascular:S1 S2 wnl, No murmurs  Gastrointestinal: Soft, no tenderness , bowel sounds present,   Genitourinary: No suprapubic tenderness. no CVA tenderness   Musculoskeletal: No joint swelling. 2 + pitting edema.  Vascular: peripheral pulses felt  Neurological: No grossly focal deficits  Skin: full body rash- desquamation on bilateral soles of feet, coagulated blood on shins, thighs densely populated with erythematous rash, purpura and petechia, arms as well, ventral side of arms has brown macules, more of the same on back with lower back and buttocks densely populated with purpura and petechia, some parts belinda but most nonblanching, chest spared, patient says its tender, warm, burning in sensation,   Lymph Nodes: No palpable Lymphadenopathy   Psychiatric: Affect normal        Labs not yet sent      MICROBIOLOGY:  none new      RADIOLOGY:  none new

## 2017-12-19 NOTE — H&P ADULT - PROBLEM SELECTOR PLAN 2
creatinine 0.9 to 2.15, unclear etiology  - will hold Lasix for now  - will monitor strict I&O's, urine output and renal function  - will check urine lytes

## 2017-12-19 NOTE — H&P ADULT - ASSESSMENT
82M h/o asthma, HTN, HLD BIBEMS from ID clinic for tachypnea, wheezing. En route has was given 2 nebulizer treatments which he missed before his appointment and in ED he is breathing well. He had been admitted at OSH several weeks ago for b/l foot pain/redness/swelling/rash, discharged on Abx for cellulitis. He was at his ID appointment for worsening rash that has now covered his legs b/l, lower back, b/l arms, palms. Patient describes rash as painful and burning, not pruritic. He has no complaints otherwise, no fever, chills, recent illness, colored sputum production, back pain, dysuria, hematuria. 82M h/o asthma, HTN, HLD, BPH presented with worsening rash and respiratory distress admitted for further evaluation of the rash and TRINI.

## 2017-12-19 NOTE — CONSULT NOTE ADULT - ASSESSMENT
82 male with asthma, enlarged prostate, CVA no residual weakness recently admitted to [facility] 11/29 - 12/8/2017 for chest pain and skin lesions to his lower extremities with lower extremity swelling and inability to walk. Since hospitalization, swelling has improved, but rash has worsened. Family states the rash was on his shins and spread to his lower extremities. At [facility], he was diagnosed with cellulitis and has been taking cefuroxime 250 mg po bid for 2 weeks, however, the rash has spread diffusely thoughout his body involving his lower extremities, groin, abdomen, back and upper extremities, palms, sparing the face. Has been having occasional nausea and vomiting since discharge. Feels SOB. Denies fevers and chills. No recent travel. No sick contacts. Lives with his wife only. Was seen by Dermatologist Dr. Hernandez after discharge 12/14/17 and family was told it was a fungal dermatitis. Rashes feel like burning. Lesions on his feet started one month ago prior to admission to [facility] on his feet and now has spread throughout his body. He was born in [place]. Came to [place] in [place]. No history of skin disease or rashes. He came to ID clinic today and on examination was found to be toxic appearing, tachypnea, and tachycardic with diffuse petechial body rash and sent to the hospital for further workup. 82 male with asthma, enlarged prostate, CVA no residual weakness here for worsening rash.    1- Rash  - check blood cultures  - check CBC, CMP, HIV, acute hepatitis panel, quantiferon gold tb, syphilis screen  - check UA, CXR, RVP 82 male with asthma, enlarged prostate, CVA no residual weakness here for worsening rash.    1- Rash  - concern for drug rash vs vasculitis   - check blood cultures  - check CBC, CMP, HIV, acute hepatitis panel, quantiferon gold tb, syphilis screen, TAIWO, dsDNA, RF, ANCA, RMSF  - check UA, CXR, RVP   - Dermatology consult for biopsy   - monitor off antibiotics for now

## 2017-12-19 NOTE — H&P ADULT - NSHPATTENDINGPLANDISCUSS_GEN_ALL_CORE
Dr. Barrios who requested for initial evaluation/H&P and will assume care of this patient in 12/20/17 [place] [id number], Dr. Barrios who requested for initial evaluation/H&P and will assume care of this patient in 12/20/17 am.

## 2017-12-19 NOTE — ED ADULT NURSE NOTE - LANGUAGE ASSISTANCE NEEDED
Family at bedside acting as tranlators./No-Patient/Caregiver offered and refused free interpretation services.

## 2017-12-19 NOTE — ED PROVIDER NOTE - ATTENDING CONTRIBUTION TO CARE
Beba Jennings MD - Attending Physician: I have personally seen and examined this patient with the resident/fellow.  I have fully participated in the care of this patient. I have reviewed all pertinent clinical information, including history, physical exam, plan and the Resident/Fellow’s note and agree except as noted. See MDM

## 2017-12-19 NOTE — ED ADULT NURSE NOTE - OBJECTIVE STATEMENT
82 year old male presents to the ED complaining of SOB and widespread petechiae rash. As per family the pt was seen at [facility] for CHF and was discharged 2 weeks ago. As per family the Pt had some redness swelling and a rash on his feet for over a month that has now spread to arms, legs, hands, and lower trunk. Pt states that the rash, 'feels like burning all the time'. Family states that he was placed on some new medications at [facility]. Pt is A&O x 4, VSS with elevates heart rate,, afebrile, ambulating independently. Pt denies fever, chills, NVD, SOB, or chest pain. As per family the PCP at ID was thinking the rash, 'could be fungal'. Pt has Hx of asthma and takes three nebulizer treatments at home daily, As per EMS the Pt became short of breath and began to wheeze while at his ID appointment today, pt given 2 duonebulizer treatments while on the way in to the ED today,. pt arrived breathing comfortably. lungs clear. ID fellow at bedside shortly after arrival.

## 2017-12-20 ENCOUNTER — RESULT REVIEW (OUTPATIENT)
Age: 82
End: 2017-12-20

## 2017-12-20 DIAGNOSIS — I63.9 CEREBRAL INFARCTION, UNSPECIFIED: ICD-10-CM

## 2017-12-20 DIAGNOSIS — R23.3 SPONTANEOUS ECCHYMOSES: ICD-10-CM

## 2017-12-20 DIAGNOSIS — I10 ESSENTIAL (PRIMARY) HYPERTENSION: ICD-10-CM

## 2017-12-20 DIAGNOSIS — E78.00 PURE HYPERCHOLESTEROLEMIA, UNSPECIFIED: ICD-10-CM

## 2017-12-20 DIAGNOSIS — Z29.9 ENCOUNTER FOR PROPHYLACTIC MEASURES, UNSPECIFIED: ICD-10-CM

## 2017-12-20 DIAGNOSIS — J45.21 MILD INTERMITTENT ASTHMA WITH (ACUTE) EXACERBATION: ICD-10-CM

## 2017-12-20 DIAGNOSIS — N17.9 ACUTE KIDNEY FAILURE, UNSPECIFIED: ICD-10-CM

## 2017-12-20 LAB
ANA PAT FLD IF-IMP: ABNORMAL
ANA TITR SER: ABNORMAL
ANION GAP SERPL CALC-SCNC: 13 MMOL/L — SIGNIFICANT CHANGE UP (ref 5–17)
ANISOCYTOSIS BLD QL: SLIGHT — SIGNIFICANT CHANGE UP
APPEARANCE UR: CLEAR — SIGNIFICANT CHANGE UP
AUTO DIFF PNL BLD: NEGATIVE — SIGNIFICANT CHANGE UP
BASOPHILS # BLD AUTO: 0.02 K/UL — SIGNIFICANT CHANGE UP (ref 0–0.2)
BASOPHILS NFR BLD AUTO: 0.2 % — SIGNIFICANT CHANGE UP (ref 0–2)
BILIRUB DIRECT SERPL-MCNC: <0.1 MG/DL — SIGNIFICANT CHANGE UP (ref 0–0.2)
BILIRUB INDIRECT FLD-MCNC: >0.1 MG/DL — LOW (ref 0.2–1)
BILIRUB SERPL-MCNC: 0.2 MG/DL — SIGNIFICANT CHANGE UP (ref 0.2–1.2)
BILIRUB UR-MCNC: NEGATIVE — SIGNIFICANT CHANGE UP
BLD GP AB SCN SERPL QL: NEGATIVE — SIGNIFICANT CHANGE UP
BUN SERPL-MCNC: 37 MG/DL — HIGH (ref 7–23)
C-ANCA SER-ACNC: NEGATIVE — SIGNIFICANT CHANGE UP
CALCIUM SERPL-MCNC: 8 MG/DL — LOW (ref 8.4–10.5)
CHLORIDE SERPL-SCNC: 98 MMOL/L — SIGNIFICANT CHANGE UP (ref 96–108)
CO2 SERPL-SCNC: 25 MMOL/L — SIGNIFICANT CHANGE UP (ref 22–31)
COLOR SPEC: YELLOW — SIGNIFICANT CHANGE UP
CREAT ?TM UR-MCNC: 66 MG/DL — SIGNIFICANT CHANGE UP
CREAT SERPL-MCNC: 2.12 MG/DL — HIGH (ref 0.5–1.3)
DIFF PNL FLD: ABNORMAL
DSDNA AB SER-ACNC: <12 IU/ML — SIGNIFICANT CHANGE UP
EOSINOPHIL # BLD AUTO: 0.38 K/UL — SIGNIFICANT CHANGE UP (ref 0–0.5)
EOSINOPHIL NFR BLD AUTO: 4.6 % — SIGNIFICANT CHANGE UP (ref 0–6)
EPI CELLS # UR: SIGNIFICANT CHANGE UP /HPF
ERYTHROCYTE [SEDIMENTATION RATE] IN BLOOD: 44 MM/HR — HIGH (ref 0–20)
GLUCOSE SERPL-MCNC: 84 MG/DL — SIGNIFICANT CHANGE UP (ref 70–99)
GLUCOSE UR QL: NEGATIVE — SIGNIFICANT CHANGE UP
HAPTOGLOB SERPL-MCNC: 333 MG/DL — HIGH (ref 34–200)
HAV IGM SER-ACNC: SIGNIFICANT CHANGE UP
HBV CORE IGM SER-ACNC: SIGNIFICANT CHANGE UP
HBV SURFACE AG SER-ACNC: SIGNIFICANT CHANGE UP
HCT VFR BLD CALC: 25.7 % — LOW (ref 39–50)
HCT VFR BLD CALC: 27.7 % — LOW (ref 39–50)
HCV AB S/CO SERPL IA: 0.15 S/CO — SIGNIFICANT CHANGE UP
HCV AB SERPL-IMP: SIGNIFICANT CHANGE UP
HGB BLD-MCNC: 7.8 G/DL — LOW (ref 13–17)
HGB BLD-MCNC: 8.6 G/DL — LOW (ref 13–17)
HYPOCHROMIA BLD QL: SLIGHT — SIGNIFICANT CHANGE UP
IMM GRANULOCYTES NFR BLD AUTO: 0.5 % — SIGNIFICANT CHANGE UP (ref 0–1.5)
KETONES UR-MCNC: NEGATIVE — SIGNIFICANT CHANGE UP
LDH SERPL L TO P-CCNC: 188 U/L — SIGNIFICANT CHANGE UP (ref 50–242)
LEUKOCYTE ESTERASE UR-ACNC: ABNORMAL
LYMPHOCYTES # BLD AUTO: 1.39 K/UL — SIGNIFICANT CHANGE UP (ref 1–3.3)
LYMPHOCYTES # BLD AUTO: 16.7 % — SIGNIFICANT CHANGE UP (ref 13–44)
M TB TUBERC IFN-G BLD QL: 0.02 IU/ML — SIGNIFICANT CHANGE UP
M TB TUBERC IFN-G BLD QL: 0.03 IU/ML — SIGNIFICANT CHANGE UP
M TB TUBERC IFN-G BLD QL: NEGATIVE — SIGNIFICANT CHANGE UP
MANUAL SMEAR VERIFICATION: SIGNIFICANT CHANGE UP
MCHC RBC-ENTMCNC: 20.7 PG — LOW (ref 27–34)
MCHC RBC-ENTMCNC: 22.3 PG — LOW (ref 27–34)
MCHC RBC-ENTMCNC: 30.4 GM/DL — LOW (ref 32–36)
MCHC RBC-ENTMCNC: 31 GM/DL — LOW (ref 32–36)
MCV RBC AUTO: 68.2 FL — LOW (ref 80–100)
MCV RBC AUTO: 72 FL — LOW (ref 80–100)
MICROCYTES BLD QL: SLIGHT — SIGNIFICANT CHANGE UP
MITOGEN IGNF BCKGRD COR BLD-ACNC: 7.87 IU/ML — SIGNIFICANT CHANGE UP
MONOCYTES # BLD AUTO: 0.78 K/UL — SIGNIFICANT CHANGE UP (ref 0–0.9)
MONOCYTES NFR BLD AUTO: 9.4 % — SIGNIFICANT CHANGE UP (ref 2–14)
NEUTROPHILS # BLD AUTO: 5.7 K/UL — SIGNIFICANT CHANGE UP (ref 1.8–7.4)
NEUTROPHILS NFR BLD AUTO: 68.6 % — SIGNIFICANT CHANGE UP (ref 43–77)
NITRITE UR-MCNC: NEGATIVE — SIGNIFICANT CHANGE UP
P-ANCA SER-ACNC: NEGATIVE — SIGNIFICANT CHANGE UP
PH UR: 6 — SIGNIFICANT CHANGE UP (ref 5–8)
PLAT MORPH BLD: NORMAL — SIGNIFICANT CHANGE UP
PLATELET # BLD AUTO: 314 K/UL — SIGNIFICANT CHANGE UP (ref 150–400)
PLATELET # BLD AUTO: 320 K/UL — SIGNIFICANT CHANGE UP (ref 150–400)
POTASSIUM SERPL-MCNC: 4.4 MMOL/L — SIGNIFICANT CHANGE UP (ref 3.5–5.3)
POTASSIUM SERPL-SCNC: 4.4 MMOL/L — SIGNIFICANT CHANGE UP (ref 3.5–5.3)
PROT UR-MCNC: 30 MG/DL
RBC # BLD: 3.77 M/UL — LOW (ref 4.2–5.8)
RBC # BLD: 3.85 M/UL — LOW (ref 4.2–5.8)
RBC # FLD: 16.3 % — HIGH (ref 10.3–14.5)
RBC # FLD: 17.6 % — HIGH (ref 10.3–14.5)
RBC BLD AUTO: ABNORMAL
RBC CASTS # UR COMP ASSIST: ABNORMAL /HPF (ref 0–2)
RH IG SCN BLD-IMP: NEGATIVE — SIGNIFICANT CHANGE UP
SODIUM SERPL-SCNC: 136 MMOL/L — SIGNIFICANT CHANGE UP (ref 135–145)
SODIUM UR-SCNC: 45 MMOL/L — SIGNIFICANT CHANGE UP
SP GR SPEC: 1.01 — SIGNIFICANT CHANGE UP (ref 1.01–1.02)
T PALLIDUM AB TITR SER: NEGATIVE — SIGNIFICANT CHANGE UP
TARGETS BLD QL SMEAR: SLIGHT — SIGNIFICANT CHANGE UP
UROBILINOGEN FLD QL: NEGATIVE — SIGNIFICANT CHANGE UP
UUN UR-MCNC: 521 MG/DL — SIGNIFICANT CHANGE UP
WBC # BLD: 8.3 K/UL — SIGNIFICANT CHANGE UP (ref 3.8–10.5)
WBC # BLD: 8.31 K/UL — SIGNIFICANT CHANGE UP (ref 3.8–10.5)
WBC # FLD AUTO: 8.3 K/UL — SIGNIFICANT CHANGE UP (ref 3.8–10.5)
WBC # FLD AUTO: 8.31 K/UL — SIGNIFICANT CHANGE UP (ref 3.8–10.5)
WBC UR QL: SIGNIFICANT CHANGE UP /HPF (ref 0–5)

## 2017-12-20 PROCEDURE — 99223 1ST HOSP IP/OBS HIGH 75: CPT | Mod: 25,GC

## 2017-12-20 PROCEDURE — 93010 ELECTROCARDIOGRAM REPORT: CPT

## 2017-12-20 PROCEDURE — 11101 BIOPSY SKIN SUBQ&/MUCOUS MEMBRANE EA ADDL LESN: CPT

## 2017-12-20 PROCEDURE — 99222 1ST HOSP IP/OBS MODERATE 55: CPT | Mod: GC

## 2017-12-20 PROCEDURE — 11100 BX SKIN SUBCUTANEOUS&/MUCOUS MEMBRANE 1 LESION: CPT

## 2017-12-20 RX ORDER — DOCUSATE SODIUM 100 MG
100 CAPSULE ORAL THREE TIMES A DAY
Qty: 0 | Refills: 0 | Status: DISCONTINUED | OUTPATIENT
Start: 2017-12-20 | End: 2017-12-28

## 2017-12-20 RX ORDER — ATORVASTATIN CALCIUM 80 MG/1
40 TABLET, FILM COATED ORAL AT BEDTIME
Qty: 0 | Refills: 0 | Status: DISCONTINUED | OUTPATIENT
Start: 2017-12-20 | End: 2017-12-28

## 2017-12-20 RX ORDER — TAMSULOSIN HYDROCHLORIDE 0.4 MG/1
0.8 CAPSULE ORAL AT BEDTIME
Qty: 0 | Refills: 0 | Status: DISCONTINUED | OUTPATIENT
Start: 2017-12-20 | End: 2017-12-28

## 2017-12-20 RX ORDER — ASPIRIN/CALCIUM CARB/MAGNESIUM 324 MG
81 TABLET ORAL DAILY
Qty: 0 | Refills: 0 | Status: DISCONTINUED | OUTPATIENT
Start: 2017-12-20 | End: 2017-12-28

## 2017-12-20 RX ORDER — HEPARIN SODIUM 5000 [USP'U]/ML
5000 INJECTION INTRAVENOUS; SUBCUTANEOUS EVERY 12 HOURS
Qty: 0 | Refills: 0 | Status: DISCONTINUED | OUTPATIENT
Start: 2017-12-20 | End: 2017-12-28

## 2017-12-20 RX ORDER — INFLUENZA VIRUS VACCINE 15; 15; 15; 15 UG/.5ML; UG/.5ML; UG/.5ML; UG/.5ML
0.5 SUSPENSION INTRAMUSCULAR ONCE
Qty: 0 | Refills: 0 | Status: DISCONTINUED | OUTPATIENT
Start: 2017-12-20 | End: 2017-12-28

## 2017-12-20 RX ORDER — FINASTERIDE 5 MG/1
5 TABLET, FILM COATED ORAL DAILY
Qty: 0 | Refills: 0 | Status: DISCONTINUED | OUTPATIENT
Start: 2017-12-20 | End: 2017-12-28

## 2017-12-20 RX ORDER — ALBUTEROL 90 UG/1
1 AEROSOL, METERED ORAL EVERY 4 HOURS
Qty: 0 | Refills: 0 | Status: DISCONTINUED | OUTPATIENT
Start: 2017-12-20 | End: 2017-12-28

## 2017-12-20 RX ORDER — MONTELUKAST 4 MG/1
10 TABLET, CHEWABLE ORAL DAILY
Qty: 0 | Refills: 0 | Status: DISCONTINUED | OUTPATIENT
Start: 2017-12-20 | End: 2017-12-28

## 2017-12-20 RX ORDER — CLOPIDOGREL BISULFATE 75 MG/1
75 TABLET, FILM COATED ORAL DAILY
Qty: 0 | Refills: 0 | Status: DISCONTINUED | OUTPATIENT
Start: 2017-12-20 | End: 2017-12-28

## 2017-12-20 RX ORDER — FERROUS SULFATE 325(65) MG
325 TABLET ORAL DAILY
Qty: 0 | Refills: 0 | Status: DISCONTINUED | OUTPATIENT
Start: 2017-12-20 | End: 2017-12-28

## 2017-12-20 RX ORDER — IPRATROPIUM/ALBUTEROL SULFATE 18-103MCG
3 AEROSOL WITH ADAPTER (GRAM) INHALATION EVERY 6 HOURS
Qty: 0 | Refills: 0 | Status: DISCONTINUED | OUTPATIENT
Start: 2017-12-20 | End: 2017-12-28

## 2017-12-20 RX ORDER — SODIUM CHLORIDE 9 MG/ML
1000 INJECTION INTRAMUSCULAR; INTRAVENOUS; SUBCUTANEOUS
Qty: 0 | Refills: 0 | Status: COMPLETED | OUTPATIENT
Start: 2017-12-20 | End: 2017-12-20

## 2017-12-20 RX ORDER — METOPROLOL TARTRATE 50 MG
50 TABLET ORAL
Qty: 0 | Refills: 0 | Status: DISCONTINUED | OUTPATIENT
Start: 2017-12-20 | End: 2017-12-28

## 2017-12-20 RX ORDER — SENNA PLUS 8.6 MG/1
2 TABLET ORAL AT BEDTIME
Qty: 0 | Refills: 0 | Status: DISCONTINUED | OUTPATIENT
Start: 2017-12-20 | End: 2017-12-28

## 2017-12-20 RX ORDER — TIOTROPIUM BROMIDE 18 UG/1
1 CAPSULE ORAL; RESPIRATORY (INHALATION) DAILY
Qty: 0 | Refills: 0 | Status: DISCONTINUED | OUTPATIENT
Start: 2017-12-20 | End: 2017-12-28

## 2017-12-20 RX ADMIN — FINASTERIDE 5 MILLIGRAM(S): 5 TABLET, FILM COATED ORAL at 21:04

## 2017-12-20 RX ADMIN — SODIUM CHLORIDE 100 MILLILITER(S): 9 INJECTION INTRAMUSCULAR; INTRAVENOUS; SUBCUTANEOUS at 03:17

## 2017-12-20 RX ADMIN — Medication 3 MILLILITER(S): at 12:11

## 2017-12-20 RX ADMIN — CLOPIDOGREL BISULFATE 75 MILLIGRAM(S): 75 TABLET, FILM COATED ORAL at 12:12

## 2017-12-20 RX ADMIN — TAMSULOSIN HYDROCHLORIDE 0.8 MILLIGRAM(S): 0.4 CAPSULE ORAL at 21:04

## 2017-12-20 RX ADMIN — Medication 3 MILLILITER(S): at 17:39

## 2017-12-20 RX ADMIN — ATORVASTATIN CALCIUM 40 MILLIGRAM(S): 80 TABLET, FILM COATED ORAL at 21:04

## 2017-12-20 RX ADMIN — Medication 3 MILLILITER(S): at 06:38

## 2017-12-20 RX ADMIN — Medication 100 MILLIGRAM(S): at 05:17

## 2017-12-20 RX ADMIN — SENNA PLUS 2 TABLET(S): 8.6 TABLET ORAL at 21:04

## 2017-12-20 RX ADMIN — Medication 50 MILLIGRAM(S): at 17:40

## 2017-12-20 RX ADMIN — MONTELUKAST 10 MILLIGRAM(S): 4 TABLET, CHEWABLE ORAL at 12:15

## 2017-12-20 RX ADMIN — Medication 100 MILLIGRAM(S): at 15:09

## 2017-12-20 RX ADMIN — Medication 100 MILLIGRAM(S): at 21:04

## 2017-12-20 RX ADMIN — HEPARIN SODIUM 5000 UNIT(S): 5000 INJECTION INTRAVENOUS; SUBCUTANEOUS at 06:38

## 2017-12-20 RX ADMIN — Medication 50 MILLIGRAM(S): at 05:17

## 2017-12-20 RX ADMIN — HEPARIN SODIUM 5000 UNIT(S): 5000 INJECTION INTRAVENOUS; SUBCUTANEOUS at 17:39

## 2017-12-20 RX ADMIN — Medication 81 MILLIGRAM(S): at 12:12

## 2017-12-20 RX ADMIN — Medication 325 MILLIGRAM(S): at 12:12

## 2017-12-20 NOTE — PROGRESS NOTE ADULT - SUBJECTIVE AND OBJECTIVE BOX
_________________________________________________________________________________________  ========>>  M E D I C A L   A T T E N D I N G    F O L L O W  U P  N O T E  <<=========  -----------------------------------------------------------------------------------------------------    - Patient seen and examined by me approximately thirty minutes ago.  - In summary, patient is a 82y year old man who originally presented with worsening rash.  - Patient today overall doing ok, comfortable, eating OK.       itching improved, no SOB, no other events otherwise.    ==================>> REVIEW OF SYSTEM <<=================    GEN: no fever, no chills, no pain  RESP: no SOB, no cough, no sputum  CVS: no chest pain, no palpitations, no edema  GI: no abdominal pain, no nausea, no constipation, no diarrhea  : no dysuria, no frequency, no hematuria  Neuro: no headache, no dizziness  Derm : itching improved, rash stable     ==================>> PHYSICAL EXAM <<=================    GEN: A&O X 3 , NAD , comfortable in chair  HEENT: NCAT, PERRL, MMM, hearing intact  Neck: supple , no JVD  CVS: S1S2 , regular , No M/R/G appreciated  PULM: CTA B/L,  no W/R/R appreciated  ABD.: soft. non tender, non distended,  bowel sounds present  Extrem: intact pulses , mild LE edema   PSYCH : normal mood,  not anxious  DERM: petechial rash on legs, thighs, back, abd, old healing ulcerations on left lower leg..     ==================>> MEDICATIONS <<====================    MEDICATIONS  (STANDING):  ALBUTerol    90 MICROgram(s) HFA Inhaler 1 Puff(s) Inhalation every 4 hours  ALBUTerol/ipratropium for Nebulization 3 milliLiter(s) Nebulizer every 6 hours  aspirin  chewable 81 milliGRAM(s) Oral daily  atorvastatin 40 milliGRAM(s) Oral at bedtime  clopidogrel Tablet 75 milliGRAM(s) Oral daily  docusate sodium 100 milliGRAM(s) Oral three times a day  ferrous    sulfate 325 milliGRAM(s) Oral daily  finasteride 5 milliGRAM(s) Oral daily  heparin  Injectable 5000 Unit(s) SubCutaneous every 12 hours  influenza   Vaccine 0.5 milliLiter(s) IntraMuscular once  metoprolol     tartrate 50 milliGRAM(s) Oral two times a day  montelukast 10 milliGRAM(s) Oral daily  senna 2 Tablet(s) Oral at bedtime  tamsulosin 0.8 milliGRAM(s) Oral at bedtime  tiotropium 18 MICROgram(s) Capsule 1 Capsule(s) Inhalation daily    ==================>> VITAL SIGNS <<==================    T(C): 36.3 (17 @ 05:12), Max: 37.2 (17 @ 19:13)  HR: 79 (17 @ 05:12) (79 - 111)  BP: 154/70 (17 @ 05:12) (126/53 - 169/82)  RR: 17 (17 @ 05:12) (16 - 20)  SpO2: 97% (17 @ 05:12) (97% - 100%)    I&O's Summary    19 Dec 2017 07:01  -  20 Dec 2017 07:00  --------------------------------------------------------  IN: 860 mL / OUT: 150 mL / NET: 710 mL    20 Dec 2017 07:  -  20 Dec 2017 13:04  --------------------------------------------------------  IN: 240 mL / OUT: 200 mL / NET: 40 mL     ==================>> LAB AND IMAGING <<==================                        7.8    8.31  )-----------( 314      ( 20 Dec 2017 08:48 )             25.7        Hemoglobin:   7.8 <<==,  10.2 <<==    136  |  98  |  37<H>  ----------------------------<  84  4.4   |  25  |  2.12<H>    Creatinine:  2.12  <<==, 2.15  <<==    Ca    8.0<L>      20 Dec 2017 08:48    TPro  7.3  /  Alb  2.9<L>  /  TBili  0.4  /  DBili  x   /  AST  33  /  ALT  38  /  AlkPhos  171<H>  12-19               Urinalysis Basic - ( 20 Dec 2017 07:32 )  Color: Yellow / Appearance: Clear / S.010 / pH: x  Gluc: x / Ketone: Negative  / Bili: Negative / Urobili: Negative   Blood: x / Protein: 30 mg/dL / Nitrite: Negative   Leuk Esterase: Trace / RBC: 10-25 /HPF / WBC 11-25 /HPF   Sq Epi: x / Non Sq Epi: OCC /HPF / Bacteria: x    TSH:      1.64   (17)           Lipid profile:  (17)     Total: 136     LDL  : 77     HDL  :49     TG   :50     HgA1C: 5.4  (17)            ___________________________________________________________________________________  ===============>>  A S S E S S M E N T   A N D   P L A N <<===============  ------------------------------------------------------------------------------------------    · Assessment		  82M h/o asthma, HTN, HLD, BPH presented with worsening rash and respiratory distress admitted for further evaluation of the rash and TRINI.    Problem/Plan - 1:  ·  Problem: Petechial rash.  concerning for vasculitis, less likely infectious  - blood  work so far negative (except anemia s bellow)  - Derm eval pending, for biopsy  - ID eval appreciated, will hold Abx for now and monitor clinically.   - rheum eval if needed    Problem/Plan - 2:  ·  Problem: TRINI   - creatinine 0.9 to 2.15, unclear etiology  - will hold Lasix for now  - will monitor strict I&O's, urine output and renal function    Problem/Plan - 3:  ·  Problem: Mild intermittent asthma stable  - nebs PRN  - Singulair.     Problem/Plan - 4:  ·  Problem: hypertension, HLD  - continue meds as above and monitor     Problem/Plan - 5:  ·  Problem: Anemia: ?? lab error / ? post hydration?  - repeat labs now, check Bilirubin, r/o hemolysis, check LDH..  - no reports of bleeding  - monitor vitals and labs closely    Problem/Plan - 6:  Problem: h/o CVA  - asa, plavix and zocor.    -GI/DVT Prophylaxis.    --------------------------------------------  Case discussed with pt, ID, NP  Education given on plan of care, biopsy  ___________________________  H. SEFERINO Barrios.  Pager: [phone]

## 2017-12-20 NOTE — CONSULT NOTE ADULT - SUBJECTIVE AND OBJECTIVE BOX
81 yo M w/ PMH of HTN, HL, asthma, admitted for TRINI, also for red lesions on lower extremities, trunk, and arms. He states that 1 mo ago he developed red/purple patches on his b/l lower legs, which had a "burning sensation." He was admitted to an OSH from - for bilateral lower extremity cellulitis. He was discharged home on several new medications (*******). One week ago, he developed multiple smaller red and purple lesions on his upper legs, trunk, and arms, so he came to the emergency room. Over the past month, he has not had any fevers, chills, GI sx, or other systemic sx. He denies hematuria or hematochezia,  however UA was notable for + protein and rbc's and his hgb is 7.8. he denies a h/o similar lesions in the past.     REVIEW OF SYSTEMS: As per HPI.     T(C): 36.3 (17 @ 13:29), Max: 37.2 (17 @ 19:13)  HR: 87 (17 @ 13:29) (79 - 111)  BP: 138/64 (17 @ 13:29) (126/53 - 169/82)  RR: 18 (17 @ 13:29) (16 - 20)  SpO2: 99% (17 @ 13:29) (97% - 100%)  Wt(kg): --Vital Signs Last 24 Hrs  T(C): 36.3 (20 Dec 2017 13:29), Max: 37.2 (19 Dec 2017 19:13)  T(F): 97.3 (20 Dec 2017 13:29), Max: 98.9 (19 Dec 2017 19:13)  HR: 87 (20 Dec 2017 13:29) (79 - 111)  BP: 138/64 (20 Dec 2017 13:29) (126/53 - 169/82)  BP(mean): --  RR: 18 (20 Dec 2017 13:29) (16 - 20)  SpO2: 99% (20 Dec 2017 13:29) (97% - 100%)    PHYSICAL EXAM:  GENERAL: NAD  SKIN: purpuric patches w/ scale on b/l LE's. On the mid to upper legs, lower trunk, and arms, there are multiple small purpuric red macules and papules. No oral lesions.     Consultant(s) Notes Reviewed:  [x ] YES  [ ] NO  Care Discussed with Consultants/Other Providers [ x] YES  [ ] NO    LABS:                        7.8    8.31  )-----------( 314      ( 20 Dec 2017 08:48 )             25.7     12-20    136  |  98  |  37<H>  ----------------------------<  84  4.4   |  25  |  2.12<H>    Ca    8.0<L>      20 Dec 2017 08:48    TPro  7.3  /  Alb  2.9<L>  /  TBili  0.4  /  DBili  x   /  AST  33  /  ALT  38  /  AlkPhos  171<H>  12-19    Urinalysis Basic - ( 20 Dec 2017 07:32 )    Color: Yellow / Appearance: Clear / S.010 / pH: x  Gluc: x / Ketone: Negative  / Bili: Negative / Urobili: Negative   Blood: x / Protein: 30 mg/dL / Nitrite: Negative   Leuk Esterase: Trace / RBC: 10-25 /HPF / WBC 11-25 /HPF   Sq Epi: x / Non Sq Epi: OCC /HPF / Bacteria: x    CAPILLARY BLOOD GLUCOSE    Urinalysis Basic - ( 20 Dec 2017 07:32 )    Color: Yellow / Appearance: Clear / S.010 / pH: x  Gluc: x / Ketone: Negative  / Bili: Negative / Urobili: Negative   Blood: x / Protein: 30 mg/dL / Nitrite: Negative   Leuk Esterase: Trace / RBC: 10-25 /HPF / WBC 11-25 /HPF   Sq Epi: x / Non Sq Epi: OCC /HPF / Bacteria: x    RADIOLOGY & ADDITIONAL TESTS:    Imaging Personally Reviewed:  [ ] YES  [ ] NO 81 yo M w/ PMH of HTN, HL, asthma, admitted for TRIIN, also for red lesions on lower extremities, trunk, and arms. He states that 1 mo ago he developed red/purple patches on his b/l lower legs, which had a "burning sensation." He was admitted to an OSH from - for bilateral lower extremity cellulitis. He was discharged home on several new medications (metoprolol, furosemide,gabapentin, atorvastatin, flomax, ferrous sulfate, senna, & Ceftin x 2 days). One week ago, he developed multiple smaller red and purple lesions on his upper legs, trunk, and arms, so he came to the emergency room. Over the past month, he has not had any fevers, chills, GI sx, or other systemic sx. He denies hematuria or hematochezia,  however UA was notable for + protein and rbc's and his hgb is 7.8. he denies a h/o similar lesions in the past.     REVIEW OF SYSTEMS: As per HPI.     T(C): 36.3 (17 @ 13:29), Max: 37.2 (17 @ 19:13)  HR: 87 (17 @ 13:29) (79 - 111)  BP: 138/64 (17 @ 13:29) (126/53 - 169/82)  RR: 18 (17 @ 13:29) (16 - 20)  SpO2: 99% (17 @ 13:29) (97% - 100%)  Wt(kg): --Vital Signs Last 24 Hrs  T(C): 36.3 (20 Dec 2017 13:29), Max: 37.2 (19 Dec 2017 19:13)  T(F): 97.3 (20 Dec 2017 13:29), Max: 98.9 (19 Dec 2017 19:13)  HR: 87 (20 Dec 2017 13:29) (79 - 111)  BP: 138/64 (20 Dec 2017 13:29) (126/53 - 169/82)  BP(mean): --  RR: 18 (20 Dec 2017 13:29) (16 - 20)  SpO2: 99% (20 Dec 2017 13:29) (97% - 100%)    PHYSICAL EXAM:  GENERAL: NAD  SKIN: purpuric patches w/ scale on b/l LE's. On the mid to upper legs, lower trunk, and arms, there are multiple small purpuric red macules and papules. No oral lesions. Hyperpigmented scaling thin plaques on b/l lower legs and dorsal feet.     Consultant(s) Notes Reviewed:  [x ] YES  [ ] NO  Care Discussed with Consultants/Other Providers [ x] YES  [ ] NO    LABS:                        7.8    8.31  )-----------( 314      ( 20 Dec 2017 08:48 )             25.7     12-20    136  |  98  |  37<H>  ----------------------------<  84  4.4   |  25  |  2.12<H>    Ca    8.0<L>      20 Dec 2017 08:48    TPro  7.3  /  Alb  2.9<L>  /  TBili  0.4  /  DBili  x   /  AST  33  /  ALT  38  /  AlkPhos  171<H>  12-19    Urinalysis Basic - ( 20 Dec 2017 07:32 )    Color: Yellow / Appearance: Clear / S.010 / pH: x  Gluc: x / Ketone: Negative  / Bili: Negative / Urobili: Negative   Blood: x / Protein: 30 mg/dL / Nitrite: Negative   Leuk Esterase: Trace / RBC: 10-25 /HPF / WBC 11-25 /HPF   Sq Epi: x / Non Sq Epi: OCC /HPF / Bacteria: x    CAPILLARY BLOOD GLUCOSE    Urinalysis Basic - ( 20 Dec 2017 07:32 )    Color: Yellow / Appearance: Clear / S.010 / pH: x  Gluc: x / Ketone: Negative  / Bili: Negative / Urobili: Negative   Blood: x / Protein: 30 mg/dL / Nitrite: Negative   Leuk Esterase: Trace / RBC: 10-25 /HPF / WBC 11-25 /HPF   Sq Epi: x / Non Sq Epi: OCC /HPF / Bacteria: x    RADIOLOGY & ADDITIONAL TESTS:    Imaging Personally Reviewed:  [ ] YES  [ ] NO

## 2017-12-20 NOTE — CONSULT NOTE ADULT - ASSESSMENT
1.) Purpuric macules and papules on legs, arms, and trunk.  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  -Performed two punch biopsies today on the right thigh. One was sent for H&E and one was sent for direct immunofluorescence to r/o IgA vasculitis.  -For wound care, please keep the bandage on for 24 hours, then apply Vaseline/Aquaphor once daily with dressing changes.  -The biopsy results should be available by Friday afternoon or Monday. Will call pt's josh Mccormick with results: [phone]  -Recommend stool guaic. If negative, recommend starting 60mg prednisone. Taper by 10mg every 3 days.   -Recommend discussing utility of testing the urine sediment to determine if the vasculitis is the etiology of his TRINI  -Will continue to follow    2.) Hyperpigmented scaling thin plaques on b/l lower legs and dorsal feet- Venous stasis dermatitis  -Recommend Triamcinolone 0.1% ointment BID. Please apply the medication and then wrap legs with ACE bandages. 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  -Performed two punch biopsies today on the right thigh. One was sent for H&E and one was sent for direct immunofluorescence to r/o IgA vasculitis.  -For wound care, please keep the bandage on for 24 hours, then apply Vaseline/Aquaphor once daily with dressing changes.  -The biopsy results should be available by Friday afternoon or Monday. Will call pt's son Jace with results: [phone]  -Recommend stool guaic. If negative, recommend starting 60mg prednisone. Taper by 10mg every 3 days.   -Recommend discussing utility of testing the urine sediment to determine if the vasculitis is the etiology of his TRINI  -Will continue to follow    2.) Hyperpigmented scaling thin plaques on b/l lower legs and dorsal feet- Venous stasis dermatitis  -Recommend Triamcinolone 0.1% ointment BID. Please apply the medication and then wrap legs with ACE bandages. 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  -Performed two punch biopsies today on the right thigh. One was sent for H&E and one was sent for direct immunofluorescence to r/o IgA vasculitis.  -For wound care, please keep the bandage on for 24 hours, then apply Vaseline/Aquaphor once daily with dressing changes.  -The biopsy results should be available by Friday afternoon or Monday. Will call pt's son Jace with results: [phone]  -Recommend stool guaic given vasculitis could affect his GI tract especially given his current low Hbg. If negative, recommend starting 60mg prednisone. Taper by 10mg every 3 days.   -Recommend discussing utility of testing the urine sediment to determine if the vasculitis is the etiology of his TRINI  -Will continue to follow    2.) Hyperpigmented scaling thin plaques on b/l lower legs and dorsal feet- Venous stasis dermatitis  -Recommend Triamcinolone 0.1% ointment BID. Please apply the medication and then wrap legs with ACE bandages.

## 2017-12-21 LAB
ANION GAP SERPL CALC-SCNC: 12 MMOL/L — SIGNIFICANT CHANGE UP (ref 5–17)
BUN SERPL-MCNC: 42 MG/DL — HIGH (ref 7–23)
CALCIUM SERPL-MCNC: 7.8 MG/DL — LOW (ref 8.4–10.5)
CHLORIDE SERPL-SCNC: 103 MMOL/L — SIGNIFICANT CHANGE UP (ref 96–108)
CO2 SERPL-SCNC: 24 MMOL/L — SIGNIFICANT CHANGE UP (ref 22–31)
CREAT SERPL-MCNC: 1.97 MG/DL — HIGH (ref 0.5–1.3)
GLUCOSE SERPL-MCNC: 84 MG/DL — SIGNIFICANT CHANGE UP (ref 70–99)
HCT VFR BLD CALC: 26.6 % — LOW (ref 39–50)
HGB BLD-MCNC: 8.1 G/DL — LOW (ref 13–17)
MCHC RBC-ENTMCNC: 20.6 PG — LOW (ref 27–34)
MCHC RBC-ENTMCNC: 30.5 GM/DL — LOW (ref 32–36)
MCV RBC AUTO: 67.5 FL — LOW (ref 80–100)
PLATELET # BLD AUTO: 337 K/UL — SIGNIFICANT CHANGE UP (ref 150–400)
POTASSIUM SERPL-MCNC: 4.7 MMOL/L — SIGNIFICANT CHANGE UP (ref 3.5–5.3)
POTASSIUM SERPL-SCNC: 4.7 MMOL/L — SIGNIFICANT CHANGE UP (ref 3.5–5.3)
PSA FLD-MCNC: 2.92 NG/ML — SIGNIFICANT CHANGE UP (ref 0–4)
R RICKETTSI AB SER-ACNC: NEGATIVE — SIGNIFICANT CHANGE UP
R RICKETTSI IGM SER-ACNC: 0.46 INDEX — SIGNIFICANT CHANGE UP (ref 0–0.89)
RBC # BLD: 3.94 M/UL — LOW (ref 4.2–5.8)
RBC # FLD: 17.5 % — HIGH (ref 10.3–14.5)
RICK SF IGG TITR SER IF: NEGATIVE — SIGNIFICANT CHANGE UP
RICK SF IGM TITR SER IF: 0.46 INDEX — SIGNIFICANT CHANGE UP (ref 0–0.89)
SODIUM SERPL-SCNC: 139 MMOL/L — SIGNIFICANT CHANGE UP (ref 135–145)
SURGICAL PATHOLOGY STUDY: SIGNIFICANT CHANGE UP
WBC # BLD: 8.59 K/UL — SIGNIFICANT CHANGE UP (ref 3.8–10.5)
WBC # FLD AUTO: 8.59 K/UL — SIGNIFICANT CHANGE UP (ref 3.8–10.5)

## 2017-12-21 PROCEDURE — 99232 SBSQ HOSP IP/OBS MODERATE 35: CPT

## 2017-12-21 RX ORDER — PANTOPRAZOLE SODIUM 20 MG/1
40 TABLET, DELAYED RELEASE ORAL
Qty: 0 | Refills: 0 | Status: DISCONTINUED | OUTPATIENT
Start: 2017-12-21 | End: 2017-12-28

## 2017-12-21 RX ADMIN — PANTOPRAZOLE SODIUM 40 MILLIGRAM(S): 20 TABLET, DELAYED RELEASE ORAL at 11:54

## 2017-12-21 RX ADMIN — Medication 81 MILLIGRAM(S): at 11:52

## 2017-12-21 RX ADMIN — HEPARIN SODIUM 5000 UNIT(S): 5000 INJECTION INTRAVENOUS; SUBCUTANEOUS at 06:17

## 2017-12-21 RX ADMIN — Medication 50 MILLIGRAM(S): at 06:17

## 2017-12-21 RX ADMIN — Medication 3 MILLILITER(S): at 11:51

## 2017-12-21 RX ADMIN — ATORVASTATIN CALCIUM 40 MILLIGRAM(S): 80 TABLET, FILM COATED ORAL at 21:14

## 2017-12-21 RX ADMIN — HEPARIN SODIUM 5000 UNIT(S): 5000 INJECTION INTRAVENOUS; SUBCUTANEOUS at 18:39

## 2017-12-21 RX ADMIN — CLOPIDOGREL BISULFATE 75 MILLIGRAM(S): 75 TABLET, FILM COATED ORAL at 11:51

## 2017-12-21 RX ADMIN — Medication 60 MILLIGRAM(S): at 13:15

## 2017-12-21 RX ADMIN — Medication 100 MILLIGRAM(S): at 13:25

## 2017-12-21 RX ADMIN — TAMSULOSIN HYDROCHLORIDE 0.8 MILLIGRAM(S): 0.4 CAPSULE ORAL at 21:16

## 2017-12-21 RX ADMIN — FINASTERIDE 5 MILLIGRAM(S): 5 TABLET, FILM COATED ORAL at 11:52

## 2017-12-21 RX ADMIN — Medication 1 APPLICATION(S): at 06:18

## 2017-12-21 RX ADMIN — Medication 100 MILLIGRAM(S): at 21:14

## 2017-12-21 RX ADMIN — Medication 50 MILLIGRAM(S): at 18:39

## 2017-12-21 RX ADMIN — Medication 3 MILLILITER(S): at 06:18

## 2017-12-21 RX ADMIN — MONTELUKAST 10 MILLIGRAM(S): 4 TABLET, CHEWABLE ORAL at 11:51

## 2017-12-21 RX ADMIN — SENNA PLUS 2 TABLET(S): 8.6 TABLET ORAL at 21:15

## 2017-12-21 RX ADMIN — Medication 1 APPLICATION(S): at 18:38

## 2017-12-21 RX ADMIN — Medication 325 MILLIGRAM(S): at 11:51

## 2017-12-21 RX ADMIN — Medication 100 MILLIGRAM(S): at 06:17

## 2017-12-21 RX ADMIN — Medication 3 MILLILITER(S): at 18:38

## 2017-12-21 NOTE — PROGRESS NOTE ADULT - ATTENDING COMMENTS
Toan Leigh  Attending Physician   Division of Infectious Disease  Pager #[phone]  After 5pm/weekend #[phone]    Will sign off, recall ID if needed #[phone].

## 2017-12-21 NOTE — PROGRESS NOTE ADULT - SUBJECTIVE AND OBJECTIVE BOX
DEWAYNEBAILEE 82y [id number]    Patient is a 82y old  Male who presents with a chief complaint of worsening rash, wheezing/SOB (19 Dec 2017 23:36)      Follow Up/CC:  rash    Interval History/ROS: c/o itching    Allergies    No Known Allergies    Intolerances        ANTIMICROBIALS:      MEDICATIONS  (STANDING):  ALBUTerol    90 MICROgram(s) HFA Inhaler 1 Puff(s) Inhalation every 4 hours  ALBUTerol/ipratropium for Nebulization 3 milliLiter(s) Nebulizer every 6 hours  aspirin  chewable 81 milliGRAM(s) Oral daily  atorvastatin 40 milliGRAM(s) Oral at bedtime  clopidogrel Tablet 75 milliGRAM(s) Oral daily  docusate sodium 100 milliGRAM(s) Oral three times a day  ferrous    sulfate 325 milliGRAM(s) Oral daily  finasteride 5 milliGRAM(s) Oral daily  heparin  Injectable 5000 Unit(s) SubCutaneous every 12 hours  influenza   Vaccine 0.5 milliLiter(s) IntraMuscular once  metoprolol     tartrate 50 milliGRAM(s) Oral two times a day  montelukast 10 milliGRAM(s) Oral daily  pantoprazole    Tablet 40 milliGRAM(s) Oral before breakfast  senna 2 Tablet(s) Oral at bedtime  tamsulosin 0.8 milliGRAM(s) Oral at bedtime  tiotropium 18 MICROgram(s) Capsule 1 Capsule(s) Inhalation daily  triamcinolone 0.1% Ointment 1 Application(s) Topical two times a day    MEDICATIONS  (PRN):        Vital Signs Last 24 Hrs  T(C): 36.6 (21 Dec 2017 12:00), Max: 37.3 (20 Dec 2017 20:35)  T(F): 97.8 (21 Dec 2017 12:00), Max: 99.2 (20 Dec 2017 20:35)  HR: 82 (21 Dec 2017 12:00) (72 - 87)  BP: 132/68 (21 Dec 2017 12:00) (126/58 - 146/76)  BP(mean): --  RR: 17 (21 Dec 2017 12:00) (17 - 18)  SpO2: 100% (21 Dec 2017 12:00) (97% - 100%)    CBC Full  -  ( 21 Dec 2017 07:39 )  WBC Count : 8.59 K/uL  Hemoglobin : 8.1 g/dL  Hematocrit : 26.6 %  Platelet Count - Automated : 337 K/uL  Mean Cell Volume : 67.5 fl  Mean Cell Hemoglobin : 20.6 pg  Mean Cell Hemoglobin Concentration : 30.5 gm/dL  Auto Neutrophil # : x  Auto Lymphocyte # : x  Auto Monocyte # : x  Auto Eosinophil # : x  Auto Basophil # : x  Auto Neutrophil % : x  Auto Lymphocyte % : x  Auto Monocyte % : x  Auto Eosinophil % : x  Auto Basophil % : x        139  |  103  |  42<H>  ----------------------------<  84  4.7   |  24  |  1.97<H>    Ca    7.8<L>      21 Dec 2017 07:25    TPro  x   /  Alb  x   /  TBili  0.2  /  DBili  <0.1  /  AST  x   /  ALT  x   /  AlkPhos  x       LIVER FUNCTIONS - ( 19 Dec 2017 18:34 )  Alb: 2.9 g/dL / Pro: 7.3 g/dL / ALK PHOS: 171 U/L / ALT: 38 U/L RC / AST: 33 U/L / GGT: x           Urinalysis Basic - ( 20 Dec 2017 07:32 )    Color: Yellow / Appearance: Clear / S.010 / pH: x  Gluc: x / Ketone: Negative  / Bili: Negative / Urobili: Negative   Blood: x / Protein: 30 mg/dL / Nitrite: Negative   Leuk Esterase: Trace / RBC: 10-25 /HPF / WBC 11-25 /HPF   Sq Epi: x / Non Sq Epi: OCC /HPF / Bacteria: x        MICROBIOLOGY:  .Blood Blood-Peripheral  17   No growth to date.  --  --      .Blood Blood-Peripheral  17   No growth at 5 days.  --  --      .Blood Blood-Peripheral  17   No growth at 5 days.  --  --        Rapid RVP Result: Ana ( @ 19:16)          RADIOLOGY

## 2017-12-21 NOTE — PROGRESS NOTE ADULT - SUBJECTIVE AND OBJECTIVE BOX
_________________________________________________________________________________________  ========>>  M E D I C A L   A T T E N D I N G    F O L L O W  U P  N O T E  <<=========  -----------------------------------------------------------------------------------------------------    - Patient seen and examined by me approximately thirty minutes ago.  - In summary, patient is a 82y year old man who originally presented with worsening rash.  - Patient today overall doing fairly, eating OK.       pt feels legs are on fire / burning and itching at times no SOB, no bleeding, no other events otherwise.    ==================>> REVIEW OF SYSTEM <<=================    GEN: no fever, no chills, no pain  RESP: no SOB, no cough, no sputum  CVS: no chest pain, no palpitations, no edema  GI: no abdominal pain, no nausea, no constipation, no diarrhea  : no dysuria, no frequency, no hematuria  Neuro: no headache, no dizziness  Derm : itching as above    ==================>> PHYSICAL EXAM <<=================    GEN: A&O X 3 , NAD , comfortable in bed  HEENT: NCAT, PERRL, MMM, hearing intact, oral mucosa intact  Neck: supple , no JVD  CVS: S1S2 , regular , No M/R/G appreciated  PULM: CTA B/L,  no W/R/R appreciated  ABD.: soft. non tender, non distended,  bowel sounds present  Extrem: intact pulses , + 2+ LE edema   PSYCH : normal mood,  not anxious  DERM: petechial rash on legs, thighs, back, abd, old healing ulcerations on left lower leg.. overall seem stable    ==================>> MEDICATIONS <<====================    ALBUTerol    90 MICROgram(s) HFA Inhaler 1 Puff(s) Inhalation every 4 hours  ALBUTerol/ipratropium for Nebulization 3 milliLiter(s) Nebulizer every 6 hours  aspirin  chewable 81 milliGRAM(s) Oral daily  atorvastatin 40 milliGRAM(s) Oral at bedtime  clopidogrel Tablet 75 milliGRAM(s) Oral daily  docusate sodium 100 milliGRAM(s) Oral three times a day  ferrous    sulfate 325 milliGRAM(s) Oral daily  finasteride 5 milliGRAM(s) Oral daily  heparin  Injectable 5000 Unit(s) SubCutaneous every 12 hours  influenza   Vaccine 0.5 milliLiter(s) IntraMuscular once  metoprolol     tartrate 50 milliGRAM(s) Oral two times a day  montelukast 10 milliGRAM(s) Oral daily  pantoprazole    Tablet 40 milliGRAM(s) Oral before breakfast  senna 2 Tablet(s) Oral at bedtime  tamsulosin 0.8 milliGRAM(s) Oral at bedtime  tiotropium 18 MICROgram(s) Capsule 1 Capsule(s) Inhalation daily  triamcinolone 0.1% Ointment 1 Application(s) Topical two times a day    ==================>> VITAL SIGNS <<==================    Vital Signs Last 24 Hrs  T(C): 36.6 (12-21-17 @ 12:00)  T(F): 97.8 (12-21-17 @ 12:00), Max: 99.2 (12-20-17 @ 20:35)  HR: 82 (12-21-17 @ 12:00) (72 - 87)  BP: 132/68 (12-21-17 @ 12:00)  BP(mean): --  RR: 17 (12-21-17 @ 12:00) (17 - 18)  SpO2: 100% (12-21-17 @ 12:00) (97% - 100%)       ==================>> LAB AND IMAGING <<==================                        8.1    8.59  )-----------( 337      ( 21 Dec 2017 07:39 )             26.6        Hemoglobin:   8.1 <<==,  8.6 <<==,  7.8 <<==,  10.2 <<==    WBC count:   8.59 <<== ,  8.3 <<== ,  8.31 <<== ,  10.1 <<==     139  |  103  |  42<H>  ----------------------------<  84  4.7   |  24  |  1.97<H>    Creatinine:  1.97  <<==, 2.12  <<==, 2.15  <<==    Ca    7.8<L>      21 Dec 2017 07:25    TPro  x   /  Alb  x   /  TBili  0.2  /  DBili  <0.1  /  AST  x   /  ALT  x   /  AlkPhos  x   12-20    Anti-Nuclear Antibody (12.19.17 @ 21:15)    TAIWO Pattern: Speckled  Anti Nuclear Factor Titer: 1:160 (12.19.17 @ 21:15)    all other available labs reviewed and negative  ___________________________________________________________________________________  ===============>>  A S S E S S M E N T   A N D   P L A N <<===============  ------------------------------------------------------------------------------------------    · Assessment		  82M h/o asthma, HTN, HLD, BPH presented with worsening rash and respiratory distress admitted for further evaluation of the rash and TRINI.    Problem/Plan - 1:  ·  Problem: Petechial rash.  concerning for vasculitis, less likely infectious  - Derm appreciated: follow up biopsy results     - gave 1 time order of prednisone pending Bx     - continue topical steroids  - ID appreciated, hold Abx for now and monitor clinically.   - rheum eval if needed pending Bx    Problem/Plan - 2:  ·  Problem: TRINI improved  - creatinine 0.9 to 2.15, unclear etiology  - will hold Lasix for now  - will monitor strict I&O's, urine output and renal function  - monitor microhematuria    Problem/Plan - 3:  ·  Problem: Mild intermittent asthma stable  - nebs PRN  - Singulair.     Problem/Plan - 4:  ·  Problem: hypertension, HLD  - continue meds as above and monitor     Problem/Plan - 5:  ·  Problem: Anemia  - no signs of hemolysis  - no reports of bleeding  - monitor vitals and labs closely for now    Problem/Plan - 6:  Problem: h/o CVA  - asa, plavix and zocor.    -GI/DVT Prophylaxis.    --------------------------------------------  Case discussed with pt, ID, NP  Education given on plan of care, biopsy  ___________________________  NIKIA Barrios D.O.  Pager: [phone]

## 2017-12-22 LAB
ANION GAP SERPL CALC-SCNC: 15 MMOL/L — SIGNIFICANT CHANGE UP (ref 5–17)
BUN SERPL-MCNC: 42 MG/DL — HIGH (ref 7–23)
CALCIUM SERPL-MCNC: 8.5 MG/DL — SIGNIFICANT CHANGE UP (ref 8.4–10.5)
CHLORIDE SERPL-SCNC: 102 MMOL/L — SIGNIFICANT CHANGE UP (ref 96–108)
CO2 SERPL-SCNC: 22 MMOL/L — SIGNIFICANT CHANGE UP (ref 22–31)
CREAT SERPL-MCNC: 2.04 MG/DL — HIGH (ref 0.5–1.3)
GLUCOSE SERPL-MCNC: 107 MG/DL — HIGH (ref 70–99)
HCT VFR BLD CALC: 26.2 % — LOW (ref 39–50)
HGB BLD-MCNC: 7.8 G/DL — LOW (ref 13–17)
MCHC RBC-ENTMCNC: 20.5 PG — LOW (ref 27–34)
MCHC RBC-ENTMCNC: 29.8 GM/DL — LOW (ref 32–36)
MCV RBC AUTO: 68.9 FL — LOW (ref 80–100)
PLATELET # BLD AUTO: 393 K/UL — SIGNIFICANT CHANGE UP (ref 150–400)
POTASSIUM SERPL-MCNC: 4.7 MMOL/L — SIGNIFICANT CHANGE UP (ref 3.5–5.3)
POTASSIUM SERPL-SCNC: 4.7 MMOL/L — SIGNIFICANT CHANGE UP (ref 3.5–5.3)
RBC # BLD: 3.8 M/UL — LOW (ref 4.2–5.8)
RBC # FLD: 17.4 % — HIGH (ref 10.3–14.5)
SODIUM SERPL-SCNC: 139 MMOL/L — SIGNIFICANT CHANGE UP (ref 135–145)
WBC # BLD: 9.2 K/UL — SIGNIFICANT CHANGE UP (ref 3.8–10.5)
WBC # FLD AUTO: 9.2 K/UL — SIGNIFICANT CHANGE UP (ref 3.8–10.5)

## 2017-12-22 RX ADMIN — Medication 1 APPLICATION(S): at 17:15

## 2017-12-22 RX ADMIN — PANTOPRAZOLE SODIUM 40 MILLIGRAM(S): 20 TABLET, DELAYED RELEASE ORAL at 06:55

## 2017-12-22 RX ADMIN — HEPARIN SODIUM 5000 UNIT(S): 5000 INJECTION INTRAVENOUS; SUBCUTANEOUS at 17:14

## 2017-12-22 RX ADMIN — Medication 81 MILLIGRAM(S): at 11:34

## 2017-12-22 RX ADMIN — CLOPIDOGREL BISULFATE 75 MILLIGRAM(S): 75 TABLET, FILM COATED ORAL at 11:34

## 2017-12-22 RX ADMIN — Medication 3 MILLILITER(S): at 17:14

## 2017-12-22 RX ADMIN — FINASTERIDE 5 MILLIGRAM(S): 5 TABLET, FILM COATED ORAL at 11:34

## 2017-12-22 RX ADMIN — ATORVASTATIN CALCIUM 40 MILLIGRAM(S): 80 TABLET, FILM COATED ORAL at 21:38

## 2017-12-22 RX ADMIN — HEPARIN SODIUM 5000 UNIT(S): 5000 INJECTION INTRAVENOUS; SUBCUTANEOUS at 06:04

## 2017-12-22 RX ADMIN — Medication 100 MILLIGRAM(S): at 13:20

## 2017-12-22 RX ADMIN — SENNA PLUS 2 TABLET(S): 8.6 TABLET ORAL at 21:38

## 2017-12-22 RX ADMIN — Medication 3 MILLILITER(S): at 11:34

## 2017-12-22 RX ADMIN — Medication 100 MILLIGRAM(S): at 21:38

## 2017-12-22 RX ADMIN — Medication 60 MILLIGRAM(S): at 13:20

## 2017-12-22 RX ADMIN — Medication 3 MILLILITER(S): at 00:50

## 2017-12-22 RX ADMIN — Medication 3 MILLILITER(S): at 05:59

## 2017-12-22 RX ADMIN — MONTELUKAST 10 MILLIGRAM(S): 4 TABLET, CHEWABLE ORAL at 11:34

## 2017-12-22 RX ADMIN — TAMSULOSIN HYDROCHLORIDE 0.8 MILLIGRAM(S): 0.4 CAPSULE ORAL at 21:38

## 2017-12-22 RX ADMIN — Medication 100 MILLIGRAM(S): at 05:59

## 2017-12-22 RX ADMIN — Medication 50 MILLIGRAM(S): at 06:00

## 2017-12-22 RX ADMIN — Medication 325 MILLIGRAM(S): at 11:34

## 2017-12-22 RX ADMIN — Medication 1 APPLICATION(S): at 06:00

## 2017-12-22 RX ADMIN — Medication 50 MILLIGRAM(S): at 17:15

## 2017-12-22 NOTE — CONSULT NOTE ADULT - ASSESSMENT
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  He also has TRINI with Cr going from normal to 2 in the last 2 weeks.  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      Rec:  Due to the renal involvement I think we should treat with high dose steroids initially; Prednisone 30mg bid  add Calc + Vit D  Follow creatinine and H/H levels  check CRP and complement levels.

## 2017-12-22 NOTE — PROGRESS NOTE ADULT - SUBJECTIVE AND OBJECTIVE BOX
_________________________________________________________________________________________  ========>>  M E D I C A L   A T T E N D I N G    F O L L O W  U P  N O T E  <<=========  -----------------------------------------------------------------------------------------------------    - Patient seen and examined by me earlier today.  - In summary, patient is a 82y year old man who originally presented with worsening rash.  - Patient today overall doing fairly, eating OK.       burning and itching of legs better     ==================>> REVIEW OF SYSTEM <<=================    GEN: no fever, no chills, no pain  RESP: no SOB, no cough, no sputum  CVS: no chest pain, no palpitations, no edema  GI: no abdominal pain, no nausea, no constipation, no diarrhea  : no dysuria, no frequency, no hematuria  Neuro: no headache, no dizziness  Derm : itching as above    ==================>> PHYSICAL EXAM <<=================    GEN: A&O X 3 , NAD , comfortable in chair, eating  HEENT: NCAT, PERRL, MMM, hearing intact, oral mucosa intact  Neck: supple , no JVD  CVS: S1S2 , regular , No M/R/G appreciated  PULM: CTA B/L,  no W/R/R appreciated  ABD.: soft. non tender, non distended,  bowel sounds present  Extrem: intact pulses , LE edema improved  PSYCH : normal mood,  not anxious  DERM: petechial rash on legs, thighs, back, abd, old healing ulcerations on left lower leg.. overall seem improved    ==================>> MEDICATIONS <<====================    ALBUTerol    90 MICROgram(s) HFA Inhaler 1 Puff(s) Inhalation every 4 hours  ALBUTerol/ipratropium for Nebulization 3 milliLiter(s) Nebulizer every 6 hours  aspirin  chewable 81 milliGRAM(s) Oral daily  atorvastatin 40 milliGRAM(s) Oral at bedtime  clopidogrel Tablet 75 milliGRAM(s) Oral daily  docusate sodium 100 milliGRAM(s) Oral three times a day  ferrous    sulfate 325 milliGRAM(s) Oral daily  finasteride 5 milliGRAM(s) Oral daily  heparin  Injectable 5000 Unit(s) SubCutaneous every 12 hours  influenza   Vaccine 0.5 milliLiter(s) IntraMuscular once  metoprolol     tartrate 50 milliGRAM(s) Oral two times a day  montelukast 10 milliGRAM(s) Oral daily  pantoprazole    Tablet 40 milliGRAM(s) Oral before breakfast  predniSONE   Tablet 30 milliGRAM(s) Oral two times a day  senna 2 Tablet(s) Oral at bedtime  tamsulosin 0.8 milliGRAM(s) Oral at bedtime  tiotropium 18 MICROgram(s) Capsule 1 Capsule(s) Inhalation daily  triamcinolone 0.1% Ointment 1 Application(s) Topical two times a day    ==================>> VITAL SIGNS <<==================    Vital Signs Last 24 Hrs  T(C): 36.9 (12-22-17 @ 10:02)  T(F): 98.4 (12-22-17 @ 10:02), Max: 98.4 (12-22-17 @ 10:02)  HR: 74 (12-22-17 @ 10:02) (74 - 81)  BP: 120/51 (12-22-17 @ 10:02)  BP(mean): --  RR: 18 (12-22-17 @ 10:02) (17 - 18)  SpO2: 100% (12-22-17 @ 10:02) (97% - 100%)       ==================>> LAB AND IMAGING <<==================                        7.8    9.20  )-----------( 393      ( 22 Dec 2017 08:56 )             26.2        Hemoglobin:   7.8 <<==,  8.1 <<==,  8.6 <<==,  7.8 <<==,  10.2 <<==    139  |  102  |  42<H>  ----------------------------<  107<H>  4.7   |  22  |  2.04<H>    Creatinine:  2.04  <<==, 1.97  <<==, 2.12  <<==, 2.15  <<==    Ca    8.5      22 Dec 2017 08:51    TPro  x   /  Alb  x   /  TBili  0.2  /  DBili  <0.1  /  AST  x   /  ALT  x   /  AlkPhos  x   12-20    Anti-Nuclear Antibody (12.19.17 @ 21:15)    TAIWO Pattern: Speckled  Anti Nuclear Factor Titer: 1:160 (12.19.17 @ 21:15)    Surgical Pathology Report (12.20.17 @ 16:30)    Surgical Pathology Report:   ACCESSION No:  10 [id number]  BAILEE BUCHANAN 2    Surgical Final Report    Final Diagnosis  Right thigh, 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    Note: There is a superficial and mid-dermal perivascular and  interstitial infiltrate comprised mainly of neutrophils with  scattered nuclear 'dust' of neutrophils. Fibrin is present in  walls of some vessels. 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    Right thight, punch biopsy for DIF  - Granular deposition of IgA (focal) and C3 within vessel  walls.  - No specific staining with IgG and IgM.    Verified by: Tyler Simpson MD  (Electronic Signature)  Reported on: 12/21/17 17:15 EST,[address], [place] [address]  _________________________________________________________________    Clinical History  r/o LCV vs IgA vasculitis (DIF also sent)  1 week h/o purpura macules and papules on legs, arms, legs and  trunk with an associated TRINI    Specimen(s) Submitted  1     Right thigh 4mm punch  2     Right thight 4mm punch DIF    ___________________________________________________________________________________  ===============>>  A S S E S S M E N T   A N D   P L A N <<===============  ------------------------------------------------------------------------------------------    · Assessment		  82M h/o asthma, HTN, HLD, BPH presented with worsening rash and respiratory distress admitted for further evaluation of the rash and TRINI.    Problem/Plan - 1:  ·  Problem: Petechial rash.  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     Problem/Plan - 4:  ·  Problem: hypertension, HLD  - continue meds as above   - monitor closely on steroids     Problem/Plan - 5:  ·  Problem: Anemia, possibly related vasculitis  - no signs of hemolysis given normal Bili, though abnormal RBC morphology on smear  - no reports of bleeding  - monitor vitals and labs closely for now  - transfuse as needed  - expect improvement with steroids  - hematology eval if any changes    Problem/Plan - 6:  Problem: h/o CVA  - asa, plavix and zocor.    -GI/DVT Prophylaxis.    --------------------------------------------  Case discussed with pt, NP, rheum  Education given on plan of care  ___________________________  H. SEFERINO Barrios.  Pager: [phone]

## 2017-12-22 NOTE — CONSULT NOTE ADULT - SUBJECTIVE AND OBJECTIVE BOX
HISTORY OF PRESENT ILLNESS:  81 yo male with h/o HTN, HLD, ASthma who developed leg pains and lower extremity swelling over the last month.  He was admitted to [facility] last month and had leg swelling and was treated with Abx for presumed Cellulitis.  In the last week or so the leg pains have increased and he has had increasing LE rash with red lesions.  He was also noted to have acute kidney injury.  Cr has progressed from 0.84 on Dec 8th to 2.15 during this admission.      PAST MEDICAL & SURGICAL HISTORY:  Hypercholesterolemia  HTN (hypertension)  CVA (cerebral vascular accident)  Asthma  No significant past surgical history        MEDICATIONS  (STANDING):  ALBUTerol    90 MICROgram(s) HFA Inhaler 1 Puff(s) Inhalation every 4 hours  ALBUTerol/ipratropium for Nebulization 3 milliLiter(s) Nebulizer every 6 hours  aspirin  chewable 81 milliGRAM(s) Oral daily  atorvastatin 40 milliGRAM(s) Oral at bedtime  clopidogrel Tablet 75 milliGRAM(s) Oral daily  docusate sodium 100 milliGRAM(s) Oral three times a day  ferrous    sulfate 325 milliGRAM(s) Oral daily  finasteride 5 milliGRAM(s) Oral daily  heparin  Injectable 5000 Unit(s) SubCutaneous every 12 hours  influenza   Vaccine 0.5 milliLiter(s) IntraMuscular once  metoprolol     tartrate 50 milliGRAM(s) Oral two times a day  montelukast 10 milliGRAM(s) Oral daily  pantoprazole    Tablet 40 milliGRAM(s) Oral before breakfast  senna 2 Tablet(s) Oral at bedtime  tamsulosin 0.8 milliGRAM(s) Oral at bedtime  tiotropium 18 MICROgram(s) Capsule 1 Capsule(s) Inhalation daily  triamcinolone 0.1% Ointment 1 Application(s) Topical two times a day    MEDICATIONS  (PRN):      Allergies    No Known Allergies    Intolerances        PERTINENT MEDICATION HISTORY:    SOCIAL HISTORY:    FAMILY HISTORY:  No pertinent family history in first degree relatives      Vital Signs Last 24 Hrs  T(C): 36.9 (22 Dec 2017 10:02), Max: 36.9 (22 Dec 2017 10:02)  T(F): 98.4 (22 Dec 2017 10:02), Max: 98.4 (22 Dec 2017 10:02)  HR: 74 (22 Dec 2017 10:02) (74 - 81)  BP: 120/51 (22 Dec 2017 10:02) (113/62 - 136/62)  BP(mean): --  RR: 18 (22 Dec 2017 10:02) (17 - 18)  SpO2: 100% (22 Dec 2017 10:02) (97% - 100%)    Daily     Daily     PHYSICAL EXAM:      Constitutional:  Fair Appearing    Eyes:  EOMI    ENMT:    Neck:  supple no masses  Breasts:    Back:    Respiratory:  Lung-good AE bilat    Cardiovascular:  S1S2, mild systolic murmur    Gastrointestinal:  soft nt, nd, no masses    Genitourinary:    Rectal:    Extremities:  B LE edema    Vascular:    Neurological:    Skin:  +macular non blanching red lesions scattered t/o the LEs bilaterally, some lesions deep red, others pinkish red    Lymph Nodes:    Musculoskeletal:  no active synovitis    Psychiatric:  appears appropiate; he is non english speaking        LABS:                        7.8    9.20  )-----------( 393      ( 22 Dec 2017 08:56 )             26.2     12-22    139  |  102  |  42<H>  ----------------------------<  107<H>  4.7   |  22  |  2.04<H>    Ca    8.5      22 Dec 2017 08:51    TPro  x   /  Alb  x   /  TBili  0.2  /  DBili  <0.1  /  AST  x   /  ALT  x   /  AlkPhos  x   12-20    ESR 44  ANCA negative  TAIWO 1:160  dsDNA negative  RF negative    Surgical Pathology Report (12.20.17 @ 16:30)    Surgical Pathology Report:   ACCESSION No:  10 [id number]    BAILEE BUCHANAN                     2        Surgical Final Report          Final Diagnosis  Right thigh, 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    Note: There is a superficial and mid-dermal perivascular and  interstitial infiltrate comprised mainly of neutrophils with  scattered nuclear 'dust' of neutrophils. Fibrin is present in  walls of some vessels. 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    Right thight, punch biopsy for DIF  - Granular deposition of IgA (focal) and C3 within vessel  walls.  - No specific staining with IgG and IgM.    Verified by: Tyler Simpson MD  (Electronic Signature)  Reported on: 12/21/17 17:15 EST,[address], [address], [place] [address]  _________________________________________________________________    Clinical History  r/o LCV vs IgA vasculitis (DIF also sent)  1 week h/o purpura macules and papules on legs, arms, legs and  trunk with an associated TRINI    Specimen(s) Submitted  1     Right thigh 4mm punch  2     Right thight 4mm punch DIF    Gross Description  1.  The specimen is received in formalin and the specimen  container is labeled: Right thigh 4mm punch.  It consists of a  0.4 cm diameter punch biopsy of skin taken to a depth of 0.6 cm.  The epidermis is tan-pink in appearance.  The specimen is  bisected and entirely submitted in one cassette.    2.  The specimen is received in tissue fixative and the specimen  container is labeled: Right thigh 4mm punch (DIF).  It consists  of a 0.3 cm diameter punch biopsy of skin taken to a depth of 0.4  cm.  The epidermis is tan-pink in appearance.  The specimen is  sent to [facility] for DIF studies. Gross  only.  No cassettes submitted.    In addition to other data that may appear on the specimen  containers, all labels have been inspected to confirm the              BAILEE BUCHANAN 2        Surgical Final Report          presence of the patient's name and date of birth.  /12/20/17 18:54      Urinalysis + Microscopic Examination (12.20.17 @ 07:32)    Color: Yellow    Glucose Qualitative, Urine: Negative    Urine Appearance: Clear    Blood, Urine: Moderate    Bilirubin: Negative    pH Urine: 6.0    Leukocyte Esterase Concentration: Trace    Urobilinogen: Negative    Specific Gravity: 1.010    Protein, Urine: 30 mg/dL    Nitrite: Negative    Ketone - Urine: Negative    Red Blood Cell - Urine: 10-25 /HPF    White Blood Cell - Urine: 11-25 /HPF    Epithelial Cells: OCC /HPF              RADIOLOGY & ADDITIONAL STUDIES:

## 2017-12-22 NOTE — CHART NOTE - NSCHARTNOTEFT_GEN_A_CORE
Biopsy performed on 12/20 showed leukoclastic vasculitis with DIF positive for IgA.  Stool guaiac ordered, but not sent.  Please make sure nursing staff is aware that test is ordered so that it can be sent.  Given diagnosis of IgA vasculitis, worsening Cr of 2.04 and pupuric macules and papules with involvement above the waist recommended initiating treatment with prednisone 60mg.  Given likelihood of acute glomerulonephritis in the setting of patient's rising Cr and newly diagnosed IgA vasculitis recommend nephrology consult.     Plan d/w attending Dr. Solo Torres Biopsy performed on 12/20 showed leukoclastic vasculitis with DIF positive for IgA.  Stool guaiac ordered, but not sent.  Please make sure nursing staff is aware that test is ordered so that it can be sent.  Given diagnosis of IgA vasculitis, worsening Cr of 2.04 and purpuric macules and papules with involvement above the waist recommended initiating treatment with prednisone 60mg.  Given likelihood of acute glomerulonephritis in the setting of patient's rising Cr and newly diagnosed IgA vasculitis recommend nephrology consult.     Plan d/w attending Dr. Solo Christie MD (PGY-2)   Dermatology Resident  [facility]  Pager: [phone]  Office [phone]

## 2017-12-23 LAB
ANION GAP SERPL CALC-SCNC: 13 MMOL/L — SIGNIFICANT CHANGE UP (ref 5–17)
BUN SERPL-MCNC: 41 MG/DL — HIGH (ref 7–23)
C3 SERPL-MCNC: 111 MG/DL — SIGNIFICANT CHANGE UP (ref 80–180)
C4 SERPL-MCNC: 52 MG/DL — HIGH (ref 10–45)
CALCIUM SERPL-MCNC: 8.8 MG/DL — SIGNIFICANT CHANGE UP (ref 8.4–10.5)
CHLORIDE SERPL-SCNC: 104 MMOL/L — SIGNIFICANT CHANGE UP (ref 96–108)
CO2 SERPL-SCNC: 23 MMOL/L — SIGNIFICANT CHANGE UP (ref 22–31)
CREAT SERPL-MCNC: 1.98 MG/DL — HIGH (ref 0.5–1.3)
CRP SERPL-MCNC: 4.1 MG/DL — HIGH (ref 0–0.4)
GLUCOSE SERPL-MCNC: 111 MG/DL — HIGH (ref 70–99)
HCT VFR BLD CALC: 26.2 % — LOW (ref 39–50)
HGB BLD-MCNC: 7.6 G/DL — LOW (ref 13–17)
MCHC RBC-ENTMCNC: 20.2 PG — LOW (ref 27–34)
MCHC RBC-ENTMCNC: 29 GM/DL — LOW (ref 32–36)
MCV RBC AUTO: 69.5 FL — LOW (ref 80–100)
PLATELET # BLD AUTO: 393 K/UL — SIGNIFICANT CHANGE UP (ref 150–400)
POTASSIUM SERPL-MCNC: 5.3 MMOL/L — SIGNIFICANT CHANGE UP (ref 3.5–5.3)
POTASSIUM SERPL-SCNC: 5.3 MMOL/L — SIGNIFICANT CHANGE UP (ref 3.5–5.3)
RBC # BLD: 3.77 M/UL — LOW (ref 4.2–5.8)
RBC # FLD: 18 % — HIGH (ref 10.3–14.5)
SODIUM SERPL-SCNC: 140 MMOL/L — SIGNIFICANT CHANGE UP (ref 135–145)
WBC # BLD: 8.37 K/UL — SIGNIFICANT CHANGE UP (ref 3.8–10.5)
WBC # FLD AUTO: 8.37 K/UL — SIGNIFICANT CHANGE UP (ref 3.8–10.5)

## 2017-12-23 RX ORDER — FUROSEMIDE 40 MG
40 TABLET ORAL EVERY OTHER DAY
Qty: 0 | Refills: 0 | Status: DISCONTINUED | OUTPATIENT
Start: 2017-12-23 | End: 2017-12-28

## 2017-12-23 RX ORDER — IRON SUCROSE 20 MG/ML
200 INJECTION, SOLUTION INTRAVENOUS DAILY
Qty: 0 | Refills: 0 | Status: COMPLETED | OUTPATIENT
Start: 2017-12-23 | End: 2017-12-26

## 2017-12-23 RX ADMIN — PANTOPRAZOLE SODIUM 40 MILLIGRAM(S): 20 TABLET, DELAYED RELEASE ORAL at 05:49

## 2017-12-23 RX ADMIN — FINASTERIDE 5 MILLIGRAM(S): 5 TABLET, FILM COATED ORAL at 12:01

## 2017-12-23 RX ADMIN — Medication 30 MILLIGRAM(S): at 05:48

## 2017-12-23 RX ADMIN — HEPARIN SODIUM 5000 UNIT(S): 5000 INJECTION INTRAVENOUS; SUBCUTANEOUS at 05:49

## 2017-12-23 RX ADMIN — Medication 100 MILLIGRAM(S): at 21:54

## 2017-12-23 RX ADMIN — Medication 325 MILLIGRAM(S): at 12:00

## 2017-12-23 RX ADMIN — Medication 50 MILLIGRAM(S): at 17:03

## 2017-12-23 RX ADMIN — HEPARIN SODIUM 5000 UNIT(S): 5000 INJECTION INTRAVENOUS; SUBCUTANEOUS at 17:04

## 2017-12-23 RX ADMIN — CLOPIDOGREL BISULFATE 75 MILLIGRAM(S): 75 TABLET, FILM COATED ORAL at 12:00

## 2017-12-23 RX ADMIN — Medication 100 MILLIGRAM(S): at 13:18

## 2017-12-23 RX ADMIN — Medication 30 MILLIGRAM(S): at 17:03

## 2017-12-23 RX ADMIN — Medication 40 MILLIGRAM(S): at 17:03

## 2017-12-23 RX ADMIN — TAMSULOSIN HYDROCHLORIDE 0.8 MILLIGRAM(S): 0.4 CAPSULE ORAL at 21:54

## 2017-12-23 RX ADMIN — ATORVASTATIN CALCIUM 40 MILLIGRAM(S): 80 TABLET, FILM COATED ORAL at 21:54

## 2017-12-23 RX ADMIN — IRON SUCROSE 110 MILLIGRAM(S): 20 INJECTION, SOLUTION INTRAVENOUS at 16:36

## 2017-12-23 RX ADMIN — Medication 3 MILLILITER(S): at 05:48

## 2017-12-23 RX ADMIN — Medication 3 MILLILITER(S): at 17:04

## 2017-12-23 RX ADMIN — SENNA PLUS 2 TABLET(S): 8.6 TABLET ORAL at 21:54

## 2017-12-23 RX ADMIN — MONTELUKAST 10 MILLIGRAM(S): 4 TABLET, CHEWABLE ORAL at 12:00

## 2017-12-23 RX ADMIN — Medication 1 APPLICATION(S): at 05:48

## 2017-12-23 RX ADMIN — Medication 3 MILLILITER(S): at 11:58

## 2017-12-23 RX ADMIN — Medication 1 TABLET(S): at 12:01

## 2017-12-23 RX ADMIN — Medication 50 MILLIGRAM(S): at 05:48

## 2017-12-23 RX ADMIN — Medication 81 MILLIGRAM(S): at 12:01

## 2017-12-23 RX ADMIN — Medication 100 MILLIGRAM(S): at 05:48

## 2017-12-23 RX ADMIN — Medication 1 APPLICATION(S): at 17:04

## 2017-12-23 NOTE — PHYSICAL THERAPY INITIAL EVALUATION ADULT - IMPAIRMENTS CONTRIBUTING TO GAIT DEVIATIONS, PT EVAL
impaired balance/no loss of balance noted, pt able to self correct minimal/intermittent lateral gait deviations

## 2017-12-23 NOTE — PHYSICAL THERAPY INITIAL EVALUATION ADULT - PERTINENT HX OF CURRENT PROBLEM, REHAB EVAL
83 y/o male transferred from [facility] with worsening rash, elevated creatinine, diagnosed with vasculitis.He states that 1 mo ago he developed red/purple patches on his b/l lower legs, which had a "burning sensation." He was admitted to an OSH from 11/29-12/8 for bilateral lower extremity cellulitis.

## 2017-12-23 NOTE — PROGRESS NOTE ADULT - SUBJECTIVE AND OBJECTIVE BOX
_________________________________________________________________________________________  ========>>  M E D I C A L   A T T E N D I N G    F O L L O W  U P  N O T E  <<=========  -----------------------------------------------------------------------------------------------------    - Patient seen and examined by me approximately thirty minutes ago.  - In summary, patient is a 82y year old man who originally presented with worsening rash.  - Patient today overall doing fairly, eating OK.       burning and itching of legs better     ==================>> REVIEW OF SYSTEM <<=================    GEN: no fever, no chills, no pain  RESP: no SOB, no cough, no sputum  CVS: no chest pain, no palpitations, no edema  GI: no abdominal pain, no nausea, no constipation, no diarrhea  : no dysuria, no frequency, no hematuria  Neuro: no headache, no dizziness  Derm : itching improved    ==================>> PHYSICAL EXAM <<=================    GEN: A&O X 3 , NAD , comfortable  HEENT: NCAT, PERRL, MMM, hearing intact, oral mucosa intact  Neck: supple , no JVD  CVS: S1S2 , regular , No M/R/G appreciated  PULM: CTA B/L,  no W/R/R appreciated  ABD.: soft. non tender, non distended,  bowel sounds present  Extrem: intact pulses , LE edema stable  PSYCH : normal mood,  not anxious  DERM: petechial rash on legs, thighs, back, abd, old healing ulcerations on left lower leg.. overall seem improved      ==================>> MEDICATIONS <<====================    ALBUTerol    90 MICROgram(s) HFA Inhaler 1 Puff(s) Inhalation every 4 hours  ALBUTerol/ipratropium for Nebulization 3 milliLiter(s) Nebulizer every 6 hours  aspirin  chewable 81 milliGRAM(s) Oral daily  atorvastatin 40 milliGRAM(s) Oral at bedtime  calcium carbonate  625 mG + Vitamin D (OsCal 250 + D) 1 Tablet(s) Oral daily  clopidogrel Tablet 75 milliGRAM(s) Oral daily  docusate sodium 100 milliGRAM(s) Oral three times a day  ferrous    sulfate 325 milliGRAM(s) Oral daily  finasteride 5 milliGRAM(s) Oral daily  heparin  Injectable 5000 Unit(s) SubCutaneous every 12 hours  influenza   Vaccine 0.5 milliLiter(s) IntraMuscular once  metoprolol     tartrate 50 milliGRAM(s) Oral two times a day  montelukast 10 milliGRAM(s) Oral daily  pantoprazole    Tablet 40 milliGRAM(s) Oral before breakfast  predniSONE   Tablet 30 milliGRAM(s) Oral two times a day  senna 2 Tablet(s) Oral at bedtime  tamsulosin 0.8 milliGRAM(s) Oral at bedtime  tiotropium 18 MICROgram(s) Capsule 1 Capsule(s) Inhalation daily  triamcinolone 0.1% Ointment 1 Application(s) Topical two times a day    ==================>> VITAL SIGNS <<==================    Vital Signs Last 24 Hrs  T(C): 36.3 (12-23-17 @ 13:33)  T(F): 97.4 (12-23-17 @ 13:33), Max: 98 (12-23-17 @ 05:18)  HR: 70 (12-23-17 @ 13:33) (69 - 93)  BP: 144/56 (12-23-17 @ 13:33)  BP(mean): --  RR: 18 (12-23-17 @ 13:33) (18 - 18)  SpO2: 98% (12-23-17 @ 13:33) (97% - 100%)       ==================>> LAB AND IMAGING <<==================                        7.6    8.37  )-----------( 393      ( 23 Dec 2017 07:13 )             26.2        Hemoglobin:   7.6 <<==,  7.8 <<==,  8.1 <<==,  8.6 <<==,  7.8 <<==,  10.2 <<==    140  |  104  |  41<H>  ----------------------------<  111<H>  5.3   |  23  |  1.98<H>    Creatinine:  1.98  <<==, 2.04  <<==, 1.97  <<==, 2.12  <<==, 2.15  <<==    Ca    8.8      23 Dec 2017 08:00    Surgical Pathology Report (12.20.17 @ 16:30)    Surgical Pathology Report:   ACCESSION No:  [id number]  BAILEE BUCHANAN                     2    Surgical Final Report    Final Diagnosis  Right thigh, 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    Note: There is a superficial and mid-dermal perivascular and  interstitial infiltrate comprised mainly of neutrophils with  scattered nuclear 'dust' of neutrophils. Fibrin is present in  walls of some vessels. 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    Right thight, punch biopsy for DIF  - Granular deposition of IgA (focal) and C3 within vessel  walls.  - No specific staining with IgG and IgM.    Verified by: Tyler Simpson MD  (Electronic Signature)  Reported on: 12/21/17 17:15 EST,[address], [place] [address]  _________________________________________________________________    Clinical History  r/o LCV vs IgA vasculitis (DIF also sent)  1 week h/o purpura macules and papules on legs, arms, legs and  trunk with an associated TRINI    Specimen(s) Submitted  1     Right thigh 4mm punch  2     Right thight 4mm punch DIF    ___________________________________________________________________________________  ===============>>  A S S E S S M E N T   A N D   P L A N <<===============  ------------------------------------------------------------------------------------------    · Assessment		  82M h/o asthma, HTN, HLD, BPH presented with worsening rash and respiratory distress admitted for further evaluation of the rash and TRINI.    Problem/Plan - 1:  ·  Problem: Petechial rash.  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    -GI/DVT Prophylaxis.    --------------------------------------------  Case discussed with pt  Education given on plan of care, leg elevation, diet  ___________________________  H. SEFERINO Barrios.  Pager: [phone] _________________________________________________________________________________________  ========>>  M E D I C A L   A T T E N D I N G    F O L L O W  U P  N O T E  <<=========  -----------------------------------------------------------------------------------------------------    - Patient seen and examined by me approximately thirty minutes ago.  - In summary, patient is a 82y year old man who originally presented with worsening rash.  - Patient today overall doing fairly, eating OK.       burning and itching of legs better     ==================>> REVIEW OF SYSTEM <<=================    GEN: no fever, no chills, no pain  RESP: no SOB, no cough, no sputum  CVS: no chest pain, no palpitations, no edema  GI: no abdominal pain, no nausea, no constipation, no diarrhea  : no dysuria, no frequency, no hematuria  Neuro: no headache, no dizziness  Derm : itching improved    ==================>> PHYSICAL EXAM <<=================    GEN: A&O X 3 , NAD , comfortable  HEENT: NCAT, PERRL, MMM, hearing intact, oral mucosa intact  Neck: supple , no JVD  CVS: S1S2 , regular , No M/R/G appreciated  PULM: CTA B/L,  no W/R/R appreciated  ABD.: soft. non tender, non distended,  bowel sounds present  Extrem: intact pulses , LE edema stable  PSYCH : normal mood,  not anxious  DERM: petechial rash on legs, thighs, back, abd, old healing ulcerations on left lower leg.. overall seem improved      ==================>> MEDICATIONS <<====================    ALBUTerol    90 MICROgram(s) HFA Inhaler 1 Puff(s) Inhalation every 4 hours  ALBUTerol/ipratropium for Nebulization 3 milliLiter(s) Nebulizer every 6 hours  aspirin  chewable 81 milliGRAM(s) Oral daily  atorvastatin 40 milliGRAM(s) Oral at bedtime  calcium carbonate  625 mG + Vitamin D (OsCal 250 + D) 1 Tablet(s) Oral daily  clopidogrel Tablet 75 milliGRAM(s) Oral daily  docusate sodium 100 milliGRAM(s) Oral three times a day  ferrous    sulfate 325 milliGRAM(s) Oral daily  finasteride 5 milliGRAM(s) Oral daily  heparin  Injectable 5000 Unit(s) SubCutaneous every 12 hours  influenza   Vaccine 0.5 milliLiter(s) IntraMuscular once  metoprolol     tartrate 50 milliGRAM(s) Oral two times a day  montelukast 10 milliGRAM(s) Oral daily  pantoprazole    Tablet 40 milliGRAM(s) Oral before breakfast  predniSONE   Tablet 30 milliGRAM(s) Oral two times a day  senna 2 Tablet(s) Oral at bedtime  tamsulosin 0.8 milliGRAM(s) Oral at bedtime  tiotropium 18 MICROgram(s) Capsule 1 Capsule(s) Inhalation daily  triamcinolone 0.1% Ointment 1 Application(s) Topical two times a day    ==================>> VITAL SIGNS <<==================    Vital Signs Last 24 Hrs  T(C): 36.3 (12-23-17 @ 13:33)  T(F): 97.4 (12-23-17 @ 13:33), Max: 98 (12-23-17 @ 05:18)  HR: 70 (12-23-17 @ 13:33) (69 - 93)  BP: 144/56 (12-23-17 @ 13:33)  BP(mean): --  RR: 18 (12-23-17 @ 13:33) (18 - 18)  SpO2: 98% (12-23-17 @ 13:33) (97% - 100%)       ==================>> LAB AND IMAGING <<==================                        7.6    8.37  )-----------( 393      ( 23 Dec 2017 07:13 )             26.2        Hemoglobin:   7.6 <<==,  7.8 <<==,  8.1 <<==,  8.6 <<==,  7.8 <<==,  10.2 <<==    140  |  104  |  41<H>  ----------------------------<  111<H>  5.3   |  23  |  1.98<H>    Creatinine:  1.98  <<==, 2.04  <<==, 1.97  <<==, 2.12  <<==, 2.15  <<==    Ca    8.8      23 Dec 2017 08:00    Surgical Pathology Report (12.20.17 @ 16:30)    Surgical Pathology Report:   ACCESSION No:  10 [id number]  BAILEE BUCHANAN                     2    Surgical Final Report    Final Diagnosis  Right thigh, 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    Note: There is a superficial and mid-dermal perivascular and  interstitial infiltrate comprised mainly of neutrophils with  scattered nuclear 'dust' of neutrophils. Fibrin is present in  walls of some vessels. 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    Right thight, punch biopsy for DIF  - Granular deposition of IgA (focal) and C3 within vessel  walls.  - No specific staining with IgG and IgM.    Verified by: Tyler Simpson MD  (Electronic Signature)  Reported on: 12/21/17 17:15 EST,[address], [address], [place] [address]  _________________________________________________________________    Clinical History  r/o LCV vs IgA vasculitis (DIF also sent)  1 week h/o purpura macules and papules on legs, arms, legs and  trunk with an associated TRINI    Specimen(s) Submitted  1     Right thigh 4mm punch  2     Right thight 4mm punch DIF    ___________________________________________________________________________________  ===============>>  A S S E S S M E N T   A N D   P L A N <<===============  ------------------------------------------------------------------------------------------    · Assessment		  82M h/o asthma, HTN, HLD, BPH presented with worsening rash and respiratory distress admitted for further evaluation of the rash and TRINI.    Problem/Plan - 1:  ·  Problem: Petechial rash.  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    -GI/DVT Prophylaxis.    --------------------------------------------  Case discussed with pt  Education given on plan of care, leg elevation, diet  ___________________________  NIKIA Barrios D.O.  Pager: [phone]

## 2017-12-23 NOTE — PHYSICAL THERAPY INITIAL EVALUATION ADULT - ADDITIONAL COMMENTS
Pt states he lives in a private home with spouse, about 7-8 steps to enter (handrail on R), and 8-9 steps to reach second floor (handrail on B sides). Pt states prior to admission being completely independent with all functional mobility and ADLs. Pt states he owns a straight cane, RW, shower chair, and grab bars in bathroom. Pt states he no longer drives, son transports pt and spouse.

## 2017-12-23 NOTE — PHYSICAL THERAPY INITIAL EVALUATION ADULT - PRECAUTIONS/LIMITATIONS, REHAB EVAL
fall precautions/History Continued: He was discharged home on several new medications (metoprolol, furosemide,gabapentin, atorvastatin, flomax, ferrous sulfate, senna, & Ceftin x 2 days). One week ago, he developed multiple smaller red and purple lesions on his upper legs, trunk, and arms, so he came to the emergency room.

## 2017-12-23 NOTE — PHYSICAL THERAPY INITIAL EVALUATION ADULT - GENERAL OBSERVATIONS, REHAB EVAL
Pt agreed to PT session, [place]  used ([facility] [id number]). Pt rec'd semisupine in bed, peripheral IV lock intact, and NAD.

## 2017-12-24 LAB
ANION GAP SERPL CALC-SCNC: 14 MMOL/L — SIGNIFICANT CHANGE UP (ref 5–17)
ANISOCYTOSIS BLD QL: SIGNIFICANT CHANGE UP
BASOPHILS # BLD AUTO: 0.01 K/UL — SIGNIFICANT CHANGE UP (ref 0–0.2)
BASOPHILS NFR BLD AUTO: 0.1 % — SIGNIFICANT CHANGE UP (ref 0–2)
BLD GP AB SCN SERPL QL: NEGATIVE — SIGNIFICANT CHANGE UP
BUN SERPL-MCNC: 44 MG/DL — HIGH (ref 7–23)
BURR CELLS BLD QL SMEAR: SLIGHT — SIGNIFICANT CHANGE UP
CALCIUM SERPL-MCNC: 9 MG/DL — SIGNIFICANT CHANGE UP (ref 8.4–10.5)
CHLORIDE SERPL-SCNC: 105 MMOL/L — SIGNIFICANT CHANGE UP (ref 96–108)
CO2 SERPL-SCNC: 22 MMOL/L — SIGNIFICANT CHANGE UP (ref 22–31)
CREAT SERPL-MCNC: 1.82 MG/DL — HIGH (ref 0.5–1.3)
CULTURE RESULTS: SIGNIFICANT CHANGE UP
CULTURE RESULTS: SIGNIFICANT CHANGE UP
ELLIPTOCYTES BLD QL SMEAR: SLIGHT — SIGNIFICANT CHANGE UP
EOSINOPHIL # BLD AUTO: 0.01 K/UL — SIGNIFICANT CHANGE UP (ref 0–0.5)
EOSINOPHIL NFR BLD AUTO: 0.1 — SIGNIFICANT CHANGE UP
GIANT PLATELETS BLD QL SMEAR: PRESENT — SIGNIFICANT CHANGE UP
GLUCOSE SERPL-MCNC: 127 MG/DL — HIGH (ref 70–99)
HCT VFR BLD CALC: 26.4 % — LOW (ref 39–50)
HGB BLD-MCNC: 7.8 G/DL — LOW (ref 13–17)
HYPOCHROMIA BLD QL: SIGNIFICANT CHANGE UP
IMM GRANULOCYTES NFR BLD AUTO: 0.7 % — SIGNIFICANT CHANGE UP (ref 0–1.5)
LG PLATELETS BLD QL AUTO: SLIGHT — SIGNIFICANT CHANGE UP
LYMPHOCYTES # BLD AUTO: 1.44 K/UL — SIGNIFICANT CHANGE UP (ref 1–3.3)
LYMPHOCYTES # BLD AUTO: 11.8 % — LOW (ref 13–44)
MACROCYTES BLD QL: SLIGHT — SIGNIFICANT CHANGE UP
MANUAL SMEAR VERIFICATION: SIGNIFICANT CHANGE UP
MCHC RBC-ENTMCNC: 20.6 PG — LOW (ref 27–34)
MCHC RBC-ENTMCNC: 29.5 GM/DL — LOW (ref 32–36)
MCV RBC AUTO: 69.7 FL — LOW (ref 80–100)
MICROCYTES BLD QL: SIGNIFICANT CHANGE UP
MONOCYTES # BLD AUTO: 0.53 K/UL — SIGNIFICANT CHANGE UP (ref 0–0.9)
MONOCYTES NFR BLD AUTO: 4.3 % — SIGNIFICANT CHANGE UP (ref 2–14)
NEUTROPHILS # BLD AUTO: 10.11 K/UL — HIGH (ref 1.8–7.4)
NEUTROPHILS NFR BLD AUTO: 83 % — HIGH (ref 43–77)
PLAT MORPH BLD: ABNORMAL
PLATELET # BLD AUTO: 419 K/UL — HIGH (ref 150–400)
POIKILOCYTOSIS BLD QL AUTO: SLIGHT — SIGNIFICANT CHANGE UP
POTASSIUM SERPL-MCNC: 4.9 MMOL/L — SIGNIFICANT CHANGE UP (ref 3.5–5.3)
POTASSIUM SERPL-SCNC: 4.9 MMOL/L — SIGNIFICANT CHANGE UP (ref 3.5–5.3)
RBC # BLD: 3.79 M/UL — LOW (ref 4.2–5.8)
RBC # FLD: 17.9 % — HIGH (ref 10.3–14.5)
RBC BLD AUTO: ABNORMAL
RH IG SCN BLD-IMP: NEGATIVE — SIGNIFICANT CHANGE UP
SCHISTOCYTES BLD QL AUTO: SLIGHT — SIGNIFICANT CHANGE UP
SODIUM SERPL-SCNC: 141 MMOL/L — SIGNIFICANT CHANGE UP (ref 135–145)
SPECIMEN SOURCE: SIGNIFICANT CHANGE UP
SPECIMEN SOURCE: SIGNIFICANT CHANGE UP
WBC # BLD: 12.19 K/UL — HIGH (ref 3.8–10.5)
WBC # FLD AUTO: 12.19 K/UL — HIGH (ref 3.8–10.5)

## 2017-12-24 RX ADMIN — SENNA PLUS 2 TABLET(S): 8.6 TABLET ORAL at 21:19

## 2017-12-24 RX ADMIN — TAMSULOSIN HYDROCHLORIDE 0.8 MILLIGRAM(S): 0.4 CAPSULE ORAL at 21:19

## 2017-12-24 RX ADMIN — Medication 50 MILLIGRAM(S): at 05:36

## 2017-12-24 RX ADMIN — CLOPIDOGREL BISULFATE 75 MILLIGRAM(S): 75 TABLET, FILM COATED ORAL at 12:40

## 2017-12-24 RX ADMIN — Medication 100 MILLIGRAM(S): at 12:40

## 2017-12-24 RX ADMIN — Medication 81 MILLIGRAM(S): at 12:39

## 2017-12-24 RX ADMIN — Medication 30 MILLIGRAM(S): at 05:36

## 2017-12-24 RX ADMIN — IRON SUCROSE 110 MILLIGRAM(S): 20 INJECTION, SOLUTION INTRAVENOUS at 14:35

## 2017-12-24 RX ADMIN — Medication 1 APPLICATION(S): at 17:20

## 2017-12-24 RX ADMIN — HEPARIN SODIUM 5000 UNIT(S): 5000 INJECTION INTRAVENOUS; SUBCUTANEOUS at 17:21

## 2017-12-24 RX ADMIN — Medication 3 MILLILITER(S): at 23:19

## 2017-12-24 RX ADMIN — MONTELUKAST 10 MILLIGRAM(S): 4 TABLET, CHEWABLE ORAL at 12:39

## 2017-12-24 RX ADMIN — Medication 3 MILLILITER(S): at 17:20

## 2017-12-24 RX ADMIN — Medication 1 APPLICATION(S): at 05:36

## 2017-12-24 RX ADMIN — Medication 100 MILLIGRAM(S): at 05:36

## 2017-12-24 RX ADMIN — PANTOPRAZOLE SODIUM 40 MILLIGRAM(S): 20 TABLET, DELAYED RELEASE ORAL at 05:36

## 2017-12-24 RX ADMIN — Medication 325 MILLIGRAM(S): at 12:39

## 2017-12-24 RX ADMIN — Medication 50 MILLIGRAM(S): at 17:21

## 2017-12-24 RX ADMIN — Medication 30 MILLIGRAM(S): at 17:21

## 2017-12-24 RX ADMIN — Medication 3 MILLILITER(S): at 12:39

## 2017-12-24 RX ADMIN — FINASTERIDE 5 MILLIGRAM(S): 5 TABLET, FILM COATED ORAL at 12:39

## 2017-12-24 RX ADMIN — Medication 100 MILLIGRAM(S): at 21:19

## 2017-12-24 RX ADMIN — Medication 3 MILLILITER(S): at 05:36

## 2017-12-24 RX ADMIN — HEPARIN SODIUM 5000 UNIT(S): 5000 INJECTION INTRAVENOUS; SUBCUTANEOUS at 05:36

## 2017-12-24 RX ADMIN — ATORVASTATIN CALCIUM 40 MILLIGRAM(S): 80 TABLET, FILM COATED ORAL at 21:19

## 2017-12-24 RX ADMIN — Medication 1 TABLET(S): at 12:39

## 2017-12-24 NOTE — PROGRESS NOTE ADULT - ASSESSMENT
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  Seems to be improving on steroids-less symptoms, rash and Cr slowly improving.     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  Anemia most likely in part related to active inflammatory disease (vasculitis)  as well as Fe deficiency.  Seems to be improving on steroids-less symptoms, rash and Cr slowly improving.     Rec:  continue prednisone 30mg bid; hope to lower to 20 bid if Cr improves over the next 1-2 days  Calc+D started  Exercise as able

## 2017-12-24 NOTE — PROGRESS NOTE ADULT - SUBJECTIVE AND OBJECTIVE BOX
INTERVAL HPI/OVERNIGHT EVENTS:  Patient overall improved; less LE pain and rash is thinning to some degree  Notices R shoulder pain-not severe    MEDICATIONS  (STANDING):  ALBUTerol    90 MICROgram(s) HFA Inhaler 1 Puff(s) Inhalation every 4 hours  ALBUTerol/ipratropium for Nebulization 3 milliLiter(s) Nebulizer every 6 hours  aspirin  chewable 81 milliGRAM(s) Oral daily  atorvastatin 40 milliGRAM(s) Oral at bedtime  calcium carbonate  625 mG + Vitamin D (OsCal 250 + D) 1 Tablet(s) Oral daily  clopidogrel Tablet 75 milliGRAM(s) Oral daily  docusate sodium 100 milliGRAM(s) Oral three times a day  ferrous    sulfate 325 milliGRAM(s) Oral daily  finasteride 5 milliGRAM(s) Oral daily  furosemide    Tablet 40 milliGRAM(s) Oral every other day  heparin  Injectable 5000 Unit(s) SubCutaneous every 12 hours  influenza   Vaccine 0.5 milliLiter(s) IntraMuscular once  iron sucrose IVPB 200 milliGRAM(s) IV Intermittent daily  metoprolol     tartrate 50 milliGRAM(s) Oral two times a day  montelukast 10 milliGRAM(s) Oral daily  pantoprazole    Tablet 40 milliGRAM(s) Oral before breakfast  predniSONE   Tablet 30 milliGRAM(s) Oral two times a day  senna 2 Tablet(s) Oral at bedtime  tamsulosin 0.8 milliGRAM(s) Oral at bedtime  tiotropium 18 MICROgram(s) Capsule 1 Capsule(s) Inhalation daily  triamcinolone 0.1% Ointment 1 Application(s) Topical two times a day    MEDICATIONS  (PRN):      Allergies    No Known Allergies    Intolerances        REVIEW OF SYSTEMS    General:	  Fair; overall feeling improved    Skin/Breast:  	rash improving    Ophthalmologic:  	  ENMT:	    Respiratory and Thorax:  	  Cardiovascular:	    Gastrointestinal:	    Genitourinary:	    Musculoskeletal:	  R shoulder pain    Neurological:	    Psychiatric:	    Hematology/Lymphatics:    Vital Signs Last 24 Hrs  T(C): 36.6 (24 Dec 2017 20:04), Max: 37 (24 Dec 2017 05:31)  T(F): 97.8 (24 Dec 2017 20:04), Max: 98.6 (24 Dec 2017 05:31)  HR: 65 (24 Dec 2017 20:04) (62 - 67)  BP: 133/56 (24 Dec 2017 20:04) (128/65 - 137/61)  BP(mean): --  RR: 18 (24 Dec 2017 20:04) (16 - 18)  SpO2: 98% (24 Dec 2017 20:04) (97% - 99%)      PHYSICAL EXAM:    Constitutional:  well appearing    Eyes:    ENMT:    Neck:  supple no masses    Back:    Respiratory:    Cardiovascular:    Gastrointestinal:  soft nt, nd    Extremities:  less edema of LEs    Vascular:    Neurological:  grossly intact    Skin:  LE petechial rash-improving, lesions less aggressive looking, some clearing    Lymph Nodes:    Musculoskeletal:  R shoulder with good ROM, no active synovitis    Psychiatric:      LABS:                        7.8    12.19 )-----------( 419      ( 24 Dec 2017 08:45 )             26.4     12-24    141  |  105  |  44<H>  ----------------------------<  127<H>  4.9   |  22  |  1.82<H>    Ca    9.0      24 Dec 2017 08:35            RADIOLOGY & ADDITIONAL TESTS: INTERVAL HPI/OVERNIGHT EVENTS:  Patient overall improved; less LE pain and rash is thinning to some degree  Notices R shoulder pain-not severe    MEDICATIONS  (STANDING):  ALBUTerol    90 MICROgram(s) HFA Inhaler 1 Puff(s) Inhalation every 4 hours  ALBUTerol/ipratropium for Nebulization 3 milliLiter(s) Nebulizer every 6 hours  aspirin  chewable 81 milliGRAM(s) Oral daily  atorvastatin 40 milliGRAM(s) Oral at bedtime  calcium carbonate  625 mG + Vitamin D (OsCal 250 + D) 1 Tablet(s) Oral daily  clopidogrel Tablet 75 milliGRAM(s) Oral daily  docusate sodium 100 milliGRAM(s) Oral three times a day  ferrous    sulfate 325 milliGRAM(s) Oral daily  finasteride 5 milliGRAM(s) Oral daily  furosemide    Tablet 40 milliGRAM(s) Oral every other day  heparin  Injectable 5000 Unit(s) SubCutaneous every 12 hours  influenza   Vaccine 0.5 milliLiter(s) IntraMuscular once  iron sucrose IVPB 200 milliGRAM(s) IV Intermittent daily  metoprolol     tartrate 50 milliGRAM(s) Oral two times a day  montelukast 10 milliGRAM(s) Oral daily  pantoprazole    Tablet 40 milliGRAM(s) Oral before breakfast  predniSONE   Tablet 30 milliGRAM(s) Oral two times a day  senna 2 Tablet(s) Oral at bedtime  tamsulosin 0.8 milliGRAM(s) Oral at bedtime  tiotropium 18 MICROgram(s) Capsule 1 Capsule(s) Inhalation daily  triamcinolone 0.1% Ointment 1 Application(s) Topical two times a day    MEDICATIONS  (PRN):      Allergies    No Known Allergies    Intolerances        REVIEW OF SYSTEMS    General:	  Fair; overall feeling improved    Skin/Breast:  	rash improving    Ophthalmologic:  	  ENMT:	    Respiratory and Thorax:  	  Cardiovascular:	    Gastrointestinal:	    Genitourinary:	    Musculoskeletal:	  R shoulder pain    Neurological:	    Psychiatric:	    Hematology/Lymphatics:    Vital Signs Last 24 Hrs  T(C): 36.6 (24 Dec 2017 20:04), Max: 37 (24 Dec 2017 05:31)  T(F): 97.8 (24 Dec 2017 20:04), Max: 98.6 (24 Dec 2017 05:31)  HR: 65 (24 Dec 2017 20:04) (62 - 67)  BP: 133/56 (24 Dec 2017 20:04) (128/65 - 137/61)  BP(mean): --  RR: 18 (24 Dec 2017 20:04) (16 - 18)  SpO2: 98% (24 Dec 2017 20:04) (97% - 99%)      PHYSICAL EXAM:    Constitutional:  well appearing    Eyes:  EOMI    ENMT:    Neck:  supple no masses    Back:    Respiratory:    Cardiovascular:    Gastrointestinal:  soft nt, nd    Extremities:  less edema of LEs    Vascular:    Neurological:  grossly intact    Skin:  LE petechial rash-improving, lesions less aggressive looking, some clearing    Lymph Nodes:    Musculoskeletal:  R shoulder with good ROM, no active synovitis    Psychiatric:      LABS:                        7.8    12.19 )-----------( 419      ( 24 Dec 2017 08:45 )             26.4     12-24    141  |  105  |  44<H>  ----------------------------<  127<H>  4.9   |  22  |  1.82<H>    Ca    9.0      24 Dec 2017 08:35            RADIOLOGY & ADDITIONAL TESTS:

## 2017-12-24 NOTE — PROGRESS NOTE ADULT - SUBJECTIVE AND OBJECTIVE BOX
_________________________________________________________________________________________  ========>>  M E D I C A L   A T T E N D I N G    F O L L O W  U P  N O T E  <<=========  -----------------------------------------------------------------------------------------------------    - Patient seen and examined by me approximately thirty minutes ago.  - In summary, patient is a 82y year old man who originally presented with worsening rash.  - Patient today overall doing better, eating fairly per family      burning and itching of legs better     ==================>> REVIEW OF SYSTEM <<=================    GEN: no fever, no chills, no pain  RESP: no SOB, no cough, no sputum  CVS: no chest pain, no palpitations, no edema  GI: no abdominal pain, no nausea, no constipation, no diarrhea  : no dysuria, no frequency, no hematuria  Neuro: no headache, no dizziness  Derm : itching improved    ==================>> PHYSICAL EXAM <<=================    GEN: A&O X 3 , NAD , comfortable  HEENT: NCAT, PERRL, MMM, hearing intact, oral mucosa intact  Neck: supple , no JVD  CVS: S1S2 , regular , No M/R/G appreciated  PULM: CTA B/L,  no W/R/R appreciated  ABD.: soft. non tender, non distended,  bowel sounds present  Extrem: intact pulses , LE edema stable  PSYCH : normal mood,  not anxious  DERM: petechial rash on legs, thighs, back, abd, old healing wounds on left lower leg.. overall seem improved      ==================>> MEDICATIONS <<====================    ALBUTerol    90 MICROgram(s) HFA Inhaler 1 Puff(s) Inhalation every 4 hours  ALBUTerol/ipratropium for Nebulization 3 milliLiter(s) Nebulizer every 6 hours  aspirin  chewable 81 milliGRAM(s) Oral daily  atorvastatin 40 milliGRAM(s) Oral at bedtime  calcium carbonate  625 mG + Vitamin D (OsCal 250 + D) 1 Tablet(s) Oral daily  clopidogrel Tablet 75 milliGRAM(s) Oral daily  docusate sodium 100 milliGRAM(s) Oral three times a day  ferrous    sulfate 325 milliGRAM(s) Oral daily  finasteride 5 milliGRAM(s) Oral daily  furosemide    Tablet 40 milliGRAM(s) Oral every other day  heparin  Injectable 5000 Unit(s) SubCutaneous every 12 hours  influenza   Vaccine 0.5 milliLiter(s) IntraMuscular once  iron sucrose IVPB 200 milliGRAM(s) IV Intermittent daily  metoprolol     tartrate 50 milliGRAM(s) Oral two times a day  montelukast 10 milliGRAM(s) Oral daily  pantoprazole    Tablet 40 milliGRAM(s) Oral before breakfast  predniSONE   Tablet 30 milliGRAM(s) Oral two times a day  senna 2 Tablet(s) Oral at bedtime  tamsulosin 0.8 milliGRAM(s) Oral at bedtime  tiotropium 18 MICROgram(s) Capsule 1 Capsule(s) Inhalation daily  triamcinolone 0.1% Ointment 1 Application(s) Topical two times a day    ==================>> VITAL SIGNS <<==================    Vital Signs Last 24 Hrs  T(C): 36.3 (12-24-17 @ 12:33)  T(F): 97.4 (12-24-17 @ 12:33), Max: 98.6 (12-24-17 @ 05:31)  HR: 67 (12-24-17 @ 12:33) (63 - 70)  BP: 128/65 (12-24-17 @ 12:33)  BP(mean): --  RR: 18 (12-24-17 @ 12:33) (18 - 18)  SpO2: 99% (12-24-17 @ 12:33) (97% - 99%)       ==================>> LAB AND IMAGING <<==================                        7.8    12.19 )-----------( 419      ( 24 Dec 2017 08:45 )             26.4   WBC count:   12.19 <<== ,  8.37 <<== ,  9.20 <<== ,  8.59 <<== ,  8.3 <<== ,  8.31 <<==        Hemoglobin:   7.8 <<==,  7.6 <<==,  7.8 <<==,  8.1 <<==,  8.6 <<==,  7.8 <<==    Mean Cell Volume : 69.7 fl  Mean Cell Hemoglobin : 20.6 pg  Mean Cell Hemoglobin Concentration : 29.5 gm/dL  Auto Neutrophil # : 10.11 K/uL  Auto Lymphocyte # : 1.44 K/uL  Auto Monocyte # : 0.53 K/uL  Auto Eosinophil # : 0.01 K/uL  Auto Basophil # : 0.01 K/uL  Auto Neutrophil % : 83.0 %  Auto Lymphocyte % : 11.8 %  Auto Monocyte % : 4.3 %  Auto Eosinophil % : 0.1  Auto Basophil % : 0.1 %    141  |  105  |  44<H>  ----------------------------<  127<H>  4.9   |  22  |  1.82<H>    Creatinine:  1.82  <<==, 1.98  <<==, 2.04  <<==, 1.97  <<==, 2.12  <<==    Ca    9.0      24 Dec 2017 08:35      Surgical Pathology Report (12.20.17 @ 16:30)    Surgical Pathology Report:   ACCESSION No:  [id number]  BAILEE BUCHANAN                     2    Surgical Final Report    Final Diagnosis  Right thigh, 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    Note: There is a superficial and mid-dermal perivascular and  interstitial infiltrate comprised mainly of neutrophils with  scattered nuclear 'dust' of neutrophils. Fibrin is present in  walls of some vessels. 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    Right thight, punch biopsy for DIF  - Granular deposition of IgA (focal) and C3 within vessel  walls.  - No specific staining with IgG and IgM.    Verified by: Tyler Simpson MD  (Electronic Signature)  Reported on: 12/21/17 17:15 EST,[address], [address], [place] [address]  _________________________________________________________________    Clinical History  r/o LCV vs IgA vasculitis (DIF also sent)  1 week h/o purpura macules and papules on legs, arms, legs and  trunk with an associated TRINI    Specimen(s) Submitted  1     Right thigh 4mm punch  2     Right thight 4mm punch DIF    ___________________________________________________________________________________  ===============>>  A S S E S S M E N T   A N D   P L A N <<===============  ------------------------------------------------------------------------------------------    · Assessment		  82M h/o asthma, HTN, HLD, BPH presented with worsening rash and respiratory distress admitted for further evaluation of the rash and TRINI.    Problem/Plan - 1:  ·  Problem: Petechial rash.  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     Problem/Plan - 4:  ·  Problem: hypertension, HLD  - continue meds as above   - monitor closely on steroids     Problem/Plan - 5:  ·  Problem: Anemia, possibly related vasculitis? and iron deficiency  - no reports of bleeding  --last Iron level 5%: will give a few doses of IV iron  - monitor vitals and labs closely for now  - transfuse as needed  - expect improvement with steroids and IV Iron  - hematology eval if worsened  - would need GI f/u later    Problem/Plan - 6:  Problem: h/o CVA  - asa, plavix and zocor.    -GI/DVT Prophylaxis.    --------------------------------------------  Case discussed with pt, family at bedside, RN  Education given on plan of care, leg elevation, diet  ___________________________  H. SEFERINO Barrios.  Pager: [phone]

## 2017-12-25 LAB
ANION GAP SERPL CALC-SCNC: 12 MMOL/L — SIGNIFICANT CHANGE UP (ref 5–17)
BUN SERPL-MCNC: 43 MG/DL — HIGH (ref 7–23)
CALCIUM SERPL-MCNC: 8.6 MG/DL — SIGNIFICANT CHANGE UP (ref 8.4–10.5)
CHLORIDE SERPL-SCNC: 104 MMOL/L — SIGNIFICANT CHANGE UP (ref 96–108)
CO2 SERPL-SCNC: 25 MMOL/L — SIGNIFICANT CHANGE UP (ref 22–31)
CREAT SERPL-MCNC: 1.61 MG/DL — HIGH (ref 0.5–1.3)
FERRITIN SERPL-MCNC: 419 NG/ML — HIGH (ref 30–400)
GLUCOSE SERPL-MCNC: 130 MG/DL — HIGH (ref 70–99)
HCT VFR BLD CALC: 28 % — LOW (ref 39–50)
HGB BLD-MCNC: 8.6 G/DL — LOW (ref 13–17)
IRON SATN MFR SERPL: 233 UG/DL — HIGH (ref 45–165)
IRON SATN MFR SERPL: 92 % — HIGH (ref 16–55)
MCHC RBC-ENTMCNC: 22.5 PG — LOW (ref 27–34)
MCHC RBC-ENTMCNC: 30.7 GM/DL — LOW (ref 32–36)
MCV RBC AUTO: 73.2 FL — LOW (ref 80–100)
PLATELET # BLD AUTO: 404 K/UL — HIGH (ref 150–400)
POTASSIUM SERPL-MCNC: 4.5 MMOL/L — SIGNIFICANT CHANGE UP (ref 3.5–5.3)
POTASSIUM SERPL-SCNC: 4.5 MMOL/L — SIGNIFICANT CHANGE UP (ref 3.5–5.3)
RBC # BLD: 3.83 M/UL — LOW (ref 4.2–5.8)
RBC # FLD: 17.5 % — HIGH (ref 10.3–14.5)
SODIUM SERPL-SCNC: 141 MMOL/L — SIGNIFICANT CHANGE UP (ref 135–145)
TIBC SERPL-MCNC: 252 UG/DL — SIGNIFICANT CHANGE UP (ref 220–430)
UIBC SERPL-MCNC: 19 UG/DL — LOW (ref 110–370)
WBC # BLD: 18.6 K/UL — HIGH (ref 3.8–10.5)
WBC # FLD AUTO: 18.6 K/UL — HIGH (ref 3.8–10.5)

## 2017-12-25 RX ADMIN — TAMSULOSIN HYDROCHLORIDE 0.8 MILLIGRAM(S): 0.4 CAPSULE ORAL at 21:06

## 2017-12-25 RX ADMIN — Medication 3 MILLILITER(S): at 12:31

## 2017-12-25 RX ADMIN — PANTOPRAZOLE SODIUM 40 MILLIGRAM(S): 20 TABLET, DELAYED RELEASE ORAL at 06:14

## 2017-12-25 RX ADMIN — Medication 50 MILLIGRAM(S): at 17:11

## 2017-12-25 RX ADMIN — Medication 3 MILLILITER(S): at 06:14

## 2017-12-25 RX ADMIN — IRON SUCROSE 110 MILLIGRAM(S): 20 INJECTION, SOLUTION INTRAVENOUS at 12:31

## 2017-12-25 RX ADMIN — Medication 100 MILLIGRAM(S): at 06:26

## 2017-12-25 RX ADMIN — Medication 325 MILLIGRAM(S): at 12:32

## 2017-12-25 RX ADMIN — HEPARIN SODIUM 5000 UNIT(S): 5000 INJECTION INTRAVENOUS; SUBCUTANEOUS at 17:10

## 2017-12-25 RX ADMIN — MONTELUKAST 10 MILLIGRAM(S): 4 TABLET, CHEWABLE ORAL at 12:31

## 2017-12-25 RX ADMIN — Medication 81 MILLIGRAM(S): at 12:31

## 2017-12-25 RX ADMIN — Medication 3 MILLILITER(S): at 17:09

## 2017-12-25 RX ADMIN — HEPARIN SODIUM 5000 UNIT(S): 5000 INJECTION INTRAVENOUS; SUBCUTANEOUS at 06:14

## 2017-12-25 RX ADMIN — Medication 1 APPLICATION(S): at 06:20

## 2017-12-25 RX ADMIN — Medication 100 MILLIGRAM(S): at 12:32

## 2017-12-25 RX ADMIN — Medication 1 TABLET(S): at 12:31

## 2017-12-25 RX ADMIN — ATORVASTATIN CALCIUM 40 MILLIGRAM(S): 80 TABLET, FILM COATED ORAL at 21:06

## 2017-12-25 RX ADMIN — CLOPIDOGREL BISULFATE 75 MILLIGRAM(S): 75 TABLET, FILM COATED ORAL at 12:31

## 2017-12-25 RX ADMIN — SENNA PLUS 2 TABLET(S): 8.6 TABLET ORAL at 21:06

## 2017-12-25 RX ADMIN — Medication 50 MILLIGRAM(S): at 06:14

## 2017-12-25 RX ADMIN — Medication 100 MILLIGRAM(S): at 21:06

## 2017-12-25 RX ADMIN — Medication 40 MILLIGRAM(S): at 12:32

## 2017-12-25 RX ADMIN — Medication 1 APPLICATION(S): at 17:11

## 2017-12-25 RX ADMIN — Medication 30 MILLIGRAM(S): at 06:14

## 2017-12-25 RX ADMIN — Medication 3 MILLILITER(S): at 23:12

## 2017-12-25 RX ADMIN — FINASTERIDE 5 MILLIGRAM(S): 5 TABLET, FILM COATED ORAL at 12:32

## 2017-12-25 RX ADMIN — Medication 30 MILLIGRAM(S): at 17:11

## 2017-12-25 NOTE — PROGRESS NOTE ADULT - SUBJECTIVE AND OBJECTIVE BOX
_________________________________________________________________________________________  ========>>  M E D I C A L   A T T E N D I N G    F O L L O W  U P  N O T E  <<=========  -----------------------------------------------------------------------------------------------------    - Patient seen and examined by me approximately thirty minutes ago.  - In summary, patient is a 82y year old man who originally presented with worsening rash.  - Patient today overall doing better, eating fairly       burning and itching of legs better     ==================>> REVIEW OF SYSTEM <<=================    GEN: no fever, no chills, no pain  RESP: no SOB, no cough, no sputum  CVS: no chest pain, no palpitations, no edema  GI: no abdominal pain, no nausea, no constipation, no diarrhea  : no dysuria, no frequency, no hematuria  Neuro: no headache, no dizziness  Derm : itching improved    ==================>> PHYSICAL EXAM <<=================    GEN: A&O X 3 , NAD , comfortable  HEENT: NCAT, PERRL, MMM, hearing intact, oral mucosa intact  Neck: supple , no JVD  CVS: S1S2 , regular , No M/R/G appreciated  PULM: CTA B/L,  no W/R/R appreciated  ABD.: soft. non tender, non distended,  bowel sounds present  Extrem: intact pulses , LE edema stable  PSYCH : normal mood,  not anxious  DERM: petechial rash on legs, thighs, back, abd, old healing wounds on left lower leg.. overall seem improved       ==================>> MEDICATIONS <<====================    ALBUTerol    90 MICROgram(s) HFA Inhaler 1 Puff(s) Inhalation every 4 hours  ALBUTerol/ipratropium for Nebulization 3 milliLiter(s) Nebulizer every 6 hours  aspirin  chewable 81 milliGRAM(s) Oral daily  atorvastatin 40 milliGRAM(s) Oral at bedtime  calcium carbonate  625 mG + Vitamin D (OsCal 250 + D) 1 Tablet(s) Oral daily  clopidogrel Tablet 75 milliGRAM(s) Oral daily  docusate sodium 100 milliGRAM(s) Oral three times a day  ferrous    sulfate 325 milliGRAM(s) Oral daily  finasteride 5 milliGRAM(s) Oral daily  furosemide    Tablet 40 milliGRAM(s) Oral every other day  heparin  Injectable 5000 Unit(s) SubCutaneous every 12 hours  influenza   Vaccine 0.5 milliLiter(s) IntraMuscular once  iron sucrose IVPB 200 milliGRAM(s) IV Intermittent daily  metoprolol     tartrate 50 milliGRAM(s) Oral two times a day  montelukast 10 milliGRAM(s) Oral daily  pantoprazole    Tablet 40 milliGRAM(s) Oral before breakfast  predniSONE   Tablet 30 milliGRAM(s) Oral two times a day  senna 2 Tablet(s) Oral at bedtime  tamsulosin 0.8 milliGRAM(s) Oral at bedtime  tiotropium 18 MICROgram(s) Capsule 1 Capsule(s) Inhalation daily  triamcinolone 0.1% Ointment 1 Application(s) Topical two times a day    ==================>> VITAL SIGNS <<==================    Vital Signs Last 24 Hrs  T(C): 36.6 (12-25-17 @ 12:50)  T(F): 97.8 (12-25-17 @ 12:50), Max: 98.6 (12-24-17 @ 17:40)  HR: 67 (12-25-17 @ 12:50) (62 - 67)  BP: 126/59 (12-25-17 @ 12:50)  BP(mean): --  RR: 18 (12-25-17 @ 12:50) (16 - 19)  SpO2: 98% (12-25-17 @ 12:50) (98% - 99%)       ==================>> LAB AND IMAGING <<==================                        8.6    18.6  )-----------( 404      ( 25 Dec 2017 14:50 )             28.0        WBC count:   18.6 <<== ,  12.19 <<== ,  8.37 <<== ,  9.20 <<== ,  8.59 <<== ,  8.3 <<==     Hemoglobin:   8.6 <<==,  7.8 <<==,  7.6 <<==,  7.8 <<==,  8.1 <<==,  8.6 <<==    141  |  105  |  44<H>  ----------------------------<  127<H>  4.9   |  22  |  1.82<H>    Creatinine:  1.82  <<==, 1.98  <<==, 2.04  <<==, 1.97  <<==    Ca    9.0      24 Dec 2017 08:35    Surgical Pathology Report (12.20.17 @ 16:30)    Surgical Pathology Report:   ACCESSION No:  [id number]  BAILEE BUCHANAN                     2    Surgical Final Report    Final Diagnosis  Right thigh, 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    Note: There is a superficial and mid-dermal perivascular and  interstitial infiltrate comprised mainly of neutrophils with  scattered nuclear 'dust' of neutrophils. Fibrin is present in  walls of some vessels. 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    Right thight, punch biopsy for DIF  - Granular deposition of IgA (focal) and C3 within vessel  walls.  - No specific staining with IgG and IgM.    Verified by: Tyler Simpson MD  (Electronic Signature)  Reported on: 12/21/17 17:15 EST,[address], [address], [place] [address]  _________________________________________________________________    Clinical History  r/o LCV vs IgA vasculitis (DIF also sent)  1 week h/o purpura macules and papules on legs, arms, legs and  trunk with an associated TRINI    Specimen(s) Submitted  1     Right thigh 4mm punch  2     Right thight 4mm punch DIF    ___________________________________________________________________________________  ===============>>  A S S E S S M E N T   A N D   P L A N <<===============  ------------------------------------------------------------------------------------------    · Assessment		  82M h/o asthma, HTN, HLD, BPH presented with worsening rash and respiratory distress admitted for further evaluation of the rash and TRINI.    Problem/Plan - 1:  ·  Problem: Petechial rash.  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     Problem/Plan - 4:  ·  Problem: hypertension, HLD  - continue meds as above   - monitor closely on steroids     Problem/Plan - 5:  ·  Problem: Anemia, possibly related vasculitis? and iron deficiency  - no reports of bleeding  --last Iron level 5%: will give a few doses of IV iron  - monitor vitals and labs closely for now  - transfuse as needed  - expect improvement with steroids and IV Iron  - hematology eval if worsened  - would need GI f/u later    Problem/Plan - 6:  Problem: h/o CVA  - asa, plavix and zocor.    -GI/DVT Prophylaxis.    --------------------------------------------  Case discussed with pt  Education given on plan of care, leg elevation, diet  ___________________________  H. SEFERINO Barrios.  Pager: [phone]

## 2017-12-26 LAB
ANION GAP SERPL CALC-SCNC: 13 MMOL/L — SIGNIFICANT CHANGE UP (ref 5–17)
BUN SERPL-MCNC: 41 MG/DL — HIGH (ref 7–23)
CALCIUM SERPL-MCNC: 9.1 MG/DL — SIGNIFICANT CHANGE UP (ref 8.4–10.5)
CHLORIDE SERPL-SCNC: 103 MMOL/L — SIGNIFICANT CHANGE UP (ref 96–108)
CO2 SERPL-SCNC: 24 MMOL/L — SIGNIFICANT CHANGE UP (ref 22–31)
CREAT ?TM UR-MCNC: 19 MG/DL — SIGNIFICANT CHANGE UP
CREAT SERPL-MCNC: 1.6 MG/DL — HIGH (ref 0.5–1.3)
GLUCOSE SERPL-MCNC: 76 MG/DL — SIGNIFICANT CHANGE UP (ref 70–99)
HCT VFR BLD CALC: 27.2 % — LOW (ref 39–50)
HGB BLD-MCNC: 7.9 G/DL — LOW (ref 13–17)
MCHC RBC-ENTMCNC: 20.5 PG — LOW (ref 27–34)
MCHC RBC-ENTMCNC: 29 GM/DL — LOW (ref 32–36)
MCV RBC AUTO: 70.6 FL — LOW (ref 80–100)
OB PNL STL: POSITIVE
PLATELET # BLD AUTO: 385 K/UL — SIGNIFICANT CHANGE UP (ref 150–400)
POTASSIUM SERPL-MCNC: 4.5 MMOL/L — SIGNIFICANT CHANGE UP (ref 3.5–5.3)
POTASSIUM SERPL-SCNC: 4.5 MMOL/L — SIGNIFICANT CHANGE UP (ref 3.5–5.3)
PROT ?TM UR-MCNC: 11 MG/DL — SIGNIFICANT CHANGE UP (ref 0–12)
PROT/CREAT UR-RTO: 0.6 RATIO — HIGH (ref 0–0.2)
RBC # BLD: 3.85 M/UL — LOW (ref 4.2–5.8)
RBC # FLD: 19 % — HIGH (ref 10.3–14.5)
SODIUM SERPL-SCNC: 140 MMOL/L — SIGNIFICANT CHANGE UP (ref 135–145)
WBC # BLD: 15.61 K/UL — HIGH (ref 3.8–10.5)
WBC # FLD AUTO: 15.61 K/UL — HIGH (ref 3.8–10.5)

## 2017-12-26 RX ORDER — SODIUM CHLORIDE 0.65 %
1 AEROSOL, SPRAY (ML) NASAL
Qty: 0 | Refills: 0 | Status: DISCONTINUED | OUTPATIENT
Start: 2017-12-26 | End: 2017-12-28

## 2017-12-26 RX ADMIN — Medication 50 MILLIGRAM(S): at 05:56

## 2017-12-26 RX ADMIN — Medication 100 MILLIGRAM(S): at 05:56

## 2017-12-26 RX ADMIN — HEPARIN SODIUM 5000 UNIT(S): 5000 INJECTION INTRAVENOUS; SUBCUTANEOUS at 17:25

## 2017-12-26 RX ADMIN — Medication 100 MILLIGRAM(S): at 21:32

## 2017-12-26 RX ADMIN — MONTELUKAST 10 MILLIGRAM(S): 4 TABLET, CHEWABLE ORAL at 11:35

## 2017-12-26 RX ADMIN — Medication 3 MILLILITER(S): at 17:26

## 2017-12-26 RX ADMIN — HEPARIN SODIUM 5000 UNIT(S): 5000 INJECTION INTRAVENOUS; SUBCUTANEOUS at 05:56

## 2017-12-26 RX ADMIN — Medication 1 SPRAY(S): at 23:33

## 2017-12-26 RX ADMIN — Medication 3 MILLILITER(S): at 11:34

## 2017-12-26 RX ADMIN — Medication 50 MILLIGRAM(S): at 17:26

## 2017-12-26 RX ADMIN — Medication 1 TABLET(S): at 11:35

## 2017-12-26 RX ADMIN — Medication 81 MILLIGRAM(S): at 11:35

## 2017-12-26 RX ADMIN — PANTOPRAZOLE SODIUM 40 MILLIGRAM(S): 20 TABLET, DELAYED RELEASE ORAL at 05:57

## 2017-12-26 RX ADMIN — FINASTERIDE 5 MILLIGRAM(S): 5 TABLET, FILM COATED ORAL at 11:35

## 2017-12-26 RX ADMIN — CLOPIDOGREL BISULFATE 75 MILLIGRAM(S): 75 TABLET, FILM COATED ORAL at 11:35

## 2017-12-26 RX ADMIN — Medication 3 MILLILITER(S): at 23:32

## 2017-12-26 RX ADMIN — SENNA PLUS 2 TABLET(S): 8.6 TABLET ORAL at 21:32

## 2017-12-26 RX ADMIN — Medication 30 MILLIGRAM(S): at 17:26

## 2017-12-26 RX ADMIN — Medication 3 MILLILITER(S): at 05:56

## 2017-12-26 RX ADMIN — TAMSULOSIN HYDROCHLORIDE 0.8 MILLIGRAM(S): 0.4 CAPSULE ORAL at 21:33

## 2017-12-26 RX ADMIN — IRON SUCROSE 110 MILLIGRAM(S): 20 INJECTION, SOLUTION INTRAVENOUS at 11:35

## 2017-12-26 RX ADMIN — Medication 1 APPLICATION(S): at 17:26

## 2017-12-26 RX ADMIN — Medication 100 MILLIGRAM(S): at 13:24

## 2017-12-26 RX ADMIN — Medication 30 MILLIGRAM(S): at 05:57

## 2017-12-26 RX ADMIN — ATORVASTATIN CALCIUM 40 MILLIGRAM(S): 80 TABLET, FILM COATED ORAL at 21:32

## 2017-12-26 RX ADMIN — Medication 325 MILLIGRAM(S): at 11:35

## 2017-12-26 RX ADMIN — Medication 1 APPLICATION(S): at 06:25

## 2017-12-26 NOTE — PROGRESS NOTE ADULT - SUBJECTIVE AND OBJECTIVE BOX
_________________________________________________________________________________________  ========>>  M E D I C A L   A T T E N D I N G    F O L L O W  U P  N O T E  <<=========  -----------------------------------------------------------------------------------------------------    - Patient seen and examined by me approximately thirty minutes ago.  - In summary, patient is a 82y year old man who originally presented with worsening rash.  - Patient today overall doing better, eating fairly     ==================>> REVIEW OF SYSTEM <<=================    GEN: no fever, no chills, no pain  RESP: no SOB, no cough, no sputum  CVS: no chest pain, no palpitations, no edema  GI: no abdominal pain, no nausea, no constipation, no diarrhea  : no dysuria, no frequency, no hematuria  Neuro: no headache, no dizziness  Derm : itching improved    ==================>> PHYSICAL EXAM <<=================    GEN: A&O X 3 , NAD , comfortable  HEENT: NCAT, PERRL, MMM, hearing intact, oral mucosa intact  Neck: supple , no JVD  CVS: S1S2 , regular , No M/R/G appreciated  PULM: CTA B/L,  no W/R/R appreciated  ABD.: soft. non tender, non distended,  bowel sounds present  Extrem: intact pulses , LE edema stable  PSYCH : normal mood,  not anxious  DERM: petechial rash on legs, thighs, back, abd, old healing wounds on left lower leg.. overall seem improved      ==================>> MEDICATIONS <<====================    ALBUTerol    90 MICROgram(s) HFA Inhaler 1 Puff(s) Inhalation every 4 hours  ALBUTerol/ipratropium for Nebulization 3 milliLiter(s) Nebulizer every 6 hours  aspirin  chewable 81 milliGRAM(s) Oral daily  atorvastatin 40 milliGRAM(s) Oral at bedtime  calcium carbonate  625 mG + Vitamin D (OsCal 250 + D) 1 Tablet(s) Oral daily  clopidogrel Tablet 75 milliGRAM(s) Oral daily  docusate sodium 100 milliGRAM(s) Oral three times a day  ferrous    sulfate 325 milliGRAM(s) Oral daily  finasteride 5 milliGRAM(s) Oral daily  furosemide    Tablet 40 milliGRAM(s) Oral every other day  heparin  Injectable 5000 Unit(s) SubCutaneous every 12 hours  influenza   Vaccine 0.5 milliLiter(s) IntraMuscular once  metoprolol     tartrate 50 milliGRAM(s) Oral two times a day  montelukast 10 milliGRAM(s) Oral daily  pantoprazole    Tablet 40 milliGRAM(s) Oral before breakfast  predniSONE   Tablet 30 milliGRAM(s) Oral two times a day  senna 2 Tablet(s) Oral at bedtime  tamsulosin 0.8 milliGRAM(s) Oral at bedtime  tiotropium 18 MICROgram(s) Capsule 1 Capsule(s) Inhalation daily  triamcinolone 0.1% Ointment 1 Application(s) Topical two times a day    ==================>> VITAL SIGNS <<==================    Vital Signs Last 24 Hrs  T(C): 36.4 (12-26-17 @ 12:56)  T(F): 97.5 (12-26-17 @ 12:56), Max: 98.2 (12-26-17 @ 05:48)  HR: 78 (12-26-17 @ 17:30) (64 - 78)  BP: 130/57 (12-26-17 @ 17:30)  BP(mean): --  RR: 18 (12-26-17 @ 17:30) (18 - 18)  SpO2: 99% (12-26-17 @ 17:30) (97% - 99%)       ==================>> LAB AND IMAGING <<==================                        7.9    15.61 )-----------( 385      ( 26 Dec 2017 09:21 )             27.2        WBC count:   15.61 <<== ,  18.6 <<== ,  12.19 <<== ,  8.37 <<== ,  9.20 <<==     Hemoglobin:   7.9 <<==,  8.6 <<==,  7.8 <<==,  7.6 <<==,  7.8 <<==    140  |  103  |  41<H>  ----------------------------<  76  4.5   |  24  |  1.60<H>    Creatinine:  1.60  <<==, 1.61  <<==, 1.82  <<==, 1.98  <<==, 2.04  <<==    Ca    9.1      26 Dec 2017 09:11      Surgical Pathology Report (12.20.17 @ 16:30)    Surgical Pathology Report:   ACCESSION No:  [id number]  BAILEE BUCHANAN                     2    Surgical Final Report    Final Diagnosis  Right thigh, 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    Note: There is a superficial and mid-dermal perivascular and  interstitial infiltrate comprised mainly of neutrophils with  scattered nuclear 'dust' of neutrophils. Fibrin is present in  walls of some vessels. 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    Right thight, punch biopsy for DIF  - Granular deposition of IgA (focal) and C3 within vessel  walls.  - No specific staining with IgG and IgM.    Verified by: Tyler Simpson MD  (Electronic Signature)  Reported on: 12/21/17 17:15 EST,[address], [address], [place] [address]  _________________________________________________________________    Clinical History  r/o LCV vs IgA vasculitis (DIF also sent)  1 week h/o purpura macules and papules on legs, arms, legs and  trunk with an associated TRINI    Specimen(s) Submitted  1     Right thigh 4mm punch  2     Right thight 4mm punch DIF    ___________________________________________________________________________________  ===============>>  A S S E S S M E N T   A N D   P L A N <<===============  ------------------------------------------------------------------------------------------    · Assessment		  82M h/o asthma, HTN, HLD, BPH presented with worsening rash and respiratory distress admitted for further evaluation of the rash and TRINI.    Problem/Plan - 1:  ·  Problem: Petechial rash.  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    Problem/Plan - 2:  ·  Problem: TRINI likely due to vasculitis, improved  - resumed Lasix every other day given edema  - will monitor strict I&O's, urine output and renal function  - monitor microhematuria    Problem/Plan - 3:  ·  Problem: Mild intermittent asthma stable  - nebs PRN  - Singulair.     Problem/Plan - 4:  ·  Problem: hypertension, HLD  - continue meds as above   - monitor closely on steroids     Problem/Plan - 5:  ·  Problem: Anemia, possibly related vasculitis? and iron deficiency  - no reports of bleeding, occult +  --last Iron level 5%: will give a few doses of IV iron  - monitor vitals and labs closely for now  - transfuse as needed  - expect some improvement with steroids and IV Iron  - hematology eval if worsened  - will need GI f/u later as outpatient post steroids, as discussed with pt's children  - PPI while on steroids    Problem/Plan - 6:  Problem: h/o CVA  - asa, plavix and zocor.    -GI/DVT Prophylaxis.    --------------------------------------------  Case discussed with pt, son, Dtr, NP  Education given on plan of care, leg elevation, diet  ___________________________  KAMRAN. SEFERINO Barrios.  Pager: [phone]

## 2017-12-26 NOTE — PROGRESS NOTE ADULT - SUBJECTIVE AND OBJECTIVE BOX
INTERVAL HPI/OVERNIGHT EVENTS:  Pt continues to improve; less pain, rash decreasing.  On prednisone 60mg/d    MEDICATIONS  (STANDING):  ALBUTerol    90 MICROgram(s) HFA Inhaler 1 Puff(s) Inhalation every 4 hours  ALBUTerol/ipratropium for Nebulization 3 milliLiter(s) Nebulizer every 6 hours  aspirin  chewable 81 milliGRAM(s) Oral daily  atorvastatin 40 milliGRAM(s) Oral at bedtime  calcium carbonate  625 mG + Vitamin D (OsCal 250 + D) 1 Tablet(s) Oral daily  clopidogrel Tablet 75 milliGRAM(s) Oral daily  docusate sodium 100 milliGRAM(s) Oral three times a day  ferrous    sulfate 325 milliGRAM(s) Oral daily  finasteride 5 milliGRAM(s) Oral daily  furosemide    Tablet 40 milliGRAM(s) Oral every other day  heparin  Injectable 5000 Unit(s) SubCutaneous every 12 hours  influenza   Vaccine 0.5 milliLiter(s) IntraMuscular once  metoprolol     tartrate 50 milliGRAM(s) Oral two times a day  montelukast 10 milliGRAM(s) Oral daily  pantoprazole    Tablet 40 milliGRAM(s) Oral before breakfast  predniSONE   Tablet 30 milliGRAM(s) Oral two times a day  senna 2 Tablet(s) Oral at bedtime  sodium chloride 0.65% Nasal 1 Spray(s) Both Nostrils four times a day  tamsulosin 0.8 milliGRAM(s) Oral at bedtime  tiotropium 18 MICROgram(s) Capsule 1 Capsule(s) Inhalation daily  triamcinolone 0.1% Ointment 1 Application(s) Topical two times a day    MEDICATIONS  (PRN):      Allergies    No Known Allergies    Intolerances        REVIEW OF SYSTEMS    General:	    Skin/Breast:  	  Ophthalmologic:  	  ENMT:	    Respiratory and Thorax:  	  Cardiovascular:	    Gastrointestinal:	    Genitourinary:	    Musculoskeletal:	    Neurological:	    Psychiatric:	    Hematology/Lymphatics:    Vital Signs Last 24 Hrs  T(C): 36.8 (26 Dec 2017 19:57), Max: 36.8 (26 Dec 2017 05:48)  T(F): 98.3 (26 Dec 2017 19:57), Max: 98.3 (26 Dec 2017 19:57)  HR: 67 (26 Dec 2017 19:57) (67 - 78)  BP: 126/58 (26 Dec 2017 19:57) (126/58 - 158/56)  BP(mean): --  RR: 18 (26 Dec 2017 19:57) (18 - 18)  SpO2: 97% (26 Dec 2017 19:57) (97% - 99%)      PHYSICAL EXAM:    Constitutional:  well appearing    Eyes:    ENMT:    Neck:  supple     Back:    Respiratory:    Cardiovascular:  S1S2 regular    Gastrointestinal:  abd soft nt    Extremities:  +LE edema improving    Vascular:    Neurological:    Skin:  LE rash with petechial lesions; improving-lesions thinning out    Lymph Nodes:    Musculoskeletal:  No active synovitis    Psychiatric:      LABS:                        7.9    15.61 )-----------( 385      ( 26 Dec 2017 09:21 )             27.2     12-26    140  |  103  |  41<H>  ----------------------------<  76  4.5   |  24  |  1.60<H>    Ca    9.1      26 Dec 2017 09:11            RADIOLOGY & ADDITIONAL TESTS:

## 2017-12-27 ENCOUNTER — TRANSCRIPTION ENCOUNTER (OUTPATIENT)
Age: 82
End: 2017-12-27

## 2017-12-27 LAB
ANION GAP SERPL CALC-SCNC: 10 MMOL/L — SIGNIFICANT CHANGE UP (ref 5–17)
BUN SERPL-MCNC: 36 MG/DL — HIGH (ref 7–23)
CALCIUM SERPL-MCNC: 8.4 MG/DL — SIGNIFICANT CHANGE UP (ref 8.4–10.5)
CHLORIDE SERPL-SCNC: 106 MMOL/L — SIGNIFICANT CHANGE UP (ref 96–108)
CO2 SERPL-SCNC: 28 MMOL/L — SIGNIFICANT CHANGE UP (ref 22–31)
CREAT SERPL-MCNC: 1.62 MG/DL — HIGH (ref 0.5–1.3)
GLUCOSE SERPL-MCNC: 79 MG/DL — SIGNIFICANT CHANGE UP (ref 70–99)
HCT VFR BLD CALC: 26.3 % — LOW (ref 39–50)
HGB BLD-MCNC: 7.7 G/DL — LOW (ref 13–17)
MCHC RBC-ENTMCNC: 20.7 PG — LOW (ref 27–34)
MCHC RBC-ENTMCNC: 29.3 GM/DL — LOW (ref 32–36)
MCV RBC AUTO: 70.7 FL — LOW (ref 80–100)
PLATELET # BLD AUTO: 404 K/UL — HIGH (ref 150–400)
POTASSIUM SERPL-MCNC: 4.3 MMOL/L — SIGNIFICANT CHANGE UP (ref 3.5–5.3)
POTASSIUM SERPL-SCNC: 4.3 MMOL/L — SIGNIFICANT CHANGE UP (ref 3.5–5.3)
RBC # BLD: 3.72 M/UL — LOW (ref 4.2–5.8)
RBC # FLD: 19.3 % — HIGH (ref 10.3–14.5)
SODIUM SERPL-SCNC: 144 MMOL/L — SIGNIFICANT CHANGE UP (ref 135–145)
WBC # BLD: 14.67 K/UL — HIGH (ref 3.8–10.5)
WBC # FLD AUTO: 14.67 K/UL — HIGH (ref 3.8–10.5)

## 2017-12-27 RX ADMIN — Medication 1 TABLET(S): at 13:02

## 2017-12-27 RX ADMIN — Medication 50 MILLIGRAM(S): at 05:31

## 2017-12-27 RX ADMIN — SENNA PLUS 2 TABLET(S): 8.6 TABLET ORAL at 21:09

## 2017-12-27 RX ADMIN — Medication 20 MILLIGRAM(S): at 05:31

## 2017-12-27 RX ADMIN — Medication 3 MILLILITER(S): at 05:32

## 2017-12-27 RX ADMIN — PANTOPRAZOLE SODIUM 40 MILLIGRAM(S): 20 TABLET, DELAYED RELEASE ORAL at 05:31

## 2017-12-27 RX ADMIN — HEPARIN SODIUM 5000 UNIT(S): 5000 INJECTION INTRAVENOUS; SUBCUTANEOUS at 05:32

## 2017-12-27 RX ADMIN — Medication 40 MILLIGRAM(S): at 13:03

## 2017-12-27 RX ADMIN — TAMSULOSIN HYDROCHLORIDE 0.8 MILLIGRAM(S): 0.4 CAPSULE ORAL at 21:09

## 2017-12-27 RX ADMIN — Medication 325 MILLIGRAM(S): at 13:02

## 2017-12-27 RX ADMIN — ATORVASTATIN CALCIUM 40 MILLIGRAM(S): 80 TABLET, FILM COATED ORAL at 21:10

## 2017-12-27 RX ADMIN — CLOPIDOGREL BISULFATE 75 MILLIGRAM(S): 75 TABLET, FILM COATED ORAL at 13:02

## 2017-12-27 RX ADMIN — Medication 50 MILLIGRAM(S): at 17:05

## 2017-12-27 RX ADMIN — Medication 81 MILLIGRAM(S): at 13:02

## 2017-12-27 RX ADMIN — Medication 1 SPRAY(S): at 05:35

## 2017-12-27 RX ADMIN — Medication 1 APPLICATION(S): at 05:35

## 2017-12-27 RX ADMIN — Medication 3 MILLILITER(S): at 13:02

## 2017-12-27 RX ADMIN — FINASTERIDE 5 MILLIGRAM(S): 5 TABLET, FILM COATED ORAL at 13:02

## 2017-12-27 RX ADMIN — Medication 1 APPLICATION(S): at 17:05

## 2017-12-27 RX ADMIN — Medication 1 SPRAY(S): at 13:03

## 2017-12-27 RX ADMIN — Medication 100 MILLIGRAM(S): at 05:31

## 2017-12-27 RX ADMIN — MONTELUKAST 10 MILLIGRAM(S): 4 TABLET, CHEWABLE ORAL at 13:02

## 2017-12-27 RX ADMIN — Medication 3 MILLILITER(S): at 17:06

## 2017-12-27 RX ADMIN — Medication 20 MILLIGRAM(S): at 17:05

## 2017-12-27 RX ADMIN — Medication 100 MILLIGRAM(S): at 13:02

## 2017-12-27 RX ADMIN — HEPARIN SODIUM 5000 UNIT(S): 5000 INJECTION INTRAVENOUS; SUBCUTANEOUS at 17:06

## 2017-12-27 RX ADMIN — Medication 100 MILLIGRAM(S): at 21:10

## 2017-12-27 NOTE — DIETITIAN INITIAL EVALUATION ADULT. - ENERGY NEEDS
ht = 63 inches, wt = 163 pounds (12/27/17), BMI = 28.9kg/m2, IBW = 124 pounds, 131%IBW    Other Pertinent Information: 81 Y/O male with PMH of athma, HTN, HLD, BPH presented with worsening rash and respiratory distress admitted for further evaluation of the rash and TRINI. ht = 63 inches, wt = 163 pounds (12/27/17), BMI = 28.9kg/m2, IBW = 124 pounds, 131%IBW    Other Pertinent Information: 83 Y/O male with PMH of asthma, HTN, HLD, BPH presented with worsening rash and respiratory distress admitted for further evaluation of the rash and TRINI.

## 2017-12-27 NOTE — PROGRESS NOTE ADULT - SUBJECTIVE AND OBJECTIVE BOX
_________________________________________________________________________________________  ========>>  M E D I C A L   A T T E N D I N G    F O L L O W  U P  N O T E  <<=========  -----------------------------------------------------------------------------------------------------    - Patient seen and examined by me approximately thirty minutes ago.  - In summary, patient is a 82y year old man who originally presented with worsening rash.  - Patient today overall doing better, eating fairly no more burning or itching reported    ==================>> REVIEW OF SYSTEM <<=================    GEN: no fever, no chills, no pain  RESP: no SOB, no cough, no sputum  CVS: no chest pain, no palpitations, no edema  GI: no abdominal pain, no nausea, no constipation, no diarrhea  : no dysuria, no frequency, no hematuria  Neuro: no headache, no dizziness  Derm : itching improved    ==================>> PHYSICAL EXAM <<=================    GEN: A&O X 3 , NAD , comfortable  HEENT: NCAT, PERRL, MMM, hearing intact, oral mucosa intact  Neck: supple , no JVD  CVS: S1S2 , regular , No M/R/G appreciated  PULM: CTA B/L,  no W/R/R appreciated  ABD.: soft. non tender, non distended,  bowel sounds present  Extrem: intact pulses , LE edema stable  PSYCH : normal mood,  not anxious  DERM: petechial rash on legs, thighs, back, abd, overall improved      ==================>> MEDICATIONS <<====================    ALBUTerol    90 MICROgram(s) HFA Inhaler 1 Puff(s) Inhalation every 4 hours  ALBUTerol/ipratropium for Nebulization 3 milliLiter(s) Nebulizer every 6 hours  aspirin  chewable 81 milliGRAM(s) Oral daily  atorvastatin 40 milliGRAM(s) Oral at bedtime  calcium carbonate  625 mG + Vitamin D (OsCal 250 + D) 1 Tablet(s) Oral daily  clopidogrel Tablet 75 milliGRAM(s) Oral daily  docusate sodium 100 milliGRAM(s) Oral three times a day  ferrous    sulfate 325 milliGRAM(s) Oral daily  finasteride 5 milliGRAM(s) Oral daily  furosemide    Tablet 40 milliGRAM(s) Oral every other day  heparin  Injectable 5000 Unit(s) SubCutaneous every 12 hours  influenza   Vaccine 0.5 milliLiter(s) IntraMuscular once  metoprolol     tartrate 50 milliGRAM(s) Oral two times a day  montelukast 10 milliGRAM(s) Oral daily  pantoprazole    Tablet 40 milliGRAM(s) Oral before breakfast  predniSONE   Tablet 20 milliGRAM(s) Oral two times a day  senna 2 Tablet(s) Oral at bedtime  sodium chloride 0.65% Nasal 1 Spray(s) Both Nostrils four times a day  tamsulosin 0.8 milliGRAM(s) Oral at bedtime  tiotropium 18 MICROgram(s) Capsule 1 Capsule(s) Inhalation daily  triamcinolone 0.1% Ointment 1 Application(s) Topical two times a day    ==================>> VITAL SIGNS <<==================    Vital Signs Last 24 Hrs  T(C): 36.7 (12-27-17 @ 11:46)  T(F): 98 (12-27-17 @ 11:46), Max: 98.3 (12-26-17 @ 19:57)  HR: 69 (12-27-17 @ 17:02) (67 - 80)  BP: 164/67 (12-27-17 @ 17:02)  BP(mean): --  RR: 18 (12-27-17 @ 11:46) (17 - 18)  SpO2: 96% (12-27-17 @ 11:46) (96% - 97%)       ==================>> LAB AND IMAGING <<==================                        7.7    14.67 )-----------( 404      ( 27 Dec 2017 07:23 )             26.3     WBC count:   14.67 <<== ,  15.61 <<== ,  18.6 <<== ,  12.19 <<== ,  8.37 <<==     Hemoglobin:   7.7 <<==,  7.9 <<==,  8.6 <<==,  7.8 <<==,  7.6 <<==    144  |  106  |  36<H>  ----------------------------<  79  4.3   |  28  |  1.62<H>    Ca    8.4      27 Dec 2017 08:48    Creatinine:  1.62  <<==, 1.60  <<==, 1.61  <<==, 1.82  <<==, 1.98  <<==      Surgical Pathology Report (12.20.17 @ 16:30)    Surgical Pathology Report:   ACCESSION No:  [id number]  BAILEE BUCHANAN                     2    Surgical Final Report    Final Diagnosis  Right thigh, 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    Note: There is a superficial and mid-dermal perivascular and  interstitial infiltrate comprised mainly of neutrophils with  scattered nuclear 'dust' of neutrophils. Fibrin is present in  walls of some vessels. 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    Right thight, punch biopsy for DIF  - Granular deposition of IgA (focal) and C3 within vessel  walls.  - No specific staining with IgG and IgM.    Verified by: Tyler Simpson MD  (Electronic Signature)  Reported on: 12/21/17 17:15 EST,[address], [address], [place] [address]  _________________________________________________________________    Clinical History  r/o LCV vs IgA vasculitis (DIF also sent)  1 week h/o purpura macules and papules on legs, arms, legs and  trunk with an associated TRINI    Specimen(s) Submitted  1     Right thigh 4mm punch  2     Right thight 4mm punch DIF    ___________________________________________________________________________________  ===============>>  A S S E S S M E N T   A N D   P L A N <<===============  ------------------------------------------------------------------------------------------    · Assessment		  82M h/o asthma, HTN, HLD, BPH presented with worsening rash and respiratory distress admitted for further evaluation of the rash and TRINI.    Problem/Plan - 1:  ·  Problem: Petechial rash.  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    Problem/Plan - 2:  ·  Problem: TRINI likely due to vasculitis, improved  - resumed Lasix every other day given edema  - will monitor strict I&O's, urine output and renal function    Problem/Plan - 3:  ·  Problem: Mild intermittent asthma stable  - nebs PRN  - Singulair.     Problem/Plan - 4:  ·  Problem: hypertension, HLD  - continue meds as above   - monitor closely on steroids     Problem/Plan - 5:  ·  Problem: Anemia, possibly related vasculitis? and iron deficiency  - no reports of bleeding, occult +  --last Iron level 5%: will give a few doses of IV iron and one unite PRBC today  - monitor vitals and labs closely for now  - expect some improvement with steroids and IV Iron  - hematology eval if worsened  - will need GI f/u later as outpatient post steroids, as discussed with pt's children  - PPI while on steroids    Problem/Plan - 6:  Problem: h/o CVA  - asa, plavix and zocor.    -GI/DVT Prophylaxis.    --------------------------------------------  Case discussed with pt, NP  Education given on plan of care, leg elevation, diet  ___________________________  NIKIA Barrios D.O.  Pager: [phone]

## 2017-12-27 NOTE — DISCHARGE NOTE ADULT - HOSPITAL COURSE
82M h/o asthma, HTN, HLD, BPH presented with worsening rash and respiratory distress admitted for further evaluation of the rash and TRINI. Dermatology was called. Punch biopsy was performed. 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 Rheumatology consulted. Steroids was started. Pt was also anemic. Given Venofer and PRBcs. Hgb/hct now stable. Outpatient follow up with PMD, GI and Rheumatology.

## 2017-12-27 NOTE — DIETITIAN INITIAL EVALUATION ADULT. - OTHER INFO
Pt seen for length of stay on [facility]. Per previous RD note (12/5/17), pt with weight of 149 pounds. Current wt of 163 pounds (12/27/17). Note pt with 2+ edema mayank. legs. Wt gain likely fluid shifts vs. bed weight accuracy. Pt reports Pt seen for length of stay on [facility]. Per previous RD note (12/5/17), pt with weight of 149 pounds. Current wt of 163 pounds (12/27/17). Note pt with 2+ edema mayank. legs. Wt gain likely fluid shifts vs. bed weight accuracy. Pt reports good appetite with 75% consumption of meals in house. Pt's family is bringing food in from home, however, pt states he's not eating this food because he eats enough from the hospital meals. Pt denies chewing/swallowing difficulty, N+V, diarrhea, constipation. Last BM 12/27 per pt.

## 2017-12-27 NOTE — PROGRESS NOTE ADULT - PROVIDER SPECIALTY LIST ADULT
Internal Medicine
Rheumatology
Rheumatology
Infectious Disease

## 2017-12-27 NOTE — DISCHARGE NOTE ADULT - MEDICATION SUMMARY - MEDICATIONS TO CHANGE
I will SWITCH the dose or number of times a day I take the medications listed below when I get home from the hospital:  None I will SWITCH the dose or number of times a day I take the medications listed below when I get home from the hospital:    gabapentin 300 mg oral capsule  -- 1 cap(s) by mouth 2 times a day

## 2017-12-27 NOTE — CHART NOTE - NSCHARTNOTEFT_GEN_A_CORE
Admit with Vasculitis  Found to be anemic; +FOB; No active bleeding observed at this time; Colonoscopy planned as outpatient  1 unit PRBC today for H/H 7.7/26.3; Asymptomatic; hemodynamically stable    D/w Medical Attdg & Pt son Jeff Amanda

## 2017-12-27 NOTE — DISCHARGE NOTE ADULT - PLAN OF CARE
Resolution You were evaluated and followed by Dermatology. You were evaluated and followed by Dermatology.  Biopsy of your skin shows Leukocytoclastic vasculitis or hypersensitivity vasculitis which can be caused by a drug or associated with an infection; and cause inflammation of the skin This is inflammation of your smaller blood vessels that causes skin eruptions.  Continue with steroid as prescribed and follow up with Rheumatology 1-2 weeks after discharge.  Call Dr Goldberg for appointment. You will need to follow up with Gastroenterology (GI) for colonoscopy after steroid treatment completed.  Anemia is when you have a low blood count of hemoglobin (HGB) and/or hematocrit (HCT), or RBC (red blood cells).If your hgb is <13 (women) or <12 (men), then you are considered to be anemic.  Losing a moderate to large amount of blood cause anemia. Although, there are several different types of anemia that are not due to blood loss.   Your last blood count was 7.7/26.3.  You will need to follow up with your healthcare provider in one week for repeat blood work.   The primary symptoms of anemia is difficulty breathing with exertion or at rest, fatigue, bounding pulses, and/or palpitations. If you are persistently having these symptoms, you will need to seek help from your healthcare provider. Low salt diet  Activity as tolerated.  Take all medication as prescribed.  Follow up with your medical doctor for routine blood pressure monitoring at your next visit.  Notify your doctor if you have any of the following symptoms:   Dizziness, Lightheadedness, Blurry vision, Headache, Chest pain, Shortness of breath Condition improved.  Drink at least 1 Liter water daily.  Follow up with your primary doctor for repeat blood work. Condition improved.  Follow up with your primary doctor for repeat blood work. You were evaluated and followed by Dermatology.  Follow up with Dermatology-Dr. Torres- [phone] in 1 week  Biopsy of your skin shows Leukocytoclastic vasculitis or hypersensitivity vasculitis which can be caused by a drug or associated with an infection; and cause inflammation of the skin

## 2017-12-27 NOTE — DISCHARGE NOTE ADULT - CARE PROVIDER_API CALL
Dr. Lin,   Primary care doctor  Phone: (   )    -  Fax: (   )    - Dr. Lin,   Primary care doctor  Phone: (   )    -  Fax: (   )    -    Goldberg, Avram Z (MD), Internal Medicine; Rheumatology  [address]  [place] [address]  Phone: [phone]  Fax: [phone] Goldberg, Avram Z (MD), Internal Medicine; Rheumatology  [address]  [place] [address]  Phone: [phone]  Fax: [phone]    Dr. Lin,   Primary care doctor  Phone: (   )    -  Fax: (   )    -    Solo Torres), Dermatology  [address]  [address]  [place] [address]  Phone: [phone]  Fax: [phone]

## 2017-12-27 NOTE — DIETITIAN INITIAL EVALUATION ADULT. - NS AS NUTRI INTERV ED CONTENT
Reviewed low sodium/low fat/low cholesterol food choices, foods high in sodium, fat, and cholesterol to avoid. Reviewed ways to reduce sodium intake, meal and snack options, tips for dining out. Pt verbalized understanding of nutrition education. RD remains available for diet education review and per follow-up protocol. Megan Cantrell MS, RDN, CDN Pager # [phone]

## 2017-12-27 NOTE — DISCHARGE NOTE ADULT - CARE PROVIDERS DIRECT ADDRESSES
,DirectAddress_Unknown ,DirectAddress_Unknown,DirectAddress_Unknown ,DirectAddress_Unknown,DirectAddress_Unknown,[email or web address]

## 2017-12-27 NOTE — DISCHARGE NOTE ADULT - CARE PLAN
Principal Discharge DX:	Petechial rash  Goal:	Resolution  Instructions for follow-up, activity and diet:	You were evaluated and followed by Dermatology.  Secondary Diagnosis:	Vasculitis determined by biopsy of skin  Secondary Diagnosis:	Anemia  Secondary Diagnosis:	Essential hypertension  Secondary Diagnosis:	TRINI (acute kidney injury) Principal Discharge DX:	Petechial rash  Goal:	Resolution  Instructions for follow-up, activity and diet:	You were evaluated and followed by Dermatology.  Biopsy of your skin shows Leukocytoclastic vasculitis or hypersensitivity vasculitis which can be caused by a drug or associated with an infection; and cause inflammation of the skin  Secondary Diagnosis:	Vasculitis determined by biopsy of skin  Instructions for follow-up, activity and diet:	This is inflammation of your smaller blood vessels that causes skin eruptions.  Continue with steroid as prescribed and follow up with Rheumatology 1-2 weeks after discharge.  Call Dr Goldberg for appointment.  Secondary Diagnosis:	Anemia  Instructions for follow-up, activity and diet:	You will need to follow up with Gastroenterology (GI) for colonoscopy after steroid treatment completed.  Anemia is when you have a low blood count of hemoglobin (HGB) and/or hematocrit (HCT), or RBC (red blood cells).If your hgb is <13 (women) or <12 (men), then you are considered to be anemic.  Losing a moderate to large amount of blood cause anemia. Although, there are several different types of anemia that are not due to blood loss.   Your last blood count was 7.7/26.3.  You will need to follow up with your healthcare provider in one week for repeat blood work.   The primary symptoms of anemia is difficulty breathing with exertion or at rest, fatigue, bounding pulses, and/or palpitations. If you are persistently having these symptoms, you will need to seek help from your healthcare provider.  Secondary Diagnosis:	Essential hypertension  Instructions for follow-up, activity and diet:	Low salt diet  Activity as tolerated.  Take all medication as prescribed.  Follow up with your medical doctor for routine blood pressure monitoring at your next visit.  Notify your doctor if you have any of the following symptoms:   Dizziness, Lightheadedness, Blurry vision, Headache, Chest pain, Shortness of breath  Secondary Diagnosis:	TRINI (acute kidney injury)  Instructions for follow-up, activity and diet:	Condition improved.  Drink at least 1 Liter water daily.  Follow up with your primary doctor for repeat blood work. Principal Discharge DX:	Petechial rash  Goal:	Resolution  Instructions for follow-up, activity and diet:	You were evaluated and followed by Dermatology.  Biopsy of your skin shows Leukocytoclastic vasculitis or hypersensitivity vasculitis which can be caused by a drug or associated with an infection; and cause inflammation of the skin  Secondary Diagnosis:	Vasculitis determined by biopsy of skin  Instructions for follow-up, activity and diet:	This is inflammation of your smaller blood vessels that causes skin eruptions.  Continue with steroid as prescribed and follow up with Rheumatology 1-2 weeks after discharge.  Call Dr Goldberg for appointment.  Secondary Diagnosis:	Anemia  Instructions for follow-up, activity and diet:	You will need to follow up with Gastroenterology (GI) for colonoscopy after steroid treatment completed.  Anemia is when you have a low blood count of hemoglobin (HGB) and/or hematocrit (HCT), or RBC (red blood cells).If your hgb is <13 (women) or <12 (men), then you are considered to be anemic.  Losing a moderate to large amount of blood cause anemia. Although, there are several different types of anemia that are not due to blood loss.   Your last blood count was 7.7/26.3.  You will need to follow up with your healthcare provider in one week for repeat blood work.   The primary symptoms of anemia is difficulty breathing with exertion or at rest, fatigue, bounding pulses, and/or palpitations. If you are persistently having these symptoms, you will need to seek help from your healthcare provider.  Secondary Diagnosis:	Essential hypertension  Instructions for follow-up, activity and diet:	Low salt diet  Activity as tolerated.  Take all medication as prescribed.  Follow up with your medical doctor for routine blood pressure monitoring at your next visit.  Notify your doctor if you have any of the following symptoms:   Dizziness, Lightheadedness, Blurry vision, Headache, Chest pain, Shortness of breath  Secondary Diagnosis:	TRINI (acute kidney injury)  Instructions for follow-up, activity and diet:	Condition improved.  Follow up with your primary doctor for repeat blood work. Principal Discharge DX:	Petechial rash  Goal:	Resolution  Instructions for follow-up, activity and diet:	You were evaluated and followed by Dermatology.  Follow up with Dermatology-Dr. Torres- [phone] in 1 week  Biopsy of your skin shows Leukocytoclastic vasculitis or hypersensitivity vasculitis which can be caused by a drug or associated with an infection; and cause inflammation of the skin  Secondary Diagnosis:	Vasculitis determined by biopsy of skin  Instructions for follow-up, activity and diet:	This is inflammation of your smaller blood vessels that causes skin eruptions.  Continue with steroid as prescribed and follow up with Rheumatology 1-2 weeks after discharge.  Call Dr Goldberg for appointment.  Secondary Diagnosis:	Anemia  Instructions for follow-up, activity and diet:	You will need to follow up with Gastroenterology (GI) for colonoscopy after steroid treatment completed.  Anemia is when you have a low blood count of hemoglobin (HGB) and/or hematocrit (HCT), or RBC (red blood cells).If your hgb is <13 (women) or <12 (men), then you are considered to be anemic.  Losing a moderate to large amount of blood cause anemia. Although, there are several different types of anemia that are not due to blood loss.   Your last blood count was 7.7/26.3.  You will need to follow up with your healthcare provider in one week for repeat blood work.   The primary symptoms of anemia is difficulty breathing with exertion or at rest, fatigue, bounding pulses, and/or palpitations. If you are persistently having these symptoms, you will need to seek help from your healthcare provider.  Secondary Diagnosis:	Essential hypertension  Instructions for follow-up, activity and diet:	Low salt diet  Activity as tolerated.  Take all medication as prescribed.  Follow up with your medical doctor for routine blood pressure monitoring at your next visit.  Notify your doctor if you have any of the following symptoms:   Dizziness, Lightheadedness, Blurry vision, Headache, Chest pain, Shortness of breath  Secondary Diagnosis:	TRINI (acute kidney injury)  Instructions for follow-up, activity and diet:	Condition improved.  Follow up with your primary doctor for repeat blood work.

## 2017-12-27 NOTE — DISCHARGE NOTE ADULT - PROVIDER TOKENS
FREE:[LAST:[Dr. Lin],PHONE:[(   )    -],FAX:[(   )    -],ADDRESS:[Primary care doctor]] FREE:[LAST:[Dr. Lin],PHONE:[(   )    -],FAX:[(   )    -],ADDRESS:[Primary care doctor]],TOKEN:'[id number]' TOKEN:'[id number]',FREE:[LAST:[Dr. Lin],PHONE:[(   )    -],FAX:[(   )    -],ADDRESS:[Primary care doctor]],TOKEN:'[id number]'

## 2017-12-27 NOTE — DIETITIAN INITIAL EVALUATION ADULT. - SOURCE
patient/other (specify)/RN, NP, Medical Chart, Previous RD notes; no family present at bedside other (specify)/PCA, Medical Chart, Previous RD notes; no family present at bedside/patient

## 2017-12-27 NOTE — DISCHARGE NOTE ADULT - PATIENT PORTAL LINK FT
“You can access the Follow[employer] Patient Portal, offered by [facility], by registering with the following website: [email or web address]”

## 2017-12-27 NOTE — DIETITIAN INITIAL EVALUATION ADULT. - ORAL INTAKE PTA
Pt reports good appetite at home. Pt reports baseline consumption of 2-3 meals per day PTA. Pt reports he does not consume an overabundance of food due to "I don't want too much fat in my belly." Diet Recall: Breakfast: coffee, fruit, bread; Lunch/Dinner: consumes whatever is in the apartment. NKFA per pt. Pt denies vitamin/mineral supplementation at home, however, per H&P pt takes ferrous sulfate at home.

## 2017-12-27 NOTE — DISCHARGE NOTE ADULT - MEDICATION SUMMARY - MEDICATIONS TO TAKE
I will START or STAY ON the medications listed below when I get home from the hospital:    finasteride 5 mg oral tablet  -- 1 tab(s) by mouth once a day  -- Indication: For bph    predniSONE 10 mg oral tablet  -- 2 tab(s) by mouth 2 times a day   -- It is very important that you take or use this exactly as directed.  Do not skip doses or discontinue unless directed by your doctor.  Obtain medical advice before taking any non-prescription drugs as some may affect the action of this medication.  Take with food or milk.    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  Do not skip doses or discontinue unless directed by your doctor.  May cause drowsiness.  Alcohol may intensify this effect.  Use care when operating dangerous machinery.    -- Indication: For Pain    atorvastatin 40 mg oral tablet  -- 1 tab(s) by mouth once a day (at bedtime)  -- Indication: For Hypercholesterolemia    clopidogrel 75 mg oral tablet  -- 1 tab(s) by mouth once a day  -- Indication: For Cva    metoprolol tartrate 50 mg oral tablet  -- 1 tab(s) by mouth 2 times a day  -- Indication: For Hypertension    Symbicort 160 mcg-4.5 mcg/inh inhalation aerosol  -- 2 puff(s) inhaled 2 times a day  -- Indication: For Mild intermittent asthma with acute exacerbation    tiotropium 18 mcg inhalation capsule  -- 1 cap(s) inhaled once a day  -- Indication: For Mild intermittent asthma with acute exacerbation    triamcinolone 0.1% topical ointment  -- 1 application on skin 2 times a day  -- Indication: For rash    furosemide 40 mg oral tablet  -- 1 tab(s) by mouth every other day  -- Indication: For diastolic heart failure    ferrous sulfate 325 mg (65 mg elemental iron) oral delayed release tablet  -- 1 tab(s) by mouth once a day   -- May discolor urine or feces.  Swallow whole.  Do not crush.    -- Indication: For Anemia    docusate sodium 100 mg oral capsule  -- 1 cap(s) by mouth 3 times a day  -- Indication: For Constipation    senna oral tablet  -- 2 tab(s) by mouth once a day (at bedtime)  -- Indication: For Constipation    montelukast 10 mg oral tablet  -- 1 tab(s) by mouth once a day  -- Indication: For Mild intermittent asthma with acute exacerbation    pantoprazole 40 mg oral delayed release tablet  -- 1 tab(s) by mouth once a day (before a meal)  -- Indication: For Acid reflux    calcium (as carbonate)-vitamin D 250 mg-125 intl units oral tablet  -- 1 tab(s) by mouth once a day  -- Indication: For Supplement I will START or STAY ON the medications listed below when I get home from the hospital:    finasteride 5 mg oral tablet  -- 1 tab(s) by mouth once a day  -- Indication: For bph    predniSONE 10 mg oral tablet  -- 2 tab(s) by mouth 2 times a day   -- It is very important that you take or use this exactly as directed.  Do not skip doses or discontinue unless directed by your doctor.  Obtain medical advice before taking any non-prescription drugs as some may affect the action of this medication.  Take with food or milk.    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  Do not skip doses or discontinue unless directed by your doctor.  May cause drowsiness.  Alcohol may intensify this effect.  Use care when operating dangerous machinery.    -- Indication: For Pain    atorvastatin 40 mg oral tablet  -- 1 tab(s) by mouth once a day (at bedtime)  -- Indication: For High cholesterol    clopidogrel 75 mg oral tablet  -- 1 tab(s) by mouth once a day  -- Indication: For Cva    metoprolol tartrate 50 mg oral tablet  -- 1 tab(s) by mouth 2 times a day  -- Indication: For Hypertension    Symbicort 160 mcg-4.5 mcg/inh inhalation aerosol  -- 2 puff(s) inhaled 2 times a day  -- Indication: For Mild intermittent asthma with acute exacerbation    tiotropium 18 mcg inhalation capsule  -- 1 cap(s) inhaled once a day  -- Indication: For Mild intermittent asthma with acute exacerbation    triamcinolone 0.1% topical ointment  -- 1 application on skin 2 times a day  -- Indication: For rash    furosemide 40 mg oral tablet  -- 1 tab(s) by mouth every other day  -- Indication: For Cardiac    ferrous sulfate 325 mg (65 mg elemental iron) oral delayed release tablet  -- 1 tab(s) by mouth once a day   -- May discolor urine or feces.  Swallow whole.  Do not crush.    -- Indication: For Anemia    docusate sodium 100 mg oral capsule  -- 1 cap(s) by mouth 3 times a day  -- Indication: For Constipation    senna oral tablet  -- 2 tab(s) by mouth once a day (at bedtime)  -- Indication: For Constipation    montelukast 10 mg oral tablet  -- 1 tab(s) by mouth once a day  -- Indication: For Mild intermittent asthma with acute exacerbation    pantoprazole 40 mg oral delayed release tablet  -- 1 tab(s) by mouth once a day (before a meal)  -- Indication: For Acid reflux    calcium (as carbonate)-vitamin D 250 mg-125 intl units oral tablet  -- 1 tab(s) by mouth once a day  -- Indication: For Supplement

## 2017-12-28 VITALS
SYSTOLIC BLOOD PRESSURE: 146 MMHG | RESPIRATION RATE: 17 BRPM | OXYGEN SATURATION: 96 % | HEART RATE: 69 BPM | TEMPERATURE: 98 F | DIASTOLIC BLOOD PRESSURE: 56 MMHG

## 2017-12-28 LAB
ANION GAP SERPL CALC-SCNC: 11 MMOL/L — SIGNIFICANT CHANGE UP (ref 5–17)
BUN SERPL-MCNC: 29 MG/DL — HIGH (ref 7–23)
CALCIUM SERPL-MCNC: 8.9 MG/DL — SIGNIFICANT CHANGE UP (ref 8.4–10.5)
CHLORIDE SERPL-SCNC: 104 MMOL/L — SIGNIFICANT CHANGE UP (ref 96–108)
CO2 SERPL-SCNC: 26 MMOL/L — SIGNIFICANT CHANGE UP (ref 22–31)
CREAT SERPL-MCNC: 1.57 MG/DL — HIGH (ref 0.5–1.3)
GLUCOSE SERPL-MCNC: 73 MG/DL — SIGNIFICANT CHANGE UP (ref 70–99)
HCT VFR BLD CALC: 32.2 % — LOW (ref 39–50)
HGB BLD-MCNC: 9.7 G/DL — LOW (ref 13–17)
MCHC RBC-ENTMCNC: 21.8 PG — LOW (ref 27–34)
MCHC RBC-ENTMCNC: 30.1 GM/DL — LOW (ref 32–36)
MCV RBC AUTO: 72.4 FL — LOW (ref 80–100)
PLATELET # BLD AUTO: 417 K/UL — HIGH (ref 150–400)
POTASSIUM SERPL-MCNC: 4.3 MMOL/L — SIGNIFICANT CHANGE UP (ref 3.5–5.3)
POTASSIUM SERPL-SCNC: 4.3 MMOL/L — SIGNIFICANT CHANGE UP (ref 3.5–5.3)
RBC # BLD: 4.45 M/UL — SIGNIFICANT CHANGE UP (ref 4.2–5.8)
RBC # FLD: 20.5 % — HIGH (ref 10.3–14.5)
SODIUM SERPL-SCNC: 141 MMOL/L — SIGNIFICANT CHANGE UP (ref 135–145)
WBC # BLD: 14.71 K/UL — HIGH (ref 3.8–10.5)
WBC # FLD AUTO: 14.71 K/UL — HIGH (ref 3.8–10.5)

## 2017-12-28 PROCEDURE — 86850 RBC ANTIBODY SCREEN: CPT

## 2017-12-28 PROCEDURE — 86900 BLOOD TYPING SEROLOGIC ABO: CPT

## 2017-12-28 PROCEDURE — 83615 LACTATE (LD) (LDH) ENZYME: CPT

## 2017-12-28 PROCEDURE — 86038 ANTINUCLEAR ANTIBODIES: CPT

## 2017-12-28 PROCEDURE — 87581 M.PNEUMON DNA AMP PROBE: CPT

## 2017-12-28 PROCEDURE — P9016: CPT

## 2017-12-28 PROCEDURE — 84300 ASSAY OF URINE SODIUM: CPT

## 2017-12-28 PROCEDURE — 36430 TRANSFUSION BLD/BLD COMPNT: CPT

## 2017-12-28 PROCEDURE — 82570 ASSAY OF URINE CREATININE: CPT

## 2017-12-28 PROCEDURE — 85652 RBC SED RATE AUTOMATED: CPT

## 2017-12-28 PROCEDURE — 86225 DNA ANTIBODY NATIVE: CPT

## 2017-12-28 PROCEDURE — 86036 ANCA SCREEN EACH ANTIBODY: CPT

## 2017-12-28 PROCEDURE — 97161 PT EVAL LOW COMPLEX 20 MIN: CPT

## 2017-12-28 PROCEDURE — 80074 ACUTE HEPATITIS PANEL: CPT

## 2017-12-28 PROCEDURE — 86160 COMPLEMENT ANTIGEN: CPT

## 2017-12-28 PROCEDURE — 86140 C-REACTIVE PROTEIN: CPT

## 2017-12-28 PROCEDURE — 87040 BLOOD CULTURE FOR BACTERIA: CPT

## 2017-12-28 PROCEDURE — 83010 ASSAY OF HAPTOGLOBIN QUANT: CPT

## 2017-12-28 PROCEDURE — 94640 AIRWAY INHALATION TREATMENT: CPT

## 2017-12-28 PROCEDURE — 82728 ASSAY OF FERRITIN: CPT

## 2017-12-28 PROCEDURE — 87486 CHLMYD PNEUM DNA AMP PROBE: CPT

## 2017-12-28 PROCEDURE — 86757 RICKETTSIA ANTIBODY: CPT

## 2017-12-28 PROCEDURE — 82272 OCCULT BLD FECES 1-3 TESTS: CPT

## 2017-12-28 PROCEDURE — 87633 RESP VIRUS 12-25 TARGETS: CPT

## 2017-12-28 PROCEDURE — 93005 ELECTROCARDIOGRAM TRACING: CPT

## 2017-12-28 PROCEDURE — 84540 ASSAY OF URINE/UREA-N: CPT

## 2017-12-28 PROCEDURE — 99285 EMERGENCY DEPT VISIT HI MDM: CPT | Mod: 25

## 2017-12-28 PROCEDURE — 81001 URINALYSIS AUTO W/SCOPE: CPT

## 2017-12-28 PROCEDURE — 82247 BILIRUBIN TOTAL: CPT

## 2017-12-28 PROCEDURE — 86480 TB TEST CELL IMMUN MEASURE: CPT

## 2017-12-28 PROCEDURE — G0103: CPT

## 2017-12-28 PROCEDURE — 82248 BILIRUBIN DIRECT: CPT

## 2017-12-28 PROCEDURE — 80048 BASIC METABOLIC PNL TOTAL CA: CPT

## 2017-12-28 PROCEDURE — 71045 X-RAY EXAM CHEST 1 VIEW: CPT

## 2017-12-28 PROCEDURE — 85027 COMPLETE CBC AUTOMATED: CPT

## 2017-12-28 PROCEDURE — 86923 COMPATIBILITY TEST ELECTRIC: CPT

## 2017-12-28 PROCEDURE — 83550 IRON BINDING TEST: CPT

## 2017-12-28 PROCEDURE — 86431 RHEUMATOID FACTOR QUANT: CPT

## 2017-12-28 PROCEDURE — 97116 GAIT TRAINING THERAPY: CPT

## 2017-12-28 PROCEDURE — 86780 TREPONEMA PALLIDUM: CPT

## 2017-12-28 PROCEDURE — 80053 COMPREHEN METABOLIC PANEL: CPT

## 2017-12-28 PROCEDURE — 87798 DETECT AGENT NOS DNA AMP: CPT

## 2017-12-28 PROCEDURE — 86703 HIV-1/HIV-2 1 RESULT ANTBDY: CPT

## 2017-12-28 PROCEDURE — 84156 ASSAY OF PROTEIN URINE: CPT

## 2017-12-28 PROCEDURE — 86901 BLOOD TYPING SEROLOGIC RH(D): CPT

## 2017-12-28 RX ORDER — ATORVASTATIN CALCIUM 80 MG/1
1 TABLET, FILM COATED ORAL
Qty: 0 | Refills: 0 | DISCHARGE
Start: 2017-12-28

## 2017-12-28 RX ORDER — FUROSEMIDE 40 MG
1 TABLET ORAL
Qty: 0 | Refills: 0 | COMMUNITY
Start: 2017-12-28

## 2017-12-28 RX ORDER — GABAPENTIN 400 MG/1
2 CAPSULE ORAL
Qty: 180 | Refills: 0 | OUTPATIENT
Start: 2017-12-28 | End: 2018-01-26

## 2017-12-28 RX ORDER — MONTELUKAST 4 MG/1
1 TABLET, CHEWABLE ORAL
Qty: 0 | Refills: 0 | DISCHARGE
Start: 2017-12-28

## 2017-12-28 RX ORDER — FINASTERIDE 5 MG/1
1 TABLET, FILM COATED ORAL
Qty: 0 | Refills: 0 | DISCHARGE
Start: 2017-12-28

## 2017-12-28 RX ORDER — ASPIRIN/CALCIUM CARB/MAGNESIUM 324 MG
1 TABLET ORAL
Qty: 0 | Refills: 0 | DISCHARGE
Start: 2017-12-28

## 2017-12-28 RX ORDER — SENNA PLUS 8.6 MG/1
2 TABLET ORAL
Qty: 0 | Refills: 0 | COMMUNITY
Start: 2017-12-28

## 2017-12-28 RX ORDER — CLOPIDOGREL BISULFATE 75 MG/1
1 TABLET, FILM COATED ORAL
Qty: 0 | Refills: 0 | COMMUNITY
Start: 2017-12-28

## 2017-12-28 RX ORDER — DOCUSATE SODIUM 100 MG
1 CAPSULE ORAL
Qty: 0 | Refills: 0 | COMMUNITY
Start: 2017-12-28

## 2017-12-28 RX ORDER — METOPROLOL TARTRATE 50 MG
1 TABLET ORAL
Qty: 0 | Refills: 0 | COMMUNITY
Start: 2017-12-28

## 2017-12-28 RX ORDER — TIOTROPIUM BROMIDE 18 UG/1
1 CAPSULE ORAL; RESPIRATORY (INHALATION)
Qty: 0 | Refills: 0 | DISCHARGE
Start: 2017-12-28

## 2017-12-28 RX ORDER — PANTOPRAZOLE SODIUM 20 MG/1
1 TABLET, DELAYED RELEASE ORAL
Qty: 30 | Refills: 0
Start: 2017-12-28 | End: 2018-01-26

## 2017-12-28 RX ORDER — TAMSULOSIN HYDROCHLORIDE 0.4 MG/1
2 CAPSULE ORAL
Qty: 0 | Refills: 0 | DISCHARGE
Start: 2017-12-28

## 2017-12-28 RX ORDER — FINASTERIDE 5 MG/1
1 TABLET, FILM COATED ORAL
Qty: 0 | Refills: 0 | COMMUNITY

## 2017-12-28 RX ADMIN — Medication 20 MILLIGRAM(S): at 05:51

## 2017-12-28 RX ADMIN — Medication 3 MILLILITER(S): at 05:52

## 2017-12-28 RX ADMIN — Medication 1 SPRAY(S): at 00:14

## 2017-12-28 RX ADMIN — FINASTERIDE 5 MILLIGRAM(S): 5 TABLET, FILM COATED ORAL at 11:59

## 2017-12-28 RX ADMIN — Medication 50 MILLIGRAM(S): at 05:51

## 2017-12-28 RX ADMIN — Medication 1 SPRAY(S): at 05:53

## 2017-12-28 RX ADMIN — HEPARIN SODIUM 5000 UNIT(S): 5000 INJECTION INTRAVENOUS; SUBCUTANEOUS at 05:52

## 2017-12-28 RX ADMIN — Medication 3 MILLILITER(S): at 00:14

## 2017-12-28 RX ADMIN — Medication 325 MILLIGRAM(S): at 11:59

## 2017-12-28 RX ADMIN — Medication 81 MILLIGRAM(S): at 11:59

## 2017-12-28 RX ADMIN — Medication 1 APPLICATION(S): at 05:53

## 2017-12-28 RX ADMIN — Medication 1 TABLET(S): at 11:59

## 2017-12-28 RX ADMIN — Medication 100 MILLIGRAM(S): at 05:51

## 2017-12-28 RX ADMIN — PANTOPRAZOLE SODIUM 40 MILLIGRAM(S): 20 TABLET, DELAYED RELEASE ORAL at 05:51

## 2017-12-28 RX ADMIN — MONTELUKAST 10 MILLIGRAM(S): 4 TABLET, CHEWABLE ORAL at 11:59

## 2017-12-28 RX ADMIN — Medication 3 MILLILITER(S): at 11:59

## 2017-12-28 RX ADMIN — Medication 100 MILLIGRAM(S): at 12:00

## 2017-12-28 RX ADMIN — CLOPIDOGREL BISULFATE 75 MILLIGRAM(S): 75 TABLET, FILM COATED ORAL at 11:59

## 2017-12-28 RX ADMIN — Medication 1 SPRAY(S): at 11:59

## 2018-01-03 ENCOUNTER — MOBILE ON CALL (OUTPATIENT)
Age: 83
End: 2018-01-03

## 2018-01-08 ENCOUNTER — MOBILE ON CALL (OUTPATIENT)
Age: 83
End: 2018-01-08

## 2018-01-12 ENCOUNTER — LABORATORY RESULT (OUTPATIENT)
Age: 83
End: 2018-01-12

## 2018-01-12 ENCOUNTER — APPOINTMENT (OUTPATIENT)
Dept: DERMATOLOGY | Facility: CLINIC | Age: 83
End: 2018-01-12
Payer: MEDICARE

## 2018-01-12 VITALS
HEIGHT: 66 IN | WEIGHT: 150 LBS | DIASTOLIC BLOOD PRESSURE: 58 MMHG | SYSTOLIC BLOOD PRESSURE: 140 MMHG | BODY MASS INDEX: 24.11 KG/M2

## 2018-01-12 DIAGNOSIS — Z91.89 OTHER SPECIFIED PERSONAL RISK FACTORS, NOT ELSEWHERE CLASSIFIED: ICD-10-CM

## 2018-01-12 DIAGNOSIS — Z86.718 PERSONAL HISTORY OF OTHER VENOUS THROMBOSIS AND EMBOLISM: ICD-10-CM

## 2018-01-12 DIAGNOSIS — Z86.79 PERSONAL HISTORY OF OTHER DISEASES OF THE CIRCULATORY SYSTEM: ICD-10-CM

## 2018-01-12 PROCEDURE — 99214 OFFICE O/P EST MOD 30 MIN: CPT

## 2018-01-12 RX ORDER — PREDNISONE 10 MG/1
10 TABLET ORAL
Qty: 42 | Refills: 0 | Status: ACTIVE | COMMUNITY
Start: 2018-01-12 | End: 1900-01-01

## 2018-01-16 LAB
ALBUMIN SERPL ELPH-MCNC: 3.3 G/DL
ALP BLD-CCNC: 47 U/L
ALT SERPL-CCNC: 23 U/L
ANION GAP SERPL CALC-SCNC: 15 MMOL/L
APPEARANCE: CLEAR
AST SERPL-CCNC: 18 U/L
BASOPHILS # BLD AUTO: 0 K/UL
BASOPHILS NFR BLD AUTO: 0 %
BILIRUB SERPL-MCNC: 0.2 MG/DL
BILIRUBIN URINE: NEGATIVE
BLOOD URINE: ABNORMAL
BUN SERPL-MCNC: 30 MG/DL
CALCIUM SERPL-MCNC: 8.6 MG/DL
CHLORIDE SERPL-SCNC: 102 MMOL/L
CO2 SERPL-SCNC: 24 MMOL/L
COLOR: YELLOW
CREAT SERPL-MCNC: 1.15 MG/DL
EOSINOPHIL # BLD AUTO: 0 K/UL
EOSINOPHIL NFR BLD AUTO: 0
GLUCOSE QUALITATIVE U: NEGATIVE MG/DL
GLUCOSE SERPL-MCNC: 94 MG/DL
HCT VFR BLD CALC: 32.8 %
HGB BLD-MCNC: 9.8 G/DL
KETONES URINE: NEGATIVE
LEUKOCYTE ESTERASE URINE: NEGATIVE
LYMPHOCYTES # BLD AUTO: 1.57 K/UL
LYMPHOCYTES NFR BLD AUTO: 10.5 %
MAN DIFF?: NORMAL
MCHC RBC-ENTMCNC: 23.9 PG
MCHC RBC-ENTMCNC: 29.9 GM/DL
MCV RBC AUTO: 80 FL
MONOCYTES # BLD AUTO: 0.72 K/UL
MONOCYTES NFR BLD AUTO: 4.8 %
NEUTROPHILS # BLD AUTO: 12.52 K/UL
NEUTROPHILS NFR BLD AUTO: 83.7 %
NITRITE URINE: NEGATIVE
PH URINE: 6
PLATELET # BLD AUTO: 185 K/UL
POTASSIUM SERPL-SCNC: 4.6 MMOL/L
PROT SERPL-MCNC: 5.9 G/DL
PROTEIN URINE: ABNORMAL MG/DL
RBC # BLD: 4.1 M/UL
RBC # FLD: 28.9 %
SODIUM SERPL-SCNC: 141 MMOL/L
SPECIFIC GRAVITY URINE: 1.01
UROBILINOGEN URINE: NEGATIVE MG/DL
WBC # FLD AUTO: 14.96 K/UL

## 2018-01-17 ENCOUNTER — TRANSCRIPTION ENCOUNTER (OUTPATIENT)
Age: 83
End: 2018-01-17

## 2018-01-26 ENCOUNTER — APPOINTMENT (OUTPATIENT)
Dept: NEPHROLOGY | Facility: CLINIC | Age: 83
End: 2018-01-26
Payer: MEDICARE

## 2018-01-26 VITALS
HEIGHT: 66 IN | WEIGHT: 158.07 LBS | BODY MASS INDEX: 25.4 KG/M2 | OXYGEN SATURATION: 98 % | SYSTOLIC BLOOD PRESSURE: 141 MMHG | HEART RATE: 62 BPM | DIASTOLIC BLOOD PRESSURE: 64 MMHG

## 2018-01-26 DIAGNOSIS — D69.0 ALLERGIC PURPURA: ICD-10-CM

## 2018-01-26 DIAGNOSIS — N17.9 ACUTE KIDNEY FAILURE, UNSPECIFIED: ICD-10-CM

## 2018-01-26 PROCEDURE — 99205 OFFICE O/P NEW HI 60 MIN: CPT

## 2018-01-29 ENCOUNTER — MEDICATION RENEWAL (OUTPATIENT)
Age: 83
End: 2018-01-29

## 2018-01-29 LAB
ALBUMIN SERPL ELPH-MCNC: 3.6 G/DL
ANION GAP SERPL CALC-SCNC: 15 MMOL/L
APPEARANCE: CLEAR
BACTERIA: NEGATIVE
BILIRUBIN URINE: NEGATIVE
BLOOD URINE: ABNORMAL
BUN SERPL-MCNC: 23 MG/DL
CALCIUM SERPL-MCNC: 9.2 MG/DL
CHLORIDE SERPL-SCNC: 102 MMOL/L
CO2 SERPL-SCNC: 26 MMOL/L
COLOR: YELLOW
CREAT SERPL-MCNC: 1.27 MG/DL
CREAT SPEC-SCNC: 106 MG/DL
CREAT/PROT UR: 0.5 RATIO
GLUCOSE QUALITATIVE U: NEGATIVE MG/DL
GLUCOSE SERPL-MCNC: 105 MG/DL
HYALINE CASTS: 1 /LPF
KETONES URINE: NEGATIVE
LEUKOCYTE ESTERASE URINE: NEGATIVE
MICROSCOPIC-UA: NORMAL
NITRITE URINE: NEGATIVE
PH URINE: 6
PHOSPHATE SERPL-MCNC: 3 MG/DL
POTASSIUM SERPL-SCNC: 4.1 MMOL/L
PROT UR-MCNC: 55 MG/DL
PROTEIN URINE: 30 MG/DL
RED BLOOD CELLS URINE: 507 /HPF
SODIUM SERPL-SCNC: 143 MMOL/L
SPECIFIC GRAVITY URINE: 1.02
SQUAMOUS EPITHELIAL CELLS: 1 /HPF
UROBILINOGEN URINE: NEGATIVE MG/DL
WHITE BLOOD CELLS URINE: 6 /HPF

## 2018-01-29 RX ORDER — GABAPENTIN 100 MG/1
CAPSULE ORAL
Refills: 0 | Status: ACTIVE | COMMUNITY

## 2018-01-29 RX ORDER — FUROSEMIDE 80 MG/1
TABLET ORAL
Refills: 0 | Status: ACTIVE | COMMUNITY

## 2018-01-29 RX ORDER — MONTELUKAST SODIUM 10 MG/1
TABLET, FILM COATED ORAL
Refills: 0 | Status: ACTIVE | COMMUNITY

## 2018-01-29 RX ORDER — FINASTERIDE 1 MG/1
TABLET ORAL
Refills: 0 | Status: ACTIVE | COMMUNITY

## 2018-01-29 RX ORDER — ASPIRIN 81 MG
81 TABLET, DELAYED RELEASE (ENTERIC COATED) ORAL
Refills: 0 | Status: ACTIVE | COMMUNITY

## 2018-01-29 RX ORDER — LISINOPRIL 2.5 MG/1
2.5 TABLET ORAL DAILY
Qty: 30 | Refills: 2 | Status: ACTIVE | COMMUNITY
Start: 2018-01-29 | End: 1900-01-01

## 2018-01-29 RX ORDER — PANTOPRAZOLE SODIUM 40 MG/1
40 TABLET, DELAYED RELEASE ORAL
Refills: 0 | Status: ACTIVE | COMMUNITY

## 2018-01-29 RX ORDER — METOPROLOL TARTRATE 75 MG/1
TABLET, FILM COATED ORAL
Refills: 0 | Status: ACTIVE | COMMUNITY

## 2018-01-29 RX ORDER — ATORVASTATIN CALCIUM 80 MG/1
TABLET, FILM COATED ORAL
Refills: 0 | Status: ACTIVE | COMMUNITY

## 2018-02-09 ENCOUNTER — APPOINTMENT (OUTPATIENT)
Dept: DERMATOLOGY | Facility: CLINIC | Age: 83
End: 2018-02-09

## 2018-02-13 NOTE — ED ADULT TRIAGE NOTE - NS ED NURSE AMBULANCES
Physical Therapy Discharge      Visit Count: 16 of 16 per POC  Plan of Care Dates: Initial: 9/28/2017 Through: 2/1/2018 (extended 10 weeks from initial Plan of care expiration secondary to scheduling difficulties and tapering of treatment frequency)    Insurance Information: physical and speech therapy combined cap of $1980/$3700 per calendar year, $0 used at the time of evaluation  Next Referring Provider Visit: none scheuduled at present    Referred by: Hugo Underwood MD  Based on evaluation, occupational or physical therapists may be utilized, unless otherwise indicated here. Progressive weightbearing in the CAM boot, may transition out boot into a normal shoe in 4 weeks from orde lsie 9/12/17 as tolerated.    Medical Diagnosis (from order):  Fx trimalleolar-closed, right, with routine healing, subsequent encounter [H17.247G]  - Primary   Treatment Diagnosis: Ankle/Foot Symptoms with Pain, Impaired Joint Mobility, Impaired Range of Motion, Impaired Motor Function/Muscle Performance, Radiating Pain, Impaired Gait/Locomotion Deficits, Impaired Mobility and Impaired Balance  Insurance: 1. MEDICARE  2. N/A    Date of Surgery: 7/31/2017; Surgery performed: ORIF trimalleolar ankle fracture with plate and screw fixation; Physician Guidelines: yes    Diagnosis Precautions: Progressive weightbearing in the CAM boot, may transition out boot into a normal shoe in 4 weeks as tolerated. Right Lower Extremity  Chart reviewed: Relevant co-morbidities, allergies, tests and medications:   Affective psychosis, bipolar (CMS/HCC)   Chronic depressive personality disorder       SUBJECTIVE   Pt in agreement with plan for discharge.  She verbalizes understanding with importance of compliance and continued commitment to HEP for further improvement and gains in ROM.   Pt admittedly has been non compliant with HEP, only doing at most one time per day.     Pain:  Intensity: Now: 0/10     OBJECTIVE   Observation:  Normal Gait  Minimal  dorsal foot swelling right  Persistent gastroc tightness right  Limited ankle DF AROM to 8 degrees beyond neutral    Treatment   Manual Therapy:  DTM, MFR and STM to dorsum of foot.      Ultrasound (00920):  Patient has been made aware of potential contraindications and possible risks associated with the use of modality and has agreed.  Location: Dorsum of R foot  Position: supine  Duration: 8 minutes Duty Cycle: 50% duty cycle  Frequency: 3 Mhz  Intensity: 1.0 W/cm2   Results: improved circulation and decreased swelling; no adverse reaction to treatment      Therapeutic Exercise:    Review of the below program with therabands to insure pt 100% independet and ready for D/C to home program for discharge:    Access Code: [email or web address]   URL: [email or web address]   Date: 02/13/2018   Prepared by: Lisa Harvey     Exercises   • Ankle Dorsiflexion with Resistance - 10 reps - 3 sets - 1x daily - 7x weekly   • Long Sitting Calf Stretch with Strap - 10 reps - 3 sets - 1x daily - 7x weekly   • Standing Ankle Dorsiflexion Stretch - 10 reps - 3 sets - 1x daily - 7x weekly   • Seated Anterior Tibialis Stretch - 10 reps - 3 sets - 1x daily - 7x weekly   • Ankle and Toe Plantarflexion with Resistance - 10 reps - 3 sets - 1x daily - 7x weekly   • Squatting Ankle Dorsiflexion with Table - 10 reps - 3 sets - 1x daily - 7x weekly   • Ankle Inversion with Resistance - 10 reps - 3 sets - 1x daily - 7x weekly   • Ankle Eversion with Resistance - 10 reps - 3 sets - 1x daily - 7x weekly   • Single Leg Stance - 10 reps - 3 sets - 1x daily - 7x weekly   • Walking Tandem Stance - 10 reps - 3 sets - 1x daily - 7x weekly   • Single Leg Balance Alphabet - 10 reps - 3 sets - 1x daily - 7x weekly     Vasopneumatic Compression / Game Ready (64446):  Patient has been made aware of potential contraindications and possible risks associated with the use of modality and has agreed.  Location: ankle  Position: supine with leg(s) elevated  Compression: low  Water temperature 40 °F  Duration: 15 minutes   Results: decreased pain and decreased swelling; no adverse reaction to treatment      Plan for next session: D/C to home program    ASSESSMENT   Patient no longer requires skilled physical therapy as she is ready to be independent with a thorough HEP.    Goals:  To be obtained by end of this plan of care:  1. Patient independent with modified and progressed home exercise program. GOAL MET  2. Patient will decrease involved ankle pain to <3/10  to aid in normalization of gait for independent living. GOAL MET  3. Patient will increase involved ankle active range of motion to WFL° to aid in ambulating on level and unlevel surfaces. GOAL MET  4. Patient will increase involved ankle strength to 5/5 to aid in stair ambulation.Progress made  5. Patient will ambulate community distances on even and uneven terrain with normal gait pattern without assistive device and without loss of balance.GOAL MET  6. Patient will demonstrate ability to negotiate level and unlevel surfaces at variable velocities, including change of direction without increased pain or instability to return to age appropriate and community activities at prior level of function.GOAL MET  7. Patient will be able to ascend and descend 1 flight of stairs using reciprocal pattern with minimal pain/difficulty.GOAL MET  8. Patient will be able to tolerate standing activities for greater than or equal to 30 minutes with minimal pain/difficulty GOAL MET  9. Patient will be able to exit a building rapidly.GOAL MET  10.       The plan of care and goals were established with the patient who concur with plan for D/C      Patient Education:  Who will be receiving education: patient  Are they ready to learn: yes  Preferred learning style: written, verbal, demonstration  Barriers to learning: no barriers apparent at this time   Result of initial outlined education: Verbalizes understanding and Demonstrates  understanding    THERAPY DAILY BILLING   Primary Insurance: MEDICARE  Secondary Insurance: N/A    Evaluation Procedures:  No evaluation codes were used on this date of service    Timed Procedures:  Manual Therapy, 15 minutes  Therapeutic Exercise, 20 minutes  Ultrasound, 8 minutes    Untimed Procedures:  No untimed codes were used on this date of service    Total Treatment Time: 43 minutes    G-Code:  G-Code Score ABN form  : Goal Mobility Limitation,  CI - 1% to 19% impaired, limited or restricted  : D/C Mobility Limitation,  CI - 1% to 19% impaired, limited or restricted  Modifier based on outcome measure(s)/functional testing/clinical judgement as listed above    The referring provider's electronic or written signature on the evaluation authorizes the therapy plan of care and certifies the need for these services, furnished under this plan of care while under their care.  Physician Signature on file.      [facility]

## 2018-04-02 ENCOUNTER — APPOINTMENT (OUTPATIENT)
Dept: DERMATOLOGY | Facility: CLINIC | Age: 83
End: 2018-04-02

## 2018-07-26 PROBLEM — Z78.9 ALCOHOL USE: Status: ACTIVE | Noted: 2017-12-19

## 2018-07-26 PROBLEM — Z86.73 HISTORY OF STROKE: Status: RESOLVED | Noted: 2017-12-19 | Resolved: 2018-07-26

## 2018-10-24 NOTE — ED ADULT NURSE NOTE - ED STAT RN HANDOFF DETAILS
Maia Miranda   10/24/2018 9:45 AM   Anticoagulation Therapy Visit    Description:  86 year old female   Provider:   ANTICOAGULATION CLINIC   Department:   Nurse           INR as of 10/24/2018     Today's INR 2.5      Anticoagulation Summary as of 10/24/2018     INR goal 2.0-3.0   Today's INR 2.5   Full warfarin instructions 2.5 mg every day   Next INR check 11/28/2018    Indications   Long-term (current) use of anticoagulants [Z79.01]  Atrial fibrillation (H) [I48.91]         Your next Anticoagulation Clinic appointment(s)     Nov 28, 2018 10:00 AM CST   Anticoagulation Visit with  ANTICOAGULATION CLINIC   [facility]    [address] [address] [phone]              Contact Numbers     [facility]  Please call to cancel and/or reschedule your appointment, or with any problems or questions regarding your therapy.  Anticoagulation Nurse: [phone]  Main Clinic: [phone]             October 2018 Details    Sun Mon Tue Wed Thu Fri Sat      1               2               3               4               5               6                 7               8               9               10               11               12               13                 14               15               16               17               18               19               20                 21               22               23               24      2.5 mg   See details      25      2.5 mg         26      2.5 mg         27      2.5 mg           28      2.5 mg         29      2.5 mg         30      2.5 mg         31      2.5 mg             Date Details   10/24 This INR check               How to take your warfarin dose     To take:  2.5 mg Take 1 of the 2.5 mg tablets.           November 2018 Details    Sun Mon Tue Wed Thu Fri Sat         1      2.5 mg         2      2.5 mg         3      2.5 mg           4      2.5 mg         5      2.5 mg          6      2.5 mg         7      2.5 mg         8      2.5 mg         9      2.5 mg         10      2.5 mg           11      2.5 mg         12      2.5 mg         13      2.5 mg         14      2.5 mg         15      2.5 mg         16      2.5 mg         17      2.5 mg           18      2.5 mg         19      2.5 mg         20      2.5 mg         21      2.5 mg         22      2.5 mg         23      2.5 mg         24      2.5 mg           25      2.5 mg         26      2.5 mg         27      2.5 mg         28            29               30                 Date Details   No additional details    Date of next INR:  11/28/2018         How to take your warfarin dose     To take:  2.5 mg Take 1 of the 2.5 mg tablets.            report to Felipe, brought Pt kosher meal, pt resting comfortably.

## 2019-04-07 ENCOUNTER — TRANSCRIPTION ENCOUNTER (OUTPATIENT)
Age: 84
End: 2019-04-07

## 2019-04-07 ENCOUNTER — INPATIENT (INPATIENT)
Facility: HOSPITAL | Age: 84
LOS: 4 days | Discharge: HOME | DRG: 287 | End: 2019-04-12
Attending: HOSPITALIST | Admitting: HOSPITALIST
Payer: MEDICARE

## 2019-04-07 VITALS
TEMPERATURE: 99 F | DIASTOLIC BLOOD PRESSURE: 93 MMHG | HEART RATE: 108 BPM | WEIGHT: 164.91 LBS | SYSTOLIC BLOOD PRESSURE: 155 MMHG | OXYGEN SATURATION: 99 % | RESPIRATION RATE: 16 BRPM

## 2019-04-07 DIAGNOSIS — I48.92 UNSPECIFIED ATRIAL FLUTTER: ICD-10-CM

## 2019-04-07 LAB
ALBUMIN SERPL ELPH-MCNC: 4 G/DL — SIGNIFICANT CHANGE UP (ref 3.3–5)
ALP SERPL-CCNC: 55 U/L — SIGNIFICANT CHANGE UP (ref 40–120)
ALT FLD-CCNC: 11 U/L — SIGNIFICANT CHANGE UP (ref 10–45)
ANION GAP SERPL CALC-SCNC: 14 MMOL/L — SIGNIFICANT CHANGE UP (ref 5–17)
ANISOCYTOSIS BLD QL: SLIGHT — SIGNIFICANT CHANGE UP
APTT BLD: 34.3 SEC — SIGNIFICANT CHANGE UP (ref 27.5–36.3)
AST SERPL-CCNC: 16 U/L — SIGNIFICANT CHANGE UP (ref 10–40)
BASOPHILS # BLD AUTO: 0.1 K/UL — SIGNIFICANT CHANGE UP (ref 0–0.2)
BASOPHILS NFR BLD AUTO: 0.4 % — SIGNIFICANT CHANGE UP (ref 0–2)
BILIRUB SERPL-MCNC: 1 MG/DL — SIGNIFICANT CHANGE UP (ref 0.2–1.2)
BUN SERPL-MCNC: 23 MG/DL — SIGNIFICANT CHANGE UP (ref 7–23)
CALCIUM SERPL-MCNC: 9.2 MG/DL — SIGNIFICANT CHANGE UP (ref 8.4–10.5)
CHLORIDE SERPL-SCNC: 99 MMOL/L — SIGNIFICANT CHANGE UP (ref 96–108)
CO2 SERPL-SCNC: 24 MMOL/L — SIGNIFICANT CHANGE UP (ref 22–31)
CREAT SERPL-MCNC: 1.08 MG/DL — SIGNIFICANT CHANGE UP (ref 0.5–1.3)
ELLIPTOCYTES BLD QL SMEAR: SLIGHT — SIGNIFICANT CHANGE UP
EOSINOPHIL # BLD AUTO: 0 K/UL — SIGNIFICANT CHANGE UP (ref 0–0.5)
EOSINOPHIL NFR BLD AUTO: 0.1 % — SIGNIFICANT CHANGE UP (ref 0–6)
GLUCOSE SERPL-MCNC: 113 MG/DL — HIGH (ref 70–99)
HCT VFR BLD CALC: 43.8 % — SIGNIFICANT CHANGE UP (ref 39–50)
HGB BLD-MCNC: 12.9 G/DL — LOW (ref 13–17)
HYPOCHROMIA BLD QL: SLIGHT — SIGNIFICANT CHANGE UP
INR BLD: 1.18 RATIO — HIGH (ref 0.88–1.16)
LYMPHOCYTES # BLD AUTO: 22.1 % — SIGNIFICANT CHANGE UP (ref 13–44)
LYMPHOCYTES # BLD AUTO: 3.5 K/UL — HIGH (ref 1–3.3)
MCHC RBC-ENTMCNC: 22 PG — LOW (ref 27–34)
MCHC RBC-ENTMCNC: 29.5 GM/DL — LOW (ref 32–36)
MCV RBC AUTO: 74.5 FL — LOW (ref 80–100)
MICROCYTES BLD QL: SLIGHT — SIGNIFICANT CHANGE UP
MONOCYTES # BLD AUTO: 1.7 K/UL — HIGH (ref 0–0.9)
MONOCYTES NFR BLD AUTO: 10.6 % — SIGNIFICANT CHANGE UP (ref 2–14)
NEUTROPHILS # BLD AUTO: 10.5 K/UL — HIGH (ref 1.8–7.4)
NEUTROPHILS NFR BLD AUTO: 66.8 % — SIGNIFICANT CHANGE UP (ref 43–77)
NT-PROBNP SERPL-SCNC: 5793 PG/ML — HIGH (ref 0–300)
PLAT MORPH BLD: NORMAL — SIGNIFICANT CHANGE UP
PLATELET # BLD AUTO: 256 K/UL — SIGNIFICANT CHANGE UP (ref 150–400)
POIKILOCYTOSIS BLD QL AUTO: SLIGHT — SIGNIFICANT CHANGE UP
POTASSIUM SERPL-MCNC: 4.4 MMOL/L — SIGNIFICANT CHANGE UP (ref 3.5–5.3)
POTASSIUM SERPL-SCNC: 4.4 MMOL/L — SIGNIFICANT CHANGE UP (ref 3.5–5.3)
PROT SERPL-MCNC: 7.4 G/DL — SIGNIFICANT CHANGE UP (ref 6–8.3)
PROTHROM AB SERPL-ACNC: 13.5 SEC — HIGH (ref 10–12.9)
RBC # BLD: 5.88 M/UL — HIGH (ref 4.2–5.8)
RBC # FLD: 17.4 % — HIGH (ref 10.3–14.5)
RBC BLD AUTO: ABNORMAL
SODIUM SERPL-SCNC: 137 MMOL/L — SIGNIFICANT CHANGE UP (ref 135–145)
TROPONIN T, HIGH SENSITIVITY RESULT: 24 NG/L — SIGNIFICANT CHANGE UP (ref 0–51)
TROPONIN T, HIGH SENSITIVITY RESULT: 26 NG/L — SIGNIFICANT CHANGE UP (ref 0–51)
WBC # BLD: 15.7 K/UL — HIGH (ref 3.8–10.5)
WBC # FLD AUTO: 15.7 K/UL — HIGH (ref 3.8–10.5)

## 2019-04-07 PROCEDURE — 93010 ELECTROCARDIOGRAM REPORT: CPT

## 2019-04-07 PROCEDURE — 71275 CT ANGIOGRAPHY CHEST: CPT | Mod: 26

## 2019-04-07 PROCEDURE — 99285 EMERGENCY DEPT VISIT HI MDM: CPT | Mod: GC,25

## 2019-04-07 PROCEDURE — 71045 X-RAY EXAM CHEST 1 VIEW: CPT | Mod: 26

## 2019-04-07 RX ORDER — HEPARIN SODIUM 5000 [USP'U]/ML
3000 INJECTION INTRAVENOUS; SUBCUTANEOUS EVERY 6 HOURS
Qty: 0 | Refills: 0 | Status: DISCONTINUED | OUTPATIENT
Start: 2019-04-07 | End: 2019-04-11

## 2019-04-07 RX ORDER — DILTIAZEM HCL 120 MG
10 CAPSULE, EXT RELEASE 24 HR ORAL ONCE
Qty: 0 | Refills: 0 | Status: COMPLETED | OUTPATIENT
Start: 2019-04-07 | End: 2019-04-07

## 2019-04-07 RX ORDER — HEPARIN SODIUM 5000 [USP'U]/ML
6000 INJECTION INTRAVENOUS; SUBCUTANEOUS ONCE
Qty: 0 | Refills: 0 | Status: COMPLETED | OUTPATIENT
Start: 2019-04-07 | End: 2019-04-07

## 2019-04-07 RX ORDER — DILTIAZEM HCL 120 MG
60 CAPSULE, EXT RELEASE 24 HR ORAL ONCE
Qty: 0 | Refills: 0 | Status: COMPLETED | OUTPATIENT
Start: 2019-04-07 | End: 2019-04-07

## 2019-04-07 RX ORDER — HEPARIN SODIUM 5000 [USP'U]/ML
INJECTION INTRAVENOUS; SUBCUTANEOUS
Qty: 25000 | Refills: 0 | Status: DISCONTINUED | OUTPATIENT
Start: 2019-04-07 | End: 2019-04-08

## 2019-04-07 RX ORDER — HEPARIN SODIUM 5000 [USP'U]/ML
6000 INJECTION INTRAVENOUS; SUBCUTANEOUS EVERY 6 HOURS
Qty: 0 | Refills: 0 | Status: DISCONTINUED | OUTPATIENT
Start: 2019-04-07 | End: 2019-04-11

## 2019-04-07 RX ADMIN — Medication 60 MILLIGRAM(S): at 19:58

## 2019-04-07 RX ADMIN — Medication 10 MILLIGRAM(S): at 19:33

## 2019-04-07 RX ADMIN — HEPARIN SODIUM 6000 UNIT(S): 5000 INJECTION INTRAVENOUS; SUBCUTANEOUS at 20:37

## 2019-04-07 RX ADMIN — HEPARIN SODIUM 1300 UNIT(S)/HR: 5000 INJECTION INTRAVENOUS; SUBCUTANEOUS at 20:37

## 2019-04-07 NOTE — ED PROVIDER NOTE - CLINICAL SUMMARY MEDICAL DECISION MAKING FREE TEXT BOX
Impression:  afib w/RVR, no hypotension, cannot explain pleurisy.  will check ddimer, cxr, reassess.   Plan: rate control, likely admit for AC, trend trops Impression:  afib w/RVR, no hypotension, cannot explain pleurisy.  will check ddimer, cxr, reassess.   Plan: rate control, likely admit for AC, trend susanne King PGY2: Christine PGY2: 84M unclear pmh on lopressor in past non complaint with medications presents to ED with a cc of chest pain pleuritic worsening over x24 hours, was seen at urgent care today found to be in afib/aflutter instructed to present to ED for eval; pt well known at urgent care, per ems no known prior hx of afib, no long plane rides are rides, no prior dvt/pe, +sick contacts at home, no fever c/f new afib will eval for pe acs cardiacs ddimer, admit

## 2019-04-07 NOTE — ED ADULT NURSE NOTE - CHPI ED NUR SYMPTOMS NEG
no nausea/no congestion/no dizziness/no fever/no back pain/no chills/no vomiting/no diaphoresis/no syncope

## 2019-04-07 NOTE — ED PROVIDER NOTE - PMH
Asthma    CVA (cerebral vascular accident)    HTN (hypertension)    Hypercholesterolemia    Leukocytoclastic vasculitis

## 2019-04-07 NOTE — ED PROVIDER NOTE - PHYSICAL EXAMINATION
GEN APPEARANCE: WDWN, alert and cooperative, non-toxic appearing and in NAD  HEAD: Atraumatic, normocephalic   EYES: PERRLa, EOMI, vision grossly intact.   EARS: Gross hearing intact.   NOSE: No nasal discharge, no external evidence of epistaxis.  NECK: Supple  CV: RRR, S1S2, no c/r/m/g. No cyanosis or pallor. Extremities warm, well perfused. Cap refill <2 seconds. No bruits.   LUNGS: CTAB. faint trace b/l wheezing. No rales. No rhonchi. No diminished breath sounds.   ABDOMEN: Soft, NTND. No guarding or rebound. No masses.   MSK: Spine appears normal, no spine point tenderness. No CVA ttp. No joint erythema or tenderness. Normal muscular development. Pelvis stable.  EXTREMITIES: No peripheral edema. No obvious joint or bony deformity.  NEURO: Alert, follows commands. Weight bearing normal. Speech normal. Sensation and motor normal x4 extremities.   SKIN: Normal color for race, warm, dry and intact. No evidence of rash.  PSYCH: Normal mood and affect.

## 2019-04-07 NOTE — ED ADULT NURSE NOTE - OBJECTIVE STATEMENT
pt with c/o "chest pain/fast heart rate since yesterday. worsens with deep breath and lying flat- sent from urgent care for pleuritic chest pain. Noncompliant with meds"

## 2019-04-07 NOTE — ED ADULT TRIAGE NOTE - NSWEIGHTCALCTOOLDRUG_GEN_A_CORE
Provider Comments  Provider Comments:   PT NO SHOW FOR HER COLPO 3 TIMES  11/02/2016, 11/17/2016 AND 12/01/2016        Signatures   Electronically signed by : Evan Maldonado, ; Apr 5 2017  9:48AM EST                       (Author)
 used

## 2019-04-07 NOTE — ED PROVIDER NOTE - ATTENDING CONTRIBUTION TO CARE
MD Shultz:  patient seen and evaluated with the resident.  I was present for key portions of the History & Physical, and I agree with the Impression & Plan.  MD Shultz:  85 yo M, bib EMS after presenting to  c/o 24 hrs CP.  Found to be in afib w/RVR at .  Associated Sx: pleuritic CP.  Per family, noncompliant with HTN and HLD meds.    VS: tachy, normotensive.  Physical Exam: adult M, NAD, NCAT, PERRL, EOMI, neck supple  Impression: MD Shultz:  patient seen and evaluated with the resident.  I was present for key portions of the History & Physical, and I agree with the Impression & Plan.  MD Shultz:  83 yo M, bib EMS after presenting to  c/o 24 hrs CP.  Found to be in afib w/RVR at .  Associated Sx: pleuritic CP.  Per family, noncompliant with HTN and HLD meds.    VS: tachy, normotensive.  Physical Exam: adult M, NAD, NCAT, PERRL, EOMI, neck supple, +bipedal edema, irreg irreg rhythm, abd s/nd/nt. MD Shultz:  patient seen and evaluated with the resident.  I was present for key portions of the History & Physical, and I agree with the Impression & Plan.  MD Shultz:  85 yo M, bib EMS after presenting to  c/o 24 hrs CP.  Found to be in afib w/RVR at .  Associated Sx: pleuritic CP.  Per family, noncompliant with HTN and HLD meds.    VS: tachy, normotensive.  Physical Exam: adult M, NAD, NCAT, PERRL, EOMI, neck supple, +bipedal edema, irreg irreg rhythm, abd s/nd/nt.  Impression:  aflutter w/RVR, no hypotension, cannot explain pleurisy by arrhythmia alone.  DDx includes ischemia, PE (but only 2 RFs).  CXR unremarkable.  Plan: rate control, likely admit for AC, trend trops, ddimer, reassess.  CTA chest if dimer positive.

## 2019-04-07 NOTE — ED CLERICAL - NS ED CLERK NOTE PRE-ARRIVAL INFORMATION; ADDITIONAL PRE-ARRIVAL INFORMATION
CC/Reason For referral: cp since 4/6  Preferred Consultant(if applicable):  Who admits for you (if needed):  Do you have documents you would like to fax over?  Would you still like to speak to an ED attending? no

## 2019-04-07 NOTE — ED PROVIDER NOTE - PROGRESS NOTE DETAILS
Patient cardioverted after 10mg IV diltiazem.  HR 76 and regular.  No change in pleuritic CP.  Repeat ECG demonstrates. Patient's HR 76 after diltiazem.  No change in pleuritic CP.  Repeat ECG demonstrates aflutter variable block Patient's HR 76 after diltiazem.  No change in pleuritic CP.  Repeat ECG demonstrates aflutter variable block.  ST-elev in V3,4,5 more c/w COLEEN than ischemia; and no reciprocal changes.  PMD is Dr. Muriel Michelle, [phone]; no answer and no answering service.   Patient will be hospitalist admit. Patient's HR 76 after diltiazem.  No change in pleuritic CP.  Repeat ECG demonstrates aflutter variable block.  ST-elev in V3,4,5 more c/w COLEEN than ischemia; and no reciprocal changes.  PMD is Dr. Muriel Waters, [phone]; no answer and no answering service.   Patient will be hospitalist admit.

## 2019-04-07 NOTE — ED PROVIDER NOTE - OBJECTIVE STATEMENT
Tereseaaron PGY2: 84M unclear pmh on lopressor in past non complaint with medications presents to ED with a cc of chest pain pleuritic worsening over x24 hours, was seen at urgent care today found to be in afib/aflutter instructed to present to ED for eval; pt well known at urgent care, per ems no known prior hx of afib, no long plane rides are rides, no prior dvt/pe, +sick contacts at home, no fever. No worsening LE edema. Denies n/v/f/c/sob. Denies headache, syncope, lightheadedness, dizziness. Denies abdominal pain. Denies dysuria, hematuria, hematochezia, BRBPR, tarry stools, diarrhea, constipation.

## 2019-04-08 DIAGNOSIS — Z98.890 OTHER SPECIFIED POSTPROCEDURAL STATES: Chronic | ICD-10-CM

## 2019-04-08 DIAGNOSIS — I48.92 UNSPECIFIED ATRIAL FLUTTER: ICD-10-CM

## 2019-04-08 DIAGNOSIS — I10 ESSENTIAL (PRIMARY) HYPERTENSION: ICD-10-CM

## 2019-04-08 DIAGNOSIS — R07.89 OTHER CHEST PAIN: ICD-10-CM

## 2019-04-08 DIAGNOSIS — N40.0 BENIGN PROSTATIC HYPERPLASIA WITHOUT LOWER URINARY TRACT SYMPTOMS: ICD-10-CM

## 2019-04-08 DIAGNOSIS — I63.9 CEREBRAL INFARCTION, UNSPECIFIED: ICD-10-CM

## 2019-04-08 DIAGNOSIS — Z29.9 ENCOUNTER FOR PROPHYLACTIC MEASURES, UNSPECIFIED: ICD-10-CM

## 2019-04-08 DIAGNOSIS — J45.998 OTHER ASTHMA: ICD-10-CM

## 2019-04-08 LAB
ANION GAP SERPL CALC-SCNC: 15 MMOL/L — SIGNIFICANT CHANGE UP (ref 5–17)
APTT BLD: 199.5 SEC — CRITICAL HIGH (ref 27.5–36.3)
APTT BLD: 85.4 SEC — HIGH (ref 27.5–36.3)
APTT BLD: 98.6 SEC — HIGH (ref 27.5–36.3)
BASOPHILS # BLD AUTO: 0.03 K/UL — SIGNIFICANT CHANGE UP (ref 0–0.2)
BASOPHILS NFR BLD AUTO: 0.2 % — SIGNIFICANT CHANGE UP (ref 0–2)
BUN SERPL-MCNC: 22 MG/DL — SIGNIFICANT CHANGE UP (ref 7–23)
CALCIUM SERPL-MCNC: 8.8 MG/DL — SIGNIFICANT CHANGE UP (ref 8.4–10.5)
CHLORIDE SERPL-SCNC: 99 MMOL/L — SIGNIFICANT CHANGE UP (ref 96–108)
CO2 SERPL-SCNC: 24 MMOL/L — SIGNIFICANT CHANGE UP (ref 22–31)
CREAT SERPL-MCNC: 1.1 MG/DL — SIGNIFICANT CHANGE UP (ref 0.5–1.3)
EOSINOPHIL # BLD AUTO: 0.03 K/UL — SIGNIFICANT CHANGE UP (ref 0–0.5)
EOSINOPHIL NFR BLD AUTO: 0.2 % — SIGNIFICANT CHANGE UP (ref 0–6)
GLUCOSE SERPL-MCNC: 100 MG/DL — HIGH (ref 70–99)
HCT VFR BLD CALC: 37.5 % — LOW (ref 39–50)
HCT VFR BLD CALC: 37.6 % — LOW (ref 39–50)
HCT VFR BLD CALC: 38.9 % — LOW (ref 39–50)
HGB BLD-MCNC: 11.1 G/DL — LOW (ref 13–17)
HGB BLD-MCNC: 11.4 G/DL — LOW (ref 13–17)
HGB BLD-MCNC: 12.1 G/DL — LOW (ref 13–17)
IMM GRANULOCYTES NFR BLD AUTO: 0.5 % — SIGNIFICANT CHANGE UP (ref 0–1.5)
LYMPHOCYTES # BLD AUTO: 2.88 K/UL — SIGNIFICANT CHANGE UP (ref 1–3.3)
LYMPHOCYTES # BLD AUTO: 22.6 % — SIGNIFICANT CHANGE UP (ref 13–44)
MAGNESIUM SERPL-MCNC: 1.9 MG/DL — SIGNIFICANT CHANGE UP (ref 1.6–2.6)
MCHC RBC-ENTMCNC: 22 PG — LOW (ref 27–34)
MCHC RBC-ENTMCNC: 22.5 PG — LOW (ref 27–34)
MCHC RBC-ENTMCNC: 23.2 PG — LOW (ref 27–34)
MCHC RBC-ENTMCNC: 29.6 GM/DL — LOW (ref 32–36)
MCHC RBC-ENTMCNC: 30.4 GM/DL — LOW (ref 32–36)
MCHC RBC-ENTMCNC: 31.1 GM/DL — LOW (ref 32–36)
MCV RBC AUTO: 74.1 FL — LOW (ref 80–100)
MCV RBC AUTO: 74.3 FL — LOW (ref 80–100)
MCV RBC AUTO: 74.6 FL — LOW (ref 80–100)
MONOCYTES # BLD AUTO: 1.46 K/UL — HIGH (ref 0–0.9)
MONOCYTES NFR BLD AUTO: 11.5 % — SIGNIFICANT CHANGE UP (ref 2–14)
NEUTROPHILS # BLD AUTO: 8.26 K/UL — HIGH (ref 1.8–7.4)
NEUTROPHILS NFR BLD AUTO: 65 % — SIGNIFICANT CHANGE UP (ref 43–77)
PHOSPHATE SERPL-MCNC: 3.2 MG/DL — SIGNIFICANT CHANGE UP (ref 2.5–4.5)
PLATELET # BLD AUTO: 185 K/UL — SIGNIFICANT CHANGE UP (ref 150–400)
PLATELET # BLD AUTO: 193 K/UL — SIGNIFICANT CHANGE UP (ref 150–400)
PLATELET # BLD AUTO: 199 K/UL — SIGNIFICANT CHANGE UP (ref 150–400)
POTASSIUM SERPL-MCNC: 4.1 MMOL/L — SIGNIFICANT CHANGE UP (ref 3.5–5.3)
POTASSIUM SERPL-SCNC: 4.1 MMOL/L — SIGNIFICANT CHANGE UP (ref 3.5–5.3)
RAPID RVP RESULT: SIGNIFICANT CHANGE UP
RBC # BLD: 5.05 M/UL — SIGNIFICANT CHANGE UP (ref 4.2–5.8)
RBC # BLD: 5.07 M/UL — SIGNIFICANT CHANGE UP (ref 4.2–5.8)
RBC # BLD: 5.22 M/UL — SIGNIFICANT CHANGE UP (ref 4.2–5.8)
RBC # FLD: 17 % — HIGH (ref 10.3–14.5)
RBC # FLD: 17.1 % — HIGH (ref 10.3–14.5)
RBC # FLD: 18.4 % — HIGH (ref 10.3–14.5)
SODIUM SERPL-SCNC: 138 MMOL/L — SIGNIFICANT CHANGE UP (ref 135–145)
TSH SERPL-MCNC: 1.81 UIU/ML — SIGNIFICANT CHANGE UP (ref 0.27–4.2)
WBC # BLD: 11.7 K/UL — HIGH (ref 3.8–10.5)
WBC # BLD: 12.73 K/UL — HIGH (ref 3.8–10.5)
WBC # BLD: 15.5 K/UL — HIGH (ref 3.8–10.5)
WBC # FLD AUTO: 11.7 K/UL — HIGH (ref 3.8–10.5)
WBC # FLD AUTO: 12.73 K/UL — HIGH (ref 3.8–10.5)
WBC # FLD AUTO: 15.5 K/UL — HIGH (ref 3.8–10.5)

## 2019-04-08 PROCEDURE — 12345: CPT | Mod: NC

## 2019-04-08 PROCEDURE — 99223 1ST HOSP IP/OBS HIGH 75: CPT | Mod: GC

## 2019-04-08 RX ORDER — DOCUSATE SODIUM 100 MG
100 CAPSULE ORAL THREE TIMES A DAY
Qty: 0 | Refills: 0 | Status: DISCONTINUED | OUTPATIENT
Start: 2019-04-08 | End: 2019-04-12

## 2019-04-08 RX ORDER — PANTOPRAZOLE SODIUM 20 MG/1
40 TABLET, DELAYED RELEASE ORAL
Qty: 0 | Refills: 0 | Status: DISCONTINUED | OUTPATIENT
Start: 2019-04-08 | End: 2019-04-12

## 2019-04-08 RX ORDER — ATORVASTATIN CALCIUM 80 MG/1
40 TABLET, FILM COATED ORAL AT BEDTIME
Qty: 0 | Refills: 0 | Status: DISCONTINUED | OUTPATIENT
Start: 2019-04-08 | End: 2019-04-12

## 2019-04-08 RX ORDER — MONTELUKAST 4 MG/1
10 TABLET, CHEWABLE ORAL DAILY
Qty: 0 | Refills: 0 | Status: DISCONTINUED | OUTPATIENT
Start: 2019-04-08 | End: 2019-04-12

## 2019-04-08 RX ORDER — FINASTERIDE 5 MG/1
5 TABLET, FILM COATED ORAL DAILY
Qty: 0 | Refills: 0 | Status: DISCONTINUED | OUTPATIENT
Start: 2019-04-08 | End: 2019-04-12

## 2019-04-08 RX ORDER — ASPIRIN/CALCIUM CARB/MAGNESIUM 324 MG
81 TABLET ORAL DAILY
Qty: 0 | Refills: 0 | Status: DISCONTINUED | OUTPATIENT
Start: 2019-04-08 | End: 2019-04-12

## 2019-04-08 RX ORDER — IPRATROPIUM/ALBUTEROL SULFATE 18-103MCG
3 AEROSOL WITH ADAPTER (GRAM) INHALATION EVERY 6 HOURS
Qty: 0 | Refills: 0 | Status: DISCONTINUED | OUTPATIENT
Start: 2019-04-08 | End: 2019-04-08

## 2019-04-08 RX ORDER — TAMSULOSIN HYDROCHLORIDE 0.4 MG/1
0.8 CAPSULE ORAL AT BEDTIME
Qty: 0 | Refills: 0 | Status: DISCONTINUED | OUTPATIENT
Start: 2019-04-08 | End: 2019-04-12

## 2019-04-08 RX ORDER — SENNA PLUS 8.6 MG/1
2 TABLET ORAL AT BEDTIME
Qty: 0 | Refills: 0 | Status: DISCONTINUED | OUTPATIENT
Start: 2019-04-08 | End: 2019-04-12

## 2019-04-08 RX ORDER — INFLUENZA VIRUS VACCINE 15; 15; 15; 15 UG/.5ML; UG/.5ML; UG/.5ML; UG/.5ML
0.5 SUSPENSION INTRAMUSCULAR ONCE
Qty: 0 | Refills: 0 | Status: DISCONTINUED | OUTPATIENT
Start: 2019-04-08 | End: 2019-04-12

## 2019-04-08 RX ORDER — DILTIAZEM HCL 120 MG
30 CAPSULE, EXT RELEASE 24 HR ORAL EVERY 6 HOURS
Qty: 0 | Refills: 0 | Status: DISCONTINUED | OUTPATIENT
Start: 2019-04-08 | End: 2019-04-10

## 2019-04-08 RX ORDER — BUDESONIDE AND FORMOTEROL FUMARATE DIHYDRATE 160; 4.5 UG/1; UG/1
2 AEROSOL RESPIRATORY (INHALATION)
Qty: 0 | Refills: 0 | Status: DISCONTINUED | OUTPATIENT
Start: 2019-04-08 | End: 2019-04-12

## 2019-04-08 RX ORDER — IPRATROPIUM/ALBUTEROL SULFATE 18-103MCG
3 AEROSOL WITH ADAPTER (GRAM) INHALATION EVERY 6 HOURS
Qty: 0 | Refills: 0 | Status: DISCONTINUED | OUTPATIENT
Start: 2019-04-08 | End: 2019-04-12

## 2019-04-08 RX ORDER — TIOTROPIUM BROMIDE 18 UG/1
1 CAPSULE ORAL; RESPIRATORY (INHALATION) DAILY
Qty: 0 | Refills: 0 | Status: DISCONTINUED | OUTPATIENT
Start: 2019-04-08 | End: 2019-04-12

## 2019-04-08 RX ORDER — HEPARIN SODIUM 5000 [USP'U]/ML
1100 INJECTION INTRAVENOUS; SUBCUTANEOUS
Qty: 25000 | Refills: 0 | Status: DISCONTINUED | OUTPATIENT
Start: 2019-04-08 | End: 2019-04-11

## 2019-04-08 RX ADMIN — MONTELUKAST 10 MILLIGRAM(S): 4 TABLET, CHEWABLE ORAL at 11:59

## 2019-04-08 RX ADMIN — Medication 3 MILLILITER(S): at 23:30

## 2019-04-08 RX ADMIN — ATORVASTATIN CALCIUM 40 MILLIGRAM(S): 80 TABLET, FILM COATED ORAL at 21:13

## 2019-04-08 RX ADMIN — FINASTERIDE 5 MILLIGRAM(S): 5 TABLET, FILM COATED ORAL at 11:58

## 2019-04-08 RX ADMIN — TIOTROPIUM BROMIDE 1 CAPSULE(S): 18 CAPSULE ORAL; RESPIRATORY (INHALATION) at 11:59

## 2019-04-08 RX ADMIN — HEPARIN SODIUM 1100 UNIT(S)/HR: 5000 INJECTION INTRAVENOUS; SUBCUTANEOUS at 05:11

## 2019-04-08 RX ADMIN — HEPARIN SODIUM 1100 UNIT(S)/HR: 5000 INJECTION INTRAVENOUS; SUBCUTANEOUS at 11:45

## 2019-04-08 RX ADMIN — SENNA PLUS 2 TABLET(S): 8.6 TABLET ORAL at 21:13

## 2019-04-08 RX ADMIN — Medication 100 MILLIGRAM(S): at 21:13

## 2019-04-08 RX ADMIN — Medication 30 MILLIGRAM(S): at 05:10

## 2019-04-08 RX ADMIN — Medication 100 MILLIGRAM(S): at 05:10

## 2019-04-08 RX ADMIN — BUDESONIDE AND FORMOTEROL FUMARATE DIHYDRATE 2 PUFF(S): 160; 4.5 AEROSOL RESPIRATORY (INHALATION) at 18:24

## 2019-04-08 RX ADMIN — TAMSULOSIN HYDROCHLORIDE 0.8 MILLIGRAM(S): 0.4 CAPSULE ORAL at 21:13

## 2019-04-08 RX ADMIN — Medication 30 MILLIGRAM(S): at 11:59

## 2019-04-08 RX ADMIN — BUDESONIDE AND FORMOTEROL FUMARATE DIHYDRATE 2 PUFF(S): 160; 4.5 AEROSOL RESPIRATORY (INHALATION) at 05:10

## 2019-04-08 RX ADMIN — Medication 30 MILLIGRAM(S): at 23:30

## 2019-04-08 RX ADMIN — PANTOPRAZOLE SODIUM 40 MILLIGRAM(S): 20 TABLET, DELAYED RELEASE ORAL at 08:03

## 2019-04-08 RX ADMIN — Medication 3 MILLILITER(S): at 05:10

## 2019-04-08 RX ADMIN — Medication 100 MILLIGRAM(S): at 15:09

## 2019-04-08 RX ADMIN — Medication 3 MILLILITER(S): at 11:58

## 2019-04-08 RX ADMIN — HEPARIN SODIUM 1100 UNIT(S)/HR: 5000 INJECTION INTRAVENOUS; SUBCUTANEOUS at 19:10

## 2019-04-08 RX ADMIN — Medication 30 MILLIGRAM(S): at 18:24

## 2019-04-08 RX ADMIN — Medication 81 MILLIGRAM(S): at 11:59

## 2019-04-08 NOTE — H&P ADULT - NSHPLABSRESULTS_GEN_ALL_CORE
CBC Full  -  ( 07 Apr 2019 19:30 )  WBC Count : 15.7 K/uL  Hemoglobin : 12.9 g/dL  Hematocrit : 43.8 %  Platelet Count - Automated : 256 K/uL  Mean Cell Volume : 74.5 fl  Mean Cell Hemoglobin : 22.0 pg  Mean Cell Hemoglobin Concentration : 29.5 gm/dL  Auto Neutrophil # : 10.5 K/uL  Auto Lymphocyte # : 3.5 K/uL  Auto Monocyte # : 1.7 K/uL  Auto Eosinophil # : 0.0 K/uL  Auto Basophil # : 0.1 K/uL  Auto Neutrophil % : 66.8 %  Auto Lymphocyte % : 22.1 %  Auto Monocyte % : 10.6 %  Auto Eosinophil % : 0.1 %  Auto Basophil % : 0.4 %    04-07    137  |  99  |  23  ----------------------------<  113<H>  4.4   |  24  |  1.08    Ca    9.2      07 Apr 2019 19:30    TPro  7.4  /  Alb  4.0  /  TBili  1.0  /  DBili  x   /  AST  16  /  ALT  11  /  AlkPhos  55  04-07    LIVER FUNCTIONS - ( 07 Apr 2019 19:30 )  Alb: 4.0 g/dL / Pro: 7.4 g/dL / ALK PHOS: 55 U/L / ALT: 11 U/L / AST: 16 U/L / GGT: x             PT/INR - ( 07 Apr 2019 19:30 )   PT: 13.5 sec;   INR: 1.18 ratio         PTT - ( 07 Apr 2019 19:30 )  PTT:34.3 sec    Serum Pro-Brain Natriuretic Peptide (04.07.19 @ 19:30)    Serum Pro-Brain Natriuretic Peptide: 5793 pg/mL    Troponin T, High Sensitivity (04.07.19 @ 20:45)    Troponin T, High Sensitivity Result: 24    Troponin T, High Sensitivity (04.07.19 @ 19:30)    Troponin T, High Sensitivity Result: 26      EKG:   aflutter with RVR to rate of 160 no STT changes or T wave inversion    Imaging:    < from: CT Angio Chest w/ IV Cont (04.07.19 @ 21:06) >    LUNGS AND LARGE AIRWAYS: Limited evaluation secondary to respiratory   motion artifact. Tracheal debris or secretions.  3 mm right upper lobe   subpleural lymph node (2:58). Two 4 mm nodular opacities adjacent to the   right major fissure likely represent intrafissural lymph nodes (3:246 and   3:249) Scattered bilateral small calcified granulomas. Left lower lobe   linear atelectasis. No pulmonary masses or consolidation.  PLEURA: Small bilateral pleural effusions with adjacent compressive   atelectasis.  VESSELS: Evaluation of the subsegmental pulmonary arteries are limited   secondary to respiratory motion artifact. No pulmonary embolism in the   main, lobar or segmental pulmonary arteries. Atherosclerotic   calcifications of the aorta and coronary arteries. Ascending aorta is   dilated measuring up to 4.5 cm.  HEART: Heart size is mildly enlarged. No pericardial effusion.  MEDIASTINUM AND IZZY: Enlarged left paratracheal lymph node measuring 1.8   x 1.2 cm (3:134). No hilar lymphadenopathy.  CHEST WALL AND LOWER NECK: Within normal limits.  VISUALIZED UPPER ABDOMEN: Right renal cyst.  BONES: Spinal degenerative changes.    < end of copied text > CBC Full  -  ( 07 Apr 2019 19:30 )  WBC Count : 15.7 K/uL  Hemoglobin : 12.9 g/dL  Hematocrit : 43.8 %  Platelet Count - Automated : 256 K/uL  Mean Cell Volume : 74.5 fl  Mean Cell Hemoglobin : 22.0 pg  Mean Cell Hemoglobin Concentration : 29.5 gm/dL  Auto Neutrophil # : 10.5 K/uL  Auto Lymphocyte # : 3.5 K/uL  Auto Monocyte # : 1.7 K/uL  Auto Eosinophil # : 0.0 K/uL  Auto Basophil # : 0.1 K/uL  Auto Neutrophil % : 66.8 %  Auto Lymphocyte % : 22.1 %  Auto Monocyte % : 10.6 %  Auto Eosinophil % : 0.1 %  Auto Basophil % : 0.4 %    04-07    137  |  99  |  23  ----------------------------<  113<H>  4.4   |  24  |  1.08    Ca    9.2      07 Apr 2019 19:30    TPro  7.4  /  Alb  4.0  /  TBili  1.0  /  DBili  x   /  AST  16  /  ALT  11  /  AlkPhos  55  04-07    LIVER FUNCTIONS - ( 07 Apr 2019 19:30 )  Alb: 4.0 g/dL / Pro: 7.4 g/dL / ALK PHOS: 55 U/L / ALT: 11 U/L / AST: 16 U/L / GGT: x             PT/INR - ( 07 Apr 2019 19:30 )   PT: 13.5 sec;   INR: 1.18 ratio         PTT - ( 07 Apr 2019 19:30 )  PTT:34.3 sec    Serum Pro-Brain Natriuretic Peptide (04.07.19 @ 19:30)    Serum Pro-Brain Natriuretic Peptide: 5793 pg/mL    Troponin T, High Sensitivity (04.07.19 @ 20:45)    Troponin T, High Sensitivity Result: 24    Troponin T, High Sensitivity (04.07.19 @ 19:30)    Troponin T, High Sensitivity Result: 26      EKG:   aflutter with RVR to rate of 120 no STT changes or T wave inversion    Imaging:    < from: CT Angio Chest w/ IV Cont (04.07.19 @ 21:06) >    LUNGS AND LARGE AIRWAYS: Limited evaluation secondary to respiratory   motion artifact. Tracheal debris or secretions.  3 mm right upper lobe   subpleural lymph node (2:58). Two 4 mm nodular opacities adjacent to the   right major fissure likely represent intrafissural lymph nodes (3:246 and   3:249) Scattered bilateral small calcified granulomas. Left lower lobe   linear atelectasis. No pulmonary masses or consolidation.  PLEURA: Small bilateral pleural effusions with adjacent compressive   atelectasis.  VESSELS: Evaluation of the subsegmental pulmonary arteries are limited   secondary to respiratory motion artifact. No pulmonary embolism in the   main, lobar or segmental pulmonary arteries. Atherosclerotic   calcifications of the aorta and coronary arteries. Ascending aorta is   dilated measuring up to 4.5 cm.  HEART: Heart size is mildly enlarged. No pericardial effusion.  MEDIASTINUM AND IZZY: Enlarged left paratracheal lymph node measuring 1.8   x 1.2 cm (3:134). No hilar lymphadenopathy.  CHEST WALL AND LOWER NECK: Within normal limits.  VISUALIZED UPPER ABDOMEN: Right renal cyst.  BONES: Spinal degenerative changes.    < end of copied text > Labs, imaging and EKG personally reviewed and interpreted by me - leukocytosis 15.7 w neutrophil predominance, Hb 12.9, normal Cr, normal lytes, BNP 5793, delta trop (-). CXR negative for focal consolidations. EKG aflutter with RVR to rate of 120 no STT changes or T wave inversion        CBC Full  -  ( 07 Apr 2019 19:30 )  WBC Count : 15.7 K/uL  Hemoglobin : 12.9 g/dL  Hematocrit : 43.8 %  Platelet Count - Automated : 256 K/uL  Mean Cell Volume : 74.5 fl  Mean Cell Hemoglobin : 22.0 pg  Mean Cell Hemoglobin Concentration : 29.5 gm/dL  Auto Neutrophil # : 10.5 K/uL  Auto Lymphocyte # : 3.5 K/uL  Auto Monocyte # : 1.7 K/uL  Auto Eosinophil # : 0.0 K/uL  Auto Basophil # : 0.1 K/uL  Auto Neutrophil % : 66.8 %  Auto Lymphocyte % : 22.1 %  Auto Monocyte % : 10.6 %  Auto Eosinophil % : 0.1 %  Auto Basophil % : 0.4 %    04-07    137  |  99  |  23  ----------------------------<  113<H>  4.4   |  24  |  1.08    Ca    9.2      07 Apr 2019 19:30    TPro  7.4  /  Alb  4.0  /  TBili  1.0  /  DBili  x   /  AST  16  /  ALT  11  /  AlkPhos  55  04-07    PT/INR - ( 07 Apr 2019 19:30 )   PT: 13.5 sec;   INR: 1.18 ratio    PTT - ( 07 Apr 2019 19:30 )  PTT:34.3 sec    Serum Pro-Brain Natriuretic Peptide (04.07.19 @ 19:30)    Serum Pro-Brain Natriuretic Peptide: 5793 pg/mL    Troponin T, High Sensitivity (04.07.19 @ 20:45)    Troponin T, High Sensitivity Result: 24    Troponin T, High Sensitivity (04.07.19 @ 19:30)    Troponin T, High Sensitivity Result: 26    Imaging:    < from: CT Angio Chest w/ IV Cont (04.07.19 @ 21:06) >    LUNGS AND LARGE AIRWAYS: Limited evaluation secondary to respiratory   motion artifact. Tracheal debris or secretions.  3 mm right upper lobe   subpleural lymph node (2:58). Two 4 mm nodular opacities adjacent to the   right major fissure likely represent intrafissural lymph nodes (3:246 and   3:249) Scattered bilateral small calcified granulomas. Left lower lobe   linear atelectasis. No pulmonary masses or consolidation.  PLEURA: Small bilateral pleural effusions with adjacent compressive   atelectasis.  VESSELS: Evaluation of the subsegmental pulmonary arteries are limited   secondary to respiratory motion artifact. No pulmonary embolism in the   main, lobar or segmental pulmonary arteries. Atherosclerotic   calcifications of the aorta and coronary arteries. Ascending aorta is   dilated measuring up to 4.5 cm.  HEART: Heart size is mildly enlarged. No pericardial effusion.  MEDIASTINUM AND IZZY: Enlarged left paratracheal lymph node measuring 1.8   x 1.2 cm (3:134). No hilar lymphadenopathy.  CHEST WALL AND LOWER NECK: Within normal limits.  VISUALIZED UPPER ABDOMEN: Right renal cyst.  BONES: Spinal degenerative changes.    < end of copied text >

## 2019-04-08 NOTE — H&P ADULT - NSHPSOCIALHISTORY_GEN_ALL_CORE
No smoking  Social alcohol use  Lives at home with wife, daughter and son live near by involved in their care  Ambulates with walker

## 2019-04-08 NOTE — H&P ADULT - ASSESSMENT
84M with PMH of asthma, HTN, HLD, BPH, and CVA (2015)  who presents with chest pain x 1, found to have new aflutter with RVR with no overt underlying trigger 84M with PMH of asthma, HTN, HLD, BPH, and CVA (2015)  who presents with chest pain x 1, found to have new aflutter with RVR, unclear if underlying etiology

## 2019-04-08 NOTE — H&P ADULT - PROBLEM SELECTOR PLAN 6
Patient with persistent asthma, appears possibly non-adherent with medication. Component of upper airway wheezing  - c/w symbicort  - c/w spiriva  - c/w montelukast  - duonebs prn Patient with persistent asthma, appears possibly non-adherent with medication. Component of upper airway wheezing  - c/w symbicort  - c/w spiriva  - c/w montelukast  - chaka standing q6 for now  - peak flow monitor  - if failure to improve on bronchodilator, consider steroids Patient with persistent asthma, possibly non-adherent with medication. On exam, scattered upper airway wheezing  - c/w symbicort  - c/w spiriva  - c/w montelukast  - duonebs standing q6 for now  - peak flow monitor  - if failure to improve on bronchodilator, consider steroids

## 2019-04-08 NOTE — ED ADULT NURSE REASSESSMENT NOTE - NS ED NURSE REASSESS COMMENT FT1
heparin drip restarted at 0433 at 1100units/hr  as per heparin Nomogram   admitting team paged to place new order due to unable to rescan under previous order heparin drip restarted at 0433 at 1100units/hr  as per heparin Nomogram with JULIO C Chandler  admitting team paged to place new order due to unable to rescan under previous order

## 2019-04-08 NOTE — ED ADULT NURSE REASSESSMENT NOTE - GENERAL PATIENT STATE
cooperative/comfortable appearance/resting/sleeping
resting/sleeping
cooperative/comfortable appearance

## 2019-04-08 NOTE — H&P ADULT - NSICDXPASTMEDICALHX_GEN_ALL_CORE_FT
PAST MEDICAL HISTORY:  Asthma     CVA (cerebral vascular accident) 2015    HTN (hypertension)     Hypercholesterolemia     Leukocytoclastic vasculitis

## 2019-04-08 NOTE — ED ADULT NURSE REASSESSMENT NOTE - NS ED NURSE REASSESS COMMENT FT1
Received report from nurse Dipti Gutierrez.  Heparin drip in progress.  No acute respiratory distress noted, v/s obtained.  Pr admitted waiting bed assignment.

## 2019-04-08 NOTE — ED ADULT NURSE REASSESSMENT NOTE - NS ED NURSE REASSESS COMMENT FT1
Heparin drip placed on hold x 1 hr as per heparin protocol for UAW=159.5. No active bleeding at present

## 2019-04-08 NOTE — H&P ADULT - PROBLEM SELECTOR PLAN 4
Previously on metoprolol 50 BID  - currently BPs acceptable  - hold BB given initiation of CCB   - Previously on metoprolol 50 BID  - currently BPs acceptable  - hold BB given initiation of CCB Previously on metoprolol 50 BID  - currently BPs acceptable  - hold BB given initiation of CCB  - monitor BP routinely

## 2019-04-08 NOTE — H&P ADULT - PROBLEM SELECTOR PLAN 1
Patient presenting with anginal sx for 1 day . No known CAD, stress test in 2017 wnl.  - EKG with new aflutter with RVR to 130s, address as below  - CTA chest with no PE, pna, ptx, noting scattered granuloma  - leukocytosis, pending RVP  - f/u blood ctx  - component of uncontrolled asthma 2/2 nonadherence, address as below Patient presenting with anginal sx for 1 day . No known CAD, stress test in 2017 wnl.  - EKG with new aflutter with RVR to 130s, address as below  - CTA chest with no PE, pna, ptx, noting scattered granuloma  - leukocytosis, pending RVP and blood ctx  - f/u blood ctx  - component of uncontrolled asthma 2/2 nonadherence, address as below Patient found to have new aflutter with RVR to 130s but HDS. Unclear if underlying etiology driving rapid rate. S/p diltiazem 10 mg x 1 and 60 mg PO x 1, now in NSR rate controlled. [id number] 5  - low suspicion for active ACS, given neg troponin  - check TTE to eval for ischemic disease, valvualr disease, LVEF  - check TSH  - f/u blood ctx, RVP to /ro infectious etiology; CT chest neg for PNA   - monitor on tele  - replete electrolytes K > 4, and Mg > 2  - previously on BB, but given uncontrolled asthma and no cardiac c/w CCB  - c/w diltiazem 30 q6h with hold parameters - titrate up as needed for HR control   - proBNP 5000s with mild b/l pleural effusion but sat well on RA, no resp distress; hold diuresing for now, f/u TTE  - c/w heparin gtt for now, transition to DOAC/coumadin after TTE  - consider ischemic eval  - cardiology consult (previously followed by Dr. Naveed Donald)

## 2019-04-08 NOTE — CHART NOTE - NSCHARTNOTEFT_GEN_A_CORE
Pt's chart was reviewed.    Claudia ID # [id number]  Pt seen and examined at bedside. Endorses palpitations.  Pt denies cp, sob, abd pain, N/V, fever, chills.   Vitals remarkable for -130  Physical Exam remarkable for rapid irregular HR. No M/R/G  85yo male p/w Atrial Flutter with RVR   Continue Cardizem 30mg Q6hrs  CHADSVASC score 5, Continue Heparin gtt  Follow up with ECHO  Follow up with Cardiology   Monitor Tele

## 2019-04-08 NOTE — H&P ADULT - ATTENDING COMMENTS
Patient assigned to me by night hospitalist in charge for management and care for patient for this evening only. Care to be resumed by day hospitalist in the morning and thereafter.     Pt seen and examined. Case d/w house staff Dr. Waddell. Agree with assessment and plan, with changes made where appropriate.

## 2019-04-08 NOTE — CONSULT NOTE ADULT - SUBJECTIVE AND OBJECTIVE BOX
CARDIOLOGY ATTENDING      HISTORY OF PRESENT ILLNESS: 85 y/o male PMH moderate AI, HTN and stroke admitted with chest pain and palpitations. Found to have newly diagnosed afib. Baseline QRS narrow. He denies syncope.    PAST MEDICAL & SURGICAL HISTORY:  newly diagnosed atrial fibrillation  Hypercholesterolemia  HTN (hypertension)  CVA (cerebral vascular accident): 2015    H/O sinus surgery        MEDICATIONS  (STANDING):  ALBUTerol/ipratropium for Nebulization 3 milliLiter(s) Nebulizer every 6 hours  aspirin  chewable 81 milliGRAM(s) Oral daily  atorvastatin 40 milliGRAM(s) Oral at bedtime  buDESOnide 160 MICROgram(s)/formoterol 4.5 MICROgram(s) Inhaler 2 Puff(s) Inhalation two times a day  diltiazem    Tablet 30 milliGRAM(s) Oral every 6 hours  docusate sodium 100 milliGRAM(s) Oral three times a day  finasteride 5 milliGRAM(s) Oral daily  heparin  Infusion. 1100 Unit(s)/Hr (11 mL/Hr) IV Continuous <Continuous>  influenza   Vaccine 0.5 milliLiter(s) IntraMuscular once  montelukast 10 milliGRAM(s) Oral daily  pantoprazole    Tablet 40 milliGRAM(s) Oral before breakfast  senna 2 Tablet(s) Oral at bedtime  tamsulosin 0.8 milliGRAM(s) Oral at bedtime  tiotropium 18 MICROgram(s) Capsule 1 Capsule(s) Inhalation daily      Allergies    No Known Allergies    Intolerances        FAMILY HISTORY:  No pertinent family history in first degree relatives    Non-contributary for premature coronary disease or sudden cardiac death    SOCIAL HISTORY:    [ x] Non-smoker  [ ] Smoker  [ ] Alcohol      REVIEW OF SYSTEMS:  [x ]chest pain  [  ]shortness of breath  [ x ]palpitations  [  ]syncope  [ ]near syncope [ ]upper extremity weakness   [ ] lower extremity weakness  [  ]diplopia  [  ]altered mental status   [  ]fevers  [ ]chills [ ]nausea  [ ]vomitting  [  ]dysphagia    [ ]abdominal pain  [ ]melena  [ ]BRBPR    [  ]epistaxis  [  ]rash    [ ]lower extremity edema        [ x] All others negative	  [ ] Unable to obtain    PHYSICAL EXAM:  T(C): 36.7 (04-08-19 @ 12:23), Max: 37.1 (04-07-19 @ 19:07)  HR: 122 (04-08-19 @ 12:23) (84 - 122)  BP: 135/76 (04-08-19 @ 12:23) (129/62 - 177/103)  RR: 18 (04-08-19 @ 12:23) (16 - 20)  SpO2: 96% (04-08-19 @ 12:23) (95% - 99%)  Wt(kg): --    no JVD  IRR, no murmurs  CTAB  soft nt/nd  no c/c/e      TELEMETRY: 	    ECG:  	    Echo:  NST:  Cath:  	  	  LABS:	 	                          11.4   11.7  )-----------( 193      ( 08 Apr 2019 11:40 )             37.6     04-08    138  |  99  |  22  ----------------------------<  100<H>  4.1   |  24  |  1.10    Ca    8.8      08 Apr 2019 06:02  Phos  3.2     04-08  Mg     1.9     04-08    TPro  7.4  /  Alb  4.0  /  TBili  1.0  /  DBili  x   /  AST  16  /  ALT  11  /  AlkPhos  55  04-07    proBNP: Serum Pro-Brain Natriuretic Peptide: 5793 pg/mL (04-07 @ 19:30)    Lipid Profile:   HgA1c:   TSH: Thyroid Stimulating Hormone, Serum: 1.81 uIU/mL (04-08 @ 09:01)      A/P) 85 y/o male PMH moderate AI, HTN and stroke admitted with chest pain and palpitations. Found to have newly diagnosed afib. Baseline QRS narrow. He denies syncope.    -given elevated chads-vasc recommend lifelong a/c. Continue heparin drip for now, but will change to NOAC upon discharge  -get echo to assess severity of AI  -get NST given chest pain  -start metoprolol 50 bid, and escalate beta blocker therapy as BP allows      Nicanor June M.D., RS  Cardiac Electrophysiology  [facility]  [address] [place] [address], [address] [place] [address]  [email or web address]    office [phone]  pager [phone]

## 2019-04-08 NOTE — H&P ADULT - PROBLEM SELECTOR PLAN 3
Prior CVA in 2015, previously on ASA and Plavix. Per meds from other sources patient picks up meds from [facility], has not picked up Plavix in over a year  - currently with no overt residual deficits  - c/w ASA  - c/w atorvastatin Prior CVA in 2015, previously on ASA and Plavix. Per meds from other sources patient picks up meds from [facility], has not picked up Plavix in over a year  - currently with no overt residual deficits  - c/w ASA  - c/w atorvastatin  - hold further plavix given danger of bleeding while on triple therapy Prior CVA in 2015, previously on ASA and Plavix. Per meds from other sources patient picks up meds from [facility], has not picked up Plavix in over a year  - currently with no overt residual deficits  - c/w ASA  - c/w atorvastatin  - hold further plavix - clarify full med list in AM

## 2019-04-08 NOTE — H&P ADULT - HISTORY OF PRESENT ILLNESS
84M with PMH of asthma, HTN, HLD, BPH, and CVA (2015)  who presents with chest pain x 1. No family members at bedside. Spoke with patient using  iPad. Also spoke with daughter Tanvi (cell: [phone]_ for collateral history. Patient never complaints of any sx, until last night when he experienced sudden onset squeezing chest pain that lasted until today. The pain was worse with inspiration and causing him to be short of breath. He endorsed radiation of the pain to the neck, but otherwise no nausea, vomiting, cough fevers, chills, abdominal pain, or diarrhea. He has never had episodes like this prior. Otherwise, denies headache , loss of consciousness, or dizziness. Patient went to urgent care due to persistence of sx, at which time he was found to be in aflutter. Patient per EMS is well known to urgent care and not known to have SVT. Also per prior admission for chest pain in 2017 for ROB, patient had maintain NSR. Per olga, otoniel lives alone with wife at home and haphazardly takes his mediation. She is unsure what he takes.    In ED, T 98.7, , /93, and 99% on RA. Labs notable for leukocytosis of 15.7 with PMN predominance, proBNP 5793. TRoponin 26 --> 24. EKG with aflutter with RVR to 128 no STT changes or T wave inversion. Patient was given dilitizem 10 mg IVP x 1 followed by diltiazem 60 mg PO at which point patient converted. He was also initiated on heparin gtt. CTA chest neg for PE, consolidation, but did not some scattered granulomas. Patient currently endorses feeling much improved. 84M with PMH of asthma, HTN, HLD, BPH, and CVA (2015)  who presents with chest pain x 1. No family members at bedside. Spoke with patient using  iPad. Also spoke with daughter Tanvi (cell: [phone]) for collateral history. Patient never complaints of any sx, until last night when he experienced sudden onset squeezing chest pain that lasted until today. The pain was worse with inspiration and causing him to be short of breath. He endorsed radiation of the pain to the neck, but otherwise no nausea, vomiting, cough fevers, chills, abdominal pain, dysuria, urinary retentionor diarrhea. He has never had episodes like this prior. Otherwise, denies headache ,loss of consciousness, or dizziness. Patient went to urgent care due to persistence of sx, at which time he was found to be in aflutter. Patient per EMS is well known to urgent care and not known to have SVT. Also per prior admission for chest pain in 2017 for ROB, patient had maintain NSR. Per olga otoniel lives alone with wife at home and haphazardly takes his mediation. She is unsure what he takes.    In ED, T 98.7, , /93, and 99% on RA. Labs notable for leukocytosis of 15.7 with PMN predominance, proBNP 5793. TRoponin 26 --> 24. EKG with aflutter with RVR to 128 no STT changes or T wave inversion. Patient was given dilitizem 10 mg IVP x 1 followed by diltiazem 60 mg PO at which point patient converted. He was also initiated on heparin gtt. CTA chest neg for PE, consolidation, but did not some scattered granulomas. Patient currently endorses feeling much improved. 84M with PMH of asthma, HTN, HLD, BPH, and CVA (2015)  who presents with chest pain x 1 day. No family members at bedside. Spoke with patient using  iPad. Also spoke with daughter Tanvi (cell: [phone]) for collateral history. Patient never complaints of any sx, until last night when he experienced sudden onset squeezing chest pain that lasted until today. The pain was worse with inspiration and causing him to be short of breath. He endorsed radiation of the pain to the neck, but otherwise no nausea, vomiting, cough fevers, chills, abdominal pain, dysuria, urinary retention or diarrhea. He has never had episodes like this prior. Otherwise, denies headache ,loss of consciousness, or dizziness. Patient went to urgent care due to persistence of sx, at which time he was found to be in aflutter. Patient per EMS is well known to urgent care and not known to have SVT. Also per prior admission for atypical chest pain in 2017 for ROB, patient had maintain NSR. Per daughter patient lives alone with wife at home and haphazardly takes his mediation. She is unsure what he takes.    In ED, T 98.7, , /93, and 99% on RA. Labs notable for leukocytosis of 15.7 with PMN predominance, proBNP 5793. TRoponin 26 --> 24. EKG with aflutter with RVR to 128 no STT changes or T wave inversion. Patient was given dilitizem 10 mg IVP x 1 followed by diltiazem 60 mg PO at which point patient converted. He was also initiated on heparin gtt. CTA chest neg for PE, consolidation, but did not some scattered granulomas. Patient currently endorses feeling much improved.

## 2019-04-08 NOTE — H&P ADULT - PROBLEM SELECTOR PLAN 2
Patient found to have new aflutter with RVR to 130s but HDS. Unclear if underlying etiology driving rapid rate. S/p dilitiazem 10 mg x 1 and 60 mg PO x 1 with conversion to NSR rate controlled. [id number] 5  - low suspicion for active ACS, given neg troponin  - TTE  - check TSH  - f/u blood ctx, RVP  - monitor on tele  - replete electrolytes K > 4, and Mg > 2  - previously on BB, but given uncontrolled asthma and no cardiac c/w CCB  - c/w diltiazem 60 q4 with hold parameters  - proBNP 5000s with mild b/l pleural effusion, hold diuresing for now  - c/w heparin gtt for now, consider transitioning Patient found to have new aflutter with RVR to 130s but HDS. Unclear if underlying etiology driving rapid rate. S/p diltiazem 10 mg x 1 and 60 mg PO x 1 with conversion to NSR rate controlled. [id number] 5  - low suspicion for active ACS, given neg troponin  - TTE  - check TSH  - f/u blood ctx, RVP  - monitor on tele  - replete electrolytes K > 4, and Mg > 2  - previously on BB, but given uncontrolled asthma and no cardiac c/w CCB  - c/w diltiazem 60 q4 with hold parameters  - proBNP 5000s with mild b/l pleural effusion but sat , hold diuresing for now  - c/w heparin gtt for now, transition after TTE  - consider ischemia eval  - cardiology consult (previously followed by Dr. Naveed Donald) Patient presenting with anginal sx for 1 day . No known CAD, stress test in 2017 wnl. Likely 2/2 demands from Afib   - delta trop (-), no ischemic changes on EKG   - Afib management as above   - CTA chest with no PE, pna, ptx, noting scattered granuloma  - leukocytosis, pending RVP and blood ctx  - f/u blood ctx  - component of uncontrolled asthma 2/2 nonadherence, address as below

## 2019-04-08 NOTE — H&P ADULT - NSHPPHYSICALEXAM_GEN_ALL_CORE
PHYSICAL EXAM:    Vital Signs Last 24 Hrs  T(C): 36.9 (08 Apr 2019 00:05), Max: 37.1 (07 Apr 2019 19:07)  T(F): 98.4 (08 Apr 2019 00:05), Max: 98.7 (07 Apr 2019 19:07)  HR: 86 (08 Apr 2019 00:05) (84 - 117)  BP: 144/84 (08 Apr 2019 00:05) (129/62 - 177/103)  BP(mean): --  RR: 20 (08 Apr 2019 00:05) (16 - 20)  SpO2: 97% (08 Apr 2019 00:05) (97% - 99%)    General: Elderly male resting in bed, no acute distress  HEENT: NCAT.  PERRL.  EOMI.  No scleral icterus or injection.  Moist MM.    Neck: Supple.  Full ROM.  No JVD.  No thyromegaly.   Heart: RRR.  Normal S1 and S2.  No murmurs  Lungs: +upper airway sounds, mild wheeze throughout  Abdomen: BS+, soft, NT/ND.  No organomegaly.  Skin: Warm and dry.  No rashes.  Extremities: No edema.  2+ peripheral pulses b/l.  Musculoskeletal: No deformities.  No spinal or paraspinal tenderness.  Neuro: A&Ox3.  CN II-XII intact.  5/5 strength in UE and LE b/l. PHYSICAL EXAM:    Vital Signs Last 24 Hrs  T(C): 36.9 (08 Apr 2019 00:05), Max: 37.1 (07 Apr 2019 19:07)  T(F): 98.4 (08 Apr 2019 00:05), Max: 98.7 (07 Apr 2019 19:07)  HR: 86 (08 Apr 2019 00:05) (84 - 117)  BP: 144/84 (08 Apr 2019 00:05) (129/62 - 177/103)  BP(mean): --  RR: 20 (08 Apr 2019 00:05) (16 - 20)  SpO2: 97% (08 Apr 2019 00:05) (97% - 99%)    General: Elderly male resting in bed, no acute distress, speaking in full sentences  HEENT: NCAT.  PERRL.  EOMI.  No scleral icterus or injection.  Moist MM.    Neck: Supple.  Full ROM.  No JVD.  No thyromegaly.   Heart: RRR.  Normal S1 and S2.  No murmurs  Lungs: +upper airway sounds, mild wheeze throughout  Abdomen: BS+, soft, NT/ND.  No organomegaly.  Skin: Warm and dry.  No rashes.  Extremities: No edema.  2+ peripheral pulses b/l.  Musculoskeletal: No deformities.  No spinal or paraspinal tenderness.  Neuro: A&Ox3.  CN II-XII intact.  5/5 strength in UE and LE b/l. PHYSICAL EXAM:    Vital Signs Last 24 Hrs  T(C): 36.9 (08 Apr 2019 00:05), Max: 37.1 (07 Apr 2019 19:07)  T(F): 98.4 (08 Apr 2019 00:05), Max: 98.7 (07 Apr 2019 19:07)  HR: 86 (08 Apr 2019 00:05) (84 - 117)  BP: 144/84 (08 Apr 2019 00:05) (129/62 - 177/103)  BP(mean): --  RR: 20 (08 Apr 2019 00:05) (16 - 20)  SpO2: 97% (08 Apr 2019 00:05) (97% - 99%)    General: Elderly male resting in bed, no acute distress, speaking in full sentences  HEENT: NCAT.  PERRL.  EOMI.  No scleral icterus or injection.  Moist MM.    Neck: Supple.  Full ROM.  No JVD.   Heart: RRR.  Normal S1 and S2.  No murmurs  Lungs: +upper airway sounds, mild wheeze throughout  Abdomen: BS+, soft, NT/ND.  No organomegaly.  Skin: Warm and dry.  No rashes.  Extremities: No edema.  2+ peripheral pulses b/l.  Musculoskeletal: No deformities.  No spinal or paraspinal tenderness.  Neuro: A&Ox3.  CN II-XII intact.  5/5 strength in UE and LE b/l.

## 2019-04-09 DIAGNOSIS — I48.91 UNSPECIFIED ATRIAL FIBRILLATION: ICD-10-CM

## 2019-04-09 LAB
APTT BLD: 45.1 SEC — HIGH (ref 27.5–36.3)
APTT BLD: 59.4 SEC — HIGH (ref 27.5–36.3)
HCT VFR BLD CALC: 37.4 % — LOW (ref 39–50)
HGB BLD-MCNC: 11.4 G/DL — LOW (ref 13–17)
MCHC RBC-ENTMCNC: 22.6 PG — LOW (ref 27–34)
MCHC RBC-ENTMCNC: 30.5 GM/DL — LOW (ref 32–36)
MCV RBC AUTO: 74.2 FL — LOW (ref 80–100)
PLATELET # BLD AUTO: 195 K/UL — SIGNIFICANT CHANGE UP (ref 150–400)
RBC # BLD: 5.04 M/UL — SIGNIFICANT CHANGE UP (ref 4.2–5.8)
RBC # FLD: 18.1 % — HIGH (ref 10.3–14.5)
WBC # BLD: 9.6 K/UL — SIGNIFICANT CHANGE UP (ref 3.8–10.5)
WBC # FLD AUTO: 9.6 K/UL — SIGNIFICANT CHANGE UP (ref 3.8–10.5)

## 2019-04-09 PROCEDURE — 99233 SBSQ HOSP IP/OBS HIGH 50: CPT

## 2019-04-09 PROCEDURE — 93306 TTE W/DOPPLER COMPLETE: CPT | Mod: 26

## 2019-04-09 RX ORDER — METOPROLOL TARTRATE 50 MG
50 TABLET ORAL
Qty: 0 | Refills: 0 | Status: DISCONTINUED | OUTPATIENT
Start: 2019-04-09 | End: 2019-04-10

## 2019-04-09 RX ORDER — POLYETHYLENE GLYCOL 3350 17 G/17G
17 POWDER, FOR SOLUTION ORAL DAILY
Qty: 0 | Refills: 0 | Status: DISCONTINUED | OUTPATIENT
Start: 2019-04-09 | End: 2019-04-12

## 2019-04-09 RX ADMIN — Medication 81 MILLIGRAM(S): at 11:50

## 2019-04-09 RX ADMIN — BUDESONIDE AND FORMOTEROL FUMARATE DIHYDRATE 2 PUFF(S): 160; 4.5 AEROSOL RESPIRATORY (INHALATION) at 18:54

## 2019-04-09 RX ADMIN — MONTELUKAST 10 MILLIGRAM(S): 4 TABLET, CHEWABLE ORAL at 11:49

## 2019-04-09 RX ADMIN — HEPARIN SODIUM 3000 UNIT(S): 5000 INJECTION INTRAVENOUS; SUBCUTANEOUS at 10:28

## 2019-04-09 RX ADMIN — Medication 50 MILLIGRAM(S): at 18:54

## 2019-04-09 RX ADMIN — PANTOPRAZOLE SODIUM 40 MILLIGRAM(S): 20 TABLET, DELAYED RELEASE ORAL at 05:45

## 2019-04-09 RX ADMIN — Medication 30 MILLIGRAM(S): at 18:54

## 2019-04-09 RX ADMIN — Medication 50 MILLIGRAM(S): at 05:46

## 2019-04-09 RX ADMIN — Medication 30 MILLIGRAM(S): at 11:50

## 2019-04-09 RX ADMIN — BUDESONIDE AND FORMOTEROL FUMARATE DIHYDRATE 2 PUFF(S): 160; 4.5 AEROSOL RESPIRATORY (INHALATION) at 05:47

## 2019-04-09 RX ADMIN — TIOTROPIUM BROMIDE 1 CAPSULE(S): 18 CAPSULE ORAL; RESPIRATORY (INHALATION) at 11:50

## 2019-04-09 RX ADMIN — Medication 30 MILLIGRAM(S): at 05:44

## 2019-04-09 RX ADMIN — POLYETHYLENE GLYCOL 3350 17 GRAM(S): 17 POWDER, FOR SOLUTION ORAL at 18:54

## 2019-04-09 RX ADMIN — FINASTERIDE 5 MILLIGRAM(S): 5 TABLET, FILM COATED ORAL at 11:49

## 2019-04-09 RX ADMIN — Medication 3 MILLILITER(S): at 11:50

## 2019-04-09 RX ADMIN — Medication 3 MILLILITER(S): at 18:54

## 2019-04-09 RX ADMIN — HEPARIN SODIUM 1300 UNIT(S)/HR: 5000 INJECTION INTRAVENOUS; SUBCUTANEOUS at 18:54

## 2019-04-09 RX ADMIN — Medication 100 MILLIGRAM(S): at 05:44

## 2019-04-09 RX ADMIN — Medication 100 MILLIGRAM(S): at 21:31

## 2019-04-09 RX ADMIN — HEPARIN SODIUM 1300 UNIT(S)/HR: 5000 INJECTION INTRAVENOUS; SUBCUTANEOUS at 10:24

## 2019-04-09 RX ADMIN — SENNA PLUS 2 TABLET(S): 8.6 TABLET ORAL at 21:30

## 2019-04-09 RX ADMIN — Medication 3 MILLILITER(S): at 05:44

## 2019-04-09 RX ADMIN — ATORVASTATIN CALCIUM 40 MILLIGRAM(S): 80 TABLET, FILM COATED ORAL at 21:30

## 2019-04-09 RX ADMIN — TAMSULOSIN HYDROCHLORIDE 0.8 MILLIGRAM(S): 0.4 CAPSULE ORAL at 21:31

## 2019-04-09 NOTE — PROGRESS NOTE ADULT - PROBLEM SELECTOR PLAN 2
-Resolved  -Likely 2/2 demands from Afib  -Trops negative  -Afib management as above   -CTA chest with no PE, pna, ptx, noting scattered granuloma  -Follow up with ECHO and NST

## 2019-04-09 NOTE — PROGRESS NOTE ADULT - PROBLEM SELECTOR PLAN 6
- c/w symbicort  - c/w spiriva  - c/w montelukast  - chaka standing q6 for now  - peak flow monitor  - if failure to improve on bronchodilator, consider steroids

## 2019-04-09 NOTE — PROGRESS NOTE ADULT - SUBJECTIVE AND OBJECTIVE BOX
Vivek Clemons M.D. Pager Number [phone]    Patient is a 84y old  Male who presents with a chief complaint of chest pain (09 Apr 2019 06:29)      SUBJECTIVE / OVERNIGHT EVENTS:  : [place] ID # [id number]  Pt seen and examined at bedside. No acute events overnight.  Pt denies cp, sob, palpitations, abd pain, N/V, fever, chills.    ROS:  All other review of systems negative    Allergies    No Known Allergies    Intolerances        MEDICATIONS  (STANDING):  ALBUTerol/ipratropium for Nebulization 3 milliLiter(s) Nebulizer every 6 hours  aspirin  chewable 81 milliGRAM(s) Oral daily  atorvastatin 40 milliGRAM(s) Oral at bedtime  buDESOnide 160 MICROgram(s)/formoterol 4.5 MICROgram(s) Inhaler 2 Puff(s) Inhalation two times a day  diltiazem    Tablet 30 milliGRAM(s) Oral every 6 hours  docusate sodium 100 milliGRAM(s) Oral three times a day  finasteride 5 milliGRAM(s) Oral daily  heparin  Infusion. 1100 Unit(s)/Hr (11 mL/Hr) IV Continuous <Continuous>  influenza   Vaccine 0.5 milliLiter(s) IntraMuscular once  metoprolol tartrate 50 milliGRAM(s) Oral two times a day  montelukast 10 milliGRAM(s) Oral daily  pantoprazole    Tablet 40 milliGRAM(s) Oral before breakfast  polyethylene glycol 3350 17 Gram(s) Oral daily  senna 2 Tablet(s) Oral at bedtime  tamsulosin 0.8 milliGRAM(s) Oral at bedtime  tiotropium 18 MICROgram(s) Capsule 1 Capsule(s) Inhalation daily    MEDICATIONS  (PRN):  heparin  Injectable 6000 Unit(s) IV Push every 6 hours PRN For aPTT less than 40  heparin  Injectable 3000 Unit(s) IV Push every 6 hours PRN For aPTT between 40 - 57      Vital Signs Last 24 Hrs  T(C): 36.6 (09 Apr 2019 11:40), Max: 37.2 (08 Apr 2019 20:22)  T(F): 97.9 (09 Apr 2019 11:40), Max: 98.9 (08 Apr 2019 20:22)  HR: 88 (09 Apr 2019 11:40) (70 - 115)  BP: 146/71 (09 Apr 2019 11:40) (122/69 - 146/71)  BP(mean): --  RR: 18 (09 Apr 2019 11:40) (16 - 20)  SpO2: 98% (09 Apr 2019 11:40) (94% - 98%)  CAPILLARY BLOOD GLUCOSE        I&O's Summary    08 Apr 2019 07:01  -  09 Apr 2019 07:00  --------------------------------------------------------  IN: 252 mL / OUT: 700 mL / NET: -448 mL    09 Apr 2019 07:01  -  09 Apr 2019 12:31  --------------------------------------------------------  IN: 360 mL / OUT: 0 mL / NET: 360 mL        PHYSICAL EXAM:  GENERAL: NAD, well-developed  HEAD:  Atraumatic, Normocephalic  EYES: EOMI, PERRLA, conjunctiva and sclera clear  NECK: Supple, No JVD  CHEST/LUNG: Clear to auscultation bilaterally; No wheeze  HEART: Regular rate and rhythm; No murmurs, rubs, or gallops  ABDOMEN: Soft, Nontender, Nondistended; Bowel sounds present  EXTREMITIES:  2+ Peripheral Pulses, No clubbing, cyanosis, or edema  NEUROLOGY: AAOx3, non-focal  PSYCH: calm  SKIN: No rashes or lesions    LABS:                        11.4   9.60  )-----------( 195      ( 09 Apr 2019 09:15 )             37.4     04-08    138  |  99  |  22  ----------------------------<  100<H>  4.1   |  24  |  1.10    Ca    8.8      08 Apr 2019 06:02  Phos  3.2     04-08  Mg     1.9     04-08    TPro  7.4  /  Alb  4.0  /  TBili  1.0  /  DBili  x   /  AST  16  /  ALT  11  /  AlkPhos  55  04-07    PT/INR - ( 07 Apr 2019 19:30 )   PT: 13.5 sec;   INR: 1.18 ratio         PTT - ( 09 Apr 2019 09:14 )  PTT:45.1 sec          RADIOLOGY & ADDITIONAL TESTS:    Imaging Personally Reviewed:    Consultant(s) Notes Reviewed:      Care Discussed with Consultants/Other Providers:    Case Discussed with Family:    Goals of Care:

## 2019-04-09 NOTE — PROGRESS NOTE ADULT - ASSESSMENT
84M with PMH of asthma, HTN, HLD, BPH, and CVA (2015)  who presents with chest pain x 1, found to have new A fib with RVR

## 2019-04-09 NOTE — PROGRESS NOTE ADULT - PROBLEM SELECTOR PLAN 1
-Controlled   -New onset Atrial flutter   -Continue Metoprolol  -[id number] 5  -Continue Heparin gtt. Eventual transition to NOAC upon discharge  -Follow up with ECHO and NST  -Monitor tele

## 2019-04-09 NOTE — PROGRESS NOTE ADULT - SUBJECTIVE AND OBJECTIVE BOX
pt seen and examined, no complaints, ROS - .     Tele 2:1 A flutter     ALBUTerol/ipratropium for Nebulization 3 milliLiter(s) Nebulizer every 6 hours  aspirin  chewable 81 milliGRAM(s) Oral daily  atorvastatin 40 milliGRAM(s) Oral at bedtime  buDESOnide 160 MICROgram(s)/formoterol 4.5 MICROgram(s) Inhaler 2 Puff(s) Inhalation two times a day  diltiazem    Tablet 30 milliGRAM(s) Oral every 6 hours  docusate sodium 100 milliGRAM(s) Oral three times a day  finasteride 5 milliGRAM(s) Oral daily  heparin  Infusion. 1100 Unit(s)/Hr IV Continuous <Continuous>  heparin  Injectable 6000 Unit(s) IV Push every 6 hours PRN  heparin  Injectable 3000 Unit(s) IV Push every 6 hours PRN  influenza   Vaccine 0.5 milliLiter(s) IntraMuscular once  metoprolol tartrate 50 milliGRAM(s) Oral two times a day  montelukast 10 milliGRAM(s) Oral daily  pantoprazole    Tablet 40 milliGRAM(s) Oral before breakfast  senna 2 Tablet(s) Oral at bedtime  tamsulosin 0.8 milliGRAM(s) Oral at bedtime  tiotropium 18 MICROgram(s) Capsule 1 Capsule(s) Inhalation daily                            11.4   11.7  )-----------( 193      ( 08 Apr 2019 11:40 )             37.6       Hemoglobin: 11.4 g/dL (04-08 @ 11:40)  Hemoglobin: 11.1 g/dL (04-08 @ 08:49)  Hemoglobin: 12.1 g/dL (04-08 @ 02:44)  Hemoglobin: 12.9 g/dL (04-07 @ 19:30)      04-08    138  |  99  |  22  ----------------------------<  100<H>  4.1   |  24  |  1.10    Ca    8.8      08 Apr 2019 06:02  Phos  3.2     04-08  Mg     1.9     04-08    TPro  7.4  /  Alb  4.0  /  TBili  1.0  /  DBili  x   /  AST  16  /  ALT  11  /  AlkPhos  55  04-07    Creatinine Trend: 1.10<--, 1.08<--    COAGS: PTT - ( 08 Apr 2019 18:39 )  PTT:85.4 sec      T(C): 36.6 (04-09-19 @ 04:46), Max: 37.2 (04-08-19 @ 20:22)  HR: 93 (04-09-19 @ 04:46) (82 - 122)  BP: 134/69 (04-09-19 @ 04:46) (122/69 - 143/67)  RR: 18 (04-09-19 @ 04:46) (16 - 20)  SpO2: 96% (04-09-19 @ 04:46) (94% - 98%)  Wt(kg): --    I&O's Summary    08 Apr 2019 07:01  -  09 Apr 2019 06:29  --------------------------------------------------------  IN: 0 mL / OUT: 700 mL / NET: -700 mL      no JVD  IRR, no murmurs  CTAB  soft nt/nd  no c/c/e    CT angio: < from: CT Angio Chest w/ IV Cont (04.07.19 @ 21:06) >  IMPRESSION:     1. Evaluation of the subsegmental pulmonary arteries are limited   secondary to respiratory motion artifact. No pulmonary embolism in the   main, lobar or segmental pulmonary arteries.   2. Small bilateral pleural effusions with adjacent compressive   atelectasis.      JOSÉ LUIS LICONA M.D., RADIOLOGY RESIDENT  This document has been electronically signed.  RADHA AHUMADA M.D., ATTENDING RADIOLOGIST  This document has been electronically signed. Apr 7 2019 10:45PM    < end of copied text >    A/P) 85 y/o male PMH moderate AI, HTN and stroke admitted with chest pain and palpitations. Found to have newly diagnosed afib.    Cont A/C with heparin gtt at present for Afib   Will start NOAC prior to D/C   Rate controlled Afib with cardizem , Lopressor @ 50 Q12 h  Increase BB as BP allows for rate control   ECHO / NST pending    GI / DVT prophylaxis,  keep K>4, mag >2.0   CT angio neg for PE   Further plans post echo / nst   D/W Dr June pt seen and examined, no complaints, ROS - .     Tele afib    ALBUTerol/ipratropium for Nebulization 3 milliLiter(s) Nebulizer every 6 hours  aspirin  chewable 81 milliGRAM(s) Oral daily  atorvastatin 40 milliGRAM(s) Oral at bedtime  buDESOnide 160 MICROgram(s)/formoterol 4.5 MICROgram(s) Inhaler 2 Puff(s) Inhalation two times a day  diltiazem    Tablet 30 milliGRAM(s) Oral every 6 hours  docusate sodium 100 milliGRAM(s) Oral three times a day  finasteride 5 milliGRAM(s) Oral daily  heparin  Infusion. 1100 Unit(s)/Hr IV Continuous <Continuous>  heparin  Injectable 6000 Unit(s) IV Push every 6 hours PRN  heparin  Injectable 3000 Unit(s) IV Push every 6 hours PRN  influenza   Vaccine 0.5 milliLiter(s) IntraMuscular once  metoprolol tartrate 50 milliGRAM(s) Oral two times a day  montelukast 10 milliGRAM(s) Oral daily  pantoprazole    Tablet 40 milliGRAM(s) Oral before breakfast  senna 2 Tablet(s) Oral at bedtime  tamsulosin 0.8 milliGRAM(s) Oral at bedtime  tiotropium 18 MICROgram(s) Capsule 1 Capsule(s) Inhalation daily                            11.4   11.7  )-----------( 193      ( 08 Apr 2019 11:40 )             37.6       Hemoglobin: 11.4 g/dL (04-08 @ 11:40)  Hemoglobin: 11.1 g/dL (04-08 @ 08:49)  Hemoglobin: 12.1 g/dL (04-08 @ 02:44)  Hemoglobin: 12.9 g/dL (04-07 @ 19:30)      04-08    138  |  99  |  22  ----------------------------<  100<H>  4.1   |  24  |  1.10    Ca    8.8      08 Apr 2019 06:02  Phos  3.2     04-08  Mg     1.9     04-08    TPro  7.4  /  Alb  4.0  /  TBili  1.0  /  DBili  x   /  AST  16  /  ALT  11  /  AlkPhos  55  04-07    Creatinine Trend: 1.10<--, 1.08<--    COAGS: PTT - ( 08 Apr 2019 18:39 )  PTT:85.4 sec      T(C): 36.6 (04-09-19 @ 04:46), Max: 37.2 (04-08-19 @ 20:22)  HR: 93 (04-09-19 @ 04:46) (82 - 122)  BP: 134/69 (04-09-19 @ 04:46) (122/69 - 143/67)  RR: 18 (04-09-19 @ 04:46) (16 - 20)  SpO2: 96% (04-09-19 @ 04:46) (94% - 98%)  Wt(kg): --    I&O's Summary    08 Apr 2019 07:01  -  09 Apr 2019 06:29  --------------------------------------------------------  IN: 0 mL / OUT: 700 mL / NET: -700 mL      no JVD  IRR, no murmurs  CTAB  soft nt/nd  no c/c/e    CT angio: < from: CT Angio Chest w/ IV Cont (04.07.19 @ 21:06) >  IMPRESSION:     1. Evaluation of the subsegmental pulmonary arteries are limited   secondary to respiratory motion artifact. No pulmonary embolism in the   main, lobar or segmental pulmonary arteries.   2. Small bilateral pleural effusions with adjacent compressive   atelectasis.      JOSÉ LUIS LICONA M.D., RADIOLOGY RESIDENT  This document has been electronically signed.  RADHA AHUMADA M.D., ATTENDING RADIOLOGIST  This document has been electronically signed. Apr 7 2019 10:45PM    < end of copied text >    A/P) 83 y/o male PMH moderate AI, HTN and stroke admitted with chest pain and palpitations. Found to have newly diagnosed afib.    Cont A/C with heparin gtt at present for Afib   Will start NOAC prior to D/C   Rate controlled Afib with escalating doses of Lopressor  Increase BB as BP allows for rate control   ECHO / NST pending    GI / DVT prophylaxis,  keep K>4, mag >2.0   CT angio neg for PE   Further plans post echo / nst   D/W Dr June

## 2019-04-10 LAB
APTT BLD: 61.3 SEC — HIGH (ref 27.5–36.3)
APTT BLD: 62.2 SEC — HIGH (ref 27.5–36.3)
HCT VFR BLD CALC: 36.8 % — LOW (ref 39–50)
HGB BLD-MCNC: 11.2 G/DL — LOW (ref 13–17)
INR BLD: 1.13 RATIO — SIGNIFICANT CHANGE UP (ref 0.88–1.16)
MCHC RBC-ENTMCNC: 22.4 PG — LOW (ref 27–34)
MCHC RBC-ENTMCNC: 30.4 GM/DL — LOW (ref 32–36)
MCV RBC AUTO: 73.6 FL — LOW (ref 80–100)
PLATELET # BLD AUTO: 194 K/UL — SIGNIFICANT CHANGE UP (ref 150–400)
PROTHROM AB SERPL-ACNC: 13.1 SEC — HIGH (ref 10–12.9)
RBC # BLD: 5 M/UL — SIGNIFICANT CHANGE UP (ref 4.2–5.8)
RBC # FLD: 18.3 % — HIGH (ref 10.3–14.5)
WBC # BLD: 8.75 K/UL — SIGNIFICANT CHANGE UP (ref 3.8–10.5)
WBC # FLD AUTO: 8.75 K/UL — SIGNIFICANT CHANGE UP (ref 3.8–10.5)

## 2019-04-10 PROCEDURE — 99233 SBSQ HOSP IP/OBS HIGH 50: CPT

## 2019-04-10 PROCEDURE — 93018 CV STRESS TEST I&R ONLY: CPT

## 2019-04-10 PROCEDURE — 93016 CV STRESS TEST SUPVJ ONLY: CPT

## 2019-04-10 PROCEDURE — 78452 HT MUSCLE IMAGE SPECT MULT: CPT | Mod: 26

## 2019-04-10 RX ORDER — APIXABAN 2.5 MG/1
1 TABLET, FILM COATED ORAL
Qty: 60 | Refills: 0 | OUTPATIENT
Start: 2019-04-10 | End: 2019-05-09

## 2019-04-10 RX ORDER — METOPROLOL TARTRATE 50 MG
100 TABLET ORAL EVERY 12 HOURS
Qty: 0 | Refills: 0 | Status: DISCONTINUED | OUTPATIENT
Start: 2019-04-10 | End: 2019-04-12

## 2019-04-10 RX ORDER — DIGOXIN 250 MCG
0.12 TABLET ORAL DAILY
Qty: 0 | Refills: 0 | Status: DISCONTINUED | OUTPATIENT
Start: 2019-04-10 | End: 2019-04-12

## 2019-04-10 RX ADMIN — Medication 3 MILLILITER(S): at 00:35

## 2019-04-10 RX ADMIN — Medication 100 MILLIGRAM(S): at 12:32

## 2019-04-10 RX ADMIN — Medication 100 MILLIGRAM(S): at 21:46

## 2019-04-10 RX ADMIN — SENNA PLUS 2 TABLET(S): 8.6 TABLET ORAL at 21:47

## 2019-04-10 RX ADMIN — HEPARIN SODIUM 1300 UNIT(S)/HR: 5000 INJECTION INTRAVENOUS; SUBCUTANEOUS at 11:49

## 2019-04-10 RX ADMIN — Medication 3 MILLILITER(S): at 11:54

## 2019-04-10 RX ADMIN — Medication 100 MILLIGRAM(S): at 17:07

## 2019-04-10 RX ADMIN — FINASTERIDE 5 MILLIGRAM(S): 5 TABLET, FILM COATED ORAL at 11:55

## 2019-04-10 RX ADMIN — BUDESONIDE AND FORMOTEROL FUMARATE DIHYDRATE 2 PUFF(S): 160; 4.5 AEROSOL RESPIRATORY (INHALATION) at 17:07

## 2019-04-10 RX ADMIN — PANTOPRAZOLE SODIUM 40 MILLIGRAM(S): 20 TABLET, DELAYED RELEASE ORAL at 05:40

## 2019-04-10 RX ADMIN — POLYETHYLENE GLYCOL 3350 17 GRAM(S): 17 POWDER, FOR SOLUTION ORAL at 11:54

## 2019-04-10 RX ADMIN — Medication 81 MILLIGRAM(S): at 11:54

## 2019-04-10 RX ADMIN — Medication 30 MILLIGRAM(S): at 00:35

## 2019-04-10 RX ADMIN — HEPARIN SODIUM 1300 UNIT(S)/HR: 5000 INJECTION INTRAVENOUS; SUBCUTANEOUS at 00:55

## 2019-04-10 RX ADMIN — TIOTROPIUM BROMIDE 1 CAPSULE(S): 18 CAPSULE ORAL; RESPIRATORY (INHALATION) at 11:54

## 2019-04-10 RX ADMIN — ATORVASTATIN CALCIUM 40 MILLIGRAM(S): 80 TABLET, FILM COATED ORAL at 21:46

## 2019-04-10 RX ADMIN — Medication 30 MILLIGRAM(S): at 05:39

## 2019-04-10 RX ADMIN — Medication 3 MILLILITER(S): at 05:40

## 2019-04-10 RX ADMIN — BUDESONIDE AND FORMOTEROL FUMARATE DIHYDRATE 2 PUFF(S): 160; 4.5 AEROSOL RESPIRATORY (INHALATION) at 05:40

## 2019-04-10 RX ADMIN — Medication 100 MILLIGRAM(S): at 05:40

## 2019-04-10 RX ADMIN — Medication 3 MILLILITER(S): at 17:08

## 2019-04-10 RX ADMIN — MONTELUKAST 10 MILLIGRAM(S): 4 TABLET, CHEWABLE ORAL at 11:54

## 2019-04-10 RX ADMIN — Medication 50 MILLIGRAM(S): at 05:39

## 2019-04-10 RX ADMIN — TAMSULOSIN HYDROCHLORIDE 0.8 MILLIGRAM(S): 0.4 CAPSULE ORAL at 21:46

## 2019-04-10 NOTE — PROGRESS NOTE ADULT - PROBLEM SELECTOR PLAN 1
-Fairly well controlled    -Continue Metoprolol increased from 50 to 100 as per cardiology  - Added digoxin to regimen discontinued calcium channel blocker  -[id number] 5  -Continue Heparin gtt. Eventual transition to NOAC upon discharge  -Follow up NST  -Monitor tele

## 2019-04-10 NOTE — PROGRESS NOTE ADULT - PROBLEM SELECTOR PLAN 2
-Resolved  -Likely 2/2 demands from Afib/flutter  -Trops negative  -Afib management as above   -CTA chest with no PE, pna, ptx, noting scattered granuloma  -Echocardiogram without wall motion abnormalities

## 2019-04-10 NOTE — PROGRESS NOTE ADULT - SUBJECTIVE AND OBJECTIVE BOX
pt seen and examined, no complaints, ROS - .     Tele aflutter 2:1    ALBUTerol/ipratropium for Nebulization 3 milliLiter(s) Nebulizer every 6 hours  aspirin  chewable 81 milliGRAM(s) Oral daily  atorvastatin 40 milliGRAM(s) Oral at bedtime  buDESOnide 160 MICROgram(s)/formoterol 4.5 MICROgram(s) Inhaler 2 Puff(s) Inhalation two times a day  diltiazem    Tablet 30 milliGRAM(s) Oral every 6 hours  docusate sodium 100 milliGRAM(s) Oral three times a day  finasteride 5 milliGRAM(s) Oral daily  heparin  Infusion. 1100 Unit(s)/Hr IV Continuous <Continuous>  heparin  Injectable 6000 Unit(s) IV Push every 6 hours PRN  heparin  Injectable 3000 Unit(s) IV Push every 6 hours PRN  influenza   Vaccine 0.5 milliLiter(s) IntraMuscular once  metoprolol tartrate 50 milliGRAM(s) Oral two times a day  montelukast 10 milliGRAM(s) Oral daily  pantoprazole    Tablet 40 milliGRAM(s) Oral before breakfast  polyethylene glycol 3350 17 Gram(s) Oral daily  senna 2 Tablet(s) Oral at bedtime  tamsulosin 0.8 milliGRAM(s) Oral at bedtime  tiotropium 18 MICROgram(s) Capsule 1 Capsule(s) Inhalation daily                            11.4   9.60  )-----------( 195      ( 09 Apr 2019 09:15 )             37.4       Hemoglobin: 11.4 g/dL (04-09 @ 09:15)  Hemoglobin: 11.4 g/dL (04-08 @ 11:40)  Hemoglobin: 11.1 g/dL (04-08 @ 08:49)  Hemoglobin: 12.1 g/dL (04-08 @ 02:44)  Hemoglobin: 12.9 g/dL (04-07 @ 19:30)            Creatinine Trend: 1.10<--, 1.08<--    COAGS: PTT - ( 10 Apr 2019 00:53 )  PTT:61.3 sec          T(C): 36.3 (04-10-19 @ 04:29), Max: 36.6 (04-09-19 @ 11:40)  HR: 93 (04-10-19 @ 04:29) (70 - 106)  BP: 156/79 (04-10-19 @ 04:29) (123/61 - 160/92)  RR: 18 (04-10-19 @ 04:29) (18 - 18)  SpO2: 98% (04-10-19 @ 04:29) (96% - 98%)  Wt(kg): --    I&O's Summary    09 Apr 2019 07:01  -  10 Apr 2019 07:00  --------------------------------------------------------  IN: 1570 mL / OUT: 650 mL / NET: 920 mL      no JVD  IRR, no murmurs  CTAB  soft nt/nd  no c/c/e    CT angio: < from: CT Angio Chest w/ IV Cont (04.07.19 @ 21:06) >  IMPRESSION:     1. Evaluation of the subsegmental pulmonary arteries are limited   secondary to respiratory motion artifact. No pulmonary embolism in the   main, lobar or segmental pulmonary arteries.   2. Small bilateral pleural effusions with adjacent compressive   atelectasis.      JOSÉ LUIS LICONA M.D., RADIOLOGY RESIDENT  This document has been electronically signed.  RADHA AHUMADA M.D., ATTENDING RADIOLOGIST  This document has been electronically signed. Apr 7 2019 10:45PM    < end of copied text >    ECHO : < from: Transthoracic Echocardiogram (04.09.19 @ 14:22) >  Conclusions:  1. Mitral annular calcification, otherwise normal mitral  valve. Moderate mitral regurgitation.  2. Calcified aortic valve with decreased opening. Peak  transaortic valve gradient equals 14 mm Hg, mean  transaortic valve gradient equals 10 mm Hg, estimated  aortic valve area equals 1.3 sqcm (by continuity equation),  aortic valve velocity time integral equals 38 cm,  consistent with moderate aortic stenosis. Moderate aortic  regurgitation.  3. Increased relative wall thickness with normal left  ventricular mass index, consistent with concentric left  ventricular remodeling.  4. Normal left ventricular systolic function. No segmental  wall motion abnormalities.  5. The right ventricle is not well visualized; grossly  normal right ventricular systolic function.  6. Estimated right ventricular systolic pressure equals 33  mm Hg, assuming right atrial pressure equals 8 mm Hg,  consistent with normal pulmonary pressures.  *** No previous Echo exam.  ------------------------------------------------------------------------  Confirmedon  4/9/2019 - 18:46:49 by Geraldo Infante M.D.  -----------------------------------------------------------------------    < end of copied text >    A/P) 85 y/o male PMH moderate AI, HTN and stroke admitted with chest pain and palpitations. Found to have newly diagnosed afib.    Cont A/C with heparin gtt at present for Afib   Will start NOAC prior to D/C   BB / dig , hr stable this AM   Echo with mod MR, AS,  normal LV / RV fx   NST pending    GI / DVT prophylaxis,  keep K>4, mag >2.0   CT angio neg for PE   Further plans post  nst   D/W Dr June pt seen and examined, no complaints, ROS - .     Tele afib    ALBUTerol/ipratropium for Nebulization 3 milliLiter(s) Nebulizer every 6 hours  aspirin  chewable 81 milliGRAM(s) Oral daily  atorvastatin 40 milliGRAM(s) Oral at bedtime  buDESOnide 160 MICROgram(s)/formoterol 4.5 MICROgram(s) Inhaler 2 Puff(s) Inhalation two times a day  diltiazem    Tablet 30 milliGRAM(s) Oral every 6 hours  docusate sodium 100 milliGRAM(s) Oral three times a day  finasteride 5 milliGRAM(s) Oral daily  heparin  Infusion. 1100 Unit(s)/Hr IV Continuous <Continuous>  heparin  Injectable 6000 Unit(s) IV Push every 6 hours PRN  heparin  Injectable 3000 Unit(s) IV Push every 6 hours PRN  influenza   Vaccine 0.5 milliLiter(s) IntraMuscular once  metoprolol tartrate 50 milliGRAM(s) Oral two times a day  montelukast 10 milliGRAM(s) Oral daily  pantoprazole    Tablet 40 milliGRAM(s) Oral before breakfast  polyethylene glycol 3350 17 Gram(s) Oral daily  senna 2 Tablet(s) Oral at bedtime  tamsulosin 0.8 milliGRAM(s) Oral at bedtime  tiotropium 18 MICROgram(s) Capsule 1 Capsule(s) Inhalation daily                            11.4   9.60  )-----------( 195      ( 09 Apr 2019 09:15 )             37.4       Hemoglobin: 11.4 g/dL (04-09 @ 09:15)  Hemoglobin: 11.4 g/dL (04-08 @ 11:40)  Hemoglobin: 11.1 g/dL (04-08 @ 08:49)  Hemoglobin: 12.1 g/dL (04-08 @ 02:44)  Hemoglobin: 12.9 g/dL (04-07 @ 19:30)            Creatinine Trend: 1.10<--, 1.08<--    COAGS: PTT - ( 10 Apr 2019 00:53 )  PTT:61.3 sec          T(C): 36.3 (04-10-19 @ 04:29), Max: 36.6 (04-09-19 @ 11:40)  HR: 93 (04-10-19 @ 04:29) (70 - 106)  BP: 156/79 (04-10-19 @ 04:29) (123/61 - 160/92)  RR: 18 (04-10-19 @ 04:29) (18 - 18)  SpO2: 98% (04-10-19 @ 04:29) (96% - 98%)  Wt(kg): --    I&O's Summary    09 Apr 2019 07:01  -  10 Apr 2019 07:00  --------------------------------------------------------  IN: 1570 mL / OUT: 650 mL / NET: 920 mL      no JVD  IRR, no murmurs  CTAB  soft nt/nd  no c/c/e    CT angio: < from: CT Angio Chest w/ IV Cont (04.07.19 @ 21:06) >  IMPRESSION:     1. Evaluation of the subsegmental pulmonary arteries are limited   secondary to respiratory motion artifact. No pulmonary embolism in the   main, lobar or segmental pulmonary arteries.   2. Small bilateral pleural effusions with adjacent compressive   atelectasis.      JOSÉ LUIS LICONA M.D., RADIOLOGY RESIDENT  This document has been electronically signed.  RADHA AHUMADA M.D., ATTENDING RADIOLOGIST  This document has been electronically signed. Apr 7 2019 10:45PM    < end of copied text >    ECHO : < from: Transthoracic Echocardiogram (04.09.19 @ 14:22) >  Conclusions:  1. Mitral annular calcification, otherwise normal mitral  valve. Moderate mitral regurgitation.  2. Calcified aortic valve with decreased opening. Peak  transaortic valve gradient equals 14 mm Hg, mean  transaortic valve gradient equals 10 mm Hg, estimated  aortic valve area equals 1.3 sqcm (by continuity equation),  aortic valve velocity time integral equals 38 cm,  consistent with moderate aortic stenosis. Moderate aortic  regurgitation.  3. Increased relative wall thickness with normal left  ventricular mass index, consistent with concentric left  ventricular remodeling.  4. Normal left ventricular systolic function. No segmental  wall motion abnormalities.  5. The right ventricle is not well visualized; grossly  normal right ventricular systolic function.  6. Estimated right ventricular systolic pressure equals 33  mm Hg, assuming right atrial pressure equals 8 mm Hg,  consistent with normal pulmonary pressures.  *** No previous Echo exam.  ------------------------------------------------------------------------  Confirmedon  4/9/2019 - 18:46:49 by Geraldo Infante M.D.  -----------------------------------------------------------------------    < end of copied text >    A/P) 83 y/o male PMH moderate AI, HTN and stroke admitted with chest pain and palpitations. Found to have newly diagnosed afib.    Cont A/C with heparin gtt at present for Afib   Will start NOAC prior to D/C   BB / dig , hr stable this AM   Echo with mod MR, AS,  normal LV / RV fx   NST pending    GI / DVT prophylaxis,  keep K>4, mag >2.0   CT angio neg for PE   Further plans post  nst   D/W Dr June

## 2019-04-10 NOTE — PROGRESS NOTE ADULT - SUBJECTIVE AND OBJECTIVE BOX
Patient is a 84y old  Male who presents with a chief complaint of chest pain (10 Apr 2019 07:08)      INTERVAL HPI/OVERNIGHT EVENTS:  Reports feeling okay, limited english.        Atrial flutter  H/o or current diagnosis of HF- Contraindication to ACEI/ARBs  H/o or current diagnosis of HF- ACEI/ARB contraindication unknown  H/o or current diagnosis of HF- Contraindication to ACEI/ARBs  No pertinent family history in first degree relatives  Handoff  MEWS Score  Leukocytoclastic vasculitis  Hypercholesterolemia  HTN (hypertension)  CVA (cerebral vascular accident)  Asthma  CVA (cerebral vascular accident)  Atrial flutter with rapid ventricular response  Atrial fibrillation with RVR  Need for prophylactic measure  Persistent asthma with undetermined severity  BPH (benign prostatic hyperplasia)  HTN (hypertension)  CVA (cerebral vascular accident)  Atrial flutter with rapid ventricular response  Chest pain, midsternal  H/O sinus surgery  No significant past surgical history  CHEST PAIN  90+      Review of Systems: 12 point review of systems otherwise negative  ( - )fevers/chills  ( - ) dyspnea  ( - ) cough  ( - ) chest pain  ( - ) palpatations  ( - ) dizziness/lightheadedness  ( - ) nausea/vomiting  ( - ) abd pain  ( - ) diarrhea  ( - ) melena  ( - ) hematochezia  ( - ) dysuria  ( - ) hematuria  ( - ) leg swelling  ( -) calf tenderness  ( - ) motor weakness  ( - ) extremity numbness  ( - ) back pain  ( + ) tolerating POs  ( + ) BM    MEDICATIONS  (STANDING):  ALBUTerol/ipratropium for Nebulization 3 milliLiter(s) Nebulizer every 6 hours  aspirin  chewable 81 milliGRAM(s) Oral daily  atorvastatin 40 milliGRAM(s) Oral at bedtime  buDESOnide 160 MICROgram(s)/formoterol 4.5 MICROgram(s) Inhaler 2 Puff(s) Inhalation two times a day  digoxin     Tablet 0.125 milliGRAM(s) Oral daily  docusate sodium 100 milliGRAM(s) Oral three times a day  finasteride 5 milliGRAM(s) Oral daily  heparin  Infusion. 1100 Unit(s)/Hr (11 mL/Hr) IV Continuous <Continuous>  influenza   Vaccine 0.5 milliLiter(s) IntraMuscular once  metoprolol tartrate 100 milliGRAM(s) Oral every 12 hours  montelukast 10 milliGRAM(s) Oral daily  pantoprazole    Tablet 40 milliGRAM(s) Oral before breakfast  polyethylene glycol 3350 17 Gram(s) Oral daily  senna 2 Tablet(s) Oral at bedtime  tamsulosin 0.8 milliGRAM(s) Oral at bedtime  tiotropium 18 MICROgram(s) Capsule 1 Capsule(s) Inhalation daily    MEDICATIONS  (PRN):  heparin  Injectable 6000 Unit(s) IV Push every 6 hours PRN For aPTT less than 40  heparin  Injectable 3000 Unit(s) IV Push every 6 hours PRN For aPTT between 40 - 57      Allergies    No Known Allergies    Intolerances          Vital Signs Last 24 Hrs  T(C): 36.5 (10 Apr 2019 12:03), Max: 36.6 (09 Apr 2019 16:00)  T(F): 97.7 (10 Apr 2019 12:03), Max: 97.9 (09 Apr 2019 16:00)  HR: 107 (10 Apr 2019 12:03) (86 - 107)  BP: 127/68 (10 Apr 2019 12:03) (123/61 - 160/92)  BP(mean): --  RR: 18 (10 Apr 2019 12:03) (18 - 18)  SpO2: 98% (10 Apr 2019 12:03) (96% - 98%)  CAPILLARY BLOOD GLUCOSE          04-09 @ 07:01  -  04-10 @ 07:00  --------------------------------------------------------  IN: 1583 mL / OUT: 650 mL / NET: 933 mL    04-10 @ 07:01  -  04-10 @ 14:52  --------------------------------------------------------  IN: 78 mL / OUT: 0 mL / NET: 78 mL        Physical Exam:    Daily     General:  NAD  HEENT:  Nonicteric, PERRLA  CV:  irregular rhythm  Lungs:  CTA B/L, no wheeze  Abdomen:  Soft, non-tender, no distended, positive BS,  Extremities:  no edema  Skin:  Warm and dry, no rashes  :  No shah  Neuro:  AAOx3, non-focal  No Restraints    LABS:                        11.2   8.75  )-----------( 194      ( 10 Apr 2019 11:35 )             36.8           PT/INR - ( 10 Apr 2019 08:42 )   PT: 13.1 sec;   INR: 1.13 ratio         PTT - ( 10 Apr 2019 08:42 )  PTT:62.2 sec        RADIOLOGY & ADDITIONAL TESTS:    ---------------------------------------------------------------------------  I personally reviewed: [  ]EKG   [  ]CXR    [  ] CT    [  ]Other  ---------------------------------------------------------------------------  PLEASE CHECK WHEN PRESENT:     [  ]Heart Failure     [  ] Acute     [  ] Acute on Chronic     [  ] Chronic  -------------------------------------------------------------------     [  ]Diastolic [HFpEF]     [  ]Systolic [HFrEF]     [  ]Combined [HFpEF & HFrEF]     [  ]Other:  -------------------------------------------------------------------  [  ]TRINI     [  ]ATN     [  ]Reneal Medullary Necrosis     [  ]Renal Cortical Necrosis     [  ]Other Pathological Lesions:    [  ]CKD 1  [  ]CKD 2  [  ]CKD 3  [  ]CKD 4  [  ]CKD 5  [  ]Other  -------------------------------------------------------------------  [  ]Other/Unspecified:    --------------------------------------------------------------------    Abdominal Nutritional Status  [  ]Malnutrition: See Nutrition Note  [  ]Cachexia  [  ]Other:   [  ]Supplement Ordered:  [  ]Morbid Obesity (BMI >=40]

## 2019-04-11 DIAGNOSIS — R94.39 ABNORMAL RESULT OF OTHER CARDIOVASCULAR FUNCTION STUDY: ICD-10-CM

## 2019-04-11 LAB
ANION GAP SERPL CALC-SCNC: 13 MMOL/L — SIGNIFICANT CHANGE UP (ref 5–17)
APTT BLD: 102.6 SEC — HIGH (ref 27.5–36.3)
APTT BLD: 33.8 SEC — SIGNIFICANT CHANGE UP (ref 27.5–36.3)
BUN SERPL-MCNC: 28 MG/DL — HIGH (ref 7–23)
CALCIUM SERPL-MCNC: 9.4 MG/DL — SIGNIFICANT CHANGE UP (ref 8.4–10.5)
CHLORIDE SERPL-SCNC: 103 MMOL/L — SIGNIFICANT CHANGE UP (ref 96–108)
CO2 SERPL-SCNC: 23 MMOL/L — SIGNIFICANT CHANGE UP (ref 22–31)
CREAT SERPL-MCNC: 1.11 MG/DL — SIGNIFICANT CHANGE UP (ref 0.5–1.3)
GLUCOSE SERPL-MCNC: 92 MG/DL — SIGNIFICANT CHANGE UP (ref 70–99)
HCT VFR BLD CALC: 40.3 % — SIGNIFICANT CHANGE UP (ref 39–50)
HGB BLD-MCNC: 11.7 G/DL — LOW (ref 13–17)
INR BLD: 1.1 RATIO — SIGNIFICANT CHANGE UP (ref 0.88–1.16)
MCHC RBC-ENTMCNC: 22 PG — LOW (ref 27–34)
MCHC RBC-ENTMCNC: 29 GM/DL — LOW (ref 32–36)
MCV RBC AUTO: 75.8 FL — LOW (ref 80–100)
PLATELET # BLD AUTO: 234 K/UL — SIGNIFICANT CHANGE UP (ref 150–400)
POTASSIUM SERPL-MCNC: 4.1 MMOL/L — SIGNIFICANT CHANGE UP (ref 3.5–5.3)
POTASSIUM SERPL-SCNC: 4.1 MMOL/L — SIGNIFICANT CHANGE UP (ref 3.5–5.3)
PROTHROM AB SERPL-ACNC: 12.6 SEC — SIGNIFICANT CHANGE UP (ref 10–13.1)
RBC # BLD: 5.32 M/UL — SIGNIFICANT CHANGE UP (ref 4.2–5.8)
RBC # FLD: 19 % — HIGH (ref 10.3–14.5)
SODIUM SERPL-SCNC: 139 MMOL/L — SIGNIFICANT CHANGE UP (ref 135–145)
WBC # BLD: 10.32 K/UL — SIGNIFICANT CHANGE UP (ref 3.8–10.5)
WBC # FLD AUTO: 10.32 K/UL — SIGNIFICANT CHANGE UP (ref 3.8–10.5)

## 2019-04-11 PROCEDURE — 99233 SBSQ HOSP IP/OBS HIGH 50: CPT

## 2019-04-11 PROCEDURE — 93454 CORONARY ARTERY ANGIO S&I: CPT | Mod: 26,GC

## 2019-04-11 PROCEDURE — 99152 MOD SED SAME PHYS/QHP 5/>YRS: CPT | Mod: GC

## 2019-04-11 RX ORDER — HEPARIN SODIUM 5000 [USP'U]/ML
INJECTION INTRAVENOUS; SUBCUTANEOUS
Qty: 25000 | Refills: 0 | Status: DISCONTINUED | OUTPATIENT
Start: 2019-04-11 | End: 2019-04-12

## 2019-04-11 RX ORDER — METOPROLOL TARTRATE 50 MG
5 TABLET ORAL ONCE
Qty: 0 | Refills: 0 | Status: DISCONTINUED | OUTPATIENT
Start: 2019-04-11 | End: 2019-04-12

## 2019-04-11 RX ORDER — HEPARIN SODIUM 5000 [USP'U]/ML
6000 INJECTION INTRAVENOUS; SUBCUTANEOUS EVERY 6 HOURS
Qty: 0 | Refills: 0 | Status: DISCONTINUED | OUTPATIENT
Start: 2019-04-11 | End: 2019-04-12

## 2019-04-11 RX ORDER — HYDRALAZINE HCL 50 MG
10 TABLET ORAL ONCE
Qty: 0 | Refills: 0 | Status: COMPLETED | OUTPATIENT
Start: 2019-04-11 | End: 2019-04-11

## 2019-04-11 RX ORDER — HEPARIN SODIUM 5000 [USP'U]/ML
3000 INJECTION INTRAVENOUS; SUBCUTANEOUS EVERY 6 HOURS
Qty: 0 | Refills: 0 | Status: DISCONTINUED | OUTPATIENT
Start: 2019-04-11 | End: 2019-04-12

## 2019-04-11 RX ORDER — RIVAROXABAN 15 MG-20MG
20 KIT ORAL EVERY 24 HOURS
Qty: 0 | Refills: 0 | Status: DISCONTINUED | OUTPATIENT
Start: 2019-04-12 | End: 2019-04-12

## 2019-04-11 RX ADMIN — Medication 3 MILLILITER(S): at 21:52

## 2019-04-11 RX ADMIN — HEPARIN SODIUM 1100 UNIT(S)/HR: 5000 INJECTION INTRAVENOUS; SUBCUTANEOUS at 10:19

## 2019-04-11 RX ADMIN — Medication 100 MILLIGRAM(S): at 05:25

## 2019-04-11 RX ADMIN — SENNA PLUS 2 TABLET(S): 8.6 TABLET ORAL at 21:52

## 2019-04-11 RX ADMIN — TIOTROPIUM BROMIDE 1 CAPSULE(S): 18 CAPSULE ORAL; RESPIRATORY (INHALATION) at 18:06

## 2019-04-11 RX ADMIN — PANTOPRAZOLE SODIUM 40 MILLIGRAM(S): 20 TABLET, DELAYED RELEASE ORAL at 05:25

## 2019-04-11 RX ADMIN — FINASTERIDE 5 MILLIGRAM(S): 5 TABLET, FILM COATED ORAL at 11:34

## 2019-04-11 RX ADMIN — ATORVASTATIN CALCIUM 40 MILLIGRAM(S): 80 TABLET, FILM COATED ORAL at 21:52

## 2019-04-11 RX ADMIN — Medication 3 MILLILITER(S): at 18:05

## 2019-04-11 RX ADMIN — POLYETHYLENE GLYCOL 3350 17 GRAM(S): 17 POWDER, FOR SOLUTION ORAL at 11:34

## 2019-04-11 RX ADMIN — Medication 3 MILLILITER(S): at 05:26

## 2019-04-11 RX ADMIN — MONTELUKAST 10 MILLIGRAM(S): 4 TABLET, CHEWABLE ORAL at 11:35

## 2019-04-11 RX ADMIN — Medication 0.12 MILLIGRAM(S): at 05:25

## 2019-04-11 RX ADMIN — HEPARIN SODIUM 1300 UNIT(S)/HR: 5000 INJECTION INTRAVENOUS; SUBCUTANEOUS at 23:47

## 2019-04-11 RX ADMIN — BUDESONIDE AND FORMOTEROL FUMARATE DIHYDRATE 2 PUFF(S): 160; 4.5 AEROSOL RESPIRATORY (INHALATION) at 18:05

## 2019-04-11 RX ADMIN — Medication 100 MILLIGRAM(S): at 18:06

## 2019-04-11 RX ADMIN — Medication 100 MILLIGRAM(S): at 05:26

## 2019-04-11 RX ADMIN — TAMSULOSIN HYDROCHLORIDE 0.8 MILLIGRAM(S): 0.4 CAPSULE ORAL at 21:52

## 2019-04-11 RX ADMIN — Medication 81 MILLIGRAM(S): at 11:35

## 2019-04-11 RX ADMIN — Medication 100 MILLIGRAM(S): at 21:52

## 2019-04-11 RX ADMIN — BUDESONIDE AND FORMOTEROL FUMARATE DIHYDRATE 2 PUFF(S): 160; 4.5 AEROSOL RESPIRATORY (INHALATION) at 05:26

## 2019-04-11 RX ADMIN — Medication 10 MILLIGRAM(S): at 18:49

## 2019-04-11 RX ADMIN — Medication 3 MILLILITER(S): at 11:34

## 2019-04-11 NOTE — PROGRESS NOTE ADULT - PROBLEM SELECTOR PLAN 6
- c/w symbicort  - c/w spiriva  - c/w montelukast  - duonebs standing q6 for now  - peak flow monitor

## 2019-04-11 NOTE — PROVIDER CONTACT NOTE (OTHER) - ACTION/TREATMENT ORDERED:
NP aware, will continue to trend BP every 15 min. Hydralazine 10mg IVP ordered x1 dose. Will continue to monitor.

## 2019-04-11 NOTE — PROGRESS NOTE ADULT - PROBLEM SELECTOR PLAN 1
-Fairly well controlled    -Continue Metoprolol increased from 50 to 100 as per cardiology  - Added digoxin to regimen discontinued calcium channel blocker  -[id number]  -Continue Heparin gtt. Eventual transition to NOAC upon discharge  -Monitor tele

## 2019-04-11 NOTE — PROGRESS NOTE ADULT - SUBJECTIVE AND OBJECTIVE BOX
pt seen and examined, no complaints, ROS - .     Tele afib    ALBUTerol/ipratropium for Nebulization 3 milliLiter(s) Nebulizer every 6 hours  aspirin  chewable 81 milliGRAM(s) Oral daily  atorvastatin 40 milliGRAM(s) Oral at bedtime  buDESOnide 160 MICROgram(s)/formoterol 4.5 MICROgram(s) Inhaler 2 Puff(s) Inhalation two times a day  digoxin     Tablet 0.125 milliGRAM(s) Oral daily  docusate sodium 100 milliGRAM(s) Oral three times a day  finasteride 5 milliGRAM(s) Oral daily  heparin  Infusion. 1100 Unit(s)/Hr IV Continuous <Continuous>  heparin  Injectable 6000 Unit(s) IV Push every 6 hours PRN  heparin  Injectable 3000 Unit(s) IV Push every 6 hours PRN  influenza   Vaccine 0.5 milliLiter(s) IntraMuscular once  metoprolol tartrate 100 milliGRAM(s) Oral every 12 hours  montelukast 10 milliGRAM(s) Oral daily  pantoprazole    Tablet 40 milliGRAM(s) Oral before breakfast  polyethylene glycol 3350 17 Gram(s) Oral daily  senna 2 Tablet(s) Oral at bedtime  tamsulosin 0.8 milliGRAM(s) Oral at bedtime  tiotropium 18 MICROgram(s) Capsule 1 Capsule(s) Inhalation daily                            11.2   8.75  )-----------( 194      ( 10 Apr 2019 11:35 )             36.8       Hemoglobin: 11.2 g/dL (04-10 @ 11:35)  Hemoglobin: 11.4 g/dL (04-09 @ 09:15)  Hemoglobin: 11.4 g/dL (04-08 @ 11:40)  Hemoglobin: 11.1 g/dL (04-08 @ 08:49)  Hemoglobin: 12.1 g/dL (04-08 @ 02:44)            Creatinine Trend: 1.10<--, 1.08<--    COAGS:           T(C): 36.6 (04-11-19 @ 04:30), Max: 36.6 (04-10-19 @ 08:02)  HR: 99 (04-11-19 @ 04:30) (86 - 117)  BP: 135/79 (04-11-19 @ 04:30) (113/65 - 164/71)  RR: 18 (04-11-19 @ 04:30) (18 - 18)  SpO2: 99% (04-11-19 @ 04:30) (97% - 99%)  Wt(kg): --    I&O's Summary    09 Apr 2019 07:01  -  10 Apr 2019 07:00  --------------------------------------------------------  IN: 1583 mL / OUT: 650 mL / NET: 933 mL    10 Apr 2019 07:01  -  11 Apr 2019 06:50  --------------------------------------------------------  IN: 756 mL / OUT: 1750 mL / NET: -994 mL      no JVD  IRR, no murmurs  CTAB  soft nt/nd  no c/c/e    CT angio: < from: CT Angio Chest w/ IV Cont (04.07.19 @ 21:06) >  IMPRESSION:     1. Evaluation of the subsegmental pulmonary arteries are limited   secondary to respiratory motion artifact. No pulmonary embolism in the   main, lobar or segmental pulmonary arteries.   2. Small bilateral pleural effusions with adjacent compressive   atelectasis.      JOSÉ LUIS LICONA M.D., RADIOLOGY RESIDENT  This document has been electronically signed.  RADHA AHUMADA M.D., ATTENDING RADIOLOGIST  This document has been electronically signed. Apr 7 2019 10:45PM    < end of copied text >    ECHO : < from: Transthoracic Echocardiogram (04.09.19 @ 14:22) >  Conclusions:  1. Mitral annular calcification, otherwise normal mitral  valve. Moderate mitral regurgitation.  2. Calcified aortic valve with decreased opening. Peak  transaortic valve gradient equals 14 mm Hg, mean  transaortic valve gradient equals 10 mm Hg, estimated  aortic valve area equals 1.3 sqcm (by continuity equation),  aortic valve velocity time integral equals 38 cm,  consistent with moderate aortic stenosis. Moderate aortic  regurgitation.  3. Increased relative wall thickness with normal left  ventricular mass index, consistent with concentric left  ventricular remodeling.  4. Normal left ventricular systolic function. No segmental  wall motion abnormalities.  5. The right ventricle is not well visualized; grossly  normal right ventricular systolic function.  6. Estimated right ventricular systolic pressure equals 33  mm Hg, assuming right atrial pressure equals 8 mm Hg,  consistent with normal pulmonary pressures.  *** No previous Echo exam.  ------------------------------------------------------------------------  Confirmedon  4/9/2019 - 18:46:49 by Geraldo Infante M.D.  -----------------------------------------------------------------------    < end of copied text >    NST: < from: Nuclear Stress Test-Pharmacologic (04.10.19 @ 11:08) >  IMPRESSIONS:Abnormal Study  * Chest Pain: Developed mild chest discomfort starting at  2:00 min following regadenoson infusion at a HR of 115 bpm  which resolved by 4 minutes following infusion.  * Symptom: Chest pain.  * HR Response: Appropriate.  * BP Response: Appropriate.  * Heart Rhythm: Atrial Fibrillation - 89 BPM.  * Baseline ECG: Nonspecific ST-T wave abnormality.  Poor R  wave progression.  * ECG Changes: No significant ischemic ST segment changes  beyond baseline abnormalities.  * Arrhythmia: None.  * The left ventricle was normal in size. There are large,  mild to moderate defects in the anterior, apical, basal to  mid anteroseptal, and basal anterolateral walls that are  partially reversible suggestive of infarct with moderate  paz-infarct ischemia.  * There are medium-sized, moderate defects in the  inferior, basal to mid inferoseptal, and basal  inferolateral walls that are partially reversible  suggestive of infarct with moderate paz-infarct ischemia.  * Post-stress gated wall motion analysis was performed  (LVEF = 34 %;LVEDV = 111 ml.) revealing hypokinesis of the  anterior, basal to mid anteroseptal, apical, inferior, and  basal to mid inferoseptal walls.  Assessment of overall  LVEF may be affected by the presence of atrial  fibrillation.  *** No previous Nuclear/Stress exam.  ------------------------------------------------------------------------  Confirmed on  4/10/2019 - 15:46:36 by Fernando De M.D.  ------------------------------------------------------------------------    < end of copied text >    A/P) 83 y/o male PMH moderate AI, HTN and stroke admitted with chest pain and palpitations. Found to have newly diagnosed afib.    Cont A/C with heparin gtt at present for Afib   Will start NOAC prior to D/C   BB / dig , hr stable this AM   Echo with mod MR, AS,  normal LV / RV fx   NST  with reversible ischemia    GI / DVT prophylaxis,  keep K>4, mag >2.0   CT angio neg for PE   ** cardiac cath for full assessment/ management  of coronary disease   D/W Dr June

## 2019-04-11 NOTE — PROGRESS NOTE ADULT - SUBJECTIVE AND OBJECTIVE BOX
Patient is a 84y old  Male who presents with a chief complaint of chest pain (11 Apr 2019 06:49)      INTERVAL HPI/OVERNIGHT EVENTS:  Stress test came back + patient scheduled for cath       Review of Systems: 12 point review of systems otherwise negative  ( - )fevers/chills  ( - ) dyspnea  ( - ) cough  ( - ) chest pain  ( - ) palpatations  ( - ) dizziness/lightheadedness  ( - ) nausea/vomiting  ( - ) abd pain  ( - ) diarrhea  ( - ) melena  ( - ) hematochezia  ( - ) dysuria  ( - ) hematuria  ( - ) leg swelling  ( -) calf tenderness  ( - ) motor weakness  ( - ) extremity numbness  ( - ) back pain  ( + ) tolerating POs  ( + ) BM    MEDICATIONS  (STANDING):  ALBUTerol/ipratropium for Nebulization 3 milliLiter(s) Nebulizer every 6 hours  aspirin  chewable 81 milliGRAM(s) Oral daily  atorvastatin 40 milliGRAM(s) Oral at bedtime  buDESOnide 160 MICROgram(s)/formoterol 4.5 MICROgram(s) Inhaler 2 Puff(s) Inhalation two times a day  digoxin     Tablet 0.125 milliGRAM(s) Oral daily  docusate sodium 100 milliGRAM(s) Oral three times a day  finasteride 5 milliGRAM(s) Oral daily  heparin  Infusion. 1100 Unit(s)/Hr (11 mL/Hr) IV Continuous <Continuous>  influenza   Vaccine 0.5 milliLiter(s) IntraMuscular once  metoprolol tartrate 100 milliGRAM(s) Oral every 12 hours  montelukast 10 milliGRAM(s) Oral daily  pantoprazole    Tablet 40 milliGRAM(s) Oral before breakfast  polyethylene glycol 3350 17 Gram(s) Oral daily  senna 2 Tablet(s) Oral at bedtime  tamsulosin 0.8 milliGRAM(s) Oral at bedtime  tiotropium 18 MICROgram(s) Capsule 1 Capsule(s) Inhalation daily    MEDICATIONS  (PRN):  heparin  Injectable 6000 Unit(s) IV Push every 6 hours PRN For aPTT less than 40  heparin  Injectable 3000 Unit(s) IV Push every 6 hours PRN For aPTT between 40 - 57      Allergies    No Known Allergies    Intolerances          Vital Signs Last 24 Hrs  T(C): 36.6 (11 Apr 2019 11:52), Max: 36.6 (11 Apr 2019 04:30)  T(F): 97.8 (11 Apr 2019 11:52), Max: 97.8 (11 Apr 2019 04:30)  HR: 84 (11 Apr 2019 11:52) (74 - 102)  BP: 153/74 (11 Apr 2019 11:52) (130/82 - 154/83)  BP(mean): --  RR: 18 (11 Apr 2019 11:52) (18 - 18)  SpO2: 99% (11 Apr 2019 11:52) (97% - 99%)  CAPILLARY BLOOD GLUCOSE          04-10 @ 07:01  -  04-11 @ 07:00  --------------------------------------------------------  IN: 899 mL / OUT: 2700 mL / NET: -1801 mL    04-11 @ 07:01  -  04-11 @ 18:00  --------------------------------------------------------  IN: 288 mL / OUT: 0 mL / NET: 288 mL        Physical Exam:    Daily Height in cm: 170 (11 Apr 2019 08:26)    Daily   General:  NAD  HEENT:  Nonicteric, PERRLA  CV:  irregular rate controlled   Lungs:  CTA B/L, no wheeze  Abdomen:  Soft, non-tender, no distended, positive BS,  Extremities:  no edema  Skin:  Warm and dry, no rashes  :  No shah  Neuro:  AAOx3, non-focal  No Restraints    LABS:                        11.7   10.32 )-----------( 234      ( 11 Apr 2019 08:31 )             40.3     04-11    139  |  103  |  28<H>  ----------------------------<  92  4.1   |  23  |  1.11    Ca    9.4      11 Apr 2019 06:36      PT/INR - ( 11 Apr 2019 09:14 )   PT: 12.6 sec;   INR: 1.10 ratio         PTT - ( 11 Apr 2019 09:14 )  PTT:102.6 sec        RADIOLOGY & ADDITIONAL TESTS:    ---------------------------------------------------------------------------  I personally reviewed: [  ]EKG   [  ]CXR    [  ] CT    [  ]Other  ---------------------------------------------------------------------------  PLEASE CHECK WHEN PRESENT:     [  ]Heart Failure     [  ] Acute     [  ] Acute on Chronic     [  ] Chronic  -------------------------------------------------------------------     [  ]Diastolic [HFpEF]     [  ]Systolic [HFrEF]     [  ]Combined [HFpEF & HFrEF]     [  ]Other:  -------------------------------------------------------------------  [  ]TRINI     [  ]ATN     [  ]Reneal Medullary Necrosis     [  ]Renal Cortical Necrosis     [  ]Other Pathological Lesions:    [  ]CKD 1  [  ]CKD 2  [  ]CKD 3  [  ]CKD 4  [  ]CKD 5  [  ]Other  -------------------------------------------------------------------  [  ]Other/Unspecified:    --------------------------------------------------------------------    Abdominal Nutritional Status  [  ]Malnutrition: See Nutrition Note  [  ]Cachexia  [  ]Other:   [  ]Supplement Ordered:  [  ]Morbid Obesity (BMI >=40]

## 2019-04-12 ENCOUNTER — TRANSCRIPTION ENCOUNTER (OUTPATIENT)
Age: 84
End: 2019-04-12

## 2019-04-12 VITALS
HEART RATE: 92 BPM | TEMPERATURE: 98 F | RESPIRATION RATE: 18 BRPM | DIASTOLIC BLOOD PRESSURE: 50 MMHG | SYSTOLIC BLOOD PRESSURE: 136 MMHG | OXYGEN SATURATION: 100 %

## 2019-04-12 DIAGNOSIS — I38 ENDOCARDITIS, VALVE UNSPECIFIED: ICD-10-CM

## 2019-04-12 LAB
ANION GAP SERPL CALC-SCNC: 12 MMOL/L — SIGNIFICANT CHANGE UP (ref 5–17)
APTT BLD: 69.8 SEC — HIGH (ref 27.5–36.3)
BUN SERPL-MCNC: 22 MG/DL — SIGNIFICANT CHANGE UP (ref 7–23)
CALCIUM SERPL-MCNC: 9.1 MG/DL — SIGNIFICANT CHANGE UP (ref 8.4–10.5)
CHLORIDE SERPL-SCNC: 103 MMOL/L — SIGNIFICANT CHANGE UP (ref 96–108)
CO2 SERPL-SCNC: 23 MMOL/L — SIGNIFICANT CHANGE UP (ref 22–31)
CREAT SERPL-MCNC: 1.05 MG/DL — SIGNIFICANT CHANGE UP (ref 0.5–1.3)
GLUCOSE SERPL-MCNC: 93 MG/DL — SIGNIFICANT CHANGE UP (ref 70–99)
HCT VFR BLD CALC: 37.2 % — LOW (ref 39–50)
HGB BLD-MCNC: 11.1 G/DL — LOW (ref 13–17)
MCHC RBC-ENTMCNC: 22.2 PG — LOW (ref 27–34)
MCHC RBC-ENTMCNC: 29.8 GM/DL — LOW (ref 32–36)
MCV RBC AUTO: 74.5 FL — LOW (ref 80–100)
PLATELET # BLD AUTO: 229 K/UL — SIGNIFICANT CHANGE UP (ref 150–400)
POTASSIUM SERPL-MCNC: 4 MMOL/L — SIGNIFICANT CHANGE UP (ref 3.5–5.3)
POTASSIUM SERPL-SCNC: 4 MMOL/L — SIGNIFICANT CHANGE UP (ref 3.5–5.3)
RBC # BLD: 4.99 M/UL — SIGNIFICANT CHANGE UP (ref 4.2–5.8)
RBC # FLD: 18 % — HIGH (ref 10.3–14.5)
SODIUM SERPL-SCNC: 138 MMOL/L — SIGNIFICANT CHANGE UP (ref 135–145)
WBC # BLD: 8.78 K/UL — SIGNIFICANT CHANGE UP (ref 3.8–10.5)
WBC # FLD AUTO: 8.78 K/UL — SIGNIFICANT CHANGE UP (ref 3.8–10.5)

## 2019-04-12 PROCEDURE — 71275 CT ANGIOGRAPHY CHEST: CPT

## 2019-04-12 PROCEDURE — 85730 THROMBOPLASTIN TIME PARTIAL: CPT

## 2019-04-12 PROCEDURE — 93005 ELECTROCARDIOGRAM TRACING: CPT

## 2019-04-12 PROCEDURE — 87633 RESP VIRUS 12-25 TARGETS: CPT

## 2019-04-12 PROCEDURE — 80053 COMPREHEN METABOLIC PANEL: CPT

## 2019-04-12 PROCEDURE — 85027 COMPLETE CBC AUTOMATED: CPT

## 2019-04-12 PROCEDURE — 78452 HT MUSCLE IMAGE SPECT MULT: CPT

## 2019-04-12 PROCEDURE — 87581 M.PNEUMON DNA AMP PROBE: CPT

## 2019-04-12 PROCEDURE — A9500: CPT

## 2019-04-12 PROCEDURE — 96374 THER/PROPH/DIAG INJ IV PUSH: CPT

## 2019-04-12 PROCEDURE — 87798 DETECT AGENT NOS DNA AMP: CPT

## 2019-04-12 PROCEDURE — 84100 ASSAY OF PHOSPHORUS: CPT

## 2019-04-12 PROCEDURE — 87486 CHLMYD PNEUM DNA AMP PROBE: CPT

## 2019-04-12 PROCEDURE — C1769: CPT

## 2019-04-12 PROCEDURE — 93017 CV STRESS TEST TRACING ONLY: CPT

## 2019-04-12 PROCEDURE — 93306 TTE W/DOPPLER COMPLETE: CPT

## 2019-04-12 PROCEDURE — C1887: CPT

## 2019-04-12 PROCEDURE — 85610 PROTHROMBIN TIME: CPT

## 2019-04-12 PROCEDURE — 85379 FIBRIN DEGRADATION QUANT: CPT

## 2019-04-12 PROCEDURE — 80048 BASIC METABOLIC PNL TOTAL CA: CPT

## 2019-04-12 PROCEDURE — 84484 ASSAY OF TROPONIN QUANT: CPT

## 2019-04-12 PROCEDURE — 83735 ASSAY OF MAGNESIUM: CPT

## 2019-04-12 PROCEDURE — C1760: CPT

## 2019-04-12 PROCEDURE — 84443 ASSAY THYROID STIM HORMONE: CPT

## 2019-04-12 PROCEDURE — 99239 HOSP IP/OBS DSCHRG MGMT >30: CPT

## 2019-04-12 PROCEDURE — 99285 EMERGENCY DEPT VISIT HI MDM: CPT | Mod: 25

## 2019-04-12 PROCEDURE — 96375 TX/PRO/DX INJ NEW DRUG ADDON: CPT

## 2019-04-12 PROCEDURE — 93454 CORONARY ARTERY ANGIO S&I: CPT

## 2019-04-12 PROCEDURE — 94640 AIRWAY INHALATION TREATMENT: CPT

## 2019-04-12 PROCEDURE — C1894: CPT

## 2019-04-12 PROCEDURE — 99153 MOD SED SAME PHYS/QHP EA: CPT

## 2019-04-12 PROCEDURE — 99152 MOD SED SAME PHYS/QHP 5/>YRS: CPT

## 2019-04-12 PROCEDURE — 83880 ASSAY OF NATRIURETIC PEPTIDE: CPT

## 2019-04-12 PROCEDURE — 71045 X-RAY EXAM CHEST 1 VIEW: CPT

## 2019-04-12 PROCEDURE — A9505: CPT

## 2019-04-12 RX ORDER — DIGOXIN 250 MCG
1 TABLET ORAL
Qty: 30 | Refills: 0
Start: 2019-04-12 | End: 2019-05-11

## 2019-04-12 RX ORDER — RIVAROXABAN 15 MG-20MG
1 KIT ORAL
Qty: 30 | Refills: 0
Start: 2019-04-12 | End: 2019-05-11

## 2019-04-12 RX ORDER — RIVAROXABAN 15 MG-20MG
KIT ORAL
Qty: 0 | Refills: 0 | Status: DISCONTINUED | OUTPATIENT
Start: 2019-04-12 | End: 2019-04-12

## 2019-04-12 RX ORDER — POLYETHYLENE GLYCOL 3350 17 G/17G
17 POWDER, FOR SOLUTION ORAL
Qty: 0 | Refills: 0 | COMMUNITY
Start: 2019-04-12

## 2019-04-12 RX ORDER — METOPROLOL TARTRATE 50 MG
1 TABLET ORAL
Qty: 60 | Refills: 0
Start: 2019-04-12 | End: 2019-05-11

## 2019-04-12 RX ORDER — RIVAROXABAN 15 MG-20MG
20 KIT ORAL DAILY
Qty: 0 | Refills: 0 | Status: DISCONTINUED | OUTPATIENT
Start: 2019-04-13 | End: 2019-04-12

## 2019-04-12 RX ORDER — RIVAROXABAN 15 MG-20MG
20 KIT ORAL ONCE
Qty: 0 | Refills: 0 | Status: COMPLETED | OUTPATIENT
Start: 2019-04-12 | End: 2019-04-12

## 2019-04-12 RX ADMIN — Medication 3 MILLILITER(S): at 05:25

## 2019-04-12 RX ADMIN — BUDESONIDE AND FORMOTEROL FUMARATE DIHYDRATE 2 PUFF(S): 160; 4.5 AEROSOL RESPIRATORY (INHALATION) at 05:25

## 2019-04-12 RX ADMIN — Medication 3 MILLILITER(S): at 11:24

## 2019-04-12 RX ADMIN — POLYETHYLENE GLYCOL 3350 17 GRAM(S): 17 POWDER, FOR SOLUTION ORAL at 09:02

## 2019-04-12 RX ADMIN — Medication 0.12 MILLIGRAM(S): at 05:25

## 2019-04-12 RX ADMIN — Medication 100 MILLIGRAM(S): at 05:25

## 2019-04-12 RX ADMIN — FINASTERIDE 5 MILLIGRAM(S): 5 TABLET, FILM COATED ORAL at 08:59

## 2019-04-12 RX ADMIN — MONTELUKAST 10 MILLIGRAM(S): 4 TABLET, CHEWABLE ORAL at 09:00

## 2019-04-12 RX ADMIN — TIOTROPIUM BROMIDE 1 CAPSULE(S): 18 CAPSULE ORAL; RESPIRATORY (INHALATION) at 11:24

## 2019-04-12 RX ADMIN — Medication 81 MILLIGRAM(S): at 09:00

## 2019-04-12 RX ADMIN — RIVAROXABAN 20 MILLIGRAM(S): KIT at 09:00

## 2019-04-12 RX ADMIN — PANTOPRAZOLE SODIUM 40 MILLIGRAM(S): 20 TABLET, DELAYED RELEASE ORAL at 05:25

## 2019-04-12 NOTE — PROGRESS NOTE ADULT - PROBLEM SELECTOR PLAN 1
-rate fairly well controlled, c/w metoprolol 100mg bid, digoxin 0.125mg daily  -[id number] 5, c/w xarelto 20mg daily (cr cl 53). Explained r/b/a to pt/son and bleeding risk including to be caution not to fall as may result in deadly bleed (never had fall prior)  -outpatient followup with cards Dr Donald or any cardiologist of his chooce

## 2019-04-12 NOTE — PROGRESS NOTE ADULT - ATTENDING COMMENTS
Agree with above assessment and plan as outlined above.      - ADD: s/p cath with nonobstructive CAD  -  med management    [facility]ed Donald [facility], [facility]  BEEPER [phone]
Agree with above assessment and plan as outlined above.    - D/C per med    Naveed Donald MD, [facility]  BEEPER [phone]
CARDIOLOGY ATTENDING    Agree with nayla. 83 y/o male PMH moderate AI, HTN and stroke admitted with chest pain and palpitations. Found to have newly diagnosed afib. Baseline QRS narrow. He denies syncope.    -given elevated chads-vasc recommend lifelong a/c. Continue heparin drip for now, but will change to NOAC upon discharge  -get echo to assess severity of AI  -get NST given chest pain  -increase metoprolol to 100 bid and start dig 0.125mg daily  -d/c cardizem
CARDIOLOGY ATTENDING    Patient seen and examined. Agree with above. In brief he is a pleasant 85 y/o male PMH moderate AI/moderate AS/moderate MR, HTN and stroke admitted with chest pain and palpitations. Found to have newly diagnosed afib. Baseline QRS narrow. He denies syncope.    -given elevated chads-vasc recommend lifelong a/c. Continue heparin drip for now, but will change to NOAC upon discharge  -get NST given chest pain  -continue metoprolol 100 bid and dig 0.125mg daily for a rate control strategy  -long term plan for his AF is rate control and lifelong a/c  -no further inpatient cardiac workup needed if NST shows no ischemia

## 2019-04-12 NOTE — PROGRESS NOTE ADULT - NSHPATTENDINGPLANDISCUSS_GEN_ALL_CORE
NP
NP, await results of stress test if negative and cleared by cardiology okay for discharge
son bedside on discharge

## 2019-04-12 NOTE — DISCHARGE NOTE PROVIDER - NSDCCPCAREPLAN_GEN_ALL_CORE_FT
PRINCIPAL DISCHARGE DIAGNOSIS  Diagnosis: Atrial flutter with rapid ventricular response  Assessment and Plan of Treatment: Atrial fibrillation is the most common heart rhythm problem & has the risk of stroke & heart attack  It helps if you control your blood pressure, not drink more than 1-2 alcohol drinks per day, cut down on caffeine, getting treatment for over active thyroid gland, & getting exercise  Call your doctor if you feel your heart racing or beating unusually, chest tightness or pain, lightheaded, faint, shortness of breath especially with exercise  It is important to take your heart medication as prescribed        SECONDARY DISCHARGE DIAGNOSES  Diagnosis: Abnormal stress test  Assessment and Plan of Treatment: S/P cardiac cath, clean coronary arteries.    Diagnosis: Chest pain  Assessment and Plan of Treatment:   HOME CARE INSTRUCTIONS  For the next few days, avoid physical activities that bring on chest pain. Continue physical activities as directed.  Do not Informed pt of the various negative side effects of smoking including risk of COPD, Lung Ca etc  Strongly recommended that pt stops smoking and pt given various options of smoking cessasion tools such as NRT's and other pharmacotherapies.  Avoid drinking alcohol.   Only take over-the-counter or prescription medicine for pain, discomfort, or fever as directed by your caregiver.  Follow your caregiver's suggestions for further testing if your chest pain does not go away.  Keep any follow-up appointments you made. If you do not go to an appointment, you could develop lasting (chronic) problems with pain. If there is any problem keeping an appointment, you must call to reschedule.   SEEK MEDICAL CARE IF:  You think you are having problems from the medicine you are taking. Read your medicine instructions carefully.  Your chest pain does not go away, even after treatment.  You develop a rash with blisters on your chest.  SEEK IMMEDIATE MEDICAL CARE IF:  You have increased chest pain or pain that spreads to your arm, neck, jaw, back, or abdomen.   You develop shortness of breath, an increasing cough, or you are coughing up blood.  You have severe back or abdominal pain, feel nauseous, or vomit.  You develop severe weakness, fainting, or chills.  You have a fever.  THIS IS AN EMERGENCY. Do not wait to see if the pain will go away. Get medical help at once. Call your local emergency services. Do not drive yourself to the hospital.

## 2019-04-12 NOTE — DISCHARGE NOTE PROVIDER - HOSPITAL COURSE
83 y/o male PMH moderate AI, HTN and stroke admitted with chest pain and palpitations. Found to have newly diagnosed afib.    s/p cardiac cath         Cardiac cath with non obs coronaries .    Heparin for A/C at present for AFIB, NOAC started prior to D/C     BB / dig , hr stable    Echo with mod MR, AS,  normal LV / RV fx     NST  with reversible ischemia      GI / DVT prophylaxis,  keep K>4, mag >2.0     CT angio neg for PE     D/C to home.  F/U in clinic as scheduled 84M with PMH of asthma, HTN, HLD, BPH, and CVA (2015)  who presents with chest pain x 1, found to have new A fib with RVR,          Problem/Plan - 1:    ·  Problem: Atrial flutter with rapid ventricular response.  Plan: -rate fairly well controlled, c/w metoprolol 100mg bid, digoxin 0.125mg daily    -[id number] 5, c/w xarelto 20mg daily (cr cl 53). Explained r/b/a to pt/son and bleeding risk including to be caution not to fall as may result in deadly bleed (never had fall prior)    -CTA negative for PE    -outpatient followup with cards Dr Donald or any cardiologist of his choice             Problem/Plan - 2:    ·  Problem: Abnormal stress test.  Plan: + stress test but cath showed only minor luminal irregularities     outpatient cards followup.          Problem/Plan - 3:    ·  Problem: Valvular insufficiency.  Plan: TTE shows moderate AS, AR, MR    -will need repeat serial TTE in future as outpatient.          Problem/Plan - 4:    ·  Problem: CVA (cerebral vascular accident).  Plan: -ccurrently with no overt residual deficits    -c/w ASA    -c/w atorvastatin.          Problem/Plan - 5:    ·  Problem: HTN (hypertension).  Plan: -Continue Metoprolol.          Problem/Plan - 6:    Problem: BPH (benign prostatic hyperplasia). Plan: - c/w tamsulosin    - c/w finasteride.         Problem/Plan - 7:    ·  Problem: Persistent asthma with undetermined severity.  Plan: - c/w symbicort    - c/w spiriva    - c/w montelukast    - duonebs standing q6 for now    - peak flow monitor.          Problem/Plan - 8:    ·  Problem: Need for prophylactic measure.  Plan: DASH TLC Kosher diet    Fall precaution patient walking independently in room  (never had fall per son)    Dispo: f/u with PCP Dr Lin and cards as outpatient.

## 2019-04-12 NOTE — DISCHARGE NOTE NURSING/CASE MANAGEMENT/SOCIAL WORK - NSDCDPATPORTLINK_GEN_ALL_CORE
You can access the [facility], offered by [facility], by registering with the following website: [email or web address]

## 2019-04-12 NOTE — DISCHARGE NOTE NURSING/CASE MANAGEMENT/SOCIAL WORK - NSDCPEPTSTRK_GEN_ALL_CORE
Need for follow up after discharge/Risk factors for stroke/Stroke support groups for patients, families, and friends/Call 911 for stroke/Signs and symptoms of stroke/Stroke warning signs and symptoms/Prescribed medications/Stroke education booklet

## 2019-04-12 NOTE — PROGRESS NOTE ADULT - SUBJECTIVE AND OBJECTIVE BOX
Patient is a 84y old  Male who presents with a chief complaint of chest pain (12 Apr 2019 12:19)    [place]  [id number]    SUBJECTIVE / OVERNIGHT EVENTS: Cardiac cath with nonbstructive CAD, no interventions. Patient denies chest pain, sob, feels well.     MEDICATIONS  (STANDING):  ALBUTerol/ipratropium for Nebulization 3 milliLiter(s) Nebulizer every 6 hours  aspirin  chewable 81 milliGRAM(s) Oral daily  atorvastatin 40 milliGRAM(s) Oral at bedtime  buDESOnide 160 MICROgram(s)/formoterol 4.5 MICROgram(s) Inhaler 2 Puff(s) Inhalation two times a day  digoxin     Tablet 0.125 milliGRAM(s) Oral daily  docusate sodium 100 milliGRAM(s) Oral three times a day  finasteride 5 milliGRAM(s) Oral daily  influenza   Vaccine 0.5 milliLiter(s) IntraMuscular once  metoprolol tartrate 100 milliGRAM(s) Oral every 12 hours  montelukast 10 milliGRAM(s) Oral daily  pantoprazole    Tablet 40 milliGRAM(s) Oral before breakfast  polyethylene glycol 3350 17 Gram(s) Oral daily  rivaroxaban      senna 2 Tablet(s) Oral at bedtime  tamsulosin 0.8 milliGRAM(s) Oral at bedtime  tiotropium 18 MICROgram(s) Capsule 1 Capsule(s) Inhalation daily    MEDICATIONS  (PRN):      Vital Signs Last 24 Hrs  T(C): 36.5 (12 Apr 2019 11:39), Max: 36.9 (11 Apr 2019 18:25)  T(F): 97.7 (12 Apr 2019 11:39), Max: 98.4 (11 Apr 2019 18:25)  HR: 92 (12 Apr 2019 11:39) (92 - 136)  BP: 136/50 (12 Apr 2019 11:39) (134/82 - 178/119)  BP(mean): --  RR: 18 (12 Apr 2019 11:39) (17 - 18)  SpO2: 100% (12 Apr 2019 11:39) (97% - 100%)  CAPILLARY BLOOD GLUCOSE        I&O's Summary    11 Apr 2019 07:01  -  12 Apr 2019 07:00  --------------------------------------------------------  IN: 288 mL / OUT: 0 mL / NET: 288 mL    12 Apr 2019 07:01  -  12 Apr 2019 13:56  --------------------------------------------------------  IN: 480 mL / OUT: 0 mL / NET: 480 mL        PHYSICAL EXAM:  GENERAL: NAD, well-developed  HEAD:  Atraumatic, Normocephalic  NECK: Supple, No JVD  CHEST/LUNG: Clear to auscultation bilaterally; No wheeze  HEART: Regular rate and rhythm;   ABDOMEN: Soft, Nontender, Nondistended; Bowel sounds present  EXTREMITIES:  2+ Peripheral Pulses, No clubbing, cyanosis, or edema. R radial cath site stable  PSYCH: AAOx3  NEUROLOGY: non-focal    LABS:                        11.1   8.78  )-----------( 229      ( 12 Apr 2019 09:00 )             37.2     04-12    138  |  103  |  22  ----------------------------<  93  4.0   |  23  |  1.05    Ca    9.1      12 Apr 2019 06:56      PT/INR - ( 11 Apr 2019 09:14 )   PT: 12.6 sec;   INR: 1.10 ratio         PTT - ( 12 Apr 2019 08:24 )  PTT:69.8 sec              RADIOLOGY & ADDITIONAL TESTS:    tele reviewed: sinus 90-110s    Imaging Personally Reviewed:    Consultant(s) Notes Reviewed:      Care Discussed with Consultants/Other Providers:

## 2019-04-12 NOTE — DISCHARGE NOTE PROVIDER - PROVIDER TOKENS
PROVIDER:[TOKEN:[[id number]]] PROVIDER:[TOKEN:[[id number]]],FREE:[LAST:[kosca],FIRST:[dr],PHONE:[(   )    -],FAX:[(   )    -],ADDRESS:[PCP]]

## 2019-04-12 NOTE — PROGRESS NOTE ADULT - PROBLEM SELECTOR PLAN 8
DASH TLC Kosher diet  Fall precaution patient walking independently in room  (never had fall per son)  Dispo: f/u with PCP Dr Lin and cards as outpatient

## 2019-04-12 NOTE — DISCHARGE NOTE PROVIDER - CARE PROVIDER_API CALL
Naveed Donald)  Cardiovascular Disease  [address]  [place] [address]  Phone: [phone]  Fax: [phone]  Follow Up Time: Naveed Donald)  Cardiovascular Disease  [address]  [place] [address]  Phone: [phone]  Fax: [phone]  Follow Up Time:     dr moises  PCP  Phone: (   )    -  Fax: (   )    -  Follow Up Time:

## 2019-04-12 NOTE — PROGRESS NOTE ADULT - ASSESSMENT
84M with PMH of asthma, HTN, HLD, BPH, and CVA (2015)  who presents with chest pain x 1, found to have new A fib with RVR,

## 2019-04-12 NOTE — PROGRESS NOTE ADULT - SUBJECTIVE AND OBJECTIVE BOX
pt seen and examined, no complaints, ROS - .     Tele afib    ALBUTerol/ipratropium for Nebulization 3 milliLiter(s) Nebulizer every 6 hours  aspirin  chewable 81 milliGRAM(s) Oral daily  atorvastatin 40 milliGRAM(s) Oral at bedtime  buDESOnide 160 MICROgram(s)/formoterol 4.5 MICROgram(s) Inhaler 2 Puff(s) Inhalation two times a day  digoxin     Tablet 0.125 milliGRAM(s) Oral daily  docusate sodium 100 milliGRAM(s) Oral three times a day  finasteride 5 milliGRAM(s) Oral daily  heparin  Infusion.  Unit(s)/Hr IV Continuous <Continuous>  heparin  Injectable 6000 Unit(s) IV Push every 6 hours PRN  heparin  Injectable 3000 Unit(s) IV Push every 6 hours PRN  influenza   Vaccine 0.5 milliLiter(s) IntraMuscular once  metoprolol tartrate 100 milliGRAM(s) Oral every 12 hours  metoprolol tartrate Injectable 5 milliGRAM(s) IV Push once  montelukast 10 milliGRAM(s) Oral daily  pantoprazole    Tablet 40 milliGRAM(s) Oral before breakfast  polyethylene glycol 3350 17 Gram(s) Oral daily  rivaroxaban 20 milliGRAM(s) Oral every 24 hours  senna 2 Tablet(s) Oral at bedtime  tamsulosin 0.8 milliGRAM(s) Oral at bedtime  tiotropium 18 MICROgram(s) Capsule 1 Capsule(s) Inhalation daily                            11.7   10.32 )-----------( 234      ( 11 Apr 2019 08:31 )             40.3       Hemoglobin: 11.7 g/dL (04-11 @ 08:31)  Hemoglobin: 11.2 g/dL (04-10 @ 11:35)  Hemoglobin: 11.4 g/dL (04-09 @ 09:15)  Hemoglobin: 11.4 g/dL (04-08 @ 11:40)  Hemoglobin: 11.1 g/dL (04-08 @ 08:49)      04-11    139  |  103  |  28<H>  ----------------------------<  92  4.1   |  23  |  1.11    Ca    9.4      11 Apr 2019 06:36      Creatinine Trend: 1.11<--, 1.10<--, 1.08<--    COAGS: PT/INR - ( 11 Apr 2019 09:14 )   PT: 12.6 sec;   INR: 1.10 ratio         PTT - ( 11 Apr 2019 23:24 )  PTT:33.8 sec          T(C): 36.4 (04-12-19 @ 00:18), Max: 36.9 (04-11-19 @ 18:25)  HR: 111 (04-12-19 @ 00:18) (74 - 136)  BP: 137/72 (04-12-19 @ 00:18) (135/79 - 178/119)  RR: 18 (04-12-19 @ 00:18) (17 - 18)  SpO2: 99% (04-12-19 @ 00:18) (97% - 99%)  Wt(kg): --    I&O's Summary    10 Apr 2019 07:01  -  11 Apr 2019 07:00  --------------------------------------------------------  IN: 899 mL / OUT: 2700 mL / NET: -1801 mL    11 Apr 2019 07:01  -  12 Apr 2019 01:17  --------------------------------------------------------  IN: 288 mL / OUT: 0 mL / NET: 288 mL        no JVD  IRR, no murmurs  CTAB  soft nt/nd  no c/c/e    CT angio: < from: CT Angio Chest w/ IV Cont (04.07.19 @ 21:06) >  IMPRESSION:     1. Evaluation of the subsegmental pulmonary arteries are limited   secondary to respiratory motion artifact. No pulmonary embolism in the   main, lobar or segmental pulmonary arteries.   2. Small bilateral pleural effusions with adjacent compressive   atelectasis.      JOSÉ LUIS LICONA M.D., RADIOLOGY RESIDENT  This document has been electronically signed.  RADHA AHUMADA M.D., ATTENDING RADIOLOGIST  This document has been electronically signed. Apr 7 2019 10:45PM    < end of copied text >    ECHO : < from: Transthoracic Echocardiogram (04.09.19 @ 14:22) >  Conclusions:  1. Mitral annular calcification, otherwise normal mitral  valve. Moderate mitral regurgitation.  2. Calcified aortic valve with decreased opening. Peak  transaortic valve gradient equals 14 mm Hg, mean  transaortic valve gradient equals 10 mm Hg, estimated  aortic valve area equals 1.3 sqcm (by continuity equation),  aortic valve velocity time integral equals 38 cm,  consistent with moderate aortic stenosis. Moderate aortic  regurgitation.  3. Increased relative wall thickness with normal left  ventricular mass index, consistent with concentric left  ventricular remodeling.  4. Normal left ventricular systolic function. No segmental  wall motion abnormalities.  5. The right ventricle is not well visualized; grossly  normal right ventricular systolic function.  6. Estimated right ventricular systolic pressure equals 33  mm Hg, assuming right atrial pressure equals 8 mm Hg,  consistent with normal pulmonary pressures.  *** No previous Echo exam.  ------------------------------------------------------------------------  Confirmedon  4/9/2019 - 18:46:49 by Geraldo Infante M.D.  -----------------------------------------------------------------------    < end of copied text >    NST: < from: Nuclear Stress Test-Pharmacologic (04.10.19 @ 11:08) >  IMPRESSIONS:Abnormal Study  * Chest Pain: Developed mild chest discomfort starting at  2:00 min following regadenoson infusion at a HR of 115 bpm  which resolved by 4 minutes following infusion.  * Symptom: Chest pain.  * HR Response: Appropriate.  * BP Response: Appropriate.  * Heart Rhythm: Atrial Fibrillation - 89 BPM.  * Baseline ECG: Nonspecific ST-T wave abnormality.  Poor R  wave progression.  * ECG Changes: No significant ischemic ST segment changes  beyond baseline abnormalities.  * Arrhythmia: None.  * The left ventricle was normal in size. There are large,  mild to moderate defects in the anterior, apical, basal to  mid anteroseptal, and basal anterolateral walls that are  partially reversible suggestive of infarct with moderate  paz-infarct ischemia.  * There are medium-sized, moderate defects in the  inferior, basal to mid inferoseptal, and basal  inferolateral walls that are partially reversible  suggestive of infarct with moderate paz-infarct ischemia.  * Post-stress gated wall motion analysis was performed  (LVEF = 34 %;LVEDV = 111 ml.) revealing hypokinesis of the  anterior, basal to mid anteroseptal, apical, inferior, and  basal to mid inferoseptal walls.  Assessment of overall  LVEF may be affected by the presence of atrial  fibrillation.  *** No previous Nuclear/Stress exam.  ------------------------------------------------------------------------  Confirmed on  4/10/2019 - 15:46:36 by Fernando De M.D.  ------------------------------------------------------------------------    < end of copied text >    Cardiac cath : < from: Cardiac Cath Lab - Adult (04.11.19 @ 14:05) >  VENTRICLES: EF by radionuclide angiography was 35 %.  CORONARY VESSELS: The coronary circulation is right dominant.  LM:   --  LM: Normal.  LAD:   --  LAD: Angiographyshowed minor luminal irregularities with no  flow limiting lesions.  CX:   --  Circumflex: Angiography showed minor luminal irregularities with  no flow limiting lesions.  RCA:   --  RCA: Angiography showed minor luminal irregularities with no  flow limiting lesions.  COMPLICATIONS: There were no complications.  Prepared and signed by  Jorje King M.D.    < end of copied text >    A/P) 83 y/o male PMH moderate AI, HTN and stroke admitted with chest pain and palpitations. Found to have newly diagnosed afib.  s/p cardiac cath     Cardiac cath with non obs coronaries .  Heparin for A/C at present for AFIB, Will start NOAC prior to D/C   BB / dig , hr stable this AM   Echo with mod MR, AS,  normal LV / RV fx   NST  with reversible ischemia    GI / DVT prophylaxis,  keep K>4, mag >2.0   CT angio neg for PE   Medical management at present   D/W Dr Donald

## 2019-04-15 NOTE — CDI QUERY NOTE - NSCDIOTHERTXTBX_GEN_ALL_CORE_HH
Please clarify the acuity of the asthma for this patient. Pt admitted with chest pain. ED assessment shows he  had cough and dyspnea. Medicine progress notes states persistent asthma . Pt was treated with Duoneb every 6 hours 3-8/19- 3/12/19. Please document if this was likely ;    Asthma without exacerbation   Asthma with exacerbation   Other ( please specify) Please clarify the acuity of the asthma for this patient. Pt admitted with chest pain. ED assessment shows he  had cough and dyspnea. Medicine progress notes states persistent asthma . Pt was treated with Duoneb every 6 hours 4/8/19- 4/12/19. Please document if this was likely ;    Asthma without exacerbation   Asthma with exacerbation   Other ( please specify)

## 2019-04-16 ENCOUNTER — INPATIENT (INPATIENT)
Facility: HOSPITAL | Age: 84
LOS: 2 days | Discharge: HOME | DRG: 872 | End: 2019-04-19
Attending: INTERNAL MEDICINE | Admitting: INTERNAL MEDICINE
Payer: MEDICARE

## 2019-04-16 VITALS
TEMPERATURE: 98 F | RESPIRATION RATE: 18 BRPM | SYSTOLIC BLOOD PRESSURE: 181 MMHG | DIASTOLIC BLOOD PRESSURE: 82 MMHG | OXYGEN SATURATION: 99 % | WEIGHT: 169.98 LBS | HEIGHT: 66 IN | HEART RATE: 94 BPM

## 2019-04-16 DIAGNOSIS — Z98.890 OTHER SPECIFIED POSTPROCEDURAL STATES: Chronic | ICD-10-CM

## 2019-04-16 DIAGNOSIS — K52.9 NONINFECTIVE GASTROENTERITIS AND COLITIS, UNSPECIFIED: ICD-10-CM

## 2019-04-16 LAB
ALBUMIN SERPL ELPH-MCNC: 3.1 G/DL — LOW (ref 3.5–5)
ALP SERPL-CCNC: 54 U/L — SIGNIFICANT CHANGE UP (ref 40–120)
ALT FLD-CCNC: 16 U/L DA — SIGNIFICANT CHANGE UP (ref 10–60)
ANION GAP SERPL CALC-SCNC: 8 MMOL/L — SIGNIFICANT CHANGE UP (ref 5–17)
APTT BLD: 35.3 SEC — SIGNIFICANT CHANGE UP (ref 27.5–36.3)
AST SERPL-CCNC: 9 U/L — LOW (ref 10–40)
BASOPHILS # BLD AUTO: 0.03 K/UL — SIGNIFICANT CHANGE UP (ref 0–0.2)
BASOPHILS NFR BLD AUTO: 0.3 % — SIGNIFICANT CHANGE UP (ref 0–2)
BILIRUB SERPL-MCNC: 0.5 MG/DL — SIGNIFICANT CHANGE UP (ref 0.2–1.2)
BUN SERPL-MCNC: 33 MG/DL — HIGH (ref 7–18)
CALCIUM SERPL-MCNC: 8.4 MG/DL — SIGNIFICANT CHANGE UP (ref 8.4–10.5)
CHLORIDE SERPL-SCNC: 104 MMOL/L — SIGNIFICANT CHANGE UP (ref 96–108)
CO2 SERPL-SCNC: 24 MMOL/L — SIGNIFICANT CHANGE UP (ref 22–31)
CREAT SERPL-MCNC: 1.18 MG/DL — SIGNIFICANT CHANGE UP (ref 0.5–1.3)
DIGOXIN SERPL-MCNC: 0.7 NG/ML — LOW (ref 0.8–2)
EOSINOPHIL # BLD AUTO: 0.02 K/UL — SIGNIFICANT CHANGE UP (ref 0–0.5)
EOSINOPHIL NFR BLD AUTO: 0.2 % — SIGNIFICANT CHANGE UP (ref 0–6)
GLUCOSE SERPL-MCNC: 160 MG/DL — HIGH (ref 70–99)
HCT VFR BLD CALC: 39.2 % — SIGNIFICANT CHANGE UP (ref 39–50)
HGB BLD-MCNC: 11.8 G/DL — LOW (ref 13–17)
IMM GRANULOCYTES NFR BLD AUTO: 0.5 % — SIGNIFICANT CHANGE UP (ref 0–1.5)
INR BLD: 1.35 RATIO — HIGH (ref 0.88–1.16)
LYMPHOCYTES # BLD AUTO: 1.5 K/UL — SIGNIFICANT CHANGE UP (ref 1–3.3)
LYMPHOCYTES # BLD AUTO: 12.6 % — LOW (ref 13–44)
MCHC RBC-ENTMCNC: 22.3 PG — LOW (ref 27–34)
MCHC RBC-ENTMCNC: 30.1 GM/DL — LOW (ref 32–36)
MCV RBC AUTO: 74.2 FL — LOW (ref 80–100)
MONOCYTES # BLD AUTO: 0.77 K/UL — SIGNIFICANT CHANGE UP (ref 0–0.9)
MONOCYTES NFR BLD AUTO: 6.5 % — SIGNIFICANT CHANGE UP (ref 2–14)
NEUTROPHILS # BLD AUTO: 9.48 K/UL — HIGH (ref 1.8–7.4)
NEUTROPHILS NFR BLD AUTO: 79.9 % — HIGH (ref 43–77)
NRBC # BLD: 0 /100 WBCS — SIGNIFICANT CHANGE UP (ref 0–0)
PLATELET # BLD AUTO: 314 K/UL — SIGNIFICANT CHANGE UP (ref 150–400)
POTASSIUM SERPL-MCNC: 3.7 MMOL/L — SIGNIFICANT CHANGE UP (ref 3.5–5.3)
POTASSIUM SERPL-SCNC: 3.7 MMOL/L — SIGNIFICANT CHANGE UP (ref 3.5–5.3)
PROT SERPL-MCNC: 7.2 G/DL — SIGNIFICANT CHANGE UP (ref 6–8.3)
PROTHROM AB SERPL-ACNC: 15.1 SEC — HIGH (ref 10–12.9)
RBC # BLD: 5.28 M/UL — SIGNIFICANT CHANGE UP (ref 4.2–5.8)
RBC # FLD: 17.9 % — HIGH (ref 10.3–14.5)
SODIUM SERPL-SCNC: 136 MMOL/L — SIGNIFICANT CHANGE UP (ref 135–145)
TROPONIN I SERPL-MCNC: 0.02 NG/ML — SIGNIFICANT CHANGE UP (ref 0–0.04)
WBC # BLD: 11.86 K/UL — HIGH (ref 3.8–10.5)
WBC # FLD AUTO: 11.86 K/UL — HIGH (ref 3.8–10.5)

## 2019-04-16 PROCEDURE — 99223 1ST HOSP IP/OBS HIGH 75: CPT

## 2019-04-16 PROCEDURE — 71045 X-RAY EXAM CHEST 1 VIEW: CPT | Mod: 26

## 2019-04-16 PROCEDURE — 72125 CT NECK SPINE W/O DYE: CPT | Mod: 26

## 2019-04-16 PROCEDURE — 99285 EMERGENCY DEPT VISIT HI MDM: CPT

## 2019-04-16 PROCEDURE — 74177 CT ABD & PELVIS W/CONTRAST: CPT | Mod: 26

## 2019-04-16 PROCEDURE — 70450 CT HEAD/BRAIN W/O DYE: CPT | Mod: 26

## 2019-04-16 RX ORDER — SODIUM CHLORIDE 9 MG/ML
500 INJECTION INTRAMUSCULAR; INTRAVENOUS; SUBCUTANEOUS ONCE
Qty: 0 | Refills: 0 | Status: COMPLETED | OUTPATIENT
Start: 2019-04-16 | End: 2019-04-16

## 2019-04-16 RX ORDER — FAMOTIDINE 10 MG/ML
20 INJECTION INTRAVENOUS ONCE
Qty: 0 | Refills: 0 | Status: COMPLETED | OUTPATIENT
Start: 2019-04-16 | End: 2019-04-16

## 2019-04-16 RX ORDER — CIPROFLOXACIN LACTATE 400MG/40ML
400 VIAL (ML) INTRAVENOUS ONCE
Qty: 0 | Refills: 0 | Status: COMPLETED | OUTPATIENT
Start: 2019-04-16 | End: 2019-04-16

## 2019-04-16 RX ORDER — METRONIDAZOLE 500 MG
500 TABLET ORAL ONCE
Qty: 0 | Refills: 0 | Status: COMPLETED | OUTPATIENT
Start: 2019-04-16 | End: 2019-04-16

## 2019-04-16 RX ORDER — ONDANSETRON 8 MG/1
4 TABLET, FILM COATED ORAL ONCE
Qty: 0 | Refills: 0 | Status: COMPLETED | OUTPATIENT
Start: 2019-04-16 | End: 2019-04-16

## 2019-04-16 RX ADMIN — FAMOTIDINE 20 MILLIGRAM(S): 10 INJECTION INTRAVENOUS at 17:11

## 2019-04-16 RX ADMIN — Medication 200 MILLIGRAM(S): at 18:52

## 2019-04-16 RX ADMIN — ONDANSETRON 4 MILLIGRAM(S): 8 TABLET, FILM COATED ORAL at 17:11

## 2019-04-16 RX ADMIN — SODIUM CHLORIDE 500 MILLILITER(S): 9 INJECTION INTRAMUSCULAR; INTRAVENOUS; SUBCUTANEOUS at 17:10

## 2019-04-16 RX ADMIN — Medication 100 MILLIGRAM(S): at 18:52

## 2019-04-16 NOTE — ED ADULT NURSE NOTE - OBJECTIVE STATEMENT
pt came in for c/o dizziness& fall yesterday w/ +LOC but denies head trauma, no injuries noted. Pt also c/o dizziness, lower abdominal pain w/ nausea & vomiting, as per pt he hasn't had a BM for 3 days now. Denies cp, sob, fever, chills, breathing unlabored.

## 2019-04-16 NOTE — H&P ADULT - PROBLEM SELECTOR PLAN 3
reported to have episode of fall 1 day before - it was unwitnessed and not clear if he lost consciousness or not but denies any confusion or trauma associated with it.  - likely 2/2 dehydration   - orthostatic negative   s/p 500 cc bolus in ED   will continue with gentle hydration

## 2019-04-16 NOTE — H&P ADULT - ASSESSMENT
84/M with PMH of asthma, HTN, HLD, BPH, CAD with recent + stress s/p cath with minor luminal irregularities and CVA (2015)  was brought to ED by son for nausea, vomiting and constipation for last 3 days. He reports having multiple episodes of NBNB vomiting over last 3 days and constipation which led decrease in PO intake and generalized weakness. Also mentions that he fell while going to bathroom last night - it was unwitnessed and not clear if he lost consciousness or not but denies any confusion or trauma associated with it. Denies diarrhea, abdominal pain, SOB, chest pain, BAUTISTA, palpitations, dizziness, headache, cough, wheezing, joint pain or swelling, fever, chills.     ED course:  Vitals: 157/89, 95, 98, 16 (99)  Labs pertinent for WBC of 11.86  T1 of 0.019 and Dig level of 0.7  S/p stat dose of cipro and metro was given   500 cc bolus, zofran and pepcid was given   chest xray/CT head and cervical spine - no acute pathology noted   CT A/P: pancolitis

## 2019-04-16 NOTE — H&P ADULT - PROBLEM SELECTOR PLAN 7
IMPROVE VTE Individual Risk Assessment          RISK                                                          Points  [  ] Previous VTE                                                3  [  ] Thrombophilia                                             2  [  ] Lower limb paralysis                                   2        (unable to hold up >15 seconds)    [  ] Current Cancer                                             2         (within 6 months)  [  ] Immobilization > 24 hrs                              1  [  ] ICU/CCU stay > 24 hours                             1  [  ] Age > 60                                                         1    IMPROVE VTE Score: 2  on xarelto

## 2019-04-16 NOTE — ED PROVIDER NOTE - OBJECTIVE STATEMENT
84 y.o w/ recent hospitalization for atrial flutter, discharge 4 days ago presenting with generalized weakness, lightheadedness x 3 days. per patient, syncopized yesterday, had loc. currently endorses abd pain. denies headache. endorses + n, v and constipation x 3 days. denies fever, chills or cough, denies cp, sob.

## 2019-04-16 NOTE — H&P ADULT - NSHPLABSRESULTS_GEN_ALL_CORE
Vital Signs Last 24 Hrs  T(C): 36.9 (17 Apr 2019 05:04), Max: 36.9 (17 Apr 2019 05:04)  T(F): 98.4 (17 Apr 2019 05:04), Max: 98.4 (17 Apr 2019 05:04)  HR: 85 (17 Apr 2019 05:04) (85 - 95)  BP: 157/67 (17 Apr 2019 05:04) (136/80 - 181/82)  BP(mean): 58 (17 Apr 2019 05:04) (58 - 58)  RR: 16 (17 Apr 2019 05:04) (16 - 18)  SpO2: 99% (17 Apr 2019 05:04) (99% - 99%)                            11.8   11.86 )-----------( 314      ( 16 Apr 2019 17:13 )             39.2       04-16    136  |  104  |  33<H>  ----------------------------<  160<H>  3.7   |  24  |  1.18    Ca    8.4      16 Apr 2019 17:13    TPro  7.2  /  Alb  3.1<L>  /  TBili  0.5  /  DBili  x   /  AST  9<L>  /  ALT  16  /  AlkPhos  54  04-16                  PT/INR - ( 16 Apr 2019 17:13 )   PT: 15.1 sec;   INR: 1.35 ratio         PTT - ( 16 Apr 2019 17:13 )  PTT:35.3 sec    Lactate Trend      CARDIAC MARKERS ( 16 Apr 2019 17:13 )  0.019 ng/mL / x     / x     / x     / x    < from: CT Abdomen and Pelvis w/ IV Cont (04.16.19 @ 18:23) >    Impression:    Acute uncomplicated pancolitis. No obstructive pathology.  Enlarged prostate.  Right inguinal hernia containing nonobstructive bowel.          < end of copied text >    < from: CT Head No Cont (04.16.19 @ 18:16) >    IMPRESSION:     CT Head: No acute intracranial hemorrhage or calvarial fracture.    CT cervical spine: No acute cervical spine fracture or evidence of   traumatic malalignment.    1.6 cm hypodense left inferior thyroid lobe nodule. Further evaluation   with nonemergent ultrasound may be obtained as clinically warranted.

## 2019-04-16 NOTE — ED ADULT NURSE NOTE - CHPI ED NUR SYMPTOMS NEG
no bleeding/no deformity/no tingling/no vomiting/no confusion/no fever/no weakness/no abrasion/no numbness

## 2019-04-16 NOTE — ED ADULT NURSE NOTE - NSIMPLEMENTINTERV_GEN_ALL_ED
Implemented All Fall Risk Interventions:  [place] to call system. Call bell, personal items and telephone within reach. Instruct patient to call for assistance. Room bathroom lighting operational. Non-slip footwear when patient is off stretcher. Physically safe environment: no spills, clutter or unnecessary equipment. Stretcher in lowest position, wheels locked, appropriate side rails in place. Provide visual cue, wrist band, yellow gown, etc. Monitor gait and stability. Monitor for mental status changes and reorient to person, place, and time. Review medications for side effects contributing to fall risk. Reinforce activity limits and safety measures with patient and family.

## 2019-04-16 NOTE — H&P ADULT - ATTENDING COMMENTS
Vital Signs Last 24 Hrs  T(C): 36.7 (16 Apr 2019 20:35), Max: 36.7 (16 Apr 2019 16:15)  T(F): 98 (16 Apr 2019 20:35), Max: 98 (16 Apr 2019 16:15)  HR: 95 (16 Apr 2019 20:35) (94 - 95)  BP: 157/89 (16 Apr 2019 20:35) (157/89 - 181/82)  BP(mean): --  RR: 16 (16 Apr 2019 20:35) (16 - 18)  SpO2: 99% (16 Apr 2019 20:35) (99% - 99%) Hx provided by son.  84 year [place]ian speaking man with PMH of CVA, HTN, HLD, asthma who was brought in by family on account of nausea, vomiting and constipation for the past 3 days. He had a few episodes of NBNB vomiting and has not moved his bowels since discharge. There is associated generalized weakness as well. There was a hx of unwitnessed LOC 1 day ago per patient.  Of note, the patient was recently discharged from hospital during which he had extensive cardiac work up.    Vital Signs Last 24 Hrs  T(C): 36.7 (16 Apr 2019 20:35), Max: 36.7 (16 Apr 2019 16:15)  T(F): 98 (16 Apr 2019 20:35), Max: 98 (16 Apr 2019 16:15)  HR: 95 (16 Apr 2019 20:35) (94 - 95)  BP: 157/89 (16 Apr 2019 20:35) (157/89 - 181/82)  RR: 16 (16 Apr 2019 20:35) (16 - 18)  SpO2: 99% (16 Apr 2019 20:35) (99% - 99%)    Elderly man, Sitting up in bed, NAD AAO X 3  CTA B/L; [id number]  Soft NT ND BS +  No pedal edema  No focal weakness    Labs                        11.8   11.86 )-----------( 314      ( 16 Apr 2019 17:13 )             39.2     04-16    136  |  104  |  33<H>  ----------------------------<  160<H>  3.7   |  24  |  1.18    Ca    8.4      16 Apr 2019 17:13    TPro  7.2  /  Alb  3.1<L>  /  TBili  0.5  /  DBili  x   /  AST  9<L>  /  ALT  16  /  AlkPhos  54  04-16    EKG - a- flutter     OLD ECHO - Normal LVSF ( cardiac cath EF -35%); G2DD    Impression  84 year old man with hx as above presenting with GI symptoms, generalized weakness and lightheadedness likely from dehydration with less likelihood of a cardiac event    Assessment  Pancolitis with no diarrhea  Dehydration  Hx of LOC- unwitnessed.    Plan  IVF  Check orthostatics vital signs  Hold antibiotics  Supportive care - antiemetics /   Miralax for constipation  PT evaluation  resume home med - Hx provided by son.  84 year [place]ian speaking man with PMH of CVA, HTN, HLD, asthma who was brought in by family on account of nausea, vomiting and constipation for the past 3 days. He had a few episodes of NBNB vomiting and has not moved his bowels since discharge. There is associated generalized weakness as well. There was a hx of unwitnessed LOC 1 day ago per patient.  Of note, the patient was recently discharged from hospital during which he had extensive cardiac work up.    Vital Signs Last 24 Hrs  T(C): 36.7 (16 Apr 2019 20:35), Max: 36.7 (16 Apr 2019 16:15)  T(F): 98 (16 Apr 2019 20:35), Max: 98 (16 Apr 2019 16:15)  HR: 95 (16 Apr 2019 20:35) (94 - 95)  BP: 157/89 (16 Apr 2019 20:35) (157/89 - 181/82)  RR: 16 (16 Apr 2019 20:35) (16 - 18)  SpO2: 99% (16 Apr 2019 20:35) (99% - 99%)    Elderly man, Sitting up in bed, NAD AAO X 3  CTA B/L; [id number]  Soft NT ND BS +  No pedal edema  No focal weakness    Labs                        11.8   11.86 )-----------( 314      ( 16 Apr 2019 17:13 )             39.2     04-16    136  |  104  |  33<H>  ----------------------------<  160<H>  3.7   |  24  |  1.18    Ca    8.4      16 Apr 2019 17:13    TPro  7.2  /  Alb  3.1<L>  /  TBili  0.5  /  DBili  x   /  AST  9<L>  /  ALT  16  /  AlkPhos  54  04-16    EKG - a- flutter     OLD ECHO - Normal LVSF ( cardiac cath EF -35%); G2DD    Impression  84 year old man with hx as above presenting with GI symptoms, generalized weakness and lightheadedness likely from dehydration with less likelihood of a cardiac event    Assessment  Physical debility  Pancolitis - mild with no ileus and constipation  Dehydration  Hx of LOC- unwitnessed.    Plan  IVF- Judicious in view of LVF status  Correct electrolyte deficits  Check orthostatics vital signs  Hold antibiotics  Supportive care - antiemetics   Miralax for constipation  PT evaluation  resume home med   Recent work up for CAD; initial work up here with EKG and cardiac enzymes are unremarkable; with stable neuro exam - no further syncope work up planned.

## 2019-04-16 NOTE — H&P ADULT - HISTORY OF PRESENT ILLNESS
84/M with PMH of asthma, HTN, HLD, BPH, CAD with recent + stress s/p cath with minor luminal irregularities and CVA (2015)  was brought to ED by son for nausea, vomiting and constipation for last 3 days. He reports having multiple episodes of NBNB vomiting over last 3 days and constipation which led decrease in PO intake and generalized weakness. Also mentions that he fell while going to bathroom last night - it was unwitnessed and not clear if he lost consciousness or not but denies any confusion or trauma associated with it. Denies diarrhea, abdominal pain, SOB, chest pain, BAUTISTA, palpitations, dizziness, headache, cough, wheezing, joint pain or swelling, fever, chills.

## 2019-04-16 NOTE — ED PROVIDER NOTE - PMH
Asthma    CVA (cerebral vascular accident)  2015  HTN (hypertension)    Hypercholesterolemia    Leukocytoclastic vasculitis

## 2019-04-16 NOTE — ED ADULT TRIAGE NOTE - CHIEF COMPLAINT QUOTE
PT REPORTS LIGHT HEADINESS SINCE LAST NIGHT AND FELL. + LOC. TODAY REPORTS LIGHT HEADINESS AND FELL. DENIES LOC

## 2019-04-16 NOTE — H&P ADULT - PROBLEM SELECTOR PLAN 1
patient came with nausea, vomiting and constipation for 3 days   CT A/P suggestive of pancolitis   - likely stercoral colitis   - s/p stat dose of metro and cipro in ED   - will hold on further antibiotics at this time   - will add senna, colace and miralax - for constipation  - diet as tolerated - will start with full liquid

## 2019-04-17 DIAGNOSIS — Z29.9 ENCOUNTER FOR PROPHYLACTIC MEASURES, UNSPECIFIED: ICD-10-CM

## 2019-04-17 DIAGNOSIS — K59.00 CONSTIPATION, UNSPECIFIED: ICD-10-CM

## 2019-04-17 DIAGNOSIS — I48.91 UNSPECIFIED ATRIAL FIBRILLATION: ICD-10-CM
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
## 2019-04-17 DIAGNOSIS — I10 ESSENTIAL (PRIMARY) HYPERTENSION: ICD-10-CM

## 2019-04-17 DIAGNOSIS — E78.00 PURE HYPERCHOLESTEROLEMIA, UNSPECIFIED: ICD-10-CM

## 2019-04-17 DIAGNOSIS — K51.00 ULCERATIVE (CHRONIC) PANCOLITIS WITHOUT COMPLICATIONS: ICD-10-CM

## 2019-04-17 PROBLEM — M31.0 HYPERSENSITIVITY ANGIITIS: Chronic | Status: ACTIVE | Noted: 2019-04-07

## 2019-04-17 LAB
24R-OH-CALCIDIOL SERPL-MCNC: 16.5 NG/ML — LOW (ref 30–80)
ANION GAP SERPL CALC-SCNC: 7 MMOL/L — SIGNIFICANT CHANGE UP (ref 5–17)
BASOPHILS # BLD AUTO: 0.06 K/UL — SIGNIFICANT CHANGE UP (ref 0–0.2)
BASOPHILS NFR BLD AUTO: 0.6 % — SIGNIFICANT CHANGE UP (ref 0–2)
BUN SERPL-MCNC: 27 MG/DL — HIGH (ref 7–18)
CALCIUM SERPL-MCNC: 8.5 MG/DL — SIGNIFICANT CHANGE UP (ref 8.4–10.5)
CHLORIDE SERPL-SCNC: 105 MMOL/L — SIGNIFICANT CHANGE UP (ref 96–108)
CHOLEST SERPL-MCNC: 108 MG/DL — SIGNIFICANT CHANGE UP (ref 10–199)
CK MB BLD-MCNC: 7 % — HIGH (ref 0–3.5)
CK MB CFR SERPL CALC: 1.9 NG/ML — SIGNIFICANT CHANGE UP (ref 0–3.6)
CK SERPL-CCNC: 27 U/L — LOW (ref 35–232)
CO2 SERPL-SCNC: 26 MMOL/L — SIGNIFICANT CHANGE UP (ref 22–31)
CREAT SERPL-MCNC: 1.12 MG/DL — SIGNIFICANT CHANGE UP (ref 0.5–1.3)
EOSINOPHIL # BLD AUTO: 0.23 K/UL — SIGNIFICANT CHANGE UP (ref 0–0.5)
EOSINOPHIL NFR BLD AUTO: 2.3 % — SIGNIFICANT CHANGE UP (ref 0–6)
FOLATE SERPL-MCNC: 11.1 NG/ML — SIGNIFICANT CHANGE UP
GLUCOSE SERPL-MCNC: 83 MG/DL — SIGNIFICANT CHANGE UP (ref 70–99)
HBA1C BLD-MCNC: 5.4 % — SIGNIFICANT CHANGE UP (ref 4–5.6)
HCT VFR BLD CALC: 35.7 % — LOW (ref 39–50)
HDLC SERPL-MCNC: 49 MG/DL — SIGNIFICANT CHANGE UP
HGB BLD-MCNC: 10.4 G/DL — LOW (ref 13–17)
IMM GRANULOCYTES NFR BLD AUTO: 0.6 % — SIGNIFICANT CHANGE UP (ref 0–1.5)
LIPID PNL WITH DIRECT LDL SERPL: 49 MG/DL — SIGNIFICANT CHANGE UP
LYMPHOCYTES # BLD AUTO: 3.05 K/UL — SIGNIFICANT CHANGE UP (ref 1–3.3)
LYMPHOCYTES # BLD AUTO: 30 % — SIGNIFICANT CHANGE UP (ref 13–44)
MAGNESIUM SERPL-MCNC: 2.3 MG/DL — SIGNIFICANT CHANGE UP (ref 1.6–2.6)
MCHC RBC-ENTMCNC: 22.1 PG — LOW (ref 27–34)
MCHC RBC-ENTMCNC: 29.1 GM/DL — LOW (ref 32–36)
MCV RBC AUTO: 75.8 FL — LOW (ref 80–100)
MONOCYTES # BLD AUTO: 0.91 K/UL — HIGH (ref 0–0.9)
MONOCYTES NFR BLD AUTO: 9 % — SIGNIFICANT CHANGE UP (ref 2–14)
NEUTROPHILS # BLD AUTO: 5.84 K/UL — SIGNIFICANT CHANGE UP (ref 1.8–7.4)
NEUTROPHILS NFR BLD AUTO: 57.5 % — SIGNIFICANT CHANGE UP (ref 43–77)
NRBC # BLD: 0 /100 WBCS — SIGNIFICANT CHANGE UP (ref 0–0)
PHOSPHATE SERPL-MCNC: 2.8 MG/DL — SIGNIFICANT CHANGE UP (ref 2.5–4.5)
PLATELET # BLD AUTO: 294 K/UL — SIGNIFICANT CHANGE UP (ref 150–400)
POTASSIUM SERPL-MCNC: 3.9 MMOL/L — SIGNIFICANT CHANGE UP (ref 3.5–5.3)
POTASSIUM SERPL-SCNC: 3.9 MMOL/L — SIGNIFICANT CHANGE UP (ref 3.5–5.3)
RBC # BLD: 4.71 M/UL — SIGNIFICANT CHANGE UP (ref 4.2–5.8)
RBC # FLD: 17.5 % — HIGH (ref 10.3–14.5)
SODIUM SERPL-SCNC: 138 MMOL/L — SIGNIFICANT CHANGE UP (ref 135–145)
TOTAL CHOLESTEROL/HDL RATIO MEASUREMENT: 2.2 RATIO — LOW (ref 3.4–9.6)
TRIGL SERPL-MCNC: 51 MG/DL — SIGNIFICANT CHANGE UP (ref 10–149)
TROPONIN I SERPL-MCNC: 0.04 NG/ML — SIGNIFICANT CHANGE UP (ref 0–0.04)
TSH SERPL-MCNC: 6.09 UU/ML — HIGH (ref 0.34–4.82)
VIT B12 SERPL-MCNC: 828 PG/ML — SIGNIFICANT CHANGE UP (ref 232–1245)
WBC # BLD: 10.15 K/UL — SIGNIFICANT CHANGE UP (ref 3.8–10.5)
WBC # FLD AUTO: 10.15 K/UL — SIGNIFICANT CHANGE UP (ref 3.8–10.5)

## 2019-04-17 RX ORDER — BUDESONIDE AND FORMOTEROL FUMARATE DIHYDRATE 160; 4.5 UG/1; UG/1
2 AEROSOL RESPIRATORY (INHALATION)
Qty: 0 | Refills: 0 | Status: DISCONTINUED | OUTPATIENT
Start: 2019-04-17 | End: 2019-04-19

## 2019-04-17 RX ORDER — MONTELUKAST 4 MG/1
10 TABLET, CHEWABLE ORAL DAILY
Qty: 0 | Refills: 0 | Status: DISCONTINUED | OUTPATIENT
Start: 2019-04-17 | End: 2019-04-19

## 2019-04-17 RX ORDER — DOCUSATE SODIUM 100 MG
100 CAPSULE ORAL THREE TIMES A DAY
Qty: 0 | Refills: 0 | Status: DISCONTINUED | OUTPATIENT
Start: 2019-04-17 | End: 2019-04-17

## 2019-04-17 RX ORDER — POLYETHYLENE GLYCOL 3350 17 G/17G
17 POWDER, FOR SOLUTION ORAL
Qty: 0 | Refills: 0 | Status: DISCONTINUED | OUTPATIENT
Start: 2019-04-17 | End: 2019-04-18

## 2019-04-17 RX ORDER — SENNA PLUS 8.6 MG/1
2 TABLET ORAL AT BEDTIME
Qty: 0 | Refills: 0 | Status: DISCONTINUED | OUTPATIENT
Start: 2019-04-17 | End: 2019-04-19

## 2019-04-17 RX ORDER — METRONIDAZOLE 500 MG
500 TABLET ORAL EVERY 8 HOURS
Qty: 0 | Refills: 0 | Status: DISCONTINUED | OUTPATIENT
Start: 2019-04-17 | End: 2019-04-19

## 2019-04-17 RX ORDER — INFLUENZA VIRUS VACCINE 15; 15; 15; 15 UG/.5ML; UG/.5ML; UG/.5ML; UG/.5ML
0.5 SUSPENSION INTRAMUSCULAR ONCE
Qty: 0 | Refills: 0 | Status: DISCONTINUED | OUTPATIENT
Start: 2019-04-17 | End: 2019-04-19

## 2019-04-17 RX ORDER — ASPIRIN/CALCIUM CARB/MAGNESIUM 324 MG
81 TABLET ORAL DAILY
Qty: 0 | Refills: 0 | Status: DISCONTINUED | OUTPATIENT
Start: 2019-04-17 | End: 2019-04-19

## 2019-04-17 RX ORDER — PANTOPRAZOLE SODIUM 20 MG/1
40 TABLET, DELAYED RELEASE ORAL
Qty: 0 | Refills: 0 | Status: DISCONTINUED | OUTPATIENT
Start: 2019-04-17 | End: 2019-04-19

## 2019-04-17 RX ORDER — CEFEPIME 1 G/1
1000 INJECTION, POWDER, FOR SOLUTION INTRAMUSCULAR; INTRAVENOUS EVERY 8 HOURS
Qty: 0 | Refills: 0 | Status: DISCONTINUED | OUTPATIENT
Start: 2019-04-17 | End: 2019-04-19

## 2019-04-17 RX ORDER — FINASTERIDE 5 MG/1
5 TABLET, FILM COATED ORAL DAILY
Qty: 0 | Refills: 0 | Status: DISCONTINUED | OUTPATIENT
Start: 2019-04-17 | End: 2019-04-19

## 2019-04-17 RX ORDER — CEFEPIME 1 G/1
1000 INJECTION, POWDER, FOR SOLUTION INTRAMUSCULAR; INTRAVENOUS ONCE
Qty: 0 | Refills: 0 | Status: COMPLETED | OUTPATIENT
Start: 2019-04-17 | End: 2019-04-17

## 2019-04-17 RX ORDER — ATORVASTATIN CALCIUM 80 MG/1
40 TABLET, FILM COATED ORAL AT BEDTIME
Qty: 0 | Refills: 0 | Status: DISCONTINUED | OUTPATIENT
Start: 2019-04-17 | End: 2019-04-19

## 2019-04-17 RX ORDER — SODIUM CHLORIDE 9 MG/ML
1000 INJECTION, SOLUTION INTRAVENOUS
Qty: 0 | Refills: 0 | Status: DISCONTINUED | OUTPATIENT
Start: 2019-04-17 | End: 2019-04-19

## 2019-04-17 RX ORDER — CIPROFLOXACIN LACTATE 400MG/40ML
400 VIAL (ML) INTRAVENOUS EVERY 12 HOURS
Qty: 0 | Refills: 0 | Status: DISCONTINUED | OUTPATIENT
Start: 2019-04-17 | End: 2019-04-17

## 2019-04-17 RX ORDER — TAMSULOSIN HYDROCHLORIDE 0.4 MG/1
0.8 CAPSULE ORAL AT BEDTIME
Qty: 0 | Refills: 0 | Status: DISCONTINUED | OUTPATIENT
Start: 2019-04-17 | End: 2019-04-19

## 2019-04-17 RX ORDER — DIGOXIN 250 MCG
0.12 TABLET ORAL DAILY
Qty: 0 | Refills: 0 | Status: DISCONTINUED | OUTPATIENT
Start: 2019-04-17 | End: 2019-04-19

## 2019-04-17 RX ORDER — RIVAROXABAN 15 MG-20MG
20 KIT ORAL EVERY 24 HOURS
Qty: 0 | Refills: 0 | Status: DISCONTINUED | OUTPATIENT
Start: 2019-04-17 | End: 2019-04-18

## 2019-04-17 RX ORDER — CEFEPIME 1 G/1
INJECTION, POWDER, FOR SOLUTION INTRAMUSCULAR; INTRAVENOUS
Qty: 0 | Refills: 0 | Status: DISCONTINUED | OUTPATIENT
Start: 2019-04-17 | End: 2019-04-19

## 2019-04-17 RX ORDER — ONDANSETRON 8 MG/1
4 TABLET, FILM COATED ORAL ONCE
Qty: 0 | Refills: 0 | Status: DISCONTINUED | OUTPATIENT
Start: 2019-04-17 | End: 2019-04-19

## 2019-04-17 RX ADMIN — SODIUM CHLORIDE 60 MILLILITER(S): 9 INJECTION, SOLUTION INTRAVENOUS at 10:18

## 2019-04-17 RX ADMIN — PANTOPRAZOLE SODIUM 40 MILLIGRAM(S): 20 TABLET, DELAYED RELEASE ORAL at 07:19

## 2019-04-17 RX ADMIN — BUDESONIDE AND FORMOTEROL FUMARATE DIHYDRATE 2 PUFF(S): 160; 4.5 AEROSOL RESPIRATORY (INHALATION) at 22:47

## 2019-04-17 RX ADMIN — POLYETHYLENE GLYCOL 3350 17 GRAM(S): 17 POWDER, FOR SOLUTION ORAL at 17:17

## 2019-04-17 RX ADMIN — SENNA PLUS 2 TABLET(S): 8.6 TABLET ORAL at 22:48

## 2019-04-17 RX ADMIN — RIVAROXABAN 20 MILLIGRAM(S): KIT at 17:17

## 2019-04-17 RX ADMIN — POLYETHYLENE GLYCOL 3350 17 GRAM(S): 17 POWDER, FOR SOLUTION ORAL at 07:19

## 2019-04-17 RX ADMIN — SODIUM CHLORIDE 60 MILLILITER(S): 9 INJECTION, SOLUTION INTRAVENOUS at 21:32

## 2019-04-17 RX ADMIN — TAMSULOSIN HYDROCHLORIDE 0.8 MILLIGRAM(S): 0.4 CAPSULE ORAL at 22:48

## 2019-04-17 RX ADMIN — Medication 81 MILLIGRAM(S): at 11:28

## 2019-04-17 RX ADMIN — CEFEPIME 100 MILLIGRAM(S): 1 INJECTION, POWDER, FOR SOLUTION INTRAMUSCULAR; INTRAVENOUS at 22:48

## 2019-04-17 RX ADMIN — BUDESONIDE AND FORMOTEROL FUMARATE DIHYDRATE 2 PUFF(S): 160; 4.5 AEROSOL RESPIRATORY (INHALATION) at 10:12

## 2019-04-17 RX ADMIN — ATORVASTATIN CALCIUM 40 MILLIGRAM(S): 80 TABLET, FILM COATED ORAL at 22:48

## 2019-04-17 RX ADMIN — Medication 100 MILLIGRAM(S): at 07:18

## 2019-04-17 RX ADMIN — Medication 100 MILLIGRAM(S): at 22:47

## 2019-04-17 RX ADMIN — FINASTERIDE 5 MILLIGRAM(S): 5 TABLET, FILM COATED ORAL at 11:28

## 2019-04-17 RX ADMIN — Medication 100 MILLIGRAM(S): at 13:15

## 2019-04-17 RX ADMIN — Medication 0.12 MILLIGRAM(S): at 07:18

## 2019-04-17 RX ADMIN — CEFEPIME 100 MILLIGRAM(S): 1 INJECTION, POWDER, FOR SOLUTION INTRAMUSCULAR; INTRAVENOUS at 16:02

## 2019-04-17 RX ADMIN — MONTELUKAST 10 MILLIGRAM(S): 4 TABLET, CHEWABLE ORAL at 11:28

## 2019-04-17 NOTE — CONSULT NOTE ADULT - ASSESSMENT
# Acute Pancolitis    would recommend:    1. Obtain stool for C.difficile Toxin  2. Obtain stool for GI pathogen PCR  3. Change Cipro to Cefepime and c/w Flagyl  4. Monitor electrolytes  and supplement  as needed    will follow the patient with you and make further recommendation based on the clinical course and Lab results  Thank you for the opportunity to participate in Mr. Jeff lawrence A 83 yo M with multiple co-morbidities including   BPH, CAD with recent + stress s/p cath ,  has brought to the ER by  his son for  evaluation  of nausea, multiple episodes of vomiting and constipation , but no diarrhea,  which led decrease in PO intake and generalized weakness. On admission, he found to have Acute uncomplicated pancolitis on CT abd/pelvis. He has no fever  or chills. The CXR and Urine analysis is negative. He has started on cipro and flagyl and the ID consult requested to assist with further  evaluation and Antibiotic management.    # Acute Pancolitis    would recommend:    1. Obtain stool for C.difficile Toxin if has diarrhea  2. Obtain stool for GI pathogen PCR  3. Change Cipro to Cefepime and c/w Flagyl  4. Monitor electrolytes  and supplement  as needed    d/w Covering ER NP, patient and His son at the bed side    will follow the patient with you and make further recommendation based on the clinical course and Lab results  Thank you for the opportunity to participate in Mr. Jeff lawrence

## 2019-04-17 NOTE — CONSULT NOTE ADULT - SUBJECTIVE AND OBJECTIVE BOX
Time of visit:    CHIEF COMPLAINT: Patient is a 84y old  Male who presents with a chief complaint of SOB (17 Apr 2019 15:43)      HPI:  84/M with PMH of asthma, HTN, HLD, BPH, CAD with recent + stress s/p cath with minor luminal irregularities and CVA (2015)  was brought to ED by son for nausea, vomiting and constipation for last 3 days. He reports having multiple episodes of NBNB vomiting over last 3 days and constipation which led decrease in PO intake and generalized weakness. Also mentions that he fell while going to bathroom last night - it was unwitnessed and not clear if he lost consciousness or not but denies any confusion or trauma associated with it. Denies diarrhea, abdominal pain, SOB, chest pain, BAUTISTA, palpitations, dizziness, headache, cough, wheezing, joint pain or swelling, fever, chills. (16 Apr 2019 22:49)   Patient seen and examined.     PAST MEDICAL & SURGICAL HISTORY:  Leukocytoclastic vasculitis  Hypercholesterolemia  HTN (hypertension)  CVA (cerebral vascular accident): 2015  Asthma  H/O sinus surgery      Allergies    No Known Allergies    Intolerances        MEDICATIONS  (STANDING):  aspirin  chewable 81 milliGRAM(s) Oral daily  atorvastatin 40 milliGRAM(s) Oral at bedtime  buDESOnide 160 MICROgram(s)/formoterol 4.5 MICROgram(s) Inhaler 2 Puff(s) Inhalation two times a day  cefepime   IVPB      cefepime   IVPB 1000 milliGRAM(s) IV Intermittent every 8 hours  digoxin     Tablet 0.125 milliGRAM(s) Oral daily  finasteride 5 milliGRAM(s) Oral daily  influenza   Vaccine 0.5 milliLiter(s) IntraMuscular once  lactated ringers. 1000 milliLiter(s) (60 mL/Hr) IV Continuous <Continuous>  metroNIDAZOLE  IVPB 500 milliGRAM(s) IV Intermittent every 8 hours  montelukast 10 milliGRAM(s) Oral daily  ondansetron Injectable 4 milliGRAM(s) IV Push once  pantoprazole    Tablet 40 milliGRAM(s) Oral before breakfast  polyethylene glycol 3350 17 Gram(s) Oral two times a day  rivaroxaban 20 milliGRAM(s) Oral every 24 hours  senna 2 Tablet(s) Oral at bedtime  tamsulosin 0.8 milliGRAM(s) Oral at bedtime      MEDICATIONS  (PRN):       Medications up to date at time of exam.    FAMILY HISTORY:  No pertinent family history in first degree relatives      SOCIAL HISTORY  Smoking History: [   ] smoking/smoke exposure, [   ] former smoker  Living Condition: [   ] apartment, [   ] private house  Work History:   Travel History: denies recent travel  Illicit Substance Use: denies  Alcohol Use: denies    REVIEW OF SYSTEMS:    CONSTITUTIONAL:  denies fevers, chills, sweats, weight loss    HEENT:  denies diplopia or blurred vision, sore throat or runny nose.    CARDIOVASCULAR:  denies pressure, squeezing, tightness, or heaviness about the chest; no palpitations.    RESPIRATORY:  denies SOB, cough, BAUTISTA, wheezing.    GASTROINTESTINAL:  denies abdominal pain, nausea, vomiting or diarrhea.    GENITOURINARY: denies dysuria, frequency or urgency.    NEUROLOGIC:  denies numbness, tingling, seizures or weakness.    PSYCHIATRIC:  denies disorder of thought or mood.    MSK: denies swelling, redness      PHYSICAL EXAMINATION:    GENERAL: The patient is a well-developed, well-nourished, in no apparent distress.     Vital Signs Last 24 Hrs  T(C): 37.2 (17 Apr 2019 20:13), Max: 37.2 (17 Apr 2019 20:13)  T(F): 99 (17 Apr 2019 20:13), Max: 99 (17 Apr 2019 20:13)  HR: 85 (17 Apr 2019 20:13) (80 - 105)  BP: 153/63 (17 Apr 2019 20:13) (136/80 - 171/90)  BP(mean): 58 (17 Apr 2019 05:04) (58 - 58)  RR: 18 (17 Apr 2019 20:13) (16 - 18)  SpO2: 97% (17 Apr 2019 20:13) (97% - 99%)    HEENT: head is normocephalic and atraumatic. mucous membranes are moist.     NECK: supple, no palpable adenopathy.    LUNGS: clear to auscultation, no wheezing, rales, or rhonchi.    HEART: irregularly irregular without murmur.    ABDOMEN: soft, nontender, and nondistended.     EXTREMITIES: without any cyanosis, clubbing, rash, lesions or edema.    NEUROLOGIC: awake, alert, oriented.     SKIN: warm, dry, good turgor.      LABS:                        10.4   10.15 )-----------( 294      ( 17 Apr 2019 07:10 )             35.7     04-17    138  |  105  |  27<H>  ----------------------------<  83  3.9   |  26  |  1.12    Ca    8.5      17 Apr 2019 07:10  Phos  2.8     04-17  Mg     2.3     04-17    TPro  7.2  /  Alb  3.1<L>  /  TBili  0.5  /  DBili  x   /  AST  9<L>  /  ALT  16  /  AlkPhos  54  04-16    PT/INR - ( 16 Apr 2019 17:13 )   PT: 15.1 sec;   INR: 1.35 ratio         PTT - ( 16 Apr 2019 17:13 )  PTT:35.3 sec      CARDIAC MARKERS ( 17 Apr 2019 07:10 )  0.037 ng/mL / x     / 27 U/L / x     / 1.9 ng/mL  CARDIAC MARKERS ( 16 Apr 2019 17:13 )  0.019 ng/mL / x     / x     / x     / x              Troponin 0.037 04-17 @ 07:10  CK 27 04-17 @ 07:10  CKMB -- 04-17 @ 07:10  .    MICROBIOLOGY: (if applicable)    RADIOLOGY & ADDITIONAL STUDIES:  EKG:   CXR:  ECHO:  TELE:    IMPRESSION: 84y Male PAST MEDICAL & SURGICAL HISTORY:  Leukocytoclastic vasculitis  Hypercholesterolemia  HTN (hypertension)  CVA (cerebral vascular accident): 2015  Asthma  H/O sinus surgery    84/M with PMH of asthma, HTN, HLD, BPH, CAD with recent + stress s/p cath with minor luminal irregularities and CVA (2015)  was brought to ED by son for nausea, vomiting and constipation for last 3 days. He reports having multiple episodes of NBNB vomiting over last 3 days and constipation which led decrease in PO intake and generalized weakness. Also mentions that he fell while going to bathroom last night - it was unwitnessed and not clear if he lost consciousness or not but denies any confusion or trauma associated with it. Denies diarrhea, abdominal pain, SOB, chest pain, BAUTISTA, palpitations, dizziness, headache, cough, wheezing, joint pain or swelling, fever, chills.    RECOMMENDATIONS:    Patient is my office patient, sent to ED for evaluation of N/V and constipation s/p hospitalization at [facility].   Patient also reported unwitnessed fall.  Admit to tele, syncope work up, orthostatics, carotid duplex.  Afib stable rate.

## 2019-04-17 NOTE — CONSULT NOTE ADULT - SUBJECTIVE AND OBJECTIVE BOX
CHIEF COMPLAINT: Patient is a 84y old  Male who presents with a chief complaint of n/v, diarrhea (17 Apr 2019 09:11)      HPI:  84/M with PMH of asthma, HTN, HLD, BPH, CAD with recent + stress s/p cath with minor luminal irregularities and CVA (2015)  was brought to ED by son for nausea, vomiting and constipation for last 3 days. He reports having multiple episodes of NBNB vomiting over last 3 days and constipation which led decrease in PO intake and generalized weakness. Also mentions that he fell while going to bathroom last night - it was unwitnessed and not clear if he lost consciousness or not but denies any confusion or trauma associated with it. Denies diarrhea, abdominal pain, SOB, chest pain, BAUTISTA, palpitations, dizziness, headache, cough, wheezing, joint pain or swelling, fever, chills. (16 Apr 2019 22:49)   Patient seen and examined.     PAST MEDICAL & SURGICAL HISTORY:  Leukocytoclastic vasculitis  Hypercholesterolemia  HTN (hypertension)  CVA (cerebral vascular accident): 2015  Asthma  H/O sinus surgery      Allergies    No Known Allergies    Intolerances        MEDICATIONS  (STANDING):  aspirin  chewable 81 milliGRAM(s) Oral daily  atorvastatin 40 milliGRAM(s) Oral at bedtime  buDESOnide 160 MICROgram(s)/formoterol 4.5 MICROgram(s) Inhaler 2 Puff(s) Inhalation two times a day  cefepime   IVPB      cefepime   IVPB 1000 milliGRAM(s) IV Intermittent once  cefepime   IVPB 1000 milliGRAM(s) IV Intermittent every 8 hours  digoxin     Tablet 0.125 milliGRAM(s) Oral daily  finasteride 5 milliGRAM(s) Oral daily  lactated ringers. 1000 milliLiter(s) (60 mL/Hr) IV Continuous <Continuous>  metroNIDAZOLE  IVPB 500 milliGRAM(s) IV Intermittent every 8 hours  montelukast 10 milliGRAM(s) Oral daily  ondansetron Injectable 4 milliGRAM(s) IV Push once  pantoprazole    Tablet 40 milliGRAM(s) Oral before breakfast  polyethylene glycol 3350 17 Gram(s) Oral two times a day  rivaroxaban 20 milliGRAM(s) Oral every 24 hours  senna 2 Tablet(s) Oral at bedtime  tamsulosin 0.8 milliGRAM(s) Oral at bedtime      MEDICATIONS  (PRN):   Medications up to date at time of exam.    FAMILY HISTORY:  No pertinent family history in first degree relatives      SOCIAL HISTORY  Smoking History: [   ] smoking/smoke exposure, [   ] former smoker, [  ] denies smoking  Living Condition: [   ] apartment, [   ] private house  Work History:   Travel History: denies recent travel  Illicit Substance Use: denies  Alcohol Use: denies    REVIEW OF SYSTEMS:    CONSTITUTIONAL:  denies fevers, chills, sweats, weight loss    HEENT:  denies diplopia or blurred vision, sore throat or runny nose.    CARDIOVASCULAR:  denies pressure, squeezing, tightness, or heaviness about the chest; no palpitations.    RESPIRATORY:  denies SOB, cough, BAUTISTA, wheezing.    GASTROINTESTINAL:  denies abdominal pain, nausea, vomiting or diarrhea.    GENITOURINARY: denies dysuria, frequency or urgency.    NEUROLOGIC:  denies numbness, tingling, seizures or weakness.    PSYCHIATRIC:  denies disorder of thought or mood.    MSK: denies swelling, redness      PHYSICAL EXAMINATION:    GENERAL: The patient is a well-developed, well-nourished, in no apparent distress.     Vital Signs Last 24 Hrs  T(C): 36.6 (17 Apr 2019 11:15), Max: 36.9 (17 Apr 2019 05:04)  T(F): 97.8 (17 Apr 2019 11:15), Max: 98.4 (17 Apr 2019 05:04)  HR: 85 (17 Apr 2019 11:15) (85 - 105)  BP: 152/68 (17 Apr 2019 11:15) (136/80 - 181/82)  BP(mean): 58 (17 Apr 2019 05:04) (58 - 58)  RR: 18 (17 Apr 2019 11:15) (16 - 18)  SpO2: 98% (17 Apr 2019 11:15) (98% - 99%)   (if applicable)    Chest Tube (if applicable)    HEENT: Head is normocephalic and atraumatic. .    NECK: Supple, no palpable adenopathy.    LUNGS: Clear to auscultation, no wheezing, rales, or rhonchi.    HEART: Regular rate and rhythm without murmur.    ABDOMEN: Soft, nontender, and nondistended.  No hepatosplenomegaly is noted.    EXTREMITIES: Without any cyanosis, clubbing, rash, lesions or edema.    NEUROLOGIC: Awake, alert.    SKIN: Warm, dry, good turgor.      LABS:                        10.4   10.15 )-----------( 294      ( 17 Apr 2019 07:10 )             35.7     04-17    138  |  105  |  27<H>  ----------------------------<  83  3.9   |  26  |  1.12    Ca    8.5      17 Apr 2019 07:10  Phos  2.8     04-17  Mg     2.3     04-17    TPro  7.2  /  Alb  3.1<L>  /  TBili  0.5  /  DBili  x   /  AST  9<L>  /  ALT  16  /  AlkPhos  54  04-16    PT/INR - ( 16 Apr 2019 17:13 )   PT: 15.1 sec;   INR: 1.35 ratio         PTT - ( 16 Apr 2019 17:13 )  PTT:35.3 sec      CARDIAC MARKERS ( 17 Apr 2019 07:10 )  0.037 ng/mL / x     / 27 U/L / x     / 1.9 ng/mL  CARDIAC MARKERS ( 16 Apr 2019 17:13 )  0.019 ng/mL / x     / x     / x     / x                    MICROBIOLOGY: (if applicable)    RADIOLOGY & ADDITIONAL STUDIES:  EKG:   CXR:  ECHO:    IMPRESSION: 84y Male PAST MEDICAL & SURGICAL HISTORY:  Leukocytoclastic vasculitis  Hypercholesterolemia  HTN (hypertension)  CVA (cerebral vascular accident): 2015  Asthma  H/O sinus surgery         84/M with PMH of asthma, HTN, HLD, BPH, CAD with recent + stress s/p cath with minor luminal irregularities and CVA (2015)  was brought to ED by son for nausea, vomiting and constipation for last 3 days. He reports having multiple episodes of NBNB vomiting over last 3 days and constipation which led decrease in PO intake and generalized weakness. Also mentions that he fell while going to bathroom last night - it was unwitnessed and not clear if he lost consciousness or not but denies any confusion or trauma associated with it. Denies diarrhea, abdominal pain, SOB, chest pain, BAUTISTA, palpitations, dizziness, headache, cough, wheezing, joint pain or swelling, fever, chills.     +syncopal episode  +pan colitis  asthma stable    Suggestion:   - con't with antibiotics as per ID recommendations.    - bronchodilators, o2 supp prn    - syncope work up    - aspiration precautions   - DVT and GI prophylaxis. CHIEF COMPLAINT: Patient is a 84y old  Male who presents with a chief complaint of n/v, diarrhea (17 Apr 2019 09:11)      HPI:  84/M with PMH of asthma, HTN, HLD, BPH, CAD with recent + stress s/p cath with minor luminal irregularities and CVA (2015)  was brought to ED by son for nausea, vomiting and constipation for last 3 days. He reports having multiple episodes of NBNB vomiting over last 3 days and constipation which led decrease in PO intake and generalized weakness. Also mentions that he fell while going to bathroom last night - it was unwitnessed and not clear if he lost consciousness or not but denies any confusion or trauma associated with it. Denies diarrhea, abdominal pain, SOB, chest pain, BAUTISTA, palpitations, dizziness, headache, cough, wheezing, joint pain or swelling, fever, chills. (16 Apr 2019 22:49)   Patient seen and examined.     PAST MEDICAL & SURGICAL HISTORY:  Leukocytoclastic vasculitis  Hypercholesterolemia  HTN (hypertension)  CVA (cerebral vascular accident): 2015  Asthma  H/O sinus surgery      Allergies    No Known Allergies    Intolerances        MEDICATIONS  (STANDING):  aspirin  chewable 81 milliGRAM(s) Oral daily  atorvastatin 40 milliGRAM(s) Oral at bedtime  buDESOnide 160 MICROgram(s)/formoterol 4.5 MICROgram(s) Inhaler 2 Puff(s) Inhalation two times a day  cefepime   IVPB      cefepime   IVPB 1000 milliGRAM(s) IV Intermittent once  cefepime   IVPB 1000 milliGRAM(s) IV Intermittent every 8 hours  digoxin     Tablet 0.125 milliGRAM(s) Oral daily  finasteride 5 milliGRAM(s) Oral daily  lactated ringers. 1000 milliLiter(s) (60 mL/Hr) IV Continuous <Continuous>  metroNIDAZOLE  IVPB 500 milliGRAM(s) IV Intermittent every 8 hours  montelukast 10 milliGRAM(s) Oral daily  ondansetron Injectable 4 milliGRAM(s) IV Push once  pantoprazole    Tablet 40 milliGRAM(s) Oral before breakfast  polyethylene glycol 3350 17 Gram(s) Oral two times a day  rivaroxaban 20 milliGRAM(s) Oral every 24 hours  senna 2 Tablet(s) Oral at bedtime  tamsulosin 0.8 milliGRAM(s) Oral at bedtime      MEDICATIONS  (PRN):   Medications up to date at time of exam.    FAMILY HISTORY:  No pertinent family history in first degree relatives      SOCIAL HISTORY  Smoking History: [   ] smoking/smoke exposure, [   ] former smoker, [  ] denies smoking  Living Condition: [   ] apartment, [   ] private house  Work History:   Travel History: denies recent travel  Illicit Substance Use: denies  Alcohol Use: denies    REVIEW OF SYSTEMS:    CONSTITUTIONAL:  denies fevers, chills, sweats, weight loss    HEENT:  denies diplopia or blurred vision, sore throat or runny nose.    CARDIOVASCULAR:  denies pressure, squeezing, tightness, or heaviness about the chest; no palpitations.    RESPIRATORY:  denies SOB, cough, BAUTISTA, wheezing.    GASTROINTESTINAL:  denies abdominal pain, nausea, vomiting or diarrhea.    GENITOURINARY: denies dysuria, frequency or urgency.    NEUROLOGIC:  denies numbness, tingling, seizures or weakness.    PSYCHIATRIC:  denies disorder of thought or mood.    MSK: denies swelling, redness      PHYSICAL EXAMINATION:    GENERAL: The patient is a well-developed, well-nourished, in no apparent distress.     Vital Signs Last 24 Hrs  T(C): 36.6 (17 Apr 2019 11:15), Max: 36.9 (17 Apr 2019 05:04)  T(F): 97.8 (17 Apr 2019 11:15), Max: 98.4 (17 Apr 2019 05:04)  HR: 85 (17 Apr 2019 11:15) (85 - 105)  BP: 152/68 (17 Apr 2019 11:15) (136/80 - 181/82)  BP(mean): 58 (17 Apr 2019 05:04) (58 - 58)  RR: 18 (17 Apr 2019 11:15) (16 - 18)  SpO2: 98% (17 Apr 2019 11:15) (98% - 99%)   (if applicable)    Chest Tube (if applicable)    HEENT: Head is normocephalic and atraumatic. .    NECK: Supple, no palpable adenopathy.    LUNGS: Clear to auscultation, no wheezing, rales, or rhonchi.    HEART: Regular rate and rhythm without murmur.    ABDOMEN: Soft, nontender, and nondistended.  No hepatosplenomegaly is noted.    EXTREMITIES: Without any cyanosis, clubbing, rash, lesions or edema.    NEUROLOGIC: Awake, alert.    SKIN: Warm, dry, good turgor.      LABS:                        10.4   10.15 )-----------( 294      ( 17 Apr 2019 07:10 )             35.7     04-17    138  |  105  |  27<H>  ----------------------------<  83  3.9   |  26  |  1.12    Ca    8.5      17 Apr 2019 07:10  Phos  2.8     04-17  Mg     2.3     04-17    TPro  7.2  /  Alb  3.1<L>  /  TBili  0.5  /  DBili  x   /  AST  9<L>  /  ALT  16  /  AlkPhos  54  04-16    PT/INR - ( 16 Apr 2019 17:13 )   PT: 15.1 sec;   INR: 1.35 ratio         PTT - ( 16 Apr 2019 17:13 )  PTT:35.3 sec      CARDIAC MARKERS ( 17 Apr 2019 07:10 )  0.037 ng/mL / x     / 27 U/L / x     / 1.9 ng/mL  CARDIAC MARKERS ( 16 Apr 2019 17:13 )  0.019 ng/mL / x     / x     / x     / x                    MICROBIOLOGY: (if applicable)    RADIOLOGY & ADDITIONAL STUDIES:  EKG:   CXR:  ECHO:    IMPRESSION: 84y Male PAST MEDICAL & SURGICAL HISTORY:  Leukocytoclastic vasculitis  Hypercholesterolemia  HTN (hypertension)  CVA (cerebral vascular accident): 2015  Asthma  H/O sinus surgery         84/M with PMH of asthma, HTN, HLD, BPH, CAD with recent + stress s/p cath with minor luminal irregularities and CVA (2015)  was brought to ED by son for nausea, vomiting and constipation for last 3 days. He reports having multiple episodes of NBNB vomiting over last 3 days and constipation which led decrease in PO intake and generalized weakness. Also mentions that he fell while going to bathroom last night - it was unwitnessed and not clear if he lost consciousness or not but denies any confusion or trauma associated with it. Denies diarrhea, abdominal pain, SOB, chest pain, BAUTISTA, palpitations, dizziness, headache, cough, wheezing, joint pain or swelling, fever, chills.     +syncopal episode  +pan colitis  asthma stable    Suggestion:   - con't with antibiotics as per ID recommendations.    - bronchodilators, o2 supp prn    - syncope work up    - aspiration precautions   - DVT and GI prophylaxis.   Agree with above assessment and plan as transcribed.

## 2019-04-17 NOTE — CONSULT NOTE ADULT - SUBJECTIVE AND OBJECTIVE BOX
Patient seen and examined at bedside  n/v, abd pain  Case discussed with medical team    HPI:  84/M with PMH of asthma, HTN, HLD, BPH, CAD with recent + stress s/p cath with minor luminal irregularities and CVA (2015)  was brought to ED by son for nausea, vomiting and constipation for last 3 days. He reports having multiple episodes of NBNB vomiting over last 3 days and constipation which led decrease in PO intake and generalized weakness. Also mentions that he fell while going to bathroom last night - it was unwitnessed and not clear if he lost consciousness or not but denies any confusion or trauma associated with it. Denies diarrhea, abdominal pain, SOB, chest pain, BAUTISTA, palpitations, dizziness, headache, cough, wheezing, joint pain or swelling, fever, chills. (16 Apr 2019 22:49)      PAST MEDICAL & SURGICAL HISTORY:  Leukocytoclastic vasculitis  Hypercholesterolemia  HTN (hypertension)  CVA (cerebral vascular accident): 2015  Asthma  H/O sinus surgery      No Known Allergies       MEDICATIONS  (STANDING):  aspirin  chewable 81 milliGRAM(s) Oral daily  atorvastatin 40 milliGRAM(s) Oral at bedtime  buDESOnide 160 MICROgram(s)/formoterol 4.5 MICROgram(s) Inhaler 2 Puff(s) Inhalation two times a day  ciprofloxacin   IVPB 400 milliGRAM(s) IV Intermittent every 12 hours  digoxin     Tablet 0.125 milliGRAM(s) Oral daily  finasteride 5 milliGRAM(s) Oral daily  lactated ringers. 1000 milliLiter(s) (60 mL/Hr) IV Continuous <Continuous>  metroNIDAZOLE  IVPB 500 milliGRAM(s) IV Intermittent every 8 hours  montelukast 10 milliGRAM(s) Oral daily  ondansetron Injectable 4 milliGRAM(s) IV Push once  pantoprazole    Tablet 40 milliGRAM(s) Oral before breakfast  polyethylene glycol 3350 17 Gram(s) Oral two times a day  rivaroxaban 20 milliGRAM(s) Oral every 24 hours  senna 2 Tablet(s) Oral at bedtime  tamsulosin 0.8 milliGRAM(s) Oral at bedtime    MEDICATIONS  (PRN):      REVIEW OF SYSTEMS:  CONSTITUTIONAL: (+) malaise.   EYES: No acute change in vision   ENT:  No tinnitus  NECK: No stiffness  RESPIRATORY: No hemoptysis  CARDIOVASCULAR: No chest pain, palpitations, syncope  GASTROINTESTINAL: n/v, diarrhea. No hematemesis, melena, or hematochezia.  GENITOURINARY: No hematuria  NEUROLOGICAL: No headaches  LYMPH Nodes: No enlarged glands  ENDOCRINE: No heat or cold intolerance	    T(C): 36.4 (04-17-19 @ 07:17), Max: 36.9 (04-17-19 @ 05:04)  HR: 105 (04-17-19 @ 07:17) (85 - 105)  BP: 171/90 (04-17-19 @ 07:17) (136/80 - 181/82)  RR: 16 (04-17-19 @ 05:04) (16 - 18)  SpO2: 99% (04-17-19 @ 05:04) (99% - 99%)    PHYSICAL EXAMINATION:   Constitutional: ill appearing  HEENT: NC, AT  Neck:  Supple  Respiratory:  Adequate airflow b/l. Not using accessory muscles of respiration.  Cardiovascular:  S1 & S2 intact, no R/G, 2+ radial pulses b/l  Gastrointestinal: tender. Soft, ND, normoactive b.s., no organomegaly/RT/rigidity  Extremities: WWP  Neurological:  Alert and awake.  No acute focal motor deficits. Crude sensation intact.     Labs and imaging reviewed    LABS:                        10.4   10.15 )-----------( 294      ( 17 Apr 2019 07:10 )             35.7     04-17    138  |  105  |  27<H>  ----------------------------<  83  3.9   |  26  |  1.12    Ca    8.5      17 Apr 2019 07:10  Phos  2.8     04-17  Mg     2.3     04-17    TPro  7.2  /  Alb  3.1<L>  /  TBili  0.5  /  DBili  x   /  AST  9<L>  /  ALT  16  /  AlkPhos  54  04-16    CARDIAC MARKERS ( 17 Apr 2019 07:10 )  0.037 ng/mL / x     / 27 U/L / x     / 1.9 ng/mL  CARDIAC MARKERS ( 16 Apr 2019 17:13 )  0.019 ng/mL / x     / x     / x     / x          PT/INR - ( 16 Apr 2019 17:13 )   PT: 15.1 sec;   INR: 1.35 ratio         PTT - ( 16 Apr 2019 17:13 )  PTT:35.3 sec    CAPILLARY BLOOD GLUCOSE            LIVER FUNCTIONS - ( 16 Apr 2019 17:13 )  Alb: 3.1 g/dL / Pro: 7.2 g/dL / ALK PHOS: 54 U/L / ALT: 16 U/L DA / AST: 9 U/L / GGT: x               RADIOLOGY & ADDITIONAL STUDIES:

## 2019-04-17 NOTE — CONSULT NOTE ADULT - ASSESSMENT
84/M with PMH of asthma, HTN, HLD, BPH, CAD with recent + stress s/p cath with minor luminal irregularities and CVA (2015)  was brought to ED by son for nausea, vomiting    -> Sepsis 2/2 Pancolitis:      - tachycardic, leukocytosis      - cipro and flagyl      - f/u stool studies      - supportive care  -> Syncope:          - unspecified etiolgoy       - consultants management appreciated       - treat underlying infectious etiology and fluid hydration   -> Essential HTN:      - c/w antihypertensive rx   -> HLD:      - c/w statin

## 2019-04-17 NOTE — ED ADULT NURSE REASSESSMENT NOTE - NS ED NURSE REASSESS COMMENT FT1
Pt received axox3 resting in bed on cardiac monitor denies having any pain not distress noted observation continues.

## 2019-04-17 NOTE — ED ADULT NURSE REASSESSMENT NOTE - NS ED NURSE REASSESS COMMENT FT1
Pt admitted to telemetry on cardiac monitor accompanied by nurse, transporter and family members in no distress. Report given to JULIO C Helton.

## 2019-04-17 NOTE — CONSULT NOTE ADULT - SUBJECTIVE AND OBJECTIVE BOX
84/M with PMH of asthma, HTN, HLD, BPH, CAD with recent + stress s/p cath with minor luminal irregularities and CVA (2015)  was brought to ED by son for nausea, vomiting and constipation for last 3 days. He reports having multiple episodes of NBNB vomiting over last 3 days and constipation which led decrease in PO intake and generalized weakness. Also mentions that he fell while going to bathroom last night - it was unwitnessed and not clear if he lost consciousness or not but denies any confusion or trauma associated with it. Denies diarrhea, abdominal pain, SOB, chest pain, BAUTISTA, palpitations, dizziness, headache, cough, wheezing, joint pain or swelling, fever, chills. (16 Apr 2019 22:49)    Patient is a 84y old  Male who presents with a chief complaint of n/v, diarrhea (17 Apr 2019 09:11)      INTERVAL HPI/OVERNIGHT EVENTS:        PAST MEDICAL & SURGICAL HISTORY:  Leukocytoclastic vasculitis  Hypercholesterolemia  HTN (hypertension)  CVA (cerebral vascular accident): 2015  Asthma  H/O sinus surgery      REVIEW OF SYSTEMS: Total of twelve systems have been reviewed with patient and found to be negative unless mentioned in HPI      SOCIAL HISTORY  Alcohol: Does not drink  Tobacco: Does not smoke  Illicit substance use: None      FAMILY HISTORY: Non contributory to the present illness        No Known Allergies        T(C): 36.6 (04-17-19 @ 11:15), Max: 36.9 (04-17-19 @ 05:04)  HR: 85 (04-17-19 @ 11:15) (85 - 105)  BP: 152/68 (04-17-19 @ 11:15) (136/80 - 181/82)  RR: 18 (04-17-19 @ 11:15) (16 - 18)  SpO2: 98% (04-17-19 @ 11:15) (98% - 99%)        PHYSICAL EXAM:  GENERAL: Not in distress   CHEST/LUNG:  Aire ntry bilaterally  HEART: s1 and s2 present  ABDOMEN:  Nontender and  Nondistended  EXTREMITIES: No pedal  edema  CNS: Awake and Alert      LABS:                        10.4   10.15 )-----------( 294      ( 17 Apr 2019 07:10 )             35.7     04-17    138  |  105  |  27<H>  ----------------------------<  83  3.9   |  26  |  1.12    Ca    8.5      17 Apr 2019 07:10  Phos  2.8     04-17  Mg     2.3     04-17    TPro  7.2  /  Alb  3.1<L>  /  TBili  0.5  /  DBili  x   /  AST  9<L>  /  ALT  16  /  AlkPhos  54  04-16    PT/INR - ( 16 Apr 2019 17:13 )   PT: 15.1 sec;   INR: 1.35 ratio    PTT - ( 16 Apr 2019 17:13 )  PTT:35.3 sec        MEDICATIONS  (STANDING):  aspirin  chewable 81 milliGRAM(s) Oral daily  atorvastatin 40 milliGRAM(s) Oral at bedtime  buDESOnide 160 MICROgram(s)/formoterol 4.5 MICROgram(s) Inhaler 2 Puff(s) Inhalation two times a day  ciprofloxacin   IVPB 400 milliGRAM(s) IV Intermittent every 12 hours  digoxin     Tablet 0.125 milliGRAM(s) Oral daily  finasteride 5 milliGRAM(s) Oral daily  lactated ringers. 1000 milliLiter(s) (60 mL/Hr) IV Continuous <Continuous>  metroNIDAZOLE  IVPB 500 milliGRAM(s) IV Intermittent every 8 hours  montelukast 10 milliGRAM(s) Oral daily  ondansetron Injectable 4 milliGRAM(s) IV Push once  pantoprazole    Tablet 40 milliGRAM(s) Oral before breakfast  polyethylene glycol 3350 17 Gram(s) Oral two times a day  rivaroxaban 20 milliGRAM(s) Oral every 24 hours  senna 2 Tablet(s) Oral at bedtime  tamsulosin 0.8 milliGRAM(s) Oral at bedtime    MEDICATIONS  (PRN):        RADIOLOGY & ADDITIONAL TESTS:    < from: CT Abdomen and Pelvis w/ IV Cont (04.16.19 @ 18:23) >  Acute uncomplicated pancolitis. No obstructive pathology.  Enlarged prostate.  Right inguinal hernia containing nonobstructive bowel.      < end of copied text >    < from: Xray Chest 1 View- PORTABLE-Urgent (04.16.19 @ 17:06) >  Heart enlargement again noted.    The lung fields and pleural surfaces are unremarkable.    Visualized bony structures are grossly intact.    < end of copied text >    < from: CT Cervical Spine No Cont (04.16.19 @ 18:18) >  CT Head: No acute intracranial hemorrhage or calvarial fracture.    CT cervical spine: No acute cervical spine fracture or evidence of   traumatic malalignment.    1.6 cm hypodense left inferior thyroid lobe nodule. Further evaluation   with nonemergent ultrasound may be obtained as clinically warranted.    < end of copied text >        MICROBIOLOGY DATA:    pending A 83 yo M with multiple co-morbidities including   BPH, CAD with recent + stress s/p cath ,  has brought to the ER by  his son for  evaluation  of nausea, multiple episodes of vomiting and constipation , but no diarrhea,  which led decrease in PO intake and generalized weakness. On admission, he found to have Acute uncomplicated pancolitis on CT abd/pelvis. He has no fever  or chills. The CXR and Urine analysis is negative. He has started on cipro and flagyl and the ID consult requested to assist with further  evaluation and Antibiotic management.        REVIEW OF SYSTEMS: Total of twelve systems have been reviewed with patient and found to be negative unless mentioned in HPI        PAST MEDICAL & SURGICAL HISTORY:  Leukocytoclastic vasculitis  Hypercholesterolemia  HTN (hypertension)  CVA (cerebral vascular accident): 2015  Asthma  H/O sinus surgery        SOCIAL HISTORY  Alcohol: Does not drink  Tobacco: Does not smoke  Illicit substance use: None        FAMILY HISTORY: Non contributory to the present illness        ALLERGIES: No Known Allergies        T(C): 36.6 (04-17-19 @ 11:15), Max: 36.9 (04-17-19 @ 05:04)  HR: 85 (04-17-19 @ 11:15) (85 - 105)  BP: 152/68 (04-17-19 @ 11:15) (136/80 - 181/82)  RR: 18 (04-17-19 @ 11:15) (16 - 18)  SpO2: 98% (04-17-19 @ 11:15) (98% - 99%)        PHYSICAL EXAM:  GENERAL: Not in distress   CHEST/LUNG:  Air  entry bilaterally  HEART: s1 and s2 present  ABDOMEN:  Nondistended  and nontender  EXTREMITIES: No pedal  edema  CNS: Awake and Alert          LABS:                        10.4   10.15 )-----------( 294      ( 17 Apr 2019 07:10 )             35.7         04-17    138  |  105  |  27<H>  ----------------------------<  83  3.9   |  26  |  1.12    Ca    8.5      17 Apr 2019 07:10  Phos  2.8     04-17  Mg     2.3     04-17    TPro  7.2  /  Alb  3.1<L>  /  TBili  0.5  /  DBili  x   /  AST  9<L>  /  ALT  16  /  AlkPhos  54  04-16    PT/INR - ( 16 Apr 2019 17:13 )   PT: 15.1 sec;   INR: 1.35 ratio    PTT - ( 16 Apr 2019 17:13 )  PTT:35.3 sec        MEDICATIONS  (STANDING):  aspirin  chewable 81 milliGRAM(s) Oral daily  atorvastatin 40 milliGRAM(s) Oral at bedtime  buDESOnide 160 MICROgram(s)/formoterol 4.5 MICROgram(s) Inhaler 2 Puff(s) Inhalation two times a day  ciprofloxacin   IVPB 400 milliGRAM(s) IV Intermittent every 12 hours  digoxin     Tablet 0.125 milliGRAM(s) Oral daily  finasteride 5 milliGRAM(s) Oral daily  lactated ringers. 1000 milliLiter(s) (60 mL/Hr) IV Continuous <Continuous>  metroNIDAZOLE  IVPB 500 milliGRAM(s) IV Intermittent every 8 hours  montelukast 10 milliGRAM(s) Oral daily  ondansetron Injectable 4 milliGRAM(s) IV Push once  pantoprazole    Tablet 40 milliGRAM(s) Oral before breakfast  polyethylene glycol 3350 17 Gram(s) Oral two times a day  rivaroxaban 20 milliGRAM(s) Oral every 24 hours  senna 2 Tablet(s) Oral at bedtime  tamsulosin 0.8 milliGRAM(s) Oral at bedtime    MEDICATIONS  (PRN):        RADIOLOGY & ADDITIONAL TESTS:    4/16/19 : CT Abdomen and Pelvis w/ IV Cont (04.16.19 @ 18:23) Acute uncomplicated pancolitis. No obstructive pathology. Enlarged prostate. Right inguinal hernia containing nonobstructive bowel.      4/16/19 : Xray Chest 1 View- PORTABLE-Urgent (04.16.19 @ 17:06) Heart enlargement again noted. The lung fields and pleural surfaces are unremarkable.  Visualized bony structures are grossly intact.      4/16/19 : CT Cervical Spine No Cont (04.16.19 @ 18:18)  CT Head: No acute intracranial hemorrhage or calvarial fracture.    CT cervical spine: No acute cervical spine fracture or evidence of  traumatic malalignment.    1.6 cm hypodense left inferior thyroid lobe nodule. Further evaluation  with nonemergent ultrasound may be obtained as clinically warranted.        MICROBIOLOGY DATA:    pending

## 2019-04-18 DIAGNOSIS — E55.9 VITAMIN D DEFICIENCY, UNSPECIFIED: ICD-10-CM

## 2019-04-18 LAB
ANION GAP SERPL CALC-SCNC: 6 MMOL/L — SIGNIFICANT CHANGE UP (ref 5–17)
BUN SERPL-MCNC: 21 MG/DL — HIGH (ref 7–18)
CALCIUM SERPL-MCNC: 8.6 MG/DL — SIGNIFICANT CHANGE UP (ref 8.4–10.5)
CHLORIDE SERPL-SCNC: 104 MMOL/L — SIGNIFICANT CHANGE UP (ref 96–108)
CO2 SERPL-SCNC: 28 MMOL/L — SIGNIFICANT CHANGE UP (ref 22–31)
CREAT SERPL-MCNC: 1.22 MG/DL — SIGNIFICANT CHANGE UP (ref 0.5–1.3)
GLUCOSE SERPL-MCNC: 84 MG/DL — SIGNIFICANT CHANGE UP (ref 70–99)
HCT VFR BLD CALC: 36.8 % — LOW (ref 39–50)
HGB BLD-MCNC: 10.9 G/DL — LOW (ref 13–17)
MCHC RBC-ENTMCNC: 22.4 PG — LOW (ref 27–34)
MCHC RBC-ENTMCNC: 29.6 GM/DL — LOW (ref 32–36)
MCV RBC AUTO: 75.6 FL — LOW (ref 80–100)
NRBC # BLD: 0 /100 WBCS — SIGNIFICANT CHANGE UP (ref 0–0)
PLATELET # BLD AUTO: 305 K/UL — SIGNIFICANT CHANGE UP (ref 150–400)
POTASSIUM SERPL-MCNC: 4.1 MMOL/L — SIGNIFICANT CHANGE UP (ref 3.5–5.3)
POTASSIUM SERPL-SCNC: 4.1 MMOL/L — SIGNIFICANT CHANGE UP (ref 3.5–5.3)
RBC # BLD: 4.87 M/UL — SIGNIFICANT CHANGE UP (ref 4.2–5.8)
RBC # FLD: 17.4 % — HIGH (ref 10.3–14.5)
SODIUM SERPL-SCNC: 138 MMOL/L — SIGNIFICANT CHANGE UP (ref 135–145)
WBC # BLD: 9.82 K/UL — SIGNIFICANT CHANGE UP (ref 3.8–10.5)
WBC # FLD AUTO: 9.82 K/UL — SIGNIFICANT CHANGE UP (ref 3.8–10.5)

## 2019-04-18 PROCEDURE — 93880 EXTRACRANIAL BILAT STUDY: CPT | Mod: 26

## 2019-04-18 RX ORDER — SOD SULF/SODIUM/NAHCO3/KCL/PEG
4000 SOLUTION, RECONSTITUTED, ORAL ORAL ONCE
Qty: 0 | Refills: 0 | Status: DISCONTINUED | OUTPATIENT
Start: 2019-04-18 | End: 2019-04-18

## 2019-04-18 RX ORDER — ERGOCALCIFEROL 1.25 MG/1
50000 CAPSULE ORAL
Qty: 0 | Refills: 0 | Status: DISCONTINUED | OUTPATIENT
Start: 2019-04-18 | End: 2019-04-19

## 2019-04-18 RX ORDER — RIVAROXABAN 15 MG-20MG
20 KIT ORAL EVERY 24 HOURS
Qty: 0 | Refills: 0 | Status: DISCONTINUED | OUTPATIENT
Start: 2019-04-20 | End: 2019-04-19

## 2019-04-18 RX ORDER — SOD SULF/SODIUM/NAHCO3/KCL/PEG
4000 SOLUTION, RECONSTITUTED, ORAL ORAL ONCE
Qty: 0 | Refills: 0 | Status: COMPLETED | OUTPATIENT
Start: 2019-04-18 | End: 2019-04-18

## 2019-04-18 RX ORDER — LACTULOSE 10 G/15ML
20 SOLUTION ORAL DAILY
Qty: 0 | Refills: 0 | Status: DISCONTINUED | OUTPATIENT
Start: 2019-04-18 | End: 2019-04-19

## 2019-04-18 RX ADMIN — Medication 100 MILLIGRAM(S): at 05:37

## 2019-04-18 RX ADMIN — ATORVASTATIN CALCIUM 40 MILLIGRAM(S): 80 TABLET, FILM COATED ORAL at 21:04

## 2019-04-18 RX ADMIN — Medication 4000 MILLILITER(S): at 17:33

## 2019-04-18 RX ADMIN — FINASTERIDE 5 MILLIGRAM(S): 5 TABLET, FILM COATED ORAL at 12:19

## 2019-04-18 RX ADMIN — Medication 81 MILLIGRAM(S): at 12:19

## 2019-04-18 RX ADMIN — TAMSULOSIN HYDROCHLORIDE 0.8 MILLIGRAM(S): 0.4 CAPSULE ORAL at 21:04

## 2019-04-18 RX ADMIN — CEFEPIME 100 MILLIGRAM(S): 1 INJECTION, POWDER, FOR SOLUTION INTRAMUSCULAR; INTRAVENOUS at 05:36

## 2019-04-18 RX ADMIN — ERGOCALCIFEROL 50000 UNIT(S): 1.25 CAPSULE ORAL at 12:19

## 2019-04-18 RX ADMIN — CEFEPIME 100 MILLIGRAM(S): 1 INJECTION, POWDER, FOR SOLUTION INTRAMUSCULAR; INTRAVENOUS at 17:31

## 2019-04-18 RX ADMIN — Medication 0.12 MILLIGRAM(S): at 05:37

## 2019-04-18 RX ADMIN — PANTOPRAZOLE SODIUM 40 MILLIGRAM(S): 20 TABLET, DELAYED RELEASE ORAL at 05:37

## 2019-04-18 RX ADMIN — SENNA PLUS 2 TABLET(S): 8.6 TABLET ORAL at 21:04

## 2019-04-18 RX ADMIN — BUDESONIDE AND FORMOTEROL FUMARATE DIHYDRATE 2 PUFF(S): 160; 4.5 AEROSOL RESPIRATORY (INHALATION) at 12:20

## 2019-04-18 RX ADMIN — CEFEPIME 100 MILLIGRAM(S): 1 INJECTION, POWDER, FOR SOLUTION INTRAMUSCULAR; INTRAVENOUS at 21:04

## 2019-04-18 RX ADMIN — LACTULOSE 20 GRAM(S): 10 SOLUTION ORAL at 12:18

## 2019-04-18 RX ADMIN — Medication 100 MILLIGRAM(S): at 17:32

## 2019-04-18 RX ADMIN — MONTELUKAST 10 MILLIGRAM(S): 4 TABLET, CHEWABLE ORAL at 17:31

## 2019-04-18 RX ADMIN — BUDESONIDE AND FORMOTEROL FUMARATE DIHYDRATE 2 PUFF(S): 160; 4.5 AEROSOL RESPIRATORY (INHALATION) at 21:04

## 2019-04-18 RX ADMIN — Medication 100 MILLIGRAM(S): at 21:04

## 2019-04-18 RX ADMIN — POLYETHYLENE GLYCOL 3350 17 GRAM(S): 17 POWDER, FOR SOLUTION ORAL at 05:37

## 2019-04-18 NOTE — CONSULT NOTE ADULT - SUBJECTIVE AND OBJECTIVE BOX
GI INITIAL CONSULT    HPI: 84M w/stated PMH p/w acute-onset N/V and constipation as well as abd pain. Reports having intermittent hematochezia for the past several mos. He was seen in the office a few mos ago and was sent for a virtual colonoscopy, which he never completed. Presently has persistent hematochezia. Colitis was seen on CT scan.    PMH: asthma, HTN, HLD, BPH, CAD, CVA  PSH: sinus surgery    Meds: MEDICATIONS  (STANDING):  aspirin  chewable 81 milliGRAM(s) Oral daily  atorvastatin 40 milliGRAM(s) Oral at bedtime  buDESOnide 160 MICROgram(s)/formoterol 4.5 MICROgram(s) Inhaler 2 Puff(s) Inhalation two times a day  cefepime   IVPB      cefepime   IVPB 1000 milliGRAM(s) IV Intermittent every 8 hours  digoxin     Tablet 0.125 milliGRAM(s) Oral daily  ergocalciferol 80068 Unit(s) Oral every week  finasteride 5 milliGRAM(s) Oral daily  influenza   Vaccine 0.5 milliLiter(s) IntraMuscular once  lactated ringers. 1000 milliLiter(s) (60 mL/Hr) IV Continuous <Continuous>  lactulose Syrup 20 Gram(s) Oral daily  metroNIDAZOLE  IVPB 500 milliGRAM(s) IV Intermittent every 8 hours  montelukast 10 milliGRAM(s) Oral daily  ondansetron Injectable 4 milliGRAM(s) IV Push once  pantoprazole    Tablet 40 milliGRAM(s) Oral before breakfast  polyethylene glycol/electrolyte Solution. 4000 milliLiter(s) Oral once  rivaroxaban 20 milliGRAM(s) Oral every 24 hours  senna 2 Tablet(s) Oral at bedtime  tamsulosin 0.8 milliGRAM(s) Oral at bedtime    SH: noncontributory  FH: noncontributory  ROS:  CONSTITUTIONAL: No fever, weight loss, or fatigue  EYES: No eye pain, visual disturbances, or discharge  ENT:  No difficulty hearing, tinnitus, vertigo; No sinus or throat pain  NECK: No pain or stiffness  RESPIRATORY: No cough, wheezing, chills or hemoptysis, shortness of Breath  CARDIOVASCULAR: No chest pain, palpitations, passing out, dizziness, or leg swelling  GASTROINTESTINAL: see HPI  GENITOURINARY: No dysuria, frequency, hematuria, or incontinence  NEUROLOGICAL: No headaches, memory loss, loss of strength, numbness, or tremors  SKIN: No itching, burning, rashes, or lesions   MUSCULOSKELETAL: No arthralgia, myalgia, or back pain.     Vitals: T(C): 37 (04-18-19 @ 07:34)  T(F): 98.6 (04-18-19 @ 07:34), Max: 99 (04-17-19 @ 20:13)  HR: 83 (04-18-19 @ 07:34) (75 - 103)  BP: 160/59 (04-18-19 @ 07:34) (144/69 - 176/95)    Gen: NAD  CVS: S1/S2  Chest: CTABL  Abd: S/NT/ND                        10.9   9.82  )-----------( 305      ( 18 Apr 2019 06:30 )             36.8   04-18    138  |  104  |  21<H>  ----------------------------<  84  4.1   |  28  |  1.22    Ca    8.6      18 Apr 2019 06:30  Phos  2.8     04-17  Mg     2.3     04-17    TPro  7.2  /  Alb  3.1<L>  /  TBili  0.5  /  DBili  x   /  AST  9<L>  /  ALT  16  /  AlkPhos  54  04-16    Imaging: CT Abdomen and Pelvis w/ IV Cont (04.16.19 @ 18:23)  Impression:  Acute uncomplicated pancolitis. No obstructive pathology.  Enlarged prostate.  Right inguinal hernia containing nonobstructive bowel.

## 2019-04-18 NOTE — DIETITIAN INITIAL EVALUATION ADULT. - NS AS NUTRI INTERV MEALS SNACK
Advance diet or consider nutritional supplement if Clear liquid diet prolonged as medically feasible

## 2019-04-18 NOTE — PROGRESS NOTE ADULT - PROBLEM SELECTOR PLAN 2
started on senna, colace and miralax started on senna, colace and miralax  switched miralax to lactulose as pt has not had a BM yet  - golytely in evening for colonoscopy

## 2019-04-18 NOTE — PROGRESS NOTE ADULT - PROBLEM SELECTOR PLAN 3
reported to have episode of fall 1 day before - it was unwitnessed and not clear if he lost consciousness or not but denies any confusion or trauma associated with it.  - likely 2/2 dehydration   - orthostatic negative   s/p 500 cc bolus in ED   will continue with gentle hydration pt reports episode of fall  - likely 2/2 dehydration   - orthostatic negative, CT head with no acute changes  cardio Dr Vazquez recommends carotid doppler rate controlled on admission   continue dig, metoprolol   hold xarelto for 2 days: 4/18, 4/19 for colonoscopy

## 2019-04-18 NOTE — DIETITIAN INITIAL EVALUATION ADULT. - OTHER INFO
Nutrition assessment for liquid diet x 2 to 3d, also verbally requsted by staff Nutrition assessment for liquid diet x 2 to 3d, also verbally requested by staff for diet related issues, pt wants to have solid food; lived home PTA: skin intact; denied GI distress, chewing or swallowing problem at present; GI consult noted, diet changed to clear liquid diet , NPO after MN today for colonoscopy tomorrow; diet Rx explained to pt via family, receptive

## 2019-04-18 NOTE — CONSULT NOTE ADULT - ASSESSMENT
84M w/multiple medical comorbidities p/w N/V, hematochezia and colitis.  -concern for infectious colitis along with concomitant hemorrhoidal hematochezia  -check GI PCR panel (not checking C. diff as pt does not have diarrhea at this time)  -agree with empiric Cipro/Flagyl  -plan for colonoscopy tomorrow for further evaluation  -hold Pradaxa today and tomorrow  -clear liquid diet today  -Golytely as 6 PM tonight  -NPO after midnight

## 2019-04-18 NOTE — PROGRESS NOTE ADULT - PROBLEM SELECTOR PLAN 7
IMPROVE VTE Individual Risk Assessment          RISK                                                          Points  [  ] Previous VTE                                                3  [  ] Thrombophilia                                             2  [  ] Lower limb paralysis                                   2        (unable to hold up >15 seconds)    [  ] Current Cancer                                             2         (within 6 months)  [  ] Immobilization > 24 hrs                              1  [  ] ICU/CCU stay > 24 hours                             1  [  ] Age > 60                                                         1    IMPROVE VTE Score: 2  on xarelto Vitamin D level 16.5  Start drisdol q weekly

## 2019-04-18 NOTE — PROGRESS NOTE ADULT - PROBLEM SELECTOR PLAN 6
rate controlled on admission   continue dig, metoprolol and xarelto rate controlled on admission   continue dig, metoprolol   hold xarelto for 2 days: 4/18, 4/19 for colonoscopy restarted atorvastatin 40 mg  follow lipid profile

## 2019-04-18 NOTE — DIETITIAN INITIAL EVALUATION ADULT. - PERTINENT MEDS FT
reviewed  MEDICATIONS  (STANDING):  aspirin  chewable 81 milliGRAM(s) Oral daily  atorvastatin 40 milliGRAM(s) Oral at bedtime  buDESOnide 160 MICROgram(s)/formoterol 4.5 MICROgram(s) Inhaler 2 Puff(s) Inhalation two times a day  cefepime   IVPB      cefepime   IVPB 1000 milliGRAM(s) IV Intermittent every 8 hours  digoxin     Tablet 0.125 milliGRAM(s) Oral daily  ergocalciferol 09502 Unit(s) Oral every week  finasteride 5 milliGRAM(s) Oral daily  influenza   Vaccine 0.5 milliLiter(s) IntraMuscular once  lactated ringers. 1000 milliLiter(s) (60 mL/Hr) IV Continuous <Continuous>  lactulose Syrup 20 Gram(s) Oral daily  metroNIDAZOLE  IVPB 500 milliGRAM(s) IV Intermittent every 8 hours  montelukast 10 milliGRAM(s) Oral daily  ondansetron Injectable 4 milliGRAM(s) IV Push once  pantoprazole    Tablet 40 milliGRAM(s) Oral before breakfast  polyethylene glycol/electrolyte Solution. 4000 milliLiter(s) Oral once  senna 2 Tablet(s) Oral at bedtime  tamsulosin 0.8 milliGRAM(s) Oral at bedtime    MEDICATIONS  (PRN):

## 2019-04-18 NOTE — PROGRESS NOTE ADULT - ASSESSMENT
A 83 yo M with multiple co-morbidities including   BPH, CAD with recent + stress s/p cath ,  has brought to the ER by  his son for  evaluation  of nausea, multiple episodes of vomiting and constipation , but no diarrhea,  which led decrease in PO intake and generalized weakness. On admission, he found to have Acute uncomplicated pancolitis on CT abd/pelvis. He has no fever  or chills. The CXR and Urine analysis is negative. He has started on cipro and flagyl and the ID consult requested to assist with further  evaluation and Antibiotic management.    # Acute Pancolitis    would recommend:    1. Continue Cefepime and Flagyl  2. Stool for C.difficile Toxin and GI pathogen PCR is pending  3.  Advanced diet as tolerated    d/w  patient and His son at the bed side    will follow the patient with you A 85 yo M with multiple co-morbidities including   BPH, CAD with recent + stress s/p cath ,  has brought to the ER by  his son for  evaluation  of nausea, multiple episodes of vomiting and constipation , but no diarrhea,  which led decrease in PO intake and generalized weakness. On admission, he found to have Acute uncomplicated pancolitis on CT abd/pelvis. He has no fever  or chills. The CXR and Urine analysis is negative. He has started on cipro and flagyl and the ID consult requested to assist with further  evaluation and Antibiotic management.    # Acute Pancolitis     would recommend:    1. Start on bowel regimen since has no BM in last 5 days  2. Continue Cefepime and Flagyl inpatient and may change to oral ABx on discharge   3. Stool for C.difficile Toxin and GI pathogen PCR is pending   4.  Advanced diet as tolerated    d/w  patient and His son at the bed side    will follow the patient with you

## 2019-04-18 NOTE — PROGRESS NOTE ADULT - ASSESSMENT
84/M with PMH of asthma, HTN, HLD, BPH, CAD with recent + stress s/p cath with minor luminal irregularities and CVA (2015)  was brought to ED by son for nausea, vomiting and constipation for last 3 days. He reports having multiple episodes of NBNB vomiting , also mentions that he fell while going to bathroom. Admitted for pancolitis, on tele floor for syncope workup

## 2019-04-18 NOTE — DIETITIAN INITIAL EVALUATION ADULT. - FACTORS AFF FOOD INTAKE
altered GI function with pancolitis, s/p nausea/vomiting/constipation x 3d PTA/[employer]/ethnic/cultural/personal food preferences [place]/ethnic/cultural/personal food preferences/altered GI function with pancolitis, s/p nausea/vomiting/constipation x 3d PTA; needs

## 2019-04-18 NOTE — DIETITIAN INITIAL EVALUATION ADULT. - SOURCE
chart review/other (specify) son at bedside; chart review. Pt speaks Indian, preferes family to contact RD/ for him/other (specify)/family/significant other/patient

## 2019-04-18 NOTE — PROGRESS NOTE ADULT - SUBJECTIVE AND OBJECTIVE BOX
Patient seen and examined.       MEDICATIONS  (STANDING):  aspirin  chewable 81 milliGRAM(s) Oral daily  atorvastatin 40 milliGRAM(s) Oral at bedtime  buDESOnide 160 MICROgram(s)/formoterol 4.5 MICROgram(s) Inhaler 2 Puff(s) Inhalation two times a day  cefepime   IVPB      cefepime   IVPB 1000 milliGRAM(s) IV Intermittent every 8 hours  digoxin     Tablet 0.125 milliGRAM(s) Oral daily  ergocalciferol 77170 Unit(s) Oral every week  finasteride 5 milliGRAM(s) Oral daily  influenza   Vaccine 0.5 milliLiter(s) IntraMuscular once  lactated ringers. 1000 milliLiter(s) (60 mL/Hr) IV Continuous <Continuous>  lactulose Syrup 20 Gram(s) Oral daily  metroNIDAZOLE  IVPB 500 milliGRAM(s) IV Intermittent every 8 hours  montelukast 10 milliGRAM(s) Oral daily  ondansetron Injectable 4 milliGRAM(s) IV Push once  pantoprazole    Tablet 40 milliGRAM(s) Oral before breakfast  senna 2 Tablet(s) Oral at bedtime  tamsulosin 0.8 milliGRAM(s) Oral at bedtime      MEDICATIONS  (PRN):   Medications up to date at time of exam.      PHYSICAL EXAMINATION:  Patient has no new complaints.  GENERAL: The patient is a well-developed, well-nourished, in no apparent distress.     Vital Signs Last 24 Hrs  T(C): 37.3 (18 Apr 2019 15:20), Max: 37.3 (18 Apr 2019 15:20)  T(F): 99.1 (18 Apr 2019 15:20), Max: 99.1 (18 Apr 2019 15:20)  HR: 88 (18 Apr 2019 15:20) (75 - 103)  BP: 173/57 (18 Apr 2019 15:20) (150/56 - 176/95)  BP(mean): --  RR: 18 (18 Apr 2019 15:20) (18 - 19)  SpO2: 100% (18 Apr 2019 15:20) (96% - 100%)   (if applicable)      HEENT: Head is normocephalic and atraumatic.     NECK: Supple, no palpable adenopathy.    LUNGS: Clear to auscultation, no wheezing, rales, or rhonchi.    HEART: Regular rate and rhythm without murmur.    ABDOMEN: Soft, nontender, and nondistended.  No hepatosplenomegaly is noted.    EXTREMITIES: Without any cyanosis, clubbing, rash, lesions or edema.    NEUROLOGIC: Awake, alert.    SKIN: Warm, dry, good turgor.      LABS:                        10.9   9.82  )-----------( 305      ( 18 Apr 2019 06:30 )             36.8     04-18    138  |  104  |  21<H>  ----------------------------<  84  4.1   |  28  |  1.22    Ca    8.6      18 Apr 2019 06:30  Phos  2.8     04-17  Mg     2.3     04-17            CARDIAC MARKERS ( 17 Apr 2019 07:10 )  0.037 ng/mL / x     / 27 U/L / x     / 1.9 ng/mL                MICROBIOLOGY: (if applicable)    RADIOLOGY & ADDITIONAL STUDIES:  EKG:   CXR:  ECHO:    IMPRESSION: 84y Male PAST MEDICAL & SURGICAL HISTORY:  Leukocytoclastic vasculitis  Hypercholesterolemia  HTN (hypertension)  CVA (cerebral vascular accident): 2015  Asthma  H/O sinus surgery       RECOMMENDATIONS:    Patient is my office patient, sent to ED for evaluation of N/V and constipation s/p hospitalization at [facility].   Patient also reported unwitnessed fall.  Admit to tele, syncope work up, orthostatics, carotid duplex.  Carotid artery stenosis, vascular consult, discussed w/ house staff.  Positive orthostatics, gentle hydration.   Afib on tele, episodes of tachycardia.   Check digoxin level. Patient seen and examined.       MEDICATIONS  (STANDING):  aspirin  chewable 81 milliGRAM(s) Oral daily  atorvastatin 40 milliGRAM(s) Oral at bedtime  buDESOnide 160 MICROgram(s)/formoterol 4.5 MICROgram(s) Inhaler 2 Puff(s) Inhalation two times a day  cefepime   IVPB      cefepime   IVPB 1000 milliGRAM(s) IV Intermittent every 8 hours  digoxin     Tablet 0.125 milliGRAM(s) Oral daily  ergocalciferol 15209 Unit(s) Oral every week  finasteride 5 milliGRAM(s) Oral daily  influenza   Vaccine 0.5 milliLiter(s) IntraMuscular once  lactated ringers. 1000 milliLiter(s) (60 mL/Hr) IV Continuous <Continuous>  lactulose Syrup 20 Gram(s) Oral daily  metroNIDAZOLE  IVPB 500 milliGRAM(s) IV Intermittent every 8 hours  montelukast 10 milliGRAM(s) Oral daily  ondansetron Injectable 4 milliGRAM(s) IV Push once  pantoprazole    Tablet 40 milliGRAM(s) Oral before breakfast  senna 2 Tablet(s) Oral at bedtime  tamsulosin 0.8 milliGRAM(s) Oral at bedtime      MEDICATIONS  (PRN):   Medications up to date at time of exam.      PHYSICAL EXAMINATION:  Patient has no new complaints.  GENERAL: The patient is a well-developed, well-nourished, in no apparent distress.     Vital Signs Last 24 Hrs  T(C): 37.3 (18 Apr 2019 15:20), Max: 37.3 (18 Apr 2019 15:20)  T(F): 99.1 (18 Apr 2019 15:20), Max: 99.1 (18 Apr 2019 15:20)  HR: 88 (18 Apr 2019 15:20) (75 - 103)  BP: 173/57 (18 Apr 2019 15:20) (150/56 - 176/95)  BP(mean): --  RR: 18 (18 Apr 2019 15:20) (18 - 19)  SpO2: 100% (18 Apr 2019 15:20) (96% - 100%)   (if applicable)      HEENT: Head is normocephalic and atraumatic.     NECK: Supple, no palpable adenopathy.    LUNGS: Clear to auscultation, no wheezing, rales, or rhonchi.    HEART: Regular rate and rhythm without murmur.    ABDOMEN: Soft, nontender, and nondistended.  No hepatosplenomegaly is noted.    EXTREMITIES: Without any cyanosis, clubbing, rash, lesions or edema.    NEUROLOGIC: Awake, alert.    SKIN: Warm, dry, good turgor.      LABS:                        10.9   9.82  )-----------( 305      ( 18 Apr 2019 06:30 )             36.8     04-18    138  |  104  |  21<H>  ----------------------------<  84  4.1   |  28  |  1.22    Ca    8.6      18 Apr 2019 06:30  Phos  2.8     04-17  Mg     2.3     04-17            CARDIAC MARKERS ( 17 Apr 2019 07:10 )  0.037 ng/mL / x     / 27 U/L / x     / 1.9 ng/mL                MICROBIOLOGY: (if applicable)    RADIOLOGY & ADDITIONAL STUDIES:  EKG:   CXR:  ECHO:    IMPRESSION: 84y Male PAST MEDICAL & SURGICAL HISTORY:  Leukocytoclastic vasculitis  Hypercholesterolemia  HTN (hypertension)  CVA (cerebral vascular accident): 2015  Asthma  H/O sinus surgery       RECOMMENDATIONS:    Patient is my office patient, sent to ED for evaluation of N/V and constipation s/p hospitalization at [place].   Patient also reported unwitnessed fall.  Admit to tele, syncope work up, orthostatics, carotid duplex.  Carotid artery stenosis, vascular consult, discussed w/ house staff.  Positive orthostatics, gentle hydration.   Afib on tele, episodes of tachycardia.   Digoxin level okay, tachycardia likely in the setting of infection, if persistent can start cardizem 30mg q6h

## 2019-04-18 NOTE — DIETITIAN INITIAL EVALUATION ADULT. - PROBLEM SELECTOR PLAN 3
reported to have episode of fall 1 day before - it was unwitnessed and not clear if he lost consciousness or not but denies any confusion or trauma associated with it.  - likely 2/2 dehydration   - orthostatic negative   s/p 500 cc bolus in ED   will continue with gentle hydration per MD

## 2019-04-18 NOTE — PROGRESS NOTE ADULT - SUBJECTIVE AND OBJECTIVE BOX
Patient is seen and examined at the bed side, is afebrile.      REVIEW OF SYSTEMS: All other review systems are negative      ALLERGIES: No Known Allergies      Vital Signs Last 24 Hrs  T(C): 36.9 (18 Apr 2019 11:49), Max: 37.2 (17 Apr 2019 20:13)  T(F): 98.4 (18 Apr 2019 11:49), Max: 99 (17 Apr 2019 20:13)  HR: 91 (18 Apr 2019 11:13) (75 - 103)  BP: 150/56 (18 Apr 2019 11:13) (144/69 - 176/95)  BP(mean): --  RR: 18 (18 Apr 2019 11:49) (18 - 19)  SpO2: 99% (18 Apr 2019 11:49) (96% - 99%)        PHYSICAL EXAM:  GENERAL: Not in distress   CHEST/LUNG:  Air  entry bilaterally  HEART: s1 and s2 present  ABDOMEN:  Nondistended  and nontender  EXTREMITIES: No pedal  edema  CNS: Awake and Alert          LABS:                        10.9   9.82  )-----------( 305      ( 18 Apr 2019 06:30 )             36.8                           10.4   10.15 )-----------( 294      ( 17 Apr 2019 07:10 )             35.7       04-18    138  |  104  |  21<H>  ----------------------------<  84  4.1   |  28  |  1.22    Ca    8.6      18 Apr 2019 06:30  Phos  2.8     04-17  Mg     2.3     04-17    TPro  7.2  /  Alb  3.1<L>  /  TBili  0.5  /  DBili  x   /  AST  9<L>  /  ALT  16  /  AlkPhos  54  04-16      04-17    138  |  105  |  27<H>  ----------------------------<  83  3.9   |  26  |  1.12    Ca    8.5      17 Apr 2019 07:10  Phos  2.8     04-17  Mg     2.3     04-17    TPro  7.2  /  Alb  3.1<L>  /  TBili  0.5  /  DBili  x   /  AST  9<L>  /  ALT  16  /  AlkPhos  54  04-16    PT/INR - ( 16 Apr 2019 17:13 )   PT: 15.1 sec;   INR: 1.35 ratio    PTT - ( 16 Apr 2019 17:13 )  PTT:35.3 sec        MEDICATIONS  (STANDING):  aspirin  chewable 81 milliGRAM(s) Oral daily  atorvastatin 40 milliGRAM(s) Oral at bedtime  buDESOnide 160 MICROgram(s)/formoterol 4.5 MICROgram(s) Inhaler 2 Puff(s) Inhalation two times a day  cefepime   IVPB      cefepime   IVPB 1000 milliGRAM(s) IV Intermittent every 8 hours  digoxin     Tablet 0.125 milliGRAM(s) Oral daily  ergocalciferol 61674 Unit(s) Oral every week  finasteride 5 milliGRAM(s) Oral daily  influenza   Vaccine 0.5 milliLiter(s) IntraMuscular once  lactated ringers. 1000 milliLiter(s) (60 mL/Hr) IV Continuous <Continuous>  lactulose Syrup 20 Gram(s) Oral daily  metroNIDAZOLE  IVPB 500 milliGRAM(s) IV Intermittent every 8 hours  montelukast 10 milliGRAM(s) Oral daily  ondansetron Injectable 4 milliGRAM(s) IV Push once  pantoprazole    Tablet 40 milliGRAM(s) Oral before breakfast  polyethylene glycol/electrolyte Solution. 4000 milliLiter(s) Oral once  senna 2 Tablet(s) Oral at bedtime  tamsulosin 0.8 milliGRAM(s) Oral at bedtime    MEDICATIONS  (PRN):        RADIOLOGY & ADDITIONAL TESTS:    4/16/19 : CT Abdomen and Pelvis w/ IV Cont (04.16.19 @ 18:23) Acute uncomplicated pancolitis. No obstructive pathology. Enlarged prostate. Right inguinal hernia containing nonobstructive bowel.      4/16/19 : Xray Chest 1 View- PORTABLE-Urgent (04.16.19 @ 17:06) Heart enlargement again noted. The lung fields and pleural surfaces are unremarkable.  Visualized bony structures are grossly intact.      4/16/19 : CT Cervical Spine No Cont (04.16.19 @ 18:18)  CT Head: No acute intracranial hemorrhage or calvarial fracture.    CT cervical spine: No acute cervical spine fracture or evidence of  traumatic malalignment.    1.6 cm hypodense left inferior thyroid lobe nodule. Further evaluation  with nonemergent ultrasound may be obtained as clinically warranted.        MICROBIOLOGY DATA:      Culture - Blood (04.16.19 @ 22:44)    Specimen Source: .Blood    Culture Results:   No growth to date.    Culture - Blood (04.16.19 @ 22:44)    Specimen Source: .Blood    Culture Results:   No growth to date. Patient is seen and examined at the bed side, is afebrile. He is tolerating diet well and no bowel movement for last 5 days. The Blood cultures have no growth to date.         REVIEW OF SYSTEMS: All other review systems are negative        ALLERGIES: No Known Allergies        Vital Signs Last 24 Hrs  T(C): 36.9 (18 Apr 2019 11:49), Max: 37.2 (17 Apr 2019 20:13)  T(F): 98.4 (18 Apr 2019 11:49), Max: 99 (17 Apr 2019 20:13)  HR: 91 (18 Apr 2019 11:13) (75 - 103)  BP: 150/56 (18 Apr 2019 11:13) (144/69 - 176/95)  BP(mean): --  RR: 18 (18 Apr 2019 11:49) (18 - 19)  SpO2: 99% (18 Apr 2019 11:49) (96% - 99%)        PHYSICAL EXAM:  GENERAL: Not in distress   CHEST/LUNG:  Air  entry bilaterally  HEART: s1 and s2 present  ABDOMEN:  Nondistended  and nontender  EXTREMITIES: No pedal  edema  CNS: Awake and Alert          LABS:                        10.9   9.82  )-----------( 305      ( 18 Apr 2019 06:30 )             36.8                           10.4   10.15 )-----------( 294      ( 17 Apr 2019 07:10 )             35.7         04-18    138  |  104  |  21<H>  ----------------------------<  84  4.1   |  28  |  1.22    Ca    8.6      18 Apr 2019 06:30  Phos  2.8     04-17  Mg     2.3     04-17    TPro  7.2  /  Alb  3.1<L>  /  TBili  0.5  /  DBili  x   /  AST  9<L>  /  ALT  16  /  AlkPhos  54  04-16      04-17    138  |  105  |  27<H>  ----------------------------<  83  3.9   |  26  |  1.12    Ca    8.5      17 Apr 2019 07:10  Phos  2.8     04-17  Mg     2.3     04-17    TPro  7.2  /  Alb  3.1<L>  /  TBili  0.5  /  DBili  x   /  AST  9<L>  /  ALT  16  /  AlkPhos  54  04-16    PT/INR - ( 16 Apr 2019 17:13 )   PT: 15.1 sec;   INR: 1.35 ratio    PTT - ( 16 Apr 2019 17:13 )  PTT:35.3 sec        MEDICATIONS  (STANDING):  aspirin  chewable 81 milliGRAM(s) Oral daily  atorvastatin 40 milliGRAM(s) Oral at bedtime  buDESOnide 160 MICROgram(s)/formoterol 4.5 MICROgram(s) Inhaler 2 Puff(s) Inhalation two times a day  cefepime   IVPB      cefepime   IVPB 1000 milliGRAM(s) IV Intermittent every 8 hours  digoxin     Tablet 0.125 milliGRAM(s) Oral daily  ergocalciferol 12306 Unit(s) Oral every week  finasteride 5 milliGRAM(s) Oral daily  influenza   Vaccine 0.5 milliLiter(s) IntraMuscular once  lactated ringers. 1000 milliLiter(s) (60 mL/Hr) IV Continuous <Continuous>  lactulose Syrup 20 Gram(s) Oral daily  metroNIDAZOLE  IVPB 500 milliGRAM(s) IV Intermittent every 8 hours  montelukast 10 milliGRAM(s) Oral daily  ondansetron Injectable 4 milliGRAM(s) IV Push once  pantoprazole    Tablet 40 milliGRAM(s) Oral before breakfast  polyethylene glycol/electrolyte Solution. 4000 milliLiter(s) Oral once  senna 2 Tablet(s) Oral at bedtime  tamsulosin 0.8 milliGRAM(s) Oral at bedtime    MEDICATIONS  (PRN):        RADIOLOGY & ADDITIONAL TESTS:    4/16/19 : CT Abdomen and Pelvis w/ IV Cont (04.16.19 @ 18:23) Acute uncomplicated pancolitis. No obstructive pathology. Enlarged prostate. Right inguinal hernia containing nonobstructive bowel.    4/16/19 : Xray Chest 1 View- PORTABLE-Urgent (04.16.19 @ 17:06) Heart enlargement again noted. The lung fields and pleural surfaces are unremarkable.  Visualized bony structures are grossly intact.    4/16/19 : CT Cervical Spine No Cont (04.16.19 @ 18:18)  CT Head: No acute intracranial hemorrhage or calvarial fracture.    CT cervical spine: No acute cervical spine fracture or evidence of  traumatic malalignment.    1.6 cm hypodense left inferior thyroid lobe nodule. Further evaluation  with nonemergent ultrasound may be obtained as clinically warranted.        MICROBIOLOGY DATA:      Culture - Blood (04.16.19 @ 22:44)    Specimen Source: .Blood    Culture Results:   No growth to date.    Culture - Blood (04.16.19 @ 22:44)    Specimen Source: .Blood    Culture Results:   No growth to date.

## 2019-04-18 NOTE — PROGRESS NOTE ADULT - PROBLEM SELECTOR PLAN 4
well controlled on admission   restarted home meds pt reports episode of fall  - likely 2/2 dehydration   - orthostatic negative, CT head with no acute changes  cardio Dr Vazquez recommends carotid doppler

## 2019-04-18 NOTE — DIETITIAN INITIAL EVALUATION ADULT. - PROBLEM SELECTOR PLAN 1
patient came with nausea, vomiting and constipation for 3 days   CT A/P suggestive of pancolitis   - likely stercoral colitis   - s/p stat dose of metro and cipro in ED   - will hold on further antibiotics at this time   - will add senna, colace and miralax - for constipation  - diet as tolerated - will start with full liquid per MD

## 2019-04-18 NOTE — PROGRESS NOTE ADULT - SUBJECTIVE AND OBJECTIVE BOX
PGY1 Note discussed with supervising resident and primary attending.    Patient is a 84y old  Male who presents with a chief complaint of n/v (17 Apr 2019 18:30)      INTERVAL HPI/OVERNIGHT EVENTS: patient assessed bedside, reports he still has constipation but no abdominal pain/vomiting. Plan for colonoscopy tomorrow    MEDICATIONS  (STANDING):  aspirin  chewable 81 milliGRAM(s) Oral daily  atorvastatin 40 milliGRAM(s) Oral at bedtime  buDESOnide 160 MICROgram(s)/formoterol 4.5 MICROgram(s) Inhaler 2 Puff(s) Inhalation two times a day  cefepime   IVPB      cefepime   IVPB 1000 milliGRAM(s) IV Intermittent every 8 hours  digoxin     Tablet 0.125 milliGRAM(s) Oral daily  ergocalciferol 54183 Unit(s) Oral every week  finasteride 5 milliGRAM(s) Oral daily  influenza   Vaccine 0.5 milliLiter(s) IntraMuscular once  lactated ringers. 1000 milliLiter(s) (60 mL/Hr) IV Continuous <Continuous>  lactulose Syrup 20 Gram(s) Oral daily  metroNIDAZOLE  IVPB 500 milliGRAM(s) IV Intermittent every 8 hours  montelukast 10 milliGRAM(s) Oral daily  ondansetron Injectable 4 milliGRAM(s) IV Push once  pantoprazole    Tablet 40 milliGRAM(s) Oral before breakfast  polyethylene glycol/electrolyte Solution. 4000 milliLiter(s) Oral once  senna 2 Tablet(s) Oral at bedtime  tamsulosin 0.8 milliGRAM(s) Oral at bedtime    MEDICATIONS  (PRN):      Allergies    No Known Allergies    Intolerances        REVIEW OF SYSTEMS:  CONSTITUTIONAL: No fever, weight loss, or fatigue  RESPIRATORY: No cough, wheezing, chills or hemoptysis; No shortness of breath  CARDIOVASCULAR: No chest pain, palpitations, dizziness, or leg swelling  GASTROINTESTINAL: Constipation; No abdominal pain. No nausea, vomiting  NEUROLOGICAL: No headaches, memory loss, loss of strength, numbness, or tremors  SKIN: No itching, burning, rashes, or lesions     Vital Signs Last 24 Hrs  T(C): 37 (18 Apr 2019 07:34), Max: 37.2 (17 Apr 2019 20:13)  T(F): 98.6 (18 Apr 2019 07:34), Max: 99 (17 Apr 2019 20:13)  HR: 83 (18 Apr 2019 07:34) (75 - 103)  BP: 160/59 (18 Apr 2019 07:34) (144/69 - 176/95)  BP(mean): --  RR: 18 (18 Apr 2019 07:34) (18 - 19)  SpO2: 98% (18 Apr 2019 07:34) (96% - 98%)    PHYSICAL EXAM:  GENERAL: NAD, well-groomed, well-developed  HEAD:  Atraumatic, Normocephalic  EYES: EOMI, PERRLA, conjunctiva and sclera clear  NECK: Supple, No JVD, Normal thyroid  CHEST/LUNG: Clear to auscultation; No rales, rhonchi, wheezing, or rubs  HEART: Regular rate and rhythm; No murmurs, rubs, or gallops  ABDOMEN: Soft, Nontender, Nondistended; Bowel sounds present  NERVOUS SYSTEM:  Alert & Oriented X3, Good concentration; Motor Strength 5/5 B/L   EXTREMITIES:  2+ Peripheral Pulses, No clubbing, cyanosis, or edema    LABS:                        10.9   9.82  )-----------( 305      ( 18 Apr 2019 06:30 )             36.8     04-18    138  |  104  |  21<H>  ----------------------------<  84  4.1   |  28  |  1.22    Ca    8.6      18 Apr 2019 06:30  Phos  2.8     04-17  Mg     2.3     04-17    TPro  7.2  /  Alb  3.1<L>  /  TBili  0.5  /  DBili  x   /  AST  9<L>  /  ALT  16  /  AlkPhos  54  04-16    PT/INR - ( 16 Apr 2019 17:13 )   PT: 15.1 sec;   INR: 1.35 ratio         PTT - ( 16 Apr 2019 17:13 )  PTT:35.3 sec    Consultant(s) Notes Reviewed:  [ x ] YES  [ ] NO

## 2019-04-18 NOTE — PROGRESS NOTE ADULT - PROBLEM SELECTOR PLAN 5
restarted atorvastatin 40 mg  follow lipid profile well controlled on admission   restarted home meds

## 2019-04-18 NOTE — PROGRESS NOTE ADULT - PROBLEM SELECTOR PLAN 1
patient came with nausea, vomiting and constipation for 3 days   CT A/P suggestive of pancolitis   - likely stercoral colitis   - s/p stat dose of metro and cipro in ED   - will hold on further antibiotics at this time   - will add senna, colace and miralax - for constipation  - diet as tolerated - will start with full liquid patient came with nausea, vomiting and constipation for 3 days   CT A/P suggestive of pancolitis   - likely stercoral colitis   - s/p stat dose of metro and cipro in ED   - c/w cefepime and flagyl D2  - started on senna, colace, lactulose  - Pt to be on clear liquid diet, golytely starting 6pm  - Colonoscopy tomorrow  - f/u GI PCR  - GI Dr Fitzgerald

## 2019-04-19 ENCOUNTER — TRANSCRIPTION ENCOUNTER (OUTPATIENT)
Age: 84
End: 2019-04-19

## 2019-04-19 VITALS
SYSTOLIC BLOOD PRESSURE: 170 MMHG | HEART RATE: 82 BPM | DIASTOLIC BLOOD PRESSURE: 77 MMHG | TEMPERATURE: 98 F | RESPIRATION RATE: 18 BRPM | OXYGEN SATURATION: 97 %

## 2019-04-19 LAB
ANION GAP SERPL CALC-SCNC: 5 MMOL/L — SIGNIFICANT CHANGE UP (ref 5–17)
APTT BLD: 34.8 SEC — SIGNIFICANT CHANGE UP (ref 27.5–36.3)
BUN SERPL-MCNC: 17 MG/DL — SIGNIFICANT CHANGE UP (ref 7–18)
CALCIUM SERPL-MCNC: 8.4 MG/DL — SIGNIFICANT CHANGE UP (ref 8.4–10.5)
CHLORIDE SERPL-SCNC: 105 MMOL/L — SIGNIFICANT CHANGE UP (ref 96–108)
CO2 SERPL-SCNC: 31 MMOL/L — SIGNIFICANT CHANGE UP (ref 22–31)
CREAT SERPL-MCNC: 1.15 MG/DL — SIGNIFICANT CHANGE UP (ref 0.5–1.3)
GLUCOSE SERPL-MCNC: 89 MG/DL — SIGNIFICANT CHANGE UP (ref 70–99)
HCT VFR BLD CALC: 34.3 % — LOW (ref 39–50)
HGB BLD-MCNC: 10.3 G/DL — LOW (ref 13–17)
INR BLD: 1.47 RATIO — HIGH (ref 0.88–1.16)
MAGNESIUM SERPL-MCNC: 2.1 MG/DL — SIGNIFICANT CHANGE UP (ref 1.6–2.6)
MCHC RBC-ENTMCNC: 22.2 PG — LOW (ref 27–34)
MCHC RBC-ENTMCNC: 30 GM/DL — LOW (ref 32–36)
MCV RBC AUTO: 74.1 FL — LOW (ref 80–100)
NRBC # BLD: 0 /100 WBCS — SIGNIFICANT CHANGE UP (ref 0–0)
PHOSPHATE SERPL-MCNC: 2.2 MG/DL — LOW (ref 2.5–4.5)
PLATELET # BLD AUTO: 295 K/UL — SIGNIFICANT CHANGE UP (ref 150–400)
POTASSIUM SERPL-MCNC: 3.7 MMOL/L — SIGNIFICANT CHANGE UP (ref 3.5–5.3)
POTASSIUM SERPL-SCNC: 3.7 MMOL/L — SIGNIFICANT CHANGE UP (ref 3.5–5.3)
PROTHROM AB SERPL-ACNC: 16.5 SEC — HIGH (ref 10–12.9)
RBC # BLD: 4.63 M/UL — SIGNIFICANT CHANGE UP (ref 4.2–5.8)
RBC # FLD: 17.5 % — HIGH (ref 10.3–14.5)
SODIUM SERPL-SCNC: 141 MMOL/L — SIGNIFICANT CHANGE UP (ref 135–145)
WBC # BLD: 7.96 K/UL — SIGNIFICANT CHANGE UP (ref 3.8–10.5)
WBC # FLD AUTO: 7.96 K/UL — SIGNIFICANT CHANGE UP (ref 3.8–10.5)

## 2019-04-19 PROCEDURE — 85730 THROMBOPLASTIN TIME PARTIAL: CPT

## 2019-04-19 PROCEDURE — 82306 VITAMIN D 25 HYDROXY: CPT

## 2019-04-19 PROCEDURE — 70450 CT HEAD/BRAIN W/O DYE: CPT

## 2019-04-19 PROCEDURE — 80061 LIPID PANEL: CPT

## 2019-04-19 PROCEDURE — 80053 COMPREHEN METABOLIC PANEL: CPT

## 2019-04-19 PROCEDURE — 96375 TX/PRO/DX INJ NEW DRUG ADDON: CPT

## 2019-04-19 PROCEDURE — 83735 ASSAY OF MAGNESIUM: CPT

## 2019-04-19 PROCEDURE — 72125 CT NECK SPINE W/O DYE: CPT

## 2019-04-19 PROCEDURE — 96374 THER/PROPH/DIAG INJ IV PUSH: CPT

## 2019-04-19 PROCEDURE — 99285 EMERGENCY DEPT VISIT HI MDM: CPT | Mod: 25

## 2019-04-19 PROCEDURE — 82746 ASSAY OF FOLIC ACID SERUM: CPT

## 2019-04-19 PROCEDURE — 87040 BLOOD CULTURE FOR BACTERIA: CPT

## 2019-04-19 PROCEDURE — 82550 ASSAY OF CK (CPK): CPT

## 2019-04-19 PROCEDURE — 93005 ELECTROCARDIOGRAM TRACING: CPT

## 2019-04-19 PROCEDURE — 82553 CREATINE MB FRACTION: CPT

## 2019-04-19 PROCEDURE — 84443 ASSAY THYROID STIM HORMONE: CPT

## 2019-04-19 PROCEDURE — 85027 COMPLETE CBC AUTOMATED: CPT

## 2019-04-19 PROCEDURE — 83036 HEMOGLOBIN GLYCOSYLATED A1C: CPT

## 2019-04-19 PROCEDURE — 93880 EXTRACRANIAL BILAT STUDY: CPT

## 2019-04-19 PROCEDURE — 84100 ASSAY OF PHOSPHORUS: CPT

## 2019-04-19 PROCEDURE — 85610 PROTHROMBIN TIME: CPT

## 2019-04-19 PROCEDURE — 80162 ASSAY OF DIGOXIN TOTAL: CPT

## 2019-04-19 PROCEDURE — 82607 VITAMIN B-12: CPT

## 2019-04-19 PROCEDURE — 84484 ASSAY OF TROPONIN QUANT: CPT

## 2019-04-19 PROCEDURE — 94640 AIRWAY INHALATION TREATMENT: CPT

## 2019-04-19 PROCEDURE — 36415 COLL VENOUS BLD VENIPUNCTURE: CPT

## 2019-04-19 PROCEDURE — 71045 X-RAY EXAM CHEST 1 VIEW: CPT

## 2019-04-19 PROCEDURE — 74177 CT ABD & PELVIS W/CONTRAST: CPT

## 2019-04-19 PROCEDURE — 80048 BASIC METABOLIC PNL TOTAL CA: CPT

## 2019-04-19 RX ORDER — ERGOCALCIFEROL 1.25 MG/1
1 CAPSULE ORAL
Qty: 3 | Refills: 0
Start: 2019-04-19 | End: 2019-05-18

## 2019-04-19 RX ORDER — METOPROLOL TARTRATE 50 MG
100 TABLET ORAL
Qty: 0 | Refills: 0 | Status: DISCONTINUED | OUTPATIENT
Start: 2019-04-19 | End: 2019-04-19

## 2019-04-19 RX ORDER — SODIUM,POTASSIUM PHOSPHATES 278-250MG
1 POWDER IN PACKET (EA) ORAL
Qty: 0 | Refills: 0 | Status: DISCONTINUED | OUTPATIENT
Start: 2019-04-19 | End: 2019-04-19

## 2019-04-19 RX ORDER — ERGOCALCIFEROL 1.25 MG/1
1 CAPSULE ORAL
Qty: 3 | Refills: 0 | OUTPATIENT
Start: 2019-04-19 | End: 2019-05-18

## 2019-04-19 RX ORDER — MOXIFLOXACIN HYDROCHLORIDE TABLETS, 400 MG 400 MG/1
1 TABLET, FILM COATED ORAL
Qty: 8 | Refills: 0 | OUTPATIENT
Start: 2019-04-19 | End: 2019-04-22

## 2019-04-19 RX ORDER — MOXIFLOXACIN HYDROCHLORIDE TABLETS, 400 MG 400 MG/1
1 TABLET, FILM COATED ORAL
Qty: 8 | Refills: 0
Start: 2019-04-19 | End: 2019-04-22

## 2019-04-19 RX ORDER — METOPROLOL TARTRATE 50 MG
5 TABLET ORAL ONCE
Qty: 0 | Refills: 0 | Status: COMPLETED | OUTPATIENT
Start: 2019-04-19 | End: 2019-04-19

## 2019-04-19 RX ORDER — METRONIDAZOLE 500 MG
1 TABLET ORAL
Qty: 12 | Refills: 0 | OUTPATIENT
Start: 2019-04-19 | End: 2019-04-22

## 2019-04-19 RX ORDER — METRONIDAZOLE 500 MG
1 TABLET ORAL
Qty: 12 | Refills: 0
Start: 2019-04-19 | End: 2019-04-22

## 2019-04-19 RX ADMIN — Medication 81 MILLIGRAM(S): at 12:31

## 2019-04-19 RX ADMIN — Medication 100 MILLIGRAM(S): at 05:54

## 2019-04-19 RX ADMIN — BUDESONIDE AND FORMOTEROL FUMARATE DIHYDRATE 2 PUFF(S): 160; 4.5 AEROSOL RESPIRATORY (INHALATION) at 12:32

## 2019-04-19 RX ADMIN — Medication 1 PACKET(S): at 12:31

## 2019-04-19 RX ADMIN — FINASTERIDE 5 MILLIGRAM(S): 5 TABLET, FILM COATED ORAL at 12:31

## 2019-04-19 RX ADMIN — MONTELUKAST 10 MILLIGRAM(S): 4 TABLET, CHEWABLE ORAL at 12:31

## 2019-04-19 RX ADMIN — Medication 0.12 MILLIGRAM(S): at 05:51

## 2019-04-19 RX ADMIN — Medication 5 MILLIGRAM(S): at 04:41

## 2019-04-19 RX ADMIN — LACTULOSE 20 GRAM(S): 10 SOLUTION ORAL at 12:30

## 2019-04-19 RX ADMIN — CEFEPIME 100 MILLIGRAM(S): 1 INJECTION, POWDER, FOR SOLUTION INTRAMUSCULAR; INTRAVENOUS at 05:54

## 2019-04-19 RX ADMIN — PANTOPRAZOLE SODIUM 40 MILLIGRAM(S): 20 TABLET, DELAYED RELEASE ORAL at 06:00

## 2019-04-19 NOTE — PROGRESS NOTE ADULT - SUBJECTIVE AND OBJECTIVE BOX
Patient is seen and examined at the bed side, is afebrile. He is tolerating diet well and no bowel movement for last 5 days. The Blood cultures have no growth to date.         REVIEW OF SYSTEMS: All other review systems are negative        ALLERGIES: No Known Allergies        Vital Signs Last 24 Hrs  T(C): 36.9 (19 Apr 2019 11:35), Max: 37.3 (18 Apr 2019 15:20)  T(F): 98.4 (19 Apr 2019 11:35), Max: 99.1 (18 Apr 2019 15:20)  HR: 82 (19 Apr 2019 11:35) (82 - 90)  BP: 170/77 (19 Apr 2019 11:35) (152/72 - 173/57)  BP(mean): --  RR: 18 (19 Apr 2019 11:35) (17 - 18)  SpO2: 97% (19 Apr 2019 11:35) (97% - 100%)        PHYSICAL EXAM:  GENERAL: Not in distress   CHEST/LUNG:  Air  entry bilaterally  HEART: s1 and s2 present  ABDOMEN:  Nondistended  and nontender  EXTREMITIES: No pedal  edema  CNS: Awake and Alert          LABS:                        10.3   7.96  )-----------( 295      ( 19 Apr 2019 06:24 )             34.3                           10.9   9.82  )-----------( 305      ( 18 Apr 2019 06:30 )             36.8                           10.4   10.15 )-----------( 294      ( 17 Apr 2019 07:10 )             35.7         04-19    141  |  105  |  17  ----------------------------<  89  3.7   |  31  |  1.15    Ca    8.4      19 Apr 2019 06:24  Phos  2.2     04-19  Mg     2.1     04-19      04-18    138  |  104  |  21<H>  ----------------------------<  84  4.1   |  28  |  1.22    Ca    8.6      18 Apr 2019 06:30  Phos  2.8     04-17  Mg     2.3     04-17    TPro  7.2  /  Alb  3.1<L>  /  TBili  0.5  /  DBili  x   /  AST  9<L>  /  ALT  16  /  AlkPhos  54  04-16        MEDICATIONS  (STANDING):  aspirin  chewable 81 milliGRAM(s) Oral daily  atorvastatin 40 milliGRAM(s) Oral at bedtime  buDESOnide 160 MICROgram(s)/formoterol 4.5 MICROgram(s) Inhaler 2 Puff(s) Inhalation two times a day  cefepime   IVPB      cefepime   IVPB 1000 milliGRAM(s) IV Intermittent every 8 hours  digoxin     Tablet 0.125 milliGRAM(s) Oral daily  ergocalciferol 92733 Unit(s) Oral every week  finasteride 5 milliGRAM(s) Oral daily  influenza   Vaccine 0.5 milliLiter(s) IntraMuscular once  lactulose Syrup 20 Gram(s) Oral daily  metoprolol tartrate 100 milliGRAM(s) Oral two times a day  metroNIDAZOLE  IVPB 500 milliGRAM(s) IV Intermittent every 8 hours  montelukast 10 milliGRAM(s) Oral daily  ondansetron Injectable 4 milliGRAM(s) IV Push once  pantoprazole    Tablet 40 milliGRAM(s) Oral before breakfast  potassium phosphate / sodium phosphate powder 1 Packet(s) Oral four times a day with meals  senna 2 Tablet(s) Oral at bedtime  tamsulosin 0.8 milliGRAM(s) Oral at bedtime    MEDICATIONS  (PRN):        RADIOLOGY & ADDITIONAL TESTS:    4/16/19 : CT Abdomen and Pelvis w/ IV Cont (04.16.19 @ 18:23) Acute uncomplicated pancolitis. No obstructive pathology. Enlarged prostate. Right inguinal hernia containing nonobstructive bowel.    4/16/19 : Xray Chest 1 View- PORTABLE-Urgent (04.16.19 @ 17:06) Heart enlargement again noted. The lung fields and pleural surfaces are unremarkable.  Visualized bony structures are grossly intact.    4/16/19 : CT Cervical Spine No Cont (04.16.19 @ 18:18)  CT Head: No acute intracranial hemorrhage or calvarial fracture.    CT cervical spine: No acute cervical spine fracture or evidence of  traumatic malalignment.    1.6 cm hypodense left inferior thyroid lobe nodule. Further evaluation  with nonemergent ultrasound may be obtained as clinically warranted.        MICROBIOLOGY DATA:      Culture - Blood (04.16.19 @ 22:44)    Specimen Source: .Blood    Culture Results:   No growth to date.    Culture - Blood (04.16.19 @ 22:44)    Specimen Source: .Blood    Culture Results:   No growth to date. Patient is seen and examined at the bed side, is afebrile. He is doing better. The Blood cultures remain negative.         REVIEW OF SYSTEMS: All other review systems are negative        ALLERGIES: No Known Allergies        Vital Signs Last 24 Hrs  T(C): 36.9 (19 Apr 2019 11:35), Max: 37.3 (18 Apr 2019 15:20)  T(F): 98.4 (19 Apr 2019 11:35), Max: 99.1 (18 Apr 2019 15:20)  HR: 82 (19 Apr 2019 11:35) (82 - 90)  BP: 170/77 (19 Apr 2019 11:35) (152/72 - 173/57)  BP(mean): --  RR: 18 (19 Apr 2019 11:35) (17 - 18)  SpO2: 97% (19 Apr 2019 11:35) (97% - 100%)        PHYSICAL EXAM:  GENERAL: Not in distress   CHEST/LUNG:  Air  entry bilaterally  HEART: s1 and s2 present  ABDOMEN:  Nondistended  and nontender  EXTREMITIES: No pedal  edema  CNS: Awake and Alert          LABS:                        10.3   7.96  )-----------( 295      ( 19 Apr 2019 06:24 )             34.3                           10.9   9.82  )-----------( 305      ( 18 Apr 2019 06:30 )             36.8                           10.4   10.15 )-----------( 294      ( 17 Apr 2019 07:10 )             35.7         04-19    141  |  105  |  17  ----------------------------<  89  3.7   |  31  |  1.15    Ca    8.4      19 Apr 2019 06:24  Phos  2.2     04-19  Mg     2.1     04-19      04-18    138  |  104  |  21<H>  ----------------------------<  84  4.1   |  28  |  1.22    Ca    8.6      18 Apr 2019 06:30  Phos  2.8     04-17  Mg     2.3     04-17    TPro  7.2  /  Alb  3.1<L>  /  TBili  0.5  /  DBili  x   /  AST  9<L>  /  ALT  16  /  AlkPhos  54  04-16        MEDICATIONS  (STANDING):  aspirin  chewable 81 milliGRAM(s) Oral daily  atorvastatin 40 milliGRAM(s) Oral at bedtime  buDESOnide 160 MICROgram(s)/formoterol 4.5 MICROgram(s) Inhaler 2 Puff(s) Inhalation two times a day  cefepime   IVPB      cefepime   IVPB 1000 milliGRAM(s) IV Intermittent every 8 hours  digoxin     Tablet 0.125 milliGRAM(s) Oral daily  ergocalciferol 36342 Unit(s) Oral every week  finasteride 5 milliGRAM(s) Oral daily  influenza   Vaccine 0.5 milliLiter(s) IntraMuscular once  lactulose Syrup 20 Gram(s) Oral daily  metoprolol tartrate 100 milliGRAM(s) Oral two times a day  metroNIDAZOLE  IVPB 500 milliGRAM(s) IV Intermittent every 8 hours  montelukast 10 milliGRAM(s) Oral daily  ondansetron Injectable 4 milliGRAM(s) IV Push once  pantoprazole    Tablet 40 milliGRAM(s) Oral before breakfast  potassium phosphate / sodium phosphate powder 1 Packet(s) Oral four times a day with meals  senna 2 Tablet(s) Oral at bedtime  tamsulosin 0.8 milliGRAM(s) Oral at bedtime    MEDICATIONS  (PRN):        RADIOLOGY & ADDITIONAL TESTS:    4/16/19 : CT Abdomen and Pelvis w/ IV Cont (04.16.19 @ 18:23) Acute uncomplicated pancolitis. No obstructive pathology. Enlarged prostate. Right inguinal hernia containing nonobstructive bowel.    4/16/19 : Xray Chest 1 View- PORTABLE-Urgent (04.16.19 @ 17:06) Heart enlargement again noted. The lung fields and pleural surfaces are unremarkable.  Visualized bony structures are grossly intact.    4/16/19 : CT Cervical Spine No Cont (04.16.19 @ 18:18)  CT Head: No acute intracranial hemorrhage or calvarial fracture.    CT cervical spine: No acute cervical spine fracture or evidence of  traumatic malalignment.    1.6 cm hypodense left inferior thyroid lobe nodule. Further evaluation  with nonemergent ultrasound may be obtained as clinically warranted.        MICROBIOLOGY DATA:      Culture - Blood (04.16.19 @ 22:44)    Specimen Source: .Blood    Culture Results:   No growth to date.    Culture - Blood (04.16.19 @ 22:44)    Specimen Source: .Blood    Culture Results:   No growth to date.

## 2019-04-19 NOTE — PROGRESS NOTE ADULT - ASSESSMENT
A 85 yo M with multiple co-morbidities including   BPH, CAD with recent + stress s/p cath ,  has brought to the ER by  his son for  evaluation  of nausea, multiple episodes of vomiting and constipation , but no diarrhea,  which led decrease in PO intake and generalized weakness. On admission, he found to have Acute uncomplicated pancolitis on CT abd/pelvis. He has no fever  or chills. The CXR and Urine analysis is negative. He has started on cipro and flagyl and the ID consult requested to assist with further  evaluation and Antibiotic management.    # Acute Pancolitis     would recommend:    1. Start on bowel regimen since has no BM in last 5 days  2. Continue Cefepime and Flagyl inpatient and may change to oral ABx on discharge   3. Stool for C.difficile Toxin and GI pathogen PCR is pending   4.  Advanced diet as tolerated    d/w  patient and His son at the bed side    will follow the patient with you A 85 yo M with multiple co-morbidities including   BPH, CAD with recent + stress s/p cath ,  has brought to the ER by  his son for  evaluation  of nausea, multiple episodes of vomiting and constipation , but no diarrhea,  which led decrease in PO intake and generalized weakness. On admission, he found to have Acute uncomplicated pancolitis on CT abd/pelvis. He has no fever  or chills. The CXR and Urine analysis is negative. He has started on cipro and flagyl and the ID consult requested to assist with further  evaluation and Antibiotic management.    # Acute Pancolitis   # Constipation    would recommend:    1. May change to oral cipro and Flagyl on discharge to complete total of 7 days  2. Continue bowel regimen as needed  3. OOB to chair       d/w  patient and His son at the bed side

## 2019-04-19 NOTE — DISCHARGE NOTE PROVIDER - CARE PROVIDER_API CALL
Ramón Vazquez)  Cardiology Medicine  [address] [place] [address]  Phone: [phone]  Fax: [phone]  Follow Up Time:     Bo Fitzgerald)  Internal Medicine  [address] [place] [address]  Phone: [phone]  Fax: [phone]  Follow Up Time:     Jose M Ramos)  Vascular Surgery  [address] [facility], [address]  [place] [address]  Phone: [phone]  Fax: [phone]  Follow Up Time:

## 2019-04-19 NOTE — CONSULT NOTE ADULT - ASSESSMENT
85yo male with bilateral carotid stenosis, asymptomatic    1) recommend continue asa  2) outpatient follow-up with Dr. Ramos/Sal in 2 weeks  3) cont med management   4) no immediate vascular intervention at this time  5) case and plan discussed with Dr. Huang and agrees

## 2019-04-19 NOTE — DISCHARGE NOTE PROVIDER - HOSPITAL COURSE
84/M with PMH of asthma, HTN, HLD, BPH, CAD with recent + stress s/p cath with minor luminal irregularities and CVA (2015)  was brought to ED by son for nausea, vomiting and constipation for last 3 days. He reports having multiple episodes of NBNB vomiting over last 3 days and constipation which led decrease in PO intake and generalized weakness. Also mentions that he fell while going to bathroom last night - it was unwitnessed and not clear if he lost consciousness or not but denies any confusion or trauma associated with it. Denies diarrhea, abdominal pain, SOB, chest pain, BAUTISTA, palpitations, dizziness, headache, cough, wheezing, joint pain or swelling, fever, chills.     ED course: Vitals: 157/89, 95, 98, 16 (99); Labs pertinent for WBC of 11.86; T1 of 0.019 and Dig level of 0.7; S/p stat dose of cipro and metro, 500 cc bolus, zofran and pepcid. Chest xray/CT head and cervical spine - no acute pathology noted     CT A/P: pancolitis     Patient admitted to tele floor for fall workup, as well as management of colitis. He was started on cefepime and flagyl, as well as laxatives for constipation. ID Dr Martinez, GI Dr Fitzgerald. recc colonoscopy,  however pt did not complete golytely and wants to leave  as it is passover. Will undergo virtual colosnoscopy as outpt. As per cardioDr Vazquez's reccs, orthostats -ve, pt underwent CD: showed left ica occlusion, moderate stenosis of rt ica. vascular surgery consulted, recc outpt f/u.    Patient clinically stable for discharge as discussed with attending Dr Alfaro.

## 2019-04-19 NOTE — CONSULT NOTE ADULT - SUBJECTIVE AND OBJECTIVE BOX
84y Male with PMHx of asthma, HTN, HLD, BPH, CAD admitted with nausea, vomiting and unwitnessed fall at [facility]. Pt denies any weakness, no dizziness or loss of balance. carotid dopplers revealed bilateral carotid stenosis.       pmhx: as above  pshx: none  meds:  aspirin  chewable 81 milliGRAM(s) Oral daily  atorvastatin 40 milliGRAM(s) Oral at bedtime  buDESOnide 160 MICROgram(s)/formoterol 4.5 MICROgram(s) Inhaler 2 Puff(s) Inhalation two times a day  cefepime   IVPB      cefepime   IVPB 1000 milliGRAM(s) IV Intermittent every 8 hours  digoxin     Tablet 0.125 milliGRAM(s) Oral daily  ergocalciferol 44554 Unit(s) Oral every week  finasteride 5 milliGRAM(s) Oral daily  influenza   Vaccine 0.5 milliLiter(s) IntraMuscular once  lactulose Syrup 20 Gram(s) Oral daily  metroNIDAZOLE  IVPB 500 milliGRAM(s) IV Intermittent every 8 hours  montelukast 10 milliGRAM(s) Oral daily  ondansetron Injectable 4 milliGRAM(s) IV Push once  pantoprazole    Tablet 40 milliGRAM(s) Oral before breakfast  potassium phosphate / sodium phosphate powder 1 Packet(s) Oral four times a day with meals  senna 2 Tablet(s) Oral at bedtime  tamsulosin 0.8 milliGRAM(s) Oral at bedtime    No Known Allergies      T(C): 35.9 (04-19-19 @ 07:18), Max: 37.3 (04-18-19 @ 15:20)  HR: 84 (04-19-19 @ 07:18) (84 - 90)  BP: 152/72 (04-19-19 @ 07:18) (152/72 - 173/57)  RR: 18 (04-19-19 @ 07:18) (17 - 18)  SpO2: 98% (04-19-19 @ 07:18) (97% - 100%)  Wt(kg): --    Gen:A&O x3, NAD  SKin: no rashes, no jaundice  Abdomen: soft, nontender, nondistended  Lower Extremities: no edema, no skin discoloration, nontender, ambulates without difficulty or assistance  Upper Extremities: equal strength in bilateral extremities, full ROM, sensation intact        04-18 @ 07:01  -  04-19 @ 07:00  --------------------------------------------------------  IN: 840 mL / OUT: 0 mL / NET: 840 mL                            10.3   7.96  )-----------( 295      ( 19 Apr 2019 06:24 )             34.3   04-19    141  |  105  |  17  ----------------------------<  89  3.7   |  31  |  1.15    Ca    8.4      19 Apr 2019 06:24  Phos  2.2     04-19  Mg     2.1     04-19    < from: US Duplex Carotid Arteries Complete, Bilateral (04.18.19 @ 16:12) >  COMPARISON: 12/29/2015    FINDINGS:  Atherosclerotic plaques plaques are seen in both common carotid bulbs and   proximal aspect of the bilateral ECA segments. Plaque formation is   identified within the visualized segments of the right ICA. Left ICA   occlusion is identified, previously described.    Velocities expressed in centimeters per second are as follows:    RIGHT:  Proximal CCA = 93.3; Distal CCA = 80.9; Proximal ICA = 157.0;   Distal ICA = 108.6;  ECA = 88.7  LEFT:    Proximal CCA = 98.4; Distal CCA = 43.9; ECA = 83.3    Right peak systolic IC/CC velocity ratio: 2.2    IMPRESSION:     1.  Right carotid system:  Findings suggestive of a 50-69% diameter   stenosis.     2.  Left carotid system: Left ICA occlusion.    < end of copied text >

## 2019-04-19 NOTE — DISCHARGE NOTE PROVIDER - PROVIDER TOKENS
PROVIDER:[TOKEN:[[id number]]],PROVIDER:[TOKEN:[[id number]]],PROVIDER:[TOKEN:[[id number]]]

## 2019-04-19 NOTE — DISCHARGE NOTE PROVIDER - NSDCCPCAREPLAN_GEN_ALL_CORE_FT
PRINCIPAL DISCHARGE DIAGNOSIS  Diagnosis: Colitis  Assessment and Plan of Treatment: You presented with nausea nd vomiting, ct scan of your abdomen showed pancolitis/inflammation of your colon with constipation. You were started on antibiotics and laxatives with improvement in symptoms. Continue with oral antibtiocs as prescribed for 4 more days. Follow up with GI Dr Fitzgerald in 1-2 weeks for outpatient virtual colonoscopy.      SECONDARY DISCHARGE DIAGNOSES  Diagnosis: Carotid stenosis  Assessment and Plan of Treatment: Yo reported an episode of fall at home for which an ultrasound of your carotid arteries was done showing narrowing on both sides. You were seen by vascular surgery team who recommended to continue aspirin, follow up as outpatient with Dr Ramos in 1-2 weeks.    Diagnosis: Vitamin D deficiency  Assessment and Plan of Treatment: Your vitamin d level was low on admission. Continue with supplement as prescribed.    Diagnosis: Afib  Assessment and Plan of Treatment: Continue your home medications and follow up with Dr Vazquez in 1-2 weeks    Diagnosis: Hypercholesterolemia  Assessment and Plan of Treatment: Your lipid profile is stable, continue medications as prescribed    Diagnosis: HTN (hypertension)  Assessment and Plan of Treatment: please continue medications as prescribed.

## 2019-04-19 NOTE — DISCHARGE NOTE NURSING/CASE MANAGEMENT/SOCIAL WORK - NSDCDPATPORTLINK_GEN_ALL_CORE
You can access the [facility] Patient Portal, offered by [facility], by registering with the following website: [email or web address]

## 2019-05-23 ENCOUNTER — INPATIENT (INPATIENT)
Facility: HOSPITAL | Age: 84
LOS: 7 days | Discharge: HOME | DRG: 292 | End: 2019-05-31
Attending: INTERNAL MEDICINE | Admitting: INTERNAL MEDICINE
Payer: MEDICARE

## 2019-05-23 VITALS
SYSTOLIC BLOOD PRESSURE: 178 MMHG | TEMPERATURE: 98 F | RESPIRATION RATE: 20 BRPM | DIASTOLIC BLOOD PRESSURE: 82 MMHG | HEART RATE: 95 BPM | WEIGHT: 169.76 LBS | OXYGEN SATURATION: 100 %

## 2019-05-23 DIAGNOSIS — N40.0 BENIGN PROSTATIC HYPERPLASIA WITHOUT LOWER URINARY TRACT SYMPTOMS: ICD-10-CM

## 2019-05-23 DIAGNOSIS — J45.909 UNSPECIFIED ASTHMA, UNCOMPLICATED: ICD-10-CM

## 2019-05-23 DIAGNOSIS — Z98.890 OTHER SPECIFIED POSTPROCEDURAL STATES: Chronic | ICD-10-CM

## 2019-05-23 DIAGNOSIS — I25.10 ATHEROSCLEROTIC HEART DISEASE OF NATIVE CORONARY ARTERY WITHOUT ANGINA PECTORIS: ICD-10-CM

## 2019-05-23 DIAGNOSIS — I50.9 HEART FAILURE, UNSPECIFIED: ICD-10-CM

## 2019-05-23 DIAGNOSIS — I48.91 UNSPECIFIED ATRIAL FIBRILLATION: ICD-10-CM

## 2019-05-23 DIAGNOSIS — Z29.9 ENCOUNTER FOR PROPHYLACTIC MEASURES, UNSPECIFIED: ICD-10-CM

## 2019-05-23 DIAGNOSIS — R06.00 DYSPNEA, UNSPECIFIED: ICD-10-CM

## 2019-05-23 DIAGNOSIS — D64.9 ANEMIA, UNSPECIFIED: ICD-10-CM

## 2019-05-23 LAB
ALBUMIN SERPL ELPH-MCNC: 2.9 G/DL — LOW (ref 3.5–5)
ALP SERPL-CCNC: 51 U/L — SIGNIFICANT CHANGE UP (ref 40–120)
ALT FLD-CCNC: 12 U/L DA — SIGNIFICANT CHANGE UP (ref 10–60)
ANION GAP SERPL CALC-SCNC: 7 MMOL/L — SIGNIFICANT CHANGE UP (ref 5–17)
APTT BLD: 31.3 SEC — SIGNIFICANT CHANGE UP (ref 27.5–36.3)
AST SERPL-CCNC: 12 U/L — SIGNIFICANT CHANGE UP (ref 10–40)
BILIRUB SERPL-MCNC: 0.7 MG/DL — SIGNIFICANT CHANGE UP (ref 0.2–1.2)
BUN SERPL-MCNC: 26 MG/DL — HIGH (ref 7–18)
CALCIUM SERPL-MCNC: 8.3 MG/DL — LOW (ref 8.4–10.5)
CHLORIDE SERPL-SCNC: 107 MMOL/L — SIGNIFICANT CHANGE UP (ref 96–108)
CO2 SERPL-SCNC: 26 MMOL/L — SIGNIFICANT CHANGE UP (ref 22–31)
CREAT SERPL-MCNC: 1.05 MG/DL — SIGNIFICANT CHANGE UP (ref 0.5–1.3)
GLUCOSE BLDC GLUCOMTR-MCNC: 103 MG/DL — HIGH (ref 70–99)
GLUCOSE SERPL-MCNC: 88 MG/DL — SIGNIFICANT CHANGE UP (ref 70–99)
HCT VFR BLD CALC: 28.5 % — LOW (ref 39–50)
HGB BLD-MCNC: 8.2 G/DL — LOW (ref 13–17)
INR BLD: 1.35 RATIO — HIGH (ref 0.88–1.16)
MCHC RBC-ENTMCNC: 20.1 PG — LOW (ref 27–34)
MCHC RBC-ENTMCNC: 28.8 GM/DL — LOW (ref 32–36)
MCV RBC AUTO: 70 FL — LOW (ref 80–100)
NRBC # BLD: 0 /100 WBCS — SIGNIFICANT CHANGE UP (ref 0–0)
OB PNL STL: NEGATIVE — SIGNIFICANT CHANGE UP
PLATELET # BLD AUTO: 394 K/UL — SIGNIFICANT CHANGE UP (ref 150–400)
POTASSIUM SERPL-MCNC: 3.6 MMOL/L — SIGNIFICANT CHANGE UP (ref 3.5–5.3)
POTASSIUM SERPL-SCNC: 3.6 MMOL/L — SIGNIFICANT CHANGE UP (ref 3.5–5.3)
PROT SERPL-MCNC: 6.5 G/DL — SIGNIFICANT CHANGE UP (ref 6–8.3)
PROTHROM AB SERPL-ACNC: 15.1 SEC — HIGH (ref 10–12.9)
RBC # BLD: 4.07 M/UL — LOW (ref 4.2–5.8)
RBC # FLD: 18 % — HIGH (ref 10.3–14.5)
SODIUM SERPL-SCNC: 140 MMOL/L — SIGNIFICANT CHANGE UP (ref 135–145)
TROPONIN I SERPL-MCNC: 0.06 NG/ML — HIGH (ref 0–0.04)
WBC # BLD: 9.19 K/UL — SIGNIFICANT CHANGE UP (ref 3.8–10.5)
WBC # FLD AUTO: 9.19 K/UL — SIGNIFICANT CHANGE UP (ref 3.8–10.5)

## 2019-05-23 PROCEDURE — 99285 EMERGENCY DEPT VISIT HI MDM: CPT

## 2019-05-23 PROCEDURE — 93010 ELECTROCARDIOGRAM REPORT: CPT

## 2019-05-23 PROCEDURE — 71045 X-RAY EXAM CHEST 1 VIEW: CPT | Mod: 26

## 2019-05-23 RX ORDER — METOPROLOL TARTRATE 50 MG
50 TABLET ORAL
Refills: 0 | Status: DISCONTINUED | OUTPATIENT
Start: 2019-05-23 | End: 2019-05-30

## 2019-05-23 RX ORDER — BUDESONIDE AND FORMOTEROL FUMARATE DIHYDRATE 160; 4.5 UG/1; UG/1
2 AEROSOL RESPIRATORY (INHALATION)
Qty: 0 | Refills: 0 | DISCHARGE

## 2019-05-23 RX ORDER — METOPROLOL TARTRATE 50 MG
5 TABLET ORAL ONCE
Refills: 0 | Status: COMPLETED | OUTPATIENT
Start: 2019-05-23 | End: 2019-05-23

## 2019-05-23 RX ORDER — FUROSEMIDE 40 MG
40 TABLET ORAL DAILY
Refills: 0 | Status: DISCONTINUED | OUTPATIENT
Start: 2019-05-23 | End: 2019-05-25

## 2019-05-23 RX ORDER — BUDESONIDE AND FORMOTEROL FUMARATE DIHYDRATE 160; 4.5 UG/1; UG/1
2 AEROSOL RESPIRATORY (INHALATION)
Refills: 0 | Status: DISCONTINUED | OUTPATIENT
Start: 2019-05-23 | End: 2019-05-31

## 2019-05-23 RX ORDER — ERGOCALCIFEROL 1.25 MG/1
50000 CAPSULE ORAL
Refills: 0 | Status: DISCONTINUED | OUTPATIENT
Start: 2019-05-23 | End: 2019-05-31

## 2019-05-23 RX ORDER — ATORVASTATIN CALCIUM 80 MG/1
40 TABLET, FILM COATED ORAL AT BEDTIME
Refills: 0 | Status: DISCONTINUED | OUTPATIENT
Start: 2019-05-23 | End: 2019-05-31

## 2019-05-23 RX ORDER — ASPIRIN/CALCIUM CARB/MAGNESIUM 324 MG
81 TABLET ORAL DAILY
Refills: 0 | Status: DISCONTINUED | OUTPATIENT
Start: 2019-05-23 | End: 2019-05-31

## 2019-05-23 RX ORDER — MONTELUKAST 4 MG/1
10 TABLET, CHEWABLE ORAL DAILY
Refills: 0 | Status: DISCONTINUED | OUTPATIENT
Start: 2019-05-23 | End: 2019-05-31

## 2019-05-23 RX ORDER — RIVAROXABAN 15 MG-20MG
20 KIT ORAL EVERY 24 HOURS
Refills: 0 | Status: DISCONTINUED | OUTPATIENT
Start: 2019-05-23 | End: 2019-05-31

## 2019-05-23 RX ORDER — PANTOPRAZOLE SODIUM 20 MG/1
40 TABLET, DELAYED RELEASE ORAL
Refills: 0 | Status: DISCONTINUED | OUTPATIENT
Start: 2019-05-23 | End: 2019-05-31

## 2019-05-23 RX ORDER — TAMSULOSIN HYDROCHLORIDE 0.4 MG/1
0.8 CAPSULE ORAL AT BEDTIME
Refills: 0 | Status: DISCONTINUED | OUTPATIENT
Start: 2019-05-23 | End: 2019-05-31

## 2019-05-23 RX ORDER — FUROSEMIDE 40 MG
40 TABLET ORAL ONCE
Refills: 0 | Status: COMPLETED | OUTPATIENT
Start: 2019-05-23 | End: 2019-05-23

## 2019-05-23 RX ORDER — IPRATROPIUM/ALBUTEROL SULFATE 18-103MCG
3 AEROSOL WITH ADAPTER (GRAM) INHALATION EVERY 6 HOURS
Refills: 0 | Status: DISCONTINUED | OUTPATIENT
Start: 2019-05-23 | End: 2019-05-31

## 2019-05-23 RX ORDER — INSULIN LISPRO 100/ML
VIAL (ML) SUBCUTANEOUS
Refills: 0 | Status: DISCONTINUED | OUTPATIENT
Start: 2019-05-23 | End: 2019-05-31

## 2019-05-23 RX ORDER — DIGOXIN 250 MCG
0.12 TABLET ORAL DAILY
Refills: 0 | Status: DISCONTINUED | OUTPATIENT
Start: 2019-05-23 | End: 2019-05-31

## 2019-05-23 RX ORDER — FINASTERIDE 5 MG/1
5 TABLET, FILM COATED ORAL DAILY
Refills: 0 | Status: DISCONTINUED | OUTPATIENT
Start: 2019-05-23 | End: 2019-05-31

## 2019-05-23 RX ADMIN — Medication 3 MILLILITER(S): at 22:49

## 2019-05-23 RX ADMIN — Medication 5 MILLIGRAM(S): at 22:41

## 2019-05-23 RX ADMIN — Medication 81 MILLIGRAM(S): at 23:59

## 2019-05-23 RX ADMIN — TAMSULOSIN HYDROCHLORIDE 0.8 MILLIGRAM(S): 0.4 CAPSULE ORAL at 23:58

## 2019-05-23 RX ADMIN — FINASTERIDE 5 MILLIGRAM(S): 5 TABLET, FILM COATED ORAL at 23:58

## 2019-05-23 RX ADMIN — BUDESONIDE AND FORMOTEROL FUMARATE DIHYDRATE 2 PUFF(S): 160; 4.5 AEROSOL RESPIRATORY (INHALATION) at 23:48

## 2019-05-23 RX ADMIN — Medication 0.12 MILLIGRAM(S): at 23:58

## 2019-05-23 RX ADMIN — Medication 40 MILLIGRAM(S): at 22:49

## 2019-05-23 RX ADMIN — Medication 40 MILLIGRAM(S): at 20:02

## 2019-05-23 RX ADMIN — Medication 40 MILLIGRAM(S): at 21:26

## 2019-05-23 RX ADMIN — ATORVASTATIN CALCIUM 40 MILLIGRAM(S): 80 TABLET, FILM COATED ORAL at 23:59

## 2019-05-23 RX ADMIN — MONTELUKAST 10 MILLIGRAM(S): 4 TABLET, CHEWABLE ORAL at 23:59

## 2019-05-23 RX ADMIN — Medication 50 MILLIGRAM(S): at 23:58

## 2019-05-23 NOTE — H&P ADULT - ASSESSMENT
84 Male with PMH of Asthma, CVA (2015), BPH, Non-obstructive CAD, Afib (Xarelto), Bilateral carotid stenosis, Moderate AS, Colitis came to hospital for shortness of breath and weakness for 2 weeks. Admitted to tele for CHF exacerbation and asthma.

## 2019-05-23 NOTE — H&P ADULT - NSHPPHYSICALEXAM_GEN_ALL_CORE
Vital Signs Last 24 Hrs  T(C): 37 (23 May 2019 21:15), Max: 37 (23 May 2019 21:15)  T(F): 98.6 (23 May 2019 21:15), Max: 98.6 (23 May 2019 21:15)  HR: 81 (23 May 2019 21:15) (81 - 95)  BP: 156/95 (23 May 2019 21:15) (156/95 - 178/82)  RR: 19 (23 May 2019 21:15) (19 - 20)  SpO2: 99% (23 May 2019 21:15) (99% - 100%)  .  GENERAL: Well developed, Elderly [place]ian male, NAD  HEENT:  Normocephalic/Atraumatic, reactive light reflex, moist mucous membranes  NECK: Supple, + JVD  RESP: Symmetric movement of the chest, bilateral wheezing and rales on bases  CVS: + irregular rate and rhythm, S1 and S2 audible, no murmur, rubs or gallops noted  GI: Normal active bowel sounds present, abdomen soft, non tender, non distended, without scar marks  EXTREMITIES:  No edema, no clubbing, cyanosis  MSK: 5/5 strength bilateral upper and lower extremities, intact sensations to light & crude touch  PSYCH: Normal mood, normal affect observed    NEURO: Alert and oriented x 3 Vital Signs Last 24 Hrs  T(C): 37 (23 May 2019 21:15), Max: 37 (23 May 2019 21:15)  T(F): 98.6 (23 May 2019 21:15), Max: 98.6 (23 May 2019 21:15)  HR: 81 (23 May 2019 21:15) (81 - 95)  BP: 156/95 (23 May 2019 21:15) (156/95 - 178/82)  RR: 19 (23 May 2019 21:15) (19 - 20)  SpO2: 99% (23 May 2019 21:15) (99% - 100%)  .  GENERAL: Well developed, Elderly [place] male, NAD  HEENT:  Normocephalic/Atraumatic, reactive light reflex, moist mucous membranes  NECK: Supple, + JVD, +murmur   RESP: Symmetric movement of the chest, bilateral wheezing and rales on bases  CVS: + irregular rate and rhythm, S1 and S2 audible, + murmur, no rubs or gallops noted  GI: Normal active bowel sounds present, abdomen soft, non tender, non distended, without scar marks  EXTREMITIES:  No edema, no clubbing, cyanosis  MSK: 5/5 strength bilateral upper and lower extremities, intact sensations to light & crude touch  PSYCH: Normal mood, normal affect observed    NEURO: Alert and oriented x 3

## 2019-05-23 NOTE — H&P ADULT - ATTENDING COMMENTS
I agree with the above info, changes made above as needed  pt p/w sob  pt admitted for acute pulmonary edema and acute asthma exacerbation 2/2 acute on chronic systolic chf exacerbation  uncontrolled htn, c/w antihypertensive rx  ckd 3, stable, renally adjust rx prn   vitamin d deficiency - supplement  iv lasix  steroids with tapering, nebs  cardiac meds  tele  all consultants and medical team members management appreciated

## 2019-05-23 NOTE — H&P ADULT - PROBLEM SELECTOR PLAN 4
Rate control with Digoxin  Anticoagulation with Xarelto Rate control with Digoxin  Anticoagulation with Xarelto  EKG: Rate controlled @ 90 bpm

## 2019-05-23 NOTE — H&P ADULT - PROBLEM SELECTOR PLAN 1
Mild JVD, Dyspnea on exertion and orthopnea  CXR: Bilateral basal congestion and BNP 7500  Stress test one month ago showed normal EF but on radionuclide angiography EF was 35% Mild JVD, Dyspnea on exertion and orthopnea  CXR: Bilateral basal congestion and BNP 7500  Stress test one month ago showed normal EF but on radionuclide angiography EF was 35%  Ordered echocardiogram  Started Lasix 40 IV daily  Monitor daily weights Mild JVD, Dyspnea on exertion and orthopnea  CXR: Bilateral basal congestion and BNP 7500  Stress test one month ago showed normal EF but on radionuclide angiography EF was 35%  Likely acute exacerbation due to moderate aortic stenosis?  Ordered echocardiogram  Started Lasix 40 IV daily  Monitor daily weights Mild JVD, Dyspnea on exertion and orthopnea  CXR: Bilateral basal congestion and BNP 7500  Stress test one month ago showed normal EF but on radionuclide angiography EF was 35%  Likely acute exacerbation due to moderate aortic stenosis?  Ordered echocardiogram  Started Lasix 40 IV daily  Monitor daily weights  Consulted Dr Vazquez Dyspnea on exertion and orthopnea  CXR: Bilateral basal congestion and BNP 7500  Stress test one month ago showed normal EF but on radionuclide angiography EF was 35%  Likely acute exacerbation due to moderate aortic stenosis?  Ordered echocardiogram  Started Lasix 40 IV daily  Monitor daily weights  Consulted Dr Vazquez

## 2019-05-23 NOTE — H&P ADULT - PROBLEM SELECTOR PLAN 3
Baseline Hb of 11  Presented with microcytic anemia  FOBT negative  Pt was advised to get outpatient colonoscopy last month which he has not been able to get  F/u Anemia panel and may get benefit with IV iron Baseline Hb of 11  Presented with microcytic anemia of 8.2  FOBT negative  Pt was advised to get outpatient colonoscopy last month which he has not been able to get  F/u Anemia panel and may get benefit with IV iron

## 2019-05-23 NOTE — H&P ADULT - HISTORY OF PRESENT ILLNESS
84 Male with PMH of Asthma, CVA (2015), BPH, Non-obstructive CAD, Afib (Xarelto), Bilateral carotid stenosis, Moderate AS, Colitis came to hospital for shortness of breath and 84 Male with PMH of Asthma, CVA (2015), BPH, Non-obstructive CAD, Afib (Xarelto), Bilateral carotid stenosis, Moderate AS, Colitis came to hospital for shortness of breath and weakness for 2 weeks. Symptoms were associated with poor oral intake and more frequent use of home nebulizers. Pt was last admitted in [facility] for shortness of breath and had stress test which showed reversible changes with normal EF. On cardiac cath, his EF was 35% on radionuclide angiography with no obstructive disease.     SH: Lives with daughter. Uses cane. No smoking and alcohol.    ED Course: Hemodynamically stable. Labs showed anemia of 8.2 and cardiac enzymes of 0.057. BNP was 7500 with mild bilateral congested CXR. EKG showed chronic AFib @ 90 bpm. Pt was given 80 IV lasix. 84 Male with PMH of Asthma, CVA (2015), BPH, Non-obstructive CAD, Afib (Xarelto), Bilateral carotid stenosis, Moderate AS, Colitis came to hospital for shortness of breath and weakness for 2 weeks. Symptoms were associated with poor oral intake and more frequent use of home nebulizers. Pt was last admitted in [facility] for shortness of breath and had stress test which showed reversible changes with normal EF. On cardiac cath, his EF was 35% on radionuclide angiography with no obstructive disease. Currently he can not walk more than 2 blocks due to dyspnea and uses 1 pillow at night.     SH: Lives with daughter. Uses cane. No smoking and alcohol.    ED Course: Hemodynamically stable. Labs showed anemia of 8.2 and cardiac enzymes of 0.057. BNP was 7500 with mild bilateral congested CXR. EKG showed chronic AFib @ 90 bpm. Pt was given 80 IV lasix.

## 2019-05-23 NOTE — H&P ADULT - NSICDXPASTMEDICALHX_GEN_ALL_CORE_FT
PAST MEDICAL HISTORY:  Afib     Asthma     CVA (cerebral vascular accident) 2015    HTN (hypertension)     Hypercholesterolemia     Leukocytoclastic vasculitis

## 2019-05-23 NOTE — ED PROVIDER NOTE - PMH
Asthma    CAD (coronary artery disease)    CVA (cerebral vascular accident)  2015  HTN (hypertension)    Hypercholesterolemia    Leukocytoclastic vasculitis

## 2019-05-23 NOTE — ED PROVIDER NOTE - GASTROINTESTINAL [-], MLM
Problem: Anemia  Goal: Anemia Symptom Improvement    Intervention: Monitor and Manage Anemia  Pt has a good understanding of iv iron protocol         no abdominal pain

## 2019-05-23 NOTE — H&P ADULT - PROBLEM SELECTOR PLAN 2
Acute exacerbation  Bilateral wheezing and shortness of breath  Peak flow was 100 in ED with poor study  Started Solumedrol 40 q12 and bronchodilators   Monitor peak flow daily Acute exacerbation  Bilateral wheezing and shortness of breath  Peak flow was 100 in ED with poor study  Started Solumedrol 40 q12 and bronchodilators   Monitor peak flow daily  Consulted Dr Carmona

## 2019-05-23 NOTE — ED PROVIDER NOTE - CLINICAL SUMMARY MEDICAL DECISION MAKING FREE TEXT BOX
83 y/o M presents to the ED with shortness of breath x 1 day. Patient afebrile, slight tachypnea noted; consider ACS, heart failure, PE, PNA. Will obtain labs, CXR and reevaluate.

## 2019-05-23 NOTE — CHART NOTE - NSCHARTNOTEFT_GEN_A_CORE
Pt started going in Afib with RVR up to 140 bpm on tele monitoring while ambulation  Ordered 5mg IV metoprolol push and started oral 50mg b-blocker

## 2019-05-23 NOTE — ED PROVIDER NOTE - OBJECTIVE STATEMENT
83 y/o M with a significant PMHx of CAD, HTN, HLD, asthma, CVA and leukocytoclastic vasculitis presents to the ED with complaints of shortness of breath x 1 day. Daughter states patient also with decreased oral intake and weakness x 3 weeks. Denies cough, fever, chills, abdominal pain or any other complaints.

## 2019-05-23 NOTE — ED ADULT NURSE REASSESSMENT NOTE - NS ED NURSE REASSESS COMMENT FT1
Received pt from JULIO C Vega, pt is observed laying in bed, breathing room air, in no respiratory distress at time of assessment. Pt is A&O x2-3, able to make needs known in Turkmen language. Pt has left AC #20Ga in place, meds administered as ordered, tolerated well. Family at bedside. Pt admitted to [facility], now on box H, sinus rhythm noted. HR 98 at this time. Awaiting bed, endorsed to JULIO C Echols for holding. Family at bedside.

## 2019-05-24 DIAGNOSIS — E55.9 VITAMIN D DEFICIENCY, UNSPECIFIED: ICD-10-CM

## 2019-05-24 DIAGNOSIS — N18.3 CHRONIC KIDNEY DISEASE, STAGE 3 (MODERATE): ICD-10-CM

## 2019-05-24 DIAGNOSIS — I50.23 ACUTE ON CHRONIC SYSTOLIC (CONGESTIVE) HEART FAILURE: ICD-10-CM

## 2019-05-24 LAB
ALBUMIN SERPL ELPH-MCNC: 3.6 G/DL — SIGNIFICANT CHANGE UP (ref 3.5–5)
ALP SERPL-CCNC: 66 U/L — SIGNIFICANT CHANGE UP (ref 40–120)
ALT FLD-CCNC: 15 U/L DA — SIGNIFICANT CHANGE UP (ref 10–60)
ANION GAP SERPL CALC-SCNC: 11 MMOL/L — SIGNIFICANT CHANGE UP (ref 5–17)
AST SERPL-CCNC: 17 U/L — SIGNIFICANT CHANGE UP (ref 10–40)
BASOPHILS # BLD AUTO: 0.01 K/UL — SIGNIFICANT CHANGE UP (ref 0–0.2)
BASOPHILS NFR BLD AUTO: 0.2 % — SIGNIFICANT CHANGE UP (ref 0–2)
BILIRUB SERPL-MCNC: 0.9 MG/DL — SIGNIFICANT CHANGE UP (ref 0.2–1.2)
BUN SERPL-MCNC: 23 MG/DL — HIGH (ref 7–18)
CALCIUM SERPL-MCNC: 9.1 MG/DL — SIGNIFICANT CHANGE UP (ref 8.4–10.5)
CHLORIDE SERPL-SCNC: 100 MMOL/L — SIGNIFICANT CHANGE UP (ref 96–108)
CHOLEST SERPL-MCNC: 184 MG/DL — SIGNIFICANT CHANGE UP (ref 10–199)
CK MB BLD-MCNC: 2.9 % — SIGNIFICANT CHANGE UP (ref 0–3.5)
CK MB CFR SERPL CALC: 1.5 NG/ML — SIGNIFICANT CHANGE UP (ref 0–3.6)
CK SERPL-CCNC: 51 U/L — SIGNIFICANT CHANGE UP (ref 35–232)
CO2 SERPL-SCNC: 27 MMOL/L — SIGNIFICANT CHANGE UP (ref 22–31)
CREAT SERPL-MCNC: 1.29 MG/DL — SIGNIFICANT CHANGE UP (ref 0.5–1.3)
EOSINOPHIL # BLD AUTO: 0 K/UL — SIGNIFICANT CHANGE UP (ref 0–0.5)
EOSINOPHIL NFR BLD AUTO: 0 % — SIGNIFICANT CHANGE UP (ref 0–6)
FERRITIN SERPL-MCNC: 16 NG/ML — LOW (ref 30–400)
FOLATE SERPL-MCNC: 14.1 NG/ML — SIGNIFICANT CHANGE UP
GLUCOSE BLDC GLUCOMTR-MCNC: 117 MG/DL — HIGH (ref 70–99)
GLUCOSE BLDC GLUCOMTR-MCNC: 146 MG/DL — HIGH (ref 70–99)
GLUCOSE BLDC GLUCOMTR-MCNC: 148 MG/DL — HIGH (ref 70–99)
GLUCOSE BLDC GLUCOMTR-MCNC: 238 MG/DL — HIGH (ref 70–99)
GLUCOSE SERPL-MCNC: 140 MG/DL — HIGH (ref 70–99)
HBA1C BLD-MCNC: 5.3 % — SIGNIFICANT CHANGE UP (ref 4–5.6)
HCT VFR BLD CALC: 37.6 % — LOW (ref 39–50)
HDLC SERPL-MCNC: 69 MG/DL — SIGNIFICANT CHANGE UP
HGB BLD-MCNC: 11 G/DL — LOW (ref 13–17)
IMM GRANULOCYTES NFR BLD AUTO: 0.2 % — SIGNIFICANT CHANGE UP (ref 0–1.5)
IRON SATN MFR SERPL: 13 UG/DL — LOW (ref 65–170)
IRON SATN MFR SERPL: 3 % — LOW (ref 20–55)
LDH SERPL L TO P-CCNC: 269 U/L — HIGH (ref 120–225)
LIPID PNL WITH DIRECT LDL SERPL: 103 MG/DL — SIGNIFICANT CHANGE UP
LYMPHOCYTES # BLD AUTO: 0.95 K/UL — LOW (ref 1–3.3)
LYMPHOCYTES # BLD AUTO: 16.9 % — SIGNIFICANT CHANGE UP (ref 13–44)
MAGNESIUM SERPL-MCNC: 2.2 MG/DL — SIGNIFICANT CHANGE UP (ref 1.6–2.6)
MCHC RBC-ENTMCNC: 20 PG — LOW (ref 27–34)
MCHC RBC-ENTMCNC: 29.3 GM/DL — LOW (ref 32–36)
MCV RBC AUTO: 68.4 FL — LOW (ref 80–100)
MONOCYTES # BLD AUTO: 0.08 K/UL — SIGNIFICANT CHANGE UP (ref 0–0.9)
MONOCYTES NFR BLD AUTO: 1.4 % — LOW (ref 2–14)
NEUTROPHILS # BLD AUTO: 4.56 K/UL — SIGNIFICANT CHANGE UP (ref 1.8–7.4)
NEUTROPHILS NFR BLD AUTO: 81.3 % — HIGH (ref 43–77)
NRBC # BLD: 0 /100 WBCS — SIGNIFICANT CHANGE UP (ref 0–0)
PHOSPHATE SERPL-MCNC: 4.2 MG/DL — SIGNIFICANT CHANGE UP (ref 2.5–4.5)
PLATELET # BLD AUTO: 573 K/UL — HIGH (ref 150–400)
POTASSIUM SERPL-MCNC: 3.6 MMOL/L — SIGNIFICANT CHANGE UP (ref 3.5–5.3)
POTASSIUM SERPL-SCNC: 3.6 MMOL/L — SIGNIFICANT CHANGE UP (ref 3.5–5.3)
PROT SERPL-MCNC: 8.3 G/DL — SIGNIFICANT CHANGE UP (ref 6–8.3)
RBC # BLD: 5.5 M/UL — SIGNIFICANT CHANGE UP (ref 4.2–5.8)
RBC # FLD: 18.5 % — HIGH (ref 10.3–14.5)
SODIUM SERPL-SCNC: 138 MMOL/L — SIGNIFICANT CHANGE UP (ref 135–145)
T4 AB SER-ACNC: 11.7 UG/DL — SIGNIFICANT CHANGE UP (ref 4.6–12)
TIBC SERPL-MCNC: 488 UG/DL — HIGH (ref 250–450)
TOTAL CHOLESTEROL/HDL RATIO MEASUREMENT: 2.7 RATIO — LOW (ref 3.4–9.6)
TRANSFERRIN SERPL-MCNC: 403 MG/DL — HIGH (ref 200–360)
TRIGL SERPL-MCNC: 59 MG/DL — SIGNIFICANT CHANGE UP (ref 10–149)
TROPONIN I SERPL-MCNC: 0.03 NG/ML — SIGNIFICANT CHANGE UP (ref 0–0.04)
TSH SERPL-MCNC: 1.96 UU/ML — SIGNIFICANT CHANGE UP (ref 0.34–4.82)
UIBC SERPL-MCNC: 475 UG/DL — HIGH (ref 110–370)
VIT B12 SERPL-MCNC: 970 PG/ML — SIGNIFICANT CHANGE UP (ref 232–1245)
WBC # BLD: 5.61 K/UL — SIGNIFICANT CHANGE UP (ref 3.8–10.5)
WBC # FLD AUTO: 5.61 K/UL — SIGNIFICANT CHANGE UP (ref 3.8–10.5)

## 2019-05-24 PROCEDURE — 99221 1ST HOSP IP/OBS SF/LOW 40: CPT

## 2019-05-24 PROCEDURE — 93306 TTE W/DOPPLER COMPLETE: CPT | Mod: 26

## 2019-05-24 RX ADMIN — Medication 3 MILLILITER(S): at 09:30

## 2019-05-24 RX ADMIN — MONTELUKAST 10 MILLIGRAM(S): 4 TABLET, CHEWABLE ORAL at 11:56

## 2019-05-24 RX ADMIN — Medication 50 MILLIGRAM(S): at 17:34

## 2019-05-24 RX ADMIN — TAMSULOSIN HYDROCHLORIDE 0.8 MILLIGRAM(S): 0.4 CAPSULE ORAL at 22:00

## 2019-05-24 RX ADMIN — ERGOCALCIFEROL 50000 UNIT(S): 1.25 CAPSULE ORAL at 16:36

## 2019-05-24 RX ADMIN — PANTOPRAZOLE SODIUM 40 MILLIGRAM(S): 20 TABLET, DELAYED RELEASE ORAL at 05:43

## 2019-05-24 RX ADMIN — Medication 3 MILLILITER(S): at 21:05

## 2019-05-24 RX ADMIN — Medication 40 MILLIGRAM(S): at 05:43

## 2019-05-24 RX ADMIN — Medication 0.12 MILLIGRAM(S): at 05:43

## 2019-05-24 RX ADMIN — FINASTERIDE 5 MILLIGRAM(S): 5 TABLET, FILM COATED ORAL at 11:56

## 2019-05-24 RX ADMIN — Medication 40 MILLIGRAM(S): at 17:33

## 2019-05-24 RX ADMIN — Medication 50 MILLIGRAM(S): at 05:44

## 2019-05-24 RX ADMIN — Medication 2: at 11:55

## 2019-05-24 RX ADMIN — Medication 81 MILLIGRAM(S): at 11:56

## 2019-05-24 RX ADMIN — BUDESONIDE AND FORMOTEROL FUMARATE DIHYDRATE 2 PUFF(S): 160; 4.5 AEROSOL RESPIRATORY (INHALATION) at 22:00

## 2019-05-24 RX ADMIN — ATORVASTATIN CALCIUM 40 MILLIGRAM(S): 80 TABLET, FILM COATED ORAL at 22:00

## 2019-05-24 RX ADMIN — BUDESONIDE AND FORMOTEROL FUMARATE DIHYDRATE 2 PUFF(S): 160; 4.5 AEROSOL RESPIRATORY (INHALATION) at 11:58

## 2019-05-24 RX ADMIN — Medication 3 MILLILITER(S): at 15:39

## 2019-05-24 RX ADMIN — RIVAROXABAN 20 MILLIGRAM(S): KIT at 17:34

## 2019-05-24 NOTE — CONSULT NOTE ADULT - ATTENDING COMMENTS
As above  Global symps  No focal neuro defs    Carotid US: (April) L ICA occ. R ICA mod stenosis    A/P:   AMBAR  Ch occ L ICA  R ICA mod stenosis    Rec:   Med mgmt  Neuro eval  CTA neck and brain to confirm anatomy  Will need vasc fu

## 2019-05-24 NOTE — CONSULT NOTE ADULT - SUBJECTIVE AND OBJECTIVE BOX
CHIEF COMPLAINT: Patient is a 84y old  Male who presents with a chief complaint of Shortness of breath (24 May 2019 17:03)      HPI:  84 Male with PMH of Asthma, CVA (2015), BPH, Non-obstructive CAD, Afib (Xarelto), Bilateral carotid stenosis, Moderate AS, Colitis came to hospital for shortness of breath and weakness for 2 weeks. Symptoms were associated with poor oral intake and more frequent use of home nebulizers. Pt was last admitted in [facility] for shortness of breath and had stress test which showed reversible changes with normal EF. On cardiac cath, his EF was 35% on radionuclide angiography with no obstructive disease. Currently he can not walk more than 2 blocks due to dyspnea and uses 1 pillow at night.     SH: Lives with daughter. Uses cane. No smoking and alcohol.    ED Course: Hemodynamically stable. Labs showed anemia of 8.2 and cardiac enzymes of 0.057. BNP was 7500 with mild bilateral congested CXR. EKG showed chronic AFib @ 90 bpm. Pt was given 80 IV lasix. (23 May 2019 21:05)   Patient seen and examined.     PAST MEDICAL & SURGICAL HISTORY:  Afib  Leukocytoclastic vasculitis  Hypercholesterolemia  HTN (hypertension)  CVA (cerebral vascular accident):   Asthma  H/O sinus surgery      Allergies    No Known Allergies    Intolerances        MEDICATIONS  (STANDING):  ALBUTerol/ipratropium for Nebulization 3 milliLiter(s) Nebulizer every 6 hours  aspirin  chewable 81 milliGRAM(s) Oral daily  atorvastatin 40 milliGRAM(s) Oral at bedtime  buDESOnide 160 MICROgram(s)/formoterol 4.5 MICROgram(s) Inhaler 2 Puff(s) Inhalation two times a day  digoxin     Tablet 0.125 milliGRAM(s) Oral daily  ergocalciferol 38929 Unit(s) Oral every week  finasteride 5 milliGRAM(s) Oral daily  furosemide   Injectable 40 milliGRAM(s) IV Push daily  insulin lispro (HumaLOG) corrective regimen sliding scale   SubCutaneous Before meals and at bedtime  methylPREDNISolone sodium succinate Injectable 40 milliGRAM(s) IV Push two times a day  metoprolol tartrate 50 milliGRAM(s) Oral two times a day  montelukast 10 milliGRAM(s) Oral daily  pantoprazole    Tablet 40 milliGRAM(s) Oral before breakfast  rivaroxaban 20 milliGRAM(s) Oral every 24 hours  tamsulosin 0.8 milliGRAM(s) Oral at bedtime      MEDICATIONS  (PRN):       Medications up to date at time of exam.    FAMILY HISTORY:  No pertinent family history in first degree relatives      SOCIAL HISTORY  Smoking History: [   ] smoking/smoke exposure, [   ] former smoker  Living Condition: [   ] apartment, [   ] private house  Work History:   Travel History: denies recent travel  Illicit Substance Use: denies  Alcohol Use: denies    REVIEW OF SYSTEMS:    CONSTITUTIONAL:  denies fevers, chills, sweats, weight loss    HEENT:  denies diplopia or blurred vision, sore throat or runny nose.    CARDIOVASCULAR:  denies pressure, squeezing, tightness, or heaviness about the chest; no palpitations.    RESPIRATORY:  +SOB, cough, BAUTISTA, wheezing.    GASTROINTESTINAL:  denies abdominal pain, nausea, vomiting or diarrhea.    GENITOURINARY: denies dysuria, frequency or urgency.    NEUROLOGIC:  denies numbness, tingling, seizures or weakness.    PSYCHIATRIC:  denies disorder of thought or mood.    MSK: denies swelling, redness      PHYSICAL EXAMINATION:    GENERAL: The patient is a well-developed, well-nourished, in no apparent distress.     Vital Signs Last 24 Hrs  T(C): 37 (24 May 2019 20:32), Max: 37 (24 May 2019 20:32)  T(F): 98.6 (24 May 2019 20:32), Max: 98.6 (24 May 2019 20:32)  HR: 73 (24 May 2019 20:32) (73 - 119)  BP: 128/50 (24 May 2019 20:32) (128/50 - 175/73)  BP(mean): --  RR: 18 (24 May 2019 20:32) (17 - 22)  SpO2: 98% (24 May 2019 21:43) (96% - 100%)    HEENT: head is normocephalic and atraumatic. mucous membranes are moist.     NECK: supple, no palpable adenopathy.    LUNGS: clear to auscultation, no wheezing, rales, or rhonchi.    HEART: irregularly irregular without murmur.    ABDOMEN: soft, nontender, and nondistended.     EXTREMITIES: without any cyanosis, clubbing, rash, lesions or edema.    NEUROLOGIC: awake, alert, oriented.     SKIN: warm, dry, good turgor.      LABS:                        11.0   5.61  )-----------( 573      ( 24 May 2019 06:59 )             37.6     -    138  |  100  |  23<H>  ----------------------------<  140<H>  3.6   |  27  |  1.29    Ca    9.1      24 May 2019 06:59  Phos  4.2       Mg     2.2         TPro  8.3  /  Alb  3.6  /  TBili  0.9  /  DBili  x   /  AST  17  /  ALT  15  /  AlkPhos  66  -24    PT/INR - ( 23 May 2019 17:06 )   PT: 15.1 sec;   INR: 1.35 ratio         PTT - ( 23 May 2019 17:06 )  PTT:31.3 sec      CARDIAC MARKERS ( 24 May 2019 06:59 )  0.034 ng/mL / x     / 51 U/L / x     / 1.5 ng/mL  CARDIAC MARKERS ( 23 May 2019 17:06 )  0.057 ng/mL / x     / x     / x     / x            Serum Pro-Brain Natriuretic Peptide: 7534 pg/mL (19 @ 17:06)          Troponin 0.034  @ 06:59  CK 51  @ 06:59  CKMB --  @ 06:59  .    MICROBIOLOGY: (if applicable)    RADIOLOGY & ADDITIONAL STUDIES:  EKG:   CXR:  ECHO:  TELE:  < from: Cardiac Cath Lab - Adult (19 @ 14:05) >    [facility]  Department of Cardiology  [address] [place] [address]  [phone]  Cath Lab Report -- Comprehensive Report  Patient: BAILEE BUCHANAN  Study date: 2019  Account number: [id number]  MR number:[id number]  : 1935  Gender: Male  Race: W  Case Physician(s):  CUAUHTEMOC Bajwa M.D.  Fellow:  Amadeo Kay M.D.  Referring Physician:  Naveed Donald MD  INDICATIONS: Initial NSTEMI.  HISTORY: History includes stroke (beyond 2 weeks). The patient has  hypertension, medication-treated dyslipidemia, and a family history of  coronary artery disease.  PROCEDURE:  --  Left coronary angiography.  --  Right coronary angiography.  --  Hemostasis with Angioseal.  TECHNIQUE: The risks and alternatives of the procedures and conscious  sedation were explained to the patient and informed consent was obtained.  Cardiac catheterization performed electively.  Local anesthetic given. Right femoral artery access. Left coronary artery  angiography. The vessel was injected utilizing a catheter. Right coronary  artery angiography. The vessel was injected utilizing a catheter.  Hemostasis with Angioseal. RADIATION EXPOSURE: 11.6 min.  CONTRAST GIVEN: Omnipaque 40 ml.  MEDICATIONS GIVEN: Midazolam, 1 mg, IV. Fentanyl, 25 mcg, IV. Fentanyl, 25  mcg, IV.  VENTRICLES: EF by radionuclide angiography was 35 %.  CORONARY VESSELS: The coronary circulation is right dominant.  LM:   --  LM: Normal.  LAD:   --  LAD: Angiographyshowed minor luminal irregularities with no  flow limiting lesions.  CX:   --  Circumflex: Angiography showed minor luminal irregularities with  no flow limiting lesions.  RCA:   --  RCA: Angiography showed minor luminal irregularities with no  flow limiting lesions.  COMPLICATIONS: There were no complications.  Prepared and signed by  Jorje King M.D.  Signed 2019 16:03:52  HEMODYNAMIC TABLES  Pressures:  Baseline  Pressures:  - HR: 96  Pressures:  - Rhythm:  Pressures:  -- Aortic Pressure (S/D/M): 153/76/108  Outputs:  Baseline  Outputs:  -- CALCULATIONS[age]  Outputs:  -- CALCULATIONS: Body Surface Area: 1.82  Outputs:  -- CALCULATIONS: Height in cm: 170.00  Outputs:  -- CALCULATIONS: Sex: Male  Outputs:  -- CALCULATIONS: Weight in k.80    < end of copied text >      < from: US Duplex Carotid Arteries Complete, Bilateral (19 @ 16:12) >    EXAM:  US DPLX CAROTIDS COMPL BI                            PROCEDURE DATE:  2019          INTERPRETATION:  ULTRASOUND OF THE CAROTID ARTERIES    CLINICAL INDICATION: Syncope, history of left ICA occlusion.      TECHNIQUE:   Doppler ultrasonography of the carotid arteries was   performed utilizing grayscale, color and spectral Doppler.   The   magnitude of stenosis was estimated based on established tables of   systolic peak velocity related to the magnitude of carotid stenosis.    COMPARISON: 2015    FINDINGS:  Atherosclerotic plaques plaques are seen in both common carotid bulbs and   proximal aspect of the bilateral ECA segments. Plaque formation is   identified within the visualized segments of the right ICA. Left ICA   occlusion is identified, previously described.    Velocities expressed in centimeters per second are as follows:    RIGHT:  Proximal CCA = 93.3; Distal CCA = 80.9; Proximal ICA = 157.0;   Distal ICA = 108.6;  ECA = 88.7  LEFT:    Proximal CCA = 98.4; Distal CCA = 43.9; ECA = 83.3    Right peak systolic IC/CC velocity ratio: 2.2    IMPRESSION:     1.  Right carotid system:  Findings suggestive of a 50-69% diameter   stenosis.     2.  Left carotid system: Left ICA occlusion.                PALOMA MAY  This document has been electronically signed. 2019  4:35PM                < end of copied text >      IMPRESSION: 84y Male PAST MEDICAL & SURGICAL HISTORY:  Afib  Leukocytoclastic vasculitis  Hypercholesterolemia  HTN (hypertension)  CVA (cerebral vascular accident):   Asthma  H/O sinus surgery    84 Male with PMH of Asthma, CVA (), BPH, Non-obstructive CAD, Afib (Xarelto), Bilateral carotid stenosis, Moderate AS, Colitis came to hospital for shortness of breath and weakness for 2 weeks. Symptoms were associated with poor oral intake and more frequent use of home nebulizers. Pt was last admitted in [facility] for shortness of breath and had stress test which showed reversible changes with normal EF. On cardiac cath, his EF was 35% on radionuclide angiography with no obstructive disease. Currently he can not walk more than 2 blocks due to dyspnea and uses 1 pillow at night.     RECOMMENDATIONS:    Patient presents with increasing SOB for 2 weeks. Recent cardiac cath showed non obstructive CAD.   Afib stable rate. Would repeat 2D echo. Of note patient has carotid artery stenosis of which he was supposed to follow up with vascular but was not able to, (L ICA occlusion and 50-69% stenosis of R ICA).   Trend cardiac enzymes.  P-bnp 7500, would continue with IV lasix.

## 2019-05-24 NOTE — CONSULT NOTE ADULT - ASSESSMENT
84 year old male with right ICA stenosis and left ICA occlusion complaining of dizziness, admitted with shortness of breath     - recommend repeat imaging   - neurology and cardiology evaluation and workup   - anticoagulation and statin therapy as tolerated  - continue medical management  - will discuss with Dr. Chapa

## 2019-05-24 NOTE — PROGRESS NOTE ADULT - PROBLEM SELECTOR PLAN 3
Baseline Hb of 11  Presented with microcytic anemia of 8.2  FOBT negative  Pt was advised to get outpatient colonoscopy last month which he has not been able to get  F/u Anemia panel and may get benefit with IV iron- low iron, elevated transferrin   microcytic anemia

## 2019-05-24 NOTE — PROGRESS NOTE ADULT - ASSESSMENT
84 Male with PMH of Asthma, CVA (2015), BPH, Non-obstructive CAD, Afib (Xarelto), Bilateral carotid stenosis, Moderate AS, Colitis came to hospital for shortness of breath and weakness for 2 weeks. Admitted to tele for CHF exacerbation and asthma.  Hba1c 5.4 84 Male with PMH of Asthma, CVA (2015), BPH, Non-obstructive CAD, Afib (Xarelto), Bilateral carotid stenosis, Moderate AS, Colitis came to hospital for shortness of breath and weakness for 2 weeks. Admitted to tele for CHF exacerbation and asthma.  Hba1c 5.4  vascular consulted for right carotid stenosis

## 2019-05-24 NOTE — PROGRESS NOTE ADULT - SUBJECTIVE AND OBJECTIVE BOX
Patient seen and examined at bedside  c/o sob  Case discussed with medical team    HPI:  84 Male with PMH of Asthma, CVA (2015), BPH, Non-obstructive CAD, Afib (Xarelto), Bilateral carotid stenosis, Moderate AS, Colitis came to hospital for shortness of breath and weakness for 2 weeks. Symptoms were associated with poor oral intake and more frequent use of home nebulizers. Pt was last admitted in [facility] for shortness of breath and had stress test which showed reversible changes with normal EF. On cardiac cath, his EF was 35% on radionuclide angiography with no obstructive disease. Currently he can not walk more than 2 blocks due to dyspnea and uses 1 pillow at night.     SH: Lives with daughter. Uses cane. No smoking and alcohol.    ED Course: Hemodynamically stable. Labs showed anemia of 8.2 and cardiac enzymes of 0.057. BNP was 7500 with mild bilateral congested CXR. EKG showed chronic AFib @ 90 bpm. Pt was given 80 IV lasix. (23 May 2019 21:05)      PAST MEDICAL & SURGICAL HISTORY:  Afib  Leukocytoclastic vasculitis  Hypercholesterolemia  HTN (hypertension)  CVA (cerebral vascular accident): 2015  Asthma  H/O sinus surgery      No Known Allergies       MEDICATIONS  (STANDING):  ALBUTerol/ipratropium for Nebulization 3 milliLiter(s) Nebulizer every 6 hours  aspirin  chewable 81 milliGRAM(s) Oral daily  atorvastatin 40 milliGRAM(s) Oral at bedtime  buDESOnide 160 MICROgram(s)/formoterol 4.5 MICROgram(s) Inhaler 2 Puff(s) Inhalation two times a day  digoxin     Tablet 0.125 milliGRAM(s) Oral daily  ergocalciferol 42565 Unit(s) Oral every week  finasteride 5 milliGRAM(s) Oral daily  furosemide   Injectable 40 milliGRAM(s) IV Push daily  insulin lispro (HumaLOG) corrective regimen sliding scale   SubCutaneous Before meals and at bedtime  methylPREDNISolone sodium succinate Injectable 40 milliGRAM(s) IV Push two times a day  metoprolol tartrate 50 milliGRAM(s) Oral two times a day  montelukast 10 milliGRAM(s) Oral daily  pantoprazole    Tablet 40 milliGRAM(s) Oral before breakfast  rivaroxaban 20 milliGRAM(s) Oral every 24 hours  tamsulosin 0.8 milliGRAM(s) Oral at bedtime    MEDICATIONS  (PRN):      REVIEW OF SYSTEMS:  CONSTITUTIONAL: (+) malaise.   EYES: No acute change in vision   ENT:  No tinnitus  NECK: No stiffness  RESPIRATORY: sob. schroeder. No hemoptysis  CARDIOVASCULAR: No chest pain, palpitations, syncope  GASTROINTESTINAL: No hematemesis, diarrhea, melena, or hematochezia.  GENITOURINARY: No hematuria  NEUROLOGICAL: No headaches  LYMPH Nodes: No enlarged glands  ENDOCRINE: No heat or cold intolerance	    T(C): 36.6 (05-24-19 @ 05:42), Max: 37 (05-23-19 @ 21:15)  HR: 86 (05-24-19 @ 05:42) (74 - 118)  BP: 140/76 (05-24-19 @ 05:42) (140/76 - 178/82)  RR: 17 (05-24-19 @ 05:42) (17 - 22)  SpO2: 97% (05-24-19 @ 05:42) (97% - 100%)    PHYSICAL EXAMINATION:   Constitutional: NAD  HEENT: NC, AT  Neck:  Supple  Respiratory:  rales. Adequate airflow b/l. Not using accessory muscles of respiration.  Cardiovascular:  S1 & S2 intact, no R/G, 2+ radial pulses b/l  Gastrointestinal: Soft, NT, ND, normoactive b.s., no organomegaly/RT/rigidity  Extremities: WWP  Neurological:  Alert and awake.  No acute focal motor deficits. Crude sensation intact.     Labs and imaging reviewed    LABS:                        11.0   5.61  )-----------( 573      ( 24 May 2019 06:59 )             37.6     05-24    138  |  100  |  23<H>  ----------------------------<  140<H>  3.6   |  27  |  1.29    Ca    9.1      24 May 2019 06:59  Phos  4.2     05-24  Mg     2.2     05-24    TPro  8.3  /  Alb  3.6  /  TBili  0.9  /  DBili  x   /  AST  17  /  ALT  15  /  AlkPhos  66  05-24    CARDIAC MARKERS ( 24 May 2019 06:59 )  0.034 ng/mL / x     / 51 U/L / x     / 1.5 ng/mL  CARDIAC MARKERS ( 23 May 2019 17:06 )  0.057 ng/mL / x     / x     / x     / x          PT/INR - ( 23 May 2019 17:06 )   PT: 15.1 sec;   INR: 1.35 ratio         PTT - ( 23 May 2019 17:06 )  PTT:31.3 sec    CAPILLARY BLOOD GLUCOSE      POCT Blood Glucose.: 148 mg/dL (24 May 2019 07:43)  POCT Blood Glucose.: 103 mg/dL (23 May 2019 22:34)        LIVER FUNCTIONS - ( 24 May 2019 06:59 )  Alb: 3.6 g/dL / Pro: 8.3 g/dL / ALK PHOS: 66 U/L / ALT: 15 U/L DA / AST: 17 U/L / GGT: x               RADIOLOGY & ADDITIONAL STUDIES:

## 2019-05-24 NOTE — PROGRESS NOTE ADULT - PROBLEM SELECTOR PLAN 1
Dyspnea on exertion and orthopnea- resolved   CXR: Bilateral basal congestion and BNP 7500  Stress test one month ago showed normal EF but on radionuclide angiography EF was 35%  Likely acute exacerbation due to moderate aortic stenosis?  Ordered echocardiogram  Started Lasix 40 IV daily  Monitor daily weights  Consulted Dr Vazquez

## 2019-05-24 NOTE — PROGRESS NOTE ADULT - ASSESSMENT
84 Male with PMH of Asthma, CVA (2015), BPH, Non-obstructive CAD, Afib (Xarelto), Bilateral carotid stenosis, Moderate AS, Colitis came to hospital for shortness of breath and weakness for 2 weeks. Admitted to tele for CHF exacerbation and asthma exacerbation

## 2019-05-24 NOTE — CONSULT NOTE ADULT - SUBJECTIVE AND OBJECTIVE BOX
84 Male with PMH of Asthma, CVA (2015), BPH, Non-obstructive CAD, Afib (Xarelto), Bilateral carotid stenosis, Moderate AS, Colitis came to hospital for shortness of breath and weakness for 2 weeks. Symptoms were associated with poor oral intake and more frequent use of home nebulizers. Pt was last admitted in [facility] for shortness of breath and had stress test which showed reversible changes with normal EF. On cardiac cath, his EF was 35% on radionuclide angiography with no obstructive disease. Currently he can not walk more than 2 blocks due to dyspnea and uses 1 pillow at night.     84 year old male with PMH CVA 2015, BPH, CAD, Afib, CHF presented with dyspnea. Vascular surgery consulted due to US findings of carotid artery stenosis on previous admission, at that time patient had been asymptomatic. Patient seen at bedside, unable to remember his medical history. Admits to occasional dizziness, occasional bilateral extremity weakness and numbness, and falls at home. Admits to shortness of breath, occasional chest pain. Denies visual changes.     PAST MEDICAL & SURGICAL HISTORY:  Afib  Leukocytoclastic vasculitis  Hypercholesterolemia  HTN (hypertension)  CVA (cerebral vascular accident): 2015  Asthma  H/O sinus surgery    Review of Systems: Contained within HPI    MEDICATIONS  (STANDING):  ALBUTerol/ipratropium for Nebulization 3 milliLiter(s) Nebulizer every 6 hours  aspirin  chewable 81 milliGRAM(s) Oral daily  atorvastatin 40 milliGRAM(s) Oral at bedtime  buDESOnide 160 MICROgram(s)/formoterol 4.5 MICROgram(s) Inhaler 2 Puff(s) Inhalation two times a day  digoxin     Tablet 0.125 milliGRAM(s) Oral daily  ergocalciferol 77841 Unit(s) Oral every week  finasteride 5 milliGRAM(s) Oral daily  furosemide   Injectable 40 milliGRAM(s) IV Push daily  insulin lispro (HumaLOG) corrective regimen sliding scale   SubCutaneous Before meals and at bedtime  methylPREDNISolone sodium succinate Injectable 40 milliGRAM(s) IV Push two times a day  metoprolol tartrate 50 milliGRAM(s) Oral two times a day  montelukast 10 milliGRAM(s) Oral daily  pantoprazole    Tablet 40 milliGRAM(s) Oral before breakfast  rivaroxaban 20 milliGRAM(s) Oral every 24 hours  tamsulosin 0.8 milliGRAM(s) Oral at bedtime    Allergies: No Known Allergies    FAMILY HISTORY:  No pertinent family history in first degree relatives    Vital Signs Last 24 Hrs  T(C): 36.8 (24 May 2019 16:14), Max: 37 (23 May 2019 21:15)  T(F): 98.2 (24 May 2019 16:14), Max: 98.6 (23 May 2019 21:15)  HR: 74 (24 May 2019 16:14) (74 - 119)  BP: 150/71 (24 May 2019 16:14) (140/76 - 175/73)  RR: 18 (24 May 2019 16:14) (17 - 22)  SpO2: 100% (24 May 2019 16:14) (97% - 100%)    Physical Exam:    General:  Appears stated age, well-groomed, well-nourished, no distress  Eyes: EOMI  HENT:  WNL, no JVD  Chest: respirations nonlabored  Abdomen: soft, NT/ND  Extremities: no edema bilaterally  Skin: warm and dry  Musculoskeletal: no calf tenderness  Neuro:  Alert, oriented to time, place and person   Psych: normal affect    LABS:                        11.0   5.61  )-----------( 573      ( 24 May 2019 06:59 )             37.6     05-24    138  |  100  |  23<H>  ----------------------------<  140<H>  3.6   |  27  |  1.29    Ca    9.1      24 May 2019 06:59  Phos  4.2     05-24  Mg     2.2     05-24    TPro  8.3  /  Alb  3.6  /  TBili  0.9  /  DBili  x   /  AST  17  /  ALT  15  /  AlkPhos  66  05-24    PT/INR - ( 23 May 2019 17:06 )   PT: 15.1 sec;   INR: 1.35 ratio       PTT - ( 23 May 2019 17:06 )  PTT:31.3 sec    RADIOLOGY & ADDITIONAL STUDIES:  < from: US Duplex Carotid Arteries Complete, Bilateral (04.18.19 @ 16:12) >    IMPRESSION:     1.  Right carotid system:  Findings suggestive of a 50-69% diameter   stenosis.     2.  Left carotid system: Left ICA occlusion.    PALOMA MAY  This document has been electronically signed. Apr 18 2019  4:35PM    < end of copied text >

## 2019-05-24 NOTE — PROGRESS NOTE ADULT - SUBJECTIVE AND OBJECTIVE BOX
PGY 1 Note discussed with supervising resident and primary attending    Patient is a 84y old  Male who presents with a chief complaint of Shortness of breath (24 May 2019 08:14)      INTERVAL HPI/OVERNIGHT EVENTS: Pt was seen and examined at bedside, pt is AOAx3, pt does not complain of SOB or chest now.     MEDICATIONS  (STANDING):  ALBUTerol/ipratropium for Nebulization 3 milliLiter(s) Nebulizer every 6 hours  aspirin  chewable 81 milliGRAM(s) Oral daily  atorvastatin 40 milliGRAM(s) Oral at bedtime  buDESOnide 160 MICROgram(s)/formoterol 4.5 MICROgram(s) Inhaler 2 Puff(s) Inhalation two times a day  digoxin     Tablet 0.125 milliGRAM(s) Oral daily  ergocalciferol 55212 Unit(s) Oral every week  finasteride 5 milliGRAM(s) Oral daily  furosemide   Injectable 40 milliGRAM(s) IV Push daily  insulin lispro (HumaLOG) corrective regimen sliding scale   SubCutaneous Before meals and at bedtime  methylPREDNISolone sodium succinate Injectable 40 milliGRAM(s) IV Push two times a day  metoprolol tartrate 50 milliGRAM(s) Oral two times a day  montelukast 10 milliGRAM(s) Oral daily  pantoprazole    Tablet 40 milliGRAM(s) Oral before breakfast  rivaroxaban 20 milliGRAM(s) Oral every 24 hours  tamsulosin 0.8 milliGRAM(s) Oral at bedtime    MEDICATIONS  (PRN):      __________________________________________________  REVIEW OF SYSTEMS:    CONSTITUTIONAL: No fever,   EYES: no acute visual disturbances  NECK: No pain or stiffness  RESPIRATORY: No cough; No shortness of breath  CARDIOVASCULAR: No chest pain, no palpitations  GASTROINTESTINAL: No pain. No nausea or vomiting; No diarrhea   NEUROLOGICAL: No headache or numbness, no tremors  MUSCULOSKELETAL: No joint pain, no muscle pain  GENITOURINARY: no dysuria, no frequency, no hesitancy  PSYCHIATRY: no depression , no anxiety  ALL OTHER  ROS negative        Vital Signs Last 24 Hrs  T(C): 36.7 (24 May 2019 07:29), Max: 37 (23 May 2019 21:15)  T(F): 98 (24 May 2019 07:29), Max: 98.6 (23 May 2019 21:15)  HR: 119 (24 May 2019 07:29) (74 - 119)  BP: 175/73 (24 May 2019 07:29) (140/76 - 178/82)  BP(mean): --  RR: 17 (24 May 2019 07:29) (17 - 22)  SpO2: 98% (24 May 2019 07:29) (97% - 100%)    ________________________________________________  PHYSICAL EXAM:  GENERAL: NAD  HEENT: Normocephalic;  conjunctivae and sclerae clear; moist mucous membranes;   NECK : supple  CHEST/LUNG: Clear to auscultation bilaterally with good air entry   HEART: S1 S2  regular; no murmurs, gallops or rubs  ABDOMEN: Soft, Nontender, Nondistended; Bowel sounds present  EXTREMITIES: no cyanosis; no edema; no calf tenderness  SKIN: warm and dry; no rash  NERVOUS SYSTEM:  Awake and alert; Oriented  to place, person and time ; no new deficits    _________________________________________________  LABS:                        11.0   5.61  )-----------( 573      ( 24 May 2019 06:59 )             37.6     05-24    138  |  100  |  23<H>  ----------------------------<  140<H>  3.6   |  27  |  1.29    Ca    9.1      24 May 2019 06:59  Phos  4.2     05-24  Mg     2.2     05-24    TPro  8.3  /  Alb  3.6  /  TBili  0.9  /  DBili  x   /  AST  17  /  ALT  15  /  AlkPhos  66  05-24    PT/INR - ( 23 May 2019 17:06 )   PT: 15.1 sec;   INR: 1.35 ratio         PTT - ( 23 May 2019 17:06 )  PTT:31.3 sec    CAPILLARY BLOOD GLUCOSE      POCT Blood Glucose.: 148 mg/dL (24 May 2019 07:43)  POCT Blood Glucose.: 103 mg/dL (23 May 2019 22:34)        RADIOLOGY & ADDITIONAL TESTS:    Imaging Personally Reviewed:  YES    Consultant(s) Notes Reviewed:   YES    Care Discussed with Consultants : YES    Plan of care was discussed with patient and /or primary care giver; all questions and concerns were addressed and care was aligned with patient's wishes.

## 2019-05-24 NOTE — CONSULT NOTE ADULT - SUBJECTIVE AND OBJECTIVE BOX
CHIEF COMPLAINT: Patient is a 84y old  Male who presents with a chief complaint of Shortness of breath (24 May 2019 10:33)      HPI:  84 Male with PMH of Asthma, CVA (2015), BPH, Non-obstructive CAD, Afib (Xarelto), Bilateral carotid stenosis, Moderate AS, Colitis came to hospital for shortness of breath and weakness for 2 weeks. Symptoms were associated with poor oral intake and more frequent use of home nebulizers. Pt was last admitted in [facility] for shortness of breath and had stress test which showed reversible changes with normal EF. On cardiac cath, his EF was 35% on radionuclide angiography with no obstructive disease. Currently he can not walk more than 2 blocks due to dyspnea and uses 1 pillow at night.     SH: Lives with daughter. Uses cane. No smoking and alcohol.    ED Course: Hemodynamically stable. Labs showed anemia of 8.2 and cardiac enzymes of 0.057. BNP was 7500 with mild bilateral congested CXR. EKG showed chronic AFib @ 90 bpm. Pt was given 80 IV lasix. (23 May 2019 21:05)   Patient seen and examined.     PAST MEDICAL & SURGICAL HISTORY:  Afib  Leukocytoclastic vasculitis  Hypercholesterolemia  HTN (hypertension)  CVA (cerebral vascular accident): 2015  Asthma  H/O sinus surgery      Allergies    No Known Allergies    Intolerances        MEDICATIONS  (STANDING):  ALBUTerol/ipratropium for Nebulization 3 milliLiter(s) Nebulizer every 6 hours  aspirin  chewable 81 milliGRAM(s) Oral daily  atorvastatin 40 milliGRAM(s) Oral at bedtime  buDESOnide 160 MICROgram(s)/formoterol 4.5 MICROgram(s) Inhaler 2 Puff(s) Inhalation two times a day  digoxin     Tablet 0.125 milliGRAM(s) Oral daily  ergocalciferol 64741 Unit(s) Oral every week  finasteride 5 milliGRAM(s) Oral daily  furosemide   Injectable 40 milliGRAM(s) IV Push daily  insulin lispro (HumaLOG) corrective regimen sliding scale   SubCutaneous Before meals and at bedtime  methylPREDNISolone sodium succinate Injectable 40 milliGRAM(s) IV Push two times a day  metoprolol tartrate 50 milliGRAM(s) Oral two times a day  montelukast 10 milliGRAM(s) Oral daily  pantoprazole    Tablet 40 milliGRAM(s) Oral before breakfast  rivaroxaban 20 milliGRAM(s) Oral every 24 hours  tamsulosin 0.8 milliGRAM(s) Oral at bedtime      MEDICATIONS  (PRN):   Medications up to date at time of exam.    FAMILY HISTORY:  No pertinent family history in first degree relatives      SOCIAL HISTORY  Smoking History: [   ] smoking/smoke exposure, [   ] former smoker, [  ] denies smoking  Living Condition: [   ] apartment, [   ] private house  Work History:   Travel History: denies recent travel  Illicit Substance Use: denies  Alcohol Use: denies    REVIEW OF SYSTEMS:    CONSTITUTIONAL:  denies fevers, chills, sweats, weight loss    HEENT:  denies diplopia or blurred vision, sore throat or runny nose.    CARDIOVASCULAR:  denies pressure, squeezing, tightness, or heaviness about the chest; no palpitations.    RESPIRATORY:  denies SOB, cough, BAUTISTA, wheezing.    GASTROINTESTINAL:  denies abdominal pain, nausea, vomiting or diarrhea.    GENITOURINARY: denies dysuria, frequency or urgency.    NEUROLOGIC:  denies numbness, tingling, seizures or weakness.    PSYCHIATRIC:  denies disorder of thought or mood.    MSK: denies swelling, redness      PHYSICAL EXAMINATION:    GENERAL: The patient is a well-developed, well-nourished, in no apparent distress.     Vital Signs Last 24 Hrs  T(C): 36.8 (24 May 2019 11:12), Max: 37 (23 May 2019 21:15)  T(F): 98.3 (24 May 2019 11:12), Max: 98.6 (23 May 2019 21:15)  HR: 113 (24 May 2019 11:12) (74 - 119)  BP: 146/70 (24 May 2019 11:12) (140/76 - 178/82)  BP(mean): --  RR: 17 (24 May 2019 11:12) (17 - 22)  SpO2: 98% (24 May 2019 11:12) (97% - 100%)   (if applicable)    Chest Tube (if applicable)    HEENT: Head is normocephalic and atraumatic. .    NECK: Supple, no palpable adenopathy.    LUNGS: Clear to auscultation, no wheezing, rales, or rhonchi. +decreased breath sounds at bases, mild crackles    HEART: Regular rate and rhythm without murmur.    ABDOMEN: Soft, nontender, and nondistended.  No hepatosplenomegaly is noted.    EXTREMITIES: Without any cyanosis, clubbing, rash, lesions or edema.    NEUROLOGIC: Awake, alert.    SKIN: Warm, dry, good turgor.      LABS:                        11.0   5.61  )-----------( 573      ( 24 May 2019 06:59 )             37.6     05-24    138  |  100  |  23<H>  ----------------------------<  140<H>  3.6   |  27  |  1.29    Ca    9.1      24 May 2019 06:59  Phos  4.2     05-24  Mg     2.2     05-24    TPro  8.3  /  Alb  3.6  /  TBili  0.9  /  DBili  x   /  AST  17  /  ALT  15  /  AlkPhos  66  05-24    PT/INR - ( 23 May 2019 17:06 )   PT: 15.1 sec;   INR: 1.35 ratio         PTT - ( 23 May 2019 17:06 )  PTT:31.3 sec      CARDIAC MARKERS ( 24 May 2019 06:59 )  0.034 ng/mL / x     / 51 U/L / x     / 1.5 ng/mL  CARDIAC MARKERS ( 23 May 2019 17:06 )  0.057 ng/mL / x     / x     / x     / x          D-Dimer Assay, Quantitative: 189 ng/mL DDU (05-23-19 @ 18:27)    Serum Pro-Brain Natriuretic Peptide: 7534 pg/mL (05-23-19 @ 17:06)          MICROBIOLOGY: (if applicable)    RADIOLOGY & ADDITIONAL STUDIES:  EKG:   CXR:  < from: Xray Chest 1 View-PORTABLE IMMEDIATE (05.23.19 @ 17:33) >    EXAM:  XR CHEST PORTABLE IMMED 1V                            PROCEDURE DATE:  05/23/2019          INTERPRETATION:  AP semierect chest on May 23, 2019 at 4:30 PM. Patient   has chest pain.    Heart is significantly enlarged.    There are mild lower lung field congestive changes best seen at the right   base because the heart obscures the left base.    The congestion is new since April 16 of this year.    IMPRESSION: Heart enlargement and mild basilar congestive findings.                VALREIA BRANHAM M.D., ATTENDING RADIOLOGIST  This document has been electronically signed. May 23 2019  6:06PM                < end of copied text >    ECHO:    IMPRESSION: 84y Male PAST MEDICAL & SURGICAL HISTORY:  Afib  Leukocytoclastic vasculitis  Hypercholesterolemia  HTN (hypertension)  CVA (cerebral vascular accident): 2015  Asthma  H/O sinus surgery     84 Male with PMH of Asthma, CVA (2015), BPH, Non-obstructive CAD, Afib (Xarelto), Bilateral carotid stenosis, Moderate AS, Colitis came to hospital for shortness of breath and weakness for 2 weeks. Symptoms were associated with poor oral intake and more frequent use of home nebulizers. Pt was last admitted in [facility] for shortness of breath and had stress test which showed reversible changes with normal EF. On cardiac cath, his EF was 35% on radionuclide angiography with no obstructive disease. Currently he can not walk more than 2 blocks due to dyspnea and uses 1 pillow at night.     --    SOB 2/2 pulm edema  asthma stable, no wheezing  carotid artery stenosis    SUGGESTION:   - bronchodilators, o2 supp prn    - diurese   - aspiration precautions   - DVT and GI prophylaxis. CHIEF COMPLAINT: Patient is a 84y old  Male who presents with a chief complaint of Shortness of breath (24 May 2019 10:33)      HPI:  84 Male with PMH of Asthma, CVA (2015), BPH, Non-obstructive CAD, Afib (Xarelto), Bilateral carotid stenosis, Moderate AS, Colitis came to hospital for shortness of breath and weakness for 2 weeks. Symptoms were associated with poor oral intake and more frequent use of home nebulizers. Pt was last admitted in [facility] for shortness of breath and had stress test which showed reversible changes with normal EF. On cardiac cath, his EF was 35% on radionuclide angiography with no obstructive disease. Currently he can not walk more than 2 blocks due to dyspnea and uses 1 pillow at night.     SH: Lives with daughter. Uses cane. No smoking and alcohol.    ED Course: Hemodynamically stable. Labs showed anemia of 8.2 and cardiac enzymes of 0.057. BNP was 7500 with mild bilateral congested CXR. EKG showed chronic AFib @ 90 bpm. Pt was given 80 IV lasix. (23 May 2019 21:05)   Patient seen and examined.     PAST MEDICAL & SURGICAL HISTORY:  Afib  Leukocytoclastic vasculitis  Hypercholesterolemia  HTN (hypertension)  CVA (cerebral vascular accident): 2015  Asthma  H/O sinus surgery      Allergies    No Known Allergies    Intolerances        MEDICATIONS  (STANDING):  ALBUTerol/ipratropium for Nebulization 3 milliLiter(s) Nebulizer every 6 hours  aspirin  chewable 81 milliGRAM(s) Oral daily  atorvastatin 40 milliGRAM(s) Oral at bedtime  buDESOnide 160 MICROgram(s)/formoterol 4.5 MICROgram(s) Inhaler 2 Puff(s) Inhalation two times a day  digoxin     Tablet 0.125 milliGRAM(s) Oral daily  ergocalciferol 23916 Unit(s) Oral every week  finasteride 5 milliGRAM(s) Oral daily  furosemide   Injectable 40 milliGRAM(s) IV Push daily  insulin lispro (HumaLOG) corrective regimen sliding scale   SubCutaneous Before meals and at bedtime  methylPREDNISolone sodium succinate Injectable 40 milliGRAM(s) IV Push two times a day  metoprolol tartrate 50 milliGRAM(s) Oral two times a day  montelukast 10 milliGRAM(s) Oral daily  pantoprazole    Tablet 40 milliGRAM(s) Oral before breakfast  rivaroxaban 20 milliGRAM(s) Oral every 24 hours  tamsulosin 0.8 milliGRAM(s) Oral at bedtime      MEDICATIONS  (PRN):   Medications up to date at time of exam.    FAMILY HISTORY:  No pertinent family history in first degree relatives      SOCIAL HISTORY  Smoking History: [   ] smoking/smoke exposure, [   ] former smoker, [  ] denies smoking  Living Condition: [   ] apartment, [   ] private house  Work History:   Travel History: denies recent travel  Illicit Substance Use: denies  Alcohol Use: denies    REVIEW OF SYSTEMS:    CONSTITUTIONAL:  denies fevers, chills, sweats, weight loss    HEENT:  denies diplopia or blurred vision, sore throat or runny nose.    CARDIOVASCULAR:  denies pressure, squeezing, tightness, or heaviness about the chest; no palpitations.    RESPIRATORY:  denies SOB, cough, BAUTISTA, wheezing.    GASTROINTESTINAL:  denies abdominal pain, nausea, vomiting or diarrhea.    GENITOURINARY: denies dysuria, frequency or urgency.    NEUROLOGIC:  denies numbness, tingling, seizures or weakness.    PSYCHIATRIC:  denies disorder of thought or mood.    MSK: denies swelling, redness      PHYSICAL EXAMINATION:    GENERAL: The patient is a well-developed, well-nourished, in no apparent distress.     Vital Signs Last 24 Hrs  T(C): 36.8 (24 May 2019 11:12), Max: 37 (23 May 2019 21:15)  T(F): 98.3 (24 May 2019 11:12), Max: 98.6 (23 May 2019 21:15)  HR: 113 (24 May 2019 11:12) (74 - 119)  BP: 146/70 (24 May 2019 11:12) (140/76 - 178/82)  BP(mean): --  RR: 17 (24 May 2019 11:12) (17 - 22)  SpO2: 98% (24 May 2019 11:12) (97% - 100%)   (if applicable)    Chest Tube (if applicable)    HEENT: Head is normocephalic and atraumatic. .    NECK: Supple, no palpable adenopathy.    LUNGS: Clear to auscultation, no wheezing, rales, or rhonchi. +decreased breath sounds at bases, mild crackles    HEART: Regular rate and rhythm without murmur.    ABDOMEN: Soft, nontender, and nondistended.  No hepatosplenomegaly is noted.    EXTREMITIES: Without any cyanosis, clubbing, rash, lesions or edema.    NEUROLOGIC: Awake, alert.    SKIN: Warm, dry, good turgor.      LABS:                        11.0   5.61  )-----------( 573      ( 24 May 2019 06:59 )             37.6     05-24    138  |  100  |  23<H>  ----------------------------<  140<H>  3.6   |  27  |  1.29    Ca    9.1      24 May 2019 06:59  Phos  4.2     05-24  Mg     2.2     05-24    TPro  8.3  /  Alb  3.6  /  TBili  0.9  /  DBili  x   /  AST  17  /  ALT  15  /  AlkPhos  66  05-24    PT/INR - ( 23 May 2019 17:06 )   PT: 15.1 sec;   INR: 1.35 ratio         PTT - ( 23 May 2019 17:06 )  PTT:31.3 sec      CARDIAC MARKERS ( 24 May 2019 06:59 )  0.034 ng/mL / x     / 51 U/L / x     / 1.5 ng/mL  CARDIAC MARKERS ( 23 May 2019 17:06 )  0.057 ng/mL / x     / x     / x     / x          D-Dimer Assay, Quantitative: 189 ng/mL DDU (05-23-19 @ 18:27)    Serum Pro-Brain Natriuretic Peptide: 7534 pg/mL (05-23-19 @ 17:06)          MICROBIOLOGY: (if applicable)    RADIOLOGY & ADDITIONAL STUDIES:  EKG:   CXR:  < from: Xray Chest 1 View-PORTABLE IMMEDIATE (05.23.19 @ 17:33) >    EXAM:  XR CHEST PORTABLE IMMED 1V                            PROCEDURE DATE:  05/23/2019          INTERPRETATION:  AP semierect chest on May 23, 2019 at 4:30 PM. Patient   has chest pain.    Heart is significantly enlarged.    There are mild lower lung field congestive changes best seen at the right   base because the heart obscures the left base.    The congestion is new since April 16 of this year.    IMPRESSION: Heart enlargement and mild basilar congestive findings.                VALERIA BRANHAM M.D., ATTENDING RADIOLOGIST  This document has been electronically signed. May 23 2019  6:06PM                < end of copied text >    ECHO:    IMPRESSION: 84y Male PAST MEDICAL & SURGICAL HISTORY:  Afib  Leukocytoclastic vasculitis  Hypercholesterolemia  HTN (hypertension)  CVA (cerebral vascular accident): 2015  Asthma  H/O sinus surgery     84 Male with PMH of Asthma, CVA (2015), BPH, Non-obstructive CAD, Afib (Xarelto), Bilateral carotid stenosis, Moderate AS, Colitis came to hospital for shortness of breath and weakness for 2 weeks. Symptoms were associated with poor oral intake and more frequent use of home nebulizers. Pt was last admitted in [facility] for shortness of breath and had stress test which showed reversible changes with normal EF. On cardiac cath, his EF was 35% on radionuclide angiography with no obstructive disease. Currently he can not walk more than 2 blocks due to dyspnea and uses 1 pillow at night.     --    SOB 2/2 pulm edema  asthma stable, no wheezing  carotid artery stenosis    SUGGESTION:   - bronchodilators, o2 supp prn    - diurese   - aspiration precautions   - DVT and GI prophylaxis.     Agree with above assessment and plan as transcribed.

## 2019-05-24 NOTE — PROGRESS NOTE ADULT - ATTENDING COMMENTS
pt admitted for acute pulmonary edema and acute asthma exacerbation 2/2 acute on chronic systolic chf exacerbation  uncontrolled htn, c/w antihypertensive rx  ckd 3, stable, renally adjust rx prn   vitamin d deficiency - supplement  iv lasix  steroids with tapering, nebs  cardiac meds  tele  all consultants and medical team members management appreciated

## 2019-05-25 DIAGNOSIS — I65.29 OCCLUSION AND STENOSIS OF UNSPECIFIED CAROTID ARTERY: ICD-10-CM

## 2019-05-25 DIAGNOSIS — N17.9 ACUTE KIDNEY FAILURE, UNSPECIFIED: ICD-10-CM

## 2019-05-25 LAB
ANION GAP SERPL CALC-SCNC: 9 MMOL/L — SIGNIFICANT CHANGE UP (ref 5–17)
BUN SERPL-MCNC: 41 MG/DL — HIGH (ref 7–18)
CALCIUM SERPL-MCNC: 8.6 MG/DL — SIGNIFICANT CHANGE UP (ref 8.4–10.5)
CHLORIDE SERPL-SCNC: 102 MMOL/L — SIGNIFICANT CHANGE UP (ref 96–108)
CO2 SERPL-SCNC: 29 MMOL/L — SIGNIFICANT CHANGE UP (ref 22–31)
CREAT SERPL-MCNC: 1.64 MG/DL — HIGH (ref 0.5–1.3)
GLUCOSE BLDC GLUCOMTR-MCNC: 131 MG/DL — HIGH (ref 70–99)
GLUCOSE BLDC GLUCOMTR-MCNC: 133 MG/DL — HIGH (ref 70–99)
GLUCOSE BLDC GLUCOMTR-MCNC: 135 MG/DL — HIGH (ref 70–99)
GLUCOSE BLDC GLUCOMTR-MCNC: 183 MG/DL — HIGH (ref 70–99)
GLUCOSE SERPL-MCNC: 142 MG/DL — HIGH (ref 70–99)
HCT VFR BLD CALC: 29.3 % — LOW (ref 39–50)
HGB BLD-MCNC: 8.7 G/DL — LOW (ref 13–17)
MCHC RBC-ENTMCNC: 19.9 PG — LOW (ref 27–34)
MCHC RBC-ENTMCNC: 29.7 GM/DL — LOW (ref 32–36)
MCV RBC AUTO: 67 FL — LOW (ref 80–100)
NRBC # BLD: 0 /100 WBCS — SIGNIFICANT CHANGE UP (ref 0–0)
PLATELET # BLD AUTO: 439 K/UL — HIGH (ref 150–400)
POTASSIUM SERPL-MCNC: 3.5 MMOL/L — SIGNIFICANT CHANGE UP (ref 3.5–5.3)
POTASSIUM SERPL-SCNC: 3.5 MMOL/L — SIGNIFICANT CHANGE UP (ref 3.5–5.3)
RBC # BLD: 4.37 M/UL — SIGNIFICANT CHANGE UP (ref 4.2–5.8)
RBC # FLD: 17.9 % — HIGH (ref 10.3–14.5)
SODIUM SERPL-SCNC: 140 MMOL/L — SIGNIFICANT CHANGE UP (ref 135–145)
WBC # BLD: 14.53 K/UL — HIGH (ref 3.8–10.5)
WBC # FLD AUTO: 14.53 K/UL — HIGH (ref 3.8–10.5)

## 2019-05-25 RX ORDER — FUROSEMIDE 40 MG
40 TABLET ORAL DAILY
Refills: 0 | Status: DISCONTINUED | OUTPATIENT
Start: 2019-05-26 | End: 2019-05-27

## 2019-05-25 RX ORDER — SODIUM CHLORIDE 9 MG/ML
1000 INJECTION INTRAMUSCULAR; INTRAVENOUS; SUBCUTANEOUS
Refills: 0 | Status: DISCONTINUED | OUTPATIENT
Start: 2019-05-25 | End: 2019-05-27

## 2019-05-25 RX ORDER — FUROSEMIDE 40 MG
40 TABLET ORAL DAILY
Refills: 0 | Status: DISCONTINUED | OUTPATIENT
Start: 2019-05-25 | End: 2019-05-25

## 2019-05-25 RX ADMIN — FINASTERIDE 5 MILLIGRAM(S): 5 TABLET, FILM COATED ORAL at 11:42

## 2019-05-25 RX ADMIN — Medication 3 MILLILITER(S): at 21:02

## 2019-05-25 RX ADMIN — Medication 40 MILLIGRAM(S): at 05:29

## 2019-05-25 RX ADMIN — RIVAROXABAN 20 MILLIGRAM(S): KIT at 17:44

## 2019-05-25 RX ADMIN — SODIUM CHLORIDE 75 MILLILITER(S): 9 INJECTION INTRAMUSCULAR; INTRAVENOUS; SUBCUTANEOUS at 14:56

## 2019-05-25 RX ADMIN — Medication 3 MILLILITER(S): at 18:12

## 2019-05-25 RX ADMIN — Medication 0.12 MILLIGRAM(S): at 05:29

## 2019-05-25 RX ADMIN — PANTOPRAZOLE SODIUM 40 MILLIGRAM(S): 20 TABLET, DELAYED RELEASE ORAL at 06:06

## 2019-05-25 RX ADMIN — Medication 3 MILLILITER(S): at 03:02

## 2019-05-25 RX ADMIN — Medication 50 MILLIGRAM(S): at 05:29

## 2019-05-25 RX ADMIN — Medication 40 MILLIGRAM(S): at 05:30

## 2019-05-25 RX ADMIN — MONTELUKAST 10 MILLIGRAM(S): 4 TABLET, CHEWABLE ORAL at 11:42

## 2019-05-25 RX ADMIN — TAMSULOSIN HYDROCHLORIDE 0.8 MILLIGRAM(S): 0.4 CAPSULE ORAL at 22:11

## 2019-05-25 RX ADMIN — BUDESONIDE AND FORMOTEROL FUMARATE DIHYDRATE 2 PUFF(S): 160; 4.5 AEROSOL RESPIRATORY (INHALATION) at 11:42

## 2019-05-25 RX ADMIN — Medication 50 MILLIGRAM(S): at 17:44

## 2019-05-25 RX ADMIN — Medication 1: at 12:03

## 2019-05-25 RX ADMIN — ATORVASTATIN CALCIUM 40 MILLIGRAM(S): 80 TABLET, FILM COATED ORAL at 22:11

## 2019-05-25 RX ADMIN — Medication 81 MILLIGRAM(S): at 11:42

## 2019-05-25 RX ADMIN — BUDESONIDE AND FORMOTEROL FUMARATE DIHYDRATE 2 PUFF(S): 160; 4.5 AEROSOL RESPIRATORY (INHALATION) at 22:11

## 2019-05-25 NOTE — PROGRESS NOTE ADULT - ASSESSMENT
84y.o. Male with carotid artery stenosis and occlusion    -Recommend CTA neck when TRINI resolves  -Rec neuro consult

## 2019-05-25 NOTE — PROGRESS NOTE ADULT - PROBLEM SELECTOR PLAN 3
Acute exacerbation  Bilateral wheezing and shortness of breath  Peak flow was 100 in ED with poor study  Started Solumedrol 40 q12 and bronchodilators   Monitor peak flow daily  Consulted Dr Carmona

## 2019-05-25 NOTE — PROGRESS NOTE ADULT - SUBJECTIVE AND OBJECTIVE BOX
PGY 1 Note discussed with supervising resident and primary attending    Patient is a 84y old  Male who presents with a chief complaint of Shortness of breath (24 May 2019 18:00)      INTERVAL HPI/OVERNIGHT EVENTS: offers no new complaints; current symptoms resolving    MEDICATIONS  (STANDING):  ALBUTerol/ipratropium for Nebulization 3 milliLiter(s) Nebulizer every 6 hours  aspirin  chewable 81 milliGRAM(s) Oral daily  atorvastatin 40 milliGRAM(s) Oral at bedtime  buDESOnide 160 MICROgram(s)/formoterol 4.5 MICROgram(s) Inhaler 2 Puff(s) Inhalation two times a day  digoxin     Tablet 0.125 milliGRAM(s) Oral daily  ergocalciferol 20000 Unit(s) Oral every week  finasteride 5 milliGRAM(s) Oral daily  furosemide   Injectable 40 milliGRAM(s) IV Push daily  insulin lispro (HumaLOG) corrective regimen sliding scale   SubCutaneous Before meals and at bedtime  methylPREDNISolone sodium succinate Injectable 40 milliGRAM(s) IV Push two times a day  metoprolol tartrate 50 milliGRAM(s) Oral two times a day  montelukast 10 milliGRAM(s) Oral daily  pantoprazole    Tablet 40 milliGRAM(s) Oral before breakfast  rivaroxaban 20 milliGRAM(s) Oral every 24 hours  tamsulosin 0.8 milliGRAM(s) Oral at bedtime    MEDICATIONS  (PRN):      __________________________________________________  REVIEW OF SYSTEMS:    CONSTITUTIONAL: No fever,   EYES: no acute visual disturbances  NECK: No pain or stiffness  RESPIRATORY: No cough; No shortness of breath  CARDIOVASCULAR: No chest pain, no palpitations  GASTROINTESTINAL: No pain. No nausea or vomiting; No diarrhea   NEUROLOGICAL: No headache or numbness, no tremors  MUSCULOSKELETAL: No joint pain, no muscle pain  GENITOURINARY: no dysuria, no frequency, no hesitancy  PSYCHIATRY: no depression , no anxiety  ALL OTHER  ROS negative        Vital Signs Last 24 Hrs  T(C): 36.8 (25 May 2019 04:55), Max: 37 (24 May 2019 20:32)  T(F): 98.3 (25 May 2019 04:55), Max: 98.6 (24 May 2019 20:32)  HR: 83 (25 May 2019 05:24) (71 - 119)  BP: 125/71 (25 May 2019 05:24) (121/67 - 175/73)  BP(mean): --  RR: 21 (25 May 2019 05:24) (17 - 21)  SpO2: 96% (25 May 2019 05:24) (96% - 100%)    ________________________________________________  PHYSICAL EXAM:  GENERAL: NAD  HEENT: Normocephalic;  conjunctivae and sclerae clear; moist mucous membranes;   NECK : supple  CHEST/LUNG: Clear to auscultation bilaterally with good air entry   HEART: S1 S2  regular; no murmurs, gallops or rubs  ABDOMEN: Soft, Nontender, Nondistended; Bowel sounds present  EXTREMITIES: no cyanosis; no edema; no calf tenderness  SKIN: warm and dry; no rash  NERVOUS SYSTEM:  Awake and alert; Oriented  to place, person and time ; no new deficits    _________________________________________________  LABS:                        11.0   5.61  )-----------( 573      ( 24 May 2019 06:59 )             37.6     05-24    138  |  100  |  23<H>  ----------------------------<  140<H>  3.6   |  27  |  1.29    Ca    9.1      24 May 2019 06:59  Phos  4.2     05-24  Mg     2.2     05-24    TPro  8.3  /  Alb  3.6  /  TBili  0.9  /  DBili  x   /  AST  17  /  ALT  15  /  AlkPhos  66  05-24    PT/INR - ( 23 May 2019 17:06 )   PT: 15.1 sec;   INR: 1.35 ratio         PTT - ( 23 May 2019 17:06 )  PTT:31.3 sec    CAPILLARY BLOOD GLUCOSE      POCT Blood Glucose.: 146 mg/dL (24 May 2019 21:05)  POCT Blood Glucose.: 117 mg/dL (24 May 2019 16:32)  POCT Blood Glucose.: 238 mg/dL (24 May 2019 11:33)  POCT Blood Glucose.: 148 mg/dL (24 May 2019 07:43)        RADIOLOGY & ADDITIONAL TESTS:    Imaging Personally Reviewed:  YES    Consultant(s) Notes Reviewed:   YES    Care Discussed with Consultants : YES    Plan of care was discussed with patient and /or primary care giver; all questions and concerns were addressed and care was aligned with patient's wishes.

## 2019-05-25 NOTE — PROGRESS NOTE ADULT - SUBJECTIVE AND OBJECTIVE BOX
Patient seen and examined at bedside  No acute events noted overnight  Case discussed with medical team    HPI:  84 Male with PMH of Asthma, CVA (2015), BPH, Non-obstructive CAD, Afib (Xarelto), Bilateral carotid stenosis, Moderate AS, Colitis came to hospital for shortness of breath and weakness for 2 weeks. Symptoms were associated with poor oral intake and more frequent use of home nebulizers. Pt was last admitted in [facility] for shortness of breath and had stress test which showed reversible changes with normal EF. On cardiac cath, his EF was 35% on radionuclide angiography with no obstructive disease. Currently he can not walk more than 2 blocks due to dyspnea and uses 1 pillow at night.     SH: Lives with daughter. Uses cane. No smoking and alcohol.    ED Course: Hemodynamically stable. Labs showed anemia of 8.2 and cardiac enzymes of 0.057. BNP was 7500 with mild bilateral congested CXR. EKG showed chronic AFib @ 90 bpm. Pt was given 80 IV lasix. (23 May 2019 21:05)      PAST MEDICAL & SURGICAL HISTORY:  Afib  Leukocytoclastic vasculitis  Hypercholesterolemia  HTN (hypertension)  CVA (cerebral vascular accident): 2015  Asthma  H/O sinus surgery      No Known Allergies       MEDICATIONS  (STANDING):  ALBUTerol/ipratropium for Nebulization 3 milliLiter(s) Nebulizer every 6 hours  aspirin  chewable 81 milliGRAM(s) Oral daily  atorvastatin 40 milliGRAM(s) Oral at bedtime  buDESOnide 160 MICROgram(s)/formoterol 4.5 MICROgram(s) Inhaler 2 Puff(s) Inhalation two times a day  digoxin     Tablet 0.125 milliGRAM(s) Oral daily  ergocalciferol 65228 Unit(s) Oral every week  finasteride 5 milliGRAM(s) Oral daily  furosemide    Tablet 40 milliGRAM(s) Oral daily  insulin lispro (HumaLOG) corrective regimen sliding scale   SubCutaneous Before meals and at bedtime  methylPREDNISolone sodium succinate Injectable 40 milliGRAM(s) IV Push daily  metoprolol tartrate 50 milliGRAM(s) Oral two times a day  montelukast 10 milliGRAM(s) Oral daily  pantoprazole    Tablet 40 milliGRAM(s) Oral before breakfast  rivaroxaban 20 milliGRAM(s) Oral every 24 hours  tamsulosin 0.8 milliGRAM(s) Oral at bedtime    MEDICATIONS  (PRN):      REVIEW OF SYSTEMS:  CONSTITUTIONAL: (+) malaise.   EYES: No acute change in vision   ENT:  No tinnitus  NECK: No stiffness  RESPIRATORY: No hemoptysis  CARDIOVASCULAR: No chest pain, palpitations, syncope  GASTROINTESTINAL: No hematemesis, diarrhea, melena, or hematochezia.  GENITOURINARY: No hematuria  NEUROLOGICAL: No headaches  LYMPH Nodes: No enlarged glands  ENDOCRINE: No heat or cold intolerance	    T(C): 36.8 (05-25-19 @ 07:44), Max: 37 (05-24-19 @ 20:32)  HR: 70 (05-25-19 @ 07:44) (70 - 113)  BP: 125/49 (05-25-19 @ 07:44) (121/67 - 150/71)  RR: 18 (05-25-19 @ 07:44) (17 - 21)  SpO2: 95% (05-25-19 @ 07:44) (95% - 100%)    PHYSICAL EXAMINATION:   Constitutional: WD, NAD  HEENT: NC, AT  Neck:  Supple  Respiratory:  Adequate airflow b/l. Not using accessory muscles of respiration.  Cardiovascular:  S1 & S2 intact, no R/G, 2+ radial pulses b/l  Gastrointestinal: Soft, NT, ND, normoactive b.s., no organomegaly/RT/rigidity  Extremities: WWP  Neurological:  Alert and awake.  No acute focal motor deficits. Crude sensation intact.     Labs and imaging reviewed    LABS:                        8.7    14.53 )-----------( 439      ( 25 May 2019 06:22 )             29.3     05-25    140  |  102  |  41<H>  ----------------------------<  142<H>  3.5   |  29  |  1.64<H>    Ca    8.6      25 May 2019 06:22  Phos  4.2     05-24  Mg     2.2     05-24    TPro  8.3  /  Alb  3.6  /  TBili  0.9  /  DBili  x   /  AST  17  /  ALT  15  /  AlkPhos  66  05-24    CARDIAC MARKERS ( 24 May 2019 06:59 )  0.034 ng/mL / x     / 51 U/L / x     / 1.5 ng/mL  CARDIAC MARKERS ( 23 May 2019 17:06 )  0.057 ng/mL / x     / x     / x     / x          PT/INR - ( 23 May 2019 17:06 )   PT: 15.1 sec;   INR: 1.35 ratio         PTT - ( 23 May 2019 17:06 )  PTT:31.3 sec    CAPILLARY BLOOD GLUCOSE      POCT Blood Glucose.: 135 mg/dL (25 May 2019 08:05)  POCT Blood Glucose.: 146 mg/dL (24 May 2019 21:05)  POCT Blood Glucose.: 117 mg/dL (24 May 2019 16:32)  POCT Blood Glucose.: 238 mg/dL (24 May 2019 11:33)        LIVER FUNCTIONS - ( 24 May 2019 06:59 )  Alb: 3.6 g/dL / Pro: 8.3 g/dL / ALK PHOS: 66 U/L / ALT: 15 U/L DA / AST: 17 U/L / GGT: x               RADIOLOGY & ADDITIONAL STUDIES:

## 2019-05-25 NOTE — PROGRESS NOTE ADULT - ASSESSMENT
84 Male with PMH of Asthma, CVA (2015), BPH, Non-obstructive CAD, Afib (Xarelto), Bilateral carotid stenosis, Moderate AS, Colitis came to hospital for shortness of breath and weakness for 2 weeks. Admitted to tele for CHF exacerbation and asthma.  Hba1c 5.4  vascular consulted for right carotid stenosis

## 2019-05-25 NOTE — PROGRESS NOTE ADULT - SUBJECTIVE AND OBJECTIVE BOX
INTERVAL HPI/OVERNIGHT EVENTS:  Pt resting comfortably. No acute complaints.   Denies dizziness.  CTA head/neck canceled today likely 2^ TRINI    MEDICATIONS  (STANDING):  ALBUTerol/ipratropium for Nebulization 3 milliLiter(s) Nebulizer every 6 hours  aspirin  chewable 81 milliGRAM(s) Oral daily  atorvastatin 40 milliGRAM(s) Oral at bedtime  buDESOnide 160 MICROgram(s)/formoterol 4.5 MICROgram(s) Inhaler 2 Puff(s) Inhalation two times a day  digoxin     Tablet 0.125 milliGRAM(s) Oral daily  ergocalciferol 52839 Unit(s) Oral every week  finasteride 5 milliGRAM(s) Oral daily  insulin lispro (HumaLOG) corrective regimen sliding scale   SubCutaneous Before meals and at bedtime  methylPREDNISolone sodium succinate Injectable 40 milliGRAM(s) IV Push daily  metoprolol tartrate 50 milliGRAM(s) Oral two times a day  montelukast 10 milliGRAM(s) Oral daily  pantoprazole    Tablet 40 milliGRAM(s) Oral before breakfast  rivaroxaban 20 milliGRAM(s) Oral every 24 hours  tamsulosin 0.8 milliGRAM(s) Oral at bedtime    Vital Signs Last 24 Hrs  T(C): 36.8 (25 May 2019 07:44), Max: 37 (24 May 2019 20:32)  T(F): 98.2 (25 May 2019 07:44), Max: 98.6 (24 May 2019 20:32)  HR: 70 (25 May 2019 07:44) (70 - 113)  BP: 125/49 (25 May 2019 07:44) (121/67 - 150/71)  BP(mean): --  RR: 18 (25 May 2019 07:44) (17 - 21)  SpO2: 95% (25 May 2019 07:44) (95% - 100%)    Physical:  General: NAD.  HEENT: No focal deficits.    I&O's Detail    24 May 2019 07:01  -  25 May 2019 07:00  --------------------------------------------------------  IN:    Oral Fluid: 830 mL  Total IN: 830 mL    OUT:    Voided: 650 mL  Total OUT: 650 mL    Total NET: 180 mL    LABS:                        8.7    14.53 )-----------( 439      ( 25 May 2019 06:22 )             29.3             05-25    140  |  102  |  41<H>  ----------------------------<  142<H>  3.5   |  29  |  1.64<H>    Ca    8.6      25 May 2019 06:22  Phos  4.2     05-24  Mg     2.2     05-24    TPro  8.3  /  Alb  3.6  /  TBili  0.9  /  DBili  x   /  AST  17  /  ALT  15  /  AlkPhos  66  05-24

## 2019-05-26 ENCOUNTER — TRANSCRIPTION ENCOUNTER (OUTPATIENT)
Age: 84
End: 2019-05-26

## 2019-05-26 DIAGNOSIS — I65.29 OCCLUSION AND STENOSIS OF UNSPECIFIED CAROTID ARTERY: ICD-10-CM

## 2019-05-26 LAB
ANION GAP SERPL CALC-SCNC: 8 MMOL/L — SIGNIFICANT CHANGE UP (ref 5–17)
BUN SERPL-MCNC: 47 MG/DL — HIGH (ref 7–18)
CALCIUM SERPL-MCNC: 8.7 MG/DL — SIGNIFICANT CHANGE UP (ref 8.4–10.5)
CHLORIDE SERPL-SCNC: 102 MMOL/L — SIGNIFICANT CHANGE UP (ref 96–108)
CO2 SERPL-SCNC: 29 MMOL/L — SIGNIFICANT CHANGE UP (ref 22–31)
CREAT SERPL-MCNC: 1.58 MG/DL — HIGH (ref 0.5–1.3)
GLUCOSE BLDC GLUCOMTR-MCNC: 117 MG/DL — HIGH (ref 70–99)
GLUCOSE BLDC GLUCOMTR-MCNC: 121 MG/DL — HIGH (ref 70–99)
GLUCOSE BLDC GLUCOMTR-MCNC: 136 MG/DL — HIGH (ref 70–99)
GLUCOSE BLDC GLUCOMTR-MCNC: 141 MG/DL — HIGH (ref 70–99)
GLUCOSE SERPL-MCNC: 142 MG/DL — HIGH (ref 70–99)
POTASSIUM SERPL-MCNC: 4.1 MMOL/L — SIGNIFICANT CHANGE UP (ref 3.5–5.3)
POTASSIUM SERPL-SCNC: 4.1 MMOL/L — SIGNIFICANT CHANGE UP (ref 3.5–5.3)
SODIUM SERPL-SCNC: 139 MMOL/L — SIGNIFICANT CHANGE UP (ref 135–145)

## 2019-05-26 RX ADMIN — TAMSULOSIN HYDROCHLORIDE 0.8 MILLIGRAM(S): 0.4 CAPSULE ORAL at 21:42

## 2019-05-26 RX ADMIN — Medication 50 MILLIGRAM(S): at 18:05

## 2019-05-26 RX ADMIN — RIVAROXABAN 20 MILLIGRAM(S): KIT at 18:05

## 2019-05-26 RX ADMIN — BUDESONIDE AND FORMOTEROL FUMARATE DIHYDRATE 2 PUFF(S): 160; 4.5 AEROSOL RESPIRATORY (INHALATION) at 12:19

## 2019-05-26 RX ADMIN — FINASTERIDE 5 MILLIGRAM(S): 5 TABLET, FILM COATED ORAL at 12:19

## 2019-05-26 RX ADMIN — Medication 81 MILLIGRAM(S): at 12:19

## 2019-05-26 RX ADMIN — Medication 40 MILLIGRAM(S): at 05:11

## 2019-05-26 RX ADMIN — Medication 3 MILLILITER(S): at 02:16

## 2019-05-26 RX ADMIN — BUDESONIDE AND FORMOTEROL FUMARATE DIHYDRATE 2 PUFF(S): 160; 4.5 AEROSOL RESPIRATORY (INHALATION) at 21:42

## 2019-05-26 RX ADMIN — Medication 50 MILLIGRAM(S): at 05:11

## 2019-05-26 RX ADMIN — Medication 3 MILLILITER(S): at 08:23

## 2019-05-26 RX ADMIN — PANTOPRAZOLE SODIUM 40 MILLIGRAM(S): 20 TABLET, DELAYED RELEASE ORAL at 08:19

## 2019-05-26 RX ADMIN — Medication 3 MILLILITER(S): at 15:03

## 2019-05-26 RX ADMIN — MONTELUKAST 10 MILLIGRAM(S): 4 TABLET, CHEWABLE ORAL at 12:19

## 2019-05-26 RX ADMIN — Medication 3 MILLILITER(S): at 21:02

## 2019-05-26 RX ADMIN — ATORVASTATIN CALCIUM 40 MILLIGRAM(S): 80 TABLET, FILM COATED ORAL at 21:42

## 2019-05-26 RX ADMIN — Medication 0.12 MILLIGRAM(S): at 05:11

## 2019-05-26 NOTE — PROGRESS NOTE ADULT - SUBJECTIVE AND OBJECTIVE BOX
Patient seen and examined at bedside  dizziness overnight  Case discussed with medical team    HPI:  84 Male with PMH of Asthma, CVA (2015), BPH, Non-obstructive CAD, Afib (Xarelto), Bilateral carotid stenosis, Moderate AS, Colitis came to hospital for shortness of breath and weakness for 2 weeks. Symptoms were associated with poor oral intake and more frequent use of home nebulizers. Pt was last admitted in [facility] for shortness of breath and had stress test which showed reversible changes with normal EF. On cardiac cath, his EF was 35% on radionuclide angiography with no obstructive disease. Currently he can not walk more than 2 blocks due to dyspnea and uses 1 pillow at night.     SH: Lives with daughter. Uses cane. No smoking and alcohol.    ED Course: Hemodynamically stable. Labs showed anemia of 8.2 and cardiac enzymes of 0.057. BNP was 7500 with mild bilateral congested CXR. EKG showed chronic AFib @ 90 bpm. Pt was given 80 IV lasix. (23 May 2019 21:05)      PAST MEDICAL & SURGICAL HISTORY:  Afib  Leukocytoclastic vasculitis  Hypercholesterolemia  HTN (hypertension)  CVA (cerebral vascular accident): 2015  Asthma  H/O sinus surgery      No Known Allergies       MEDICATIONS  (STANDING):  ALBUTerol/ipratropium for Nebulization 3 milliLiter(s) Nebulizer every 6 hours  aspirin  chewable 81 milliGRAM(s) Oral daily  atorvastatin 40 milliGRAM(s) Oral at bedtime  buDESOnide 160 MICROgram(s)/formoterol 4.5 MICROgram(s) Inhaler 2 Puff(s) Inhalation two times a day  digoxin     Tablet 0.125 milliGRAM(s) Oral daily  ergocalciferol 16741 Unit(s) Oral every week  finasteride 5 milliGRAM(s) Oral daily  furosemide    Tablet 40 milliGRAM(s) Oral daily  insulin lispro (HumaLOG) corrective regimen sliding scale   SubCutaneous Before meals and at bedtime  methylPREDNISolone sodium succinate Injectable 40 milliGRAM(s) IV Push daily  metoprolol tartrate 50 milliGRAM(s) Oral two times a day  montelukast 10 milliGRAM(s) Oral daily  pantoprazole    Tablet 40 milliGRAM(s) Oral before breakfast  rivaroxaban 20 milliGRAM(s) Oral every 24 hours  sodium chloride 0.9%. 1000 milliLiter(s) (75 mL/Hr) IV Continuous <Continuous>  tamsulosin 0.8 milliGRAM(s) Oral at bedtime    MEDICATIONS  (PRN):      REVIEW OF SYSTEMS:  CONSTITUTIONAL: (+) malaise.   EYES: No acute change in vision   ENT:  No tinnitus  NECK: No stiffness  RESPIRATORY: No hemoptysis  CARDIOVASCULAR: No chest pain, palpitations, syncope  GASTROINTESTINAL: No hematemesis, diarrhea, melena, or hematochezia.  GENITOURINARY: No hematuria  NEUROLOGICAL: No headaches  LYMPH Nodes: No enlarged glands  ENDOCRINE: No heat or cold intolerance	    T(C): 36.3 (05-26-19 @ 07:45), Max: 36.8 (05-25-19 @ 11:50)  HR: 81 (05-26-19 @ 09:14) (66 - 84)  BP: 114/49 (05-26-19 @ 07:45) (114/49 - 152/58)  RR: 19 (05-26-19 @ 07:45) (18 - 20)  SpO2: 98% (05-26-19 @ 09:14) (96% - 100%)    PHYSICAL EXAMINATION:   Constitutional: WD, NAD  HEENT: NC, AT  Neck:  Supple  Respiratory:  Adequate airflow b/l. Not using accessory muscles of respiration.  Cardiovascular: sys murmur, S1 & S2 intact, no R/G, 2+ radial pulses b/l  Gastrointestinal: Soft, NT, ND, normoactive b.s., no organomegaly/RT/rigidity  Extremities: WWP  Neurological:  Alert and awake.  No acute focal motor deficits. Crude sensation intact.     Labs and imaging reviewed    LABS:                        8.7    14.53 )-----------( 439      ( 25 May 2019 06:22 )             29.3     05-25    140  |  102  |  41<H>  ----------------------------<  142<H>  3.5   |  29  |  1.64<H>    Ca    8.6      25 May 2019 06:22              CAPILLARY BLOOD GLUCOSE      POCT Blood Glucose.: 121 mg/dL (26 May 2019 07:48)  POCT Blood Glucose.: 131 mg/dL (25 May 2019 21:40)  POCT Blood Glucose.: 133 mg/dL (25 May 2019 16:45)  POCT Blood Glucose.: 183 mg/dL (25 May 2019 11:49)              RADIOLOGY & ADDITIONAL STUDIES:

## 2019-05-26 NOTE — DISCHARGE NOTE NURSING/CASE MANAGEMENT/SOCIAL WORK - NSDCDPATPORTLINK_GEN_ALL_CORE
You can access the [facility] Patient Portal, offered by [facility], by registering with the following website: [email or web address]

## 2019-05-26 NOTE — PROGRESS NOTE ADULT - PROBLEM SELECTOR PLAN 1
noted on imaging  vascular surgery management appreciated  fluid hydration for zehra improvement, plan for CTA for further evaluation  obtain neuro consult

## 2019-05-27 LAB
ANION GAP SERPL CALC-SCNC: 6 MMOL/L — SIGNIFICANT CHANGE UP (ref 5–17)
BUN SERPL-MCNC: 47 MG/DL — HIGH (ref 7–18)
CALCIUM SERPL-MCNC: 8.1 MG/DL — LOW (ref 8.4–10.5)
CHLORIDE SERPL-SCNC: 105 MMOL/L — SIGNIFICANT CHANGE UP (ref 96–108)
CO2 SERPL-SCNC: 30 MMOL/L — SIGNIFICANT CHANGE UP (ref 22–31)
CREAT SERPL-MCNC: 1.35 MG/DL — HIGH (ref 0.5–1.3)
GLUCOSE BLDC GLUCOMTR-MCNC: 111 MG/DL — HIGH (ref 70–99)
GLUCOSE BLDC GLUCOMTR-MCNC: 129 MG/DL — HIGH (ref 70–99)
GLUCOSE BLDC GLUCOMTR-MCNC: 142 MG/DL — HIGH (ref 70–99)
GLUCOSE BLDC GLUCOMTR-MCNC: 145 MG/DL — HIGH (ref 70–99)
GLUCOSE SERPL-MCNC: 99 MG/DL — SIGNIFICANT CHANGE UP (ref 70–99)
HCT VFR BLD CALC: 27 % — LOW (ref 39–50)
HGB BLD-MCNC: 7.7 G/DL — LOW (ref 13–17)
MCHC RBC-ENTMCNC: 19.8 PG — LOW (ref 27–34)
MCHC RBC-ENTMCNC: 28.5 GM/DL — LOW (ref 32–36)
MCV RBC AUTO: 69.6 FL — LOW (ref 80–100)
NRBC # BLD: 0 /100 WBCS — SIGNIFICANT CHANGE UP (ref 0–0)
PLATELET # BLD AUTO: 315 K/UL — SIGNIFICANT CHANGE UP (ref 150–400)
POTASSIUM SERPL-MCNC: 3.9 MMOL/L — SIGNIFICANT CHANGE UP (ref 3.5–5.3)
POTASSIUM SERPL-SCNC: 3.9 MMOL/L — SIGNIFICANT CHANGE UP (ref 3.5–5.3)
RBC # BLD: 3.88 M/UL — LOW (ref 4.2–5.8)
RBC # FLD: 18.2 % — HIGH (ref 10.3–14.5)
SODIUM SERPL-SCNC: 141 MMOL/L — SIGNIFICANT CHANGE UP (ref 135–145)
WBC # BLD: 12.26 K/UL — HIGH (ref 3.8–10.5)
WBC # FLD AUTO: 12.26 K/UL — HIGH (ref 3.8–10.5)

## 2019-05-27 RX ORDER — FERROUS SULFATE 325(65) MG
325 TABLET ORAL DAILY
Refills: 0 | Status: DISCONTINUED | OUTPATIENT
Start: 2019-05-28 | End: 2019-05-31

## 2019-05-27 RX ORDER — SODIUM CHLORIDE 9 MG/ML
1000 INJECTION INTRAMUSCULAR; INTRAVENOUS; SUBCUTANEOUS
Refills: 0 | Status: DISCONTINUED | OUTPATIENT
Start: 2019-05-27 | End: 2019-05-30

## 2019-05-27 RX ORDER — IRON SUCROSE 20 MG/ML
200 INJECTION, SOLUTION INTRAVENOUS EVERY 24 HOURS
Refills: 0 | Status: COMPLETED | OUTPATIENT
Start: 2019-05-27 | End: 2019-05-28

## 2019-05-27 RX ORDER — ACETYLCYSTEINE 200 MG/ML
600 VIAL (ML) MISCELLANEOUS EVERY 12 HOURS
Refills: 0 | Status: COMPLETED | OUTPATIENT
Start: 2019-05-27 | End: 2019-05-29

## 2019-05-27 RX ADMIN — PANTOPRAZOLE SODIUM 40 MILLIGRAM(S): 20 TABLET, DELAYED RELEASE ORAL at 05:46

## 2019-05-27 RX ADMIN — BUDESONIDE AND FORMOTEROL FUMARATE DIHYDRATE 2 PUFF(S): 160; 4.5 AEROSOL RESPIRATORY (INHALATION) at 22:14

## 2019-05-27 RX ADMIN — IRON SUCROSE 110 MILLIGRAM(S): 20 INJECTION, SOLUTION INTRAVENOUS at 10:25

## 2019-05-27 RX ADMIN — Medication 40 MILLIGRAM(S): at 05:46

## 2019-05-27 RX ADMIN — SODIUM CHLORIDE 80 MILLILITER(S): 9 INJECTION INTRAMUSCULAR; INTRAVENOUS; SUBCUTANEOUS at 10:26

## 2019-05-27 RX ADMIN — Medication 81 MILLIGRAM(S): at 11:28

## 2019-05-27 RX ADMIN — Medication 600 MILLIGRAM(S): at 11:27

## 2019-05-27 RX ADMIN — TAMSULOSIN HYDROCHLORIDE 0.8 MILLIGRAM(S): 0.4 CAPSULE ORAL at 22:14

## 2019-05-27 RX ADMIN — Medication 50 MILLIGRAM(S): at 05:46

## 2019-05-27 RX ADMIN — Medication 3 MILLILITER(S): at 14:38

## 2019-05-27 RX ADMIN — FINASTERIDE 5 MILLIGRAM(S): 5 TABLET, FILM COATED ORAL at 11:27

## 2019-05-27 RX ADMIN — Medication 0.12 MILLIGRAM(S): at 05:46

## 2019-05-27 RX ADMIN — Medication 3 MILLILITER(S): at 08:48

## 2019-05-27 RX ADMIN — RIVAROXABAN 20 MILLIGRAM(S): KIT at 17:14

## 2019-05-27 RX ADMIN — Medication 3 MILLILITER(S): at 21:37

## 2019-05-27 RX ADMIN — Medication 50 MILLIGRAM(S): at 17:14

## 2019-05-27 RX ADMIN — ATORVASTATIN CALCIUM 40 MILLIGRAM(S): 80 TABLET, FILM COATED ORAL at 22:14

## 2019-05-27 RX ADMIN — MONTELUKAST 10 MILLIGRAM(S): 4 TABLET, CHEWABLE ORAL at 11:28

## 2019-05-27 RX ADMIN — BUDESONIDE AND FORMOTEROL FUMARATE DIHYDRATE 2 PUFF(S): 160; 4.5 AEROSOL RESPIRATORY (INHALATION) at 11:27

## 2019-05-27 RX ADMIN — Medication 3 MILLILITER(S): at 02:09

## 2019-05-27 RX ADMIN — Medication 600 MILLIGRAM(S): at 17:59

## 2019-05-27 NOTE — PROGRESS NOTE ADULT - PROBLEM SELECTOR PROBLEM 9
CKD (chronic kidney disease) stage 3, GFR 30-59 ml/min

## 2019-05-27 NOTE — PROGRESS NOTE ADULT - PROBLEM SELECTOR PLAN 4
Baseline Hb of 11  Presented with microcytic anemia of 8.2  FOBT negative  Pt was advised to get outpatient colonoscopy last month which he has not been able to get  F/u Anemia panel and may get benefit with IV iron- low iron, elevated transferrin   microcytic anemia  c/w venofer

## 2019-05-27 NOTE — PROGRESS NOTE ADULT - PROBLEM SELECTOR PLAN 1
noted on imaging  vascular surgery management appreciated  -> TRINI improving s/p fluid hydration & adjustement in lasix. F/u CTA for further evaluation  ->Obtain neuro consult

## 2019-05-27 NOTE — PROGRESS NOTE ADULT - SUBJECTIVE AND OBJECTIVE BOX
Patient seen and examined at bedside  No acute events noted overnight  Case discussed with medical team    HPI:  84 Male with PMH of Asthma, CVA (2015), BPH, Non-obstructive CAD, Afib (Xarelto), Bilateral carotid stenosis, Moderate AS, Colitis came to hospital for shortness of breath and weakness for 2 weeks. Symptoms were associated with poor oral intake and more frequent use of home nebulizers. Pt was last admitted in [facility] for shortness of breath and had stress test which showed reversible changes with normal EF. On cardiac cath, his EF was 35% on radionuclide angiography with no obstructive disease. Currently he can not walk more than 2 blocks due to dyspnea and uses 1 pillow at night.     SH: Lives with daughter. Uses cane. No smoking and alcohol.    ED Course: Hemodynamically stable. Labs showed anemia of 8.2 and cardiac enzymes of 0.057. BNP was 7500 with mild bilateral congested CXR. EKG showed chronic AFib @ 90 bpm. Pt was given 80 IV lasix. (23 May 2019 21:05)      PAST MEDICAL & SURGICAL HISTORY:  Afib  Leukocytoclastic vasculitis  Hypercholesterolemia  HTN (hypertension)  CVA (cerebral vascular accident): 2015  Asthma  H/O sinus surgery      No Known Allergies       MEDICATIONS  (STANDING):  ALBUTerol/ipratropium for Nebulization 3 milliLiter(s) Nebulizer every 6 hours  aspirin  chewable 81 milliGRAM(s) Oral daily  atorvastatin 40 milliGRAM(s) Oral at bedtime  buDESOnide 160 MICROgram(s)/formoterol 4.5 MICROgram(s) Inhaler 2 Puff(s) Inhalation two times a day  digoxin     Tablet 0.125 milliGRAM(s) Oral daily  ergocalciferol 70565 Unit(s) Oral every week  finasteride 5 milliGRAM(s) Oral daily  furosemide    Tablet 40 milliGRAM(s) Oral daily  insulin lispro (HumaLOG) corrective regimen sliding scale   SubCutaneous Before meals and at bedtime  iron sucrose IVPB 200 milliGRAM(s) IV Intermittent every 24 hours  metoprolol tartrate 50 milliGRAM(s) Oral two times a day  montelukast 10 milliGRAM(s) Oral daily  pantoprazole    Tablet 40 milliGRAM(s) Oral before breakfast  predniSONE   Tablet 40 milliGRAM(s) Oral daily  rivaroxaban 20 milliGRAM(s) Oral every 24 hours  sodium chloride 0.9%. 1000 milliLiter(s) (75 mL/Hr) IV Continuous <Continuous>  tamsulosin 0.8 milliGRAM(s) Oral at bedtime    MEDICATIONS  (PRN):      REVIEW OF SYSTEMS:  CONSTITUTIONAL: (+) malaise. gen weakness  EYES: No acute change in vision   ENT:  No tinnitus  NECK: No stiffness  RESPIRATORY: schroeder. No hemoptysis  CARDIOVASCULAR: No chest pain, palpitations, syncope  GASTROINTESTINAL: No hematemesis, diarrhea, melena, or hematochezia.  GENITOURINARY: No hematuria  NEUROLOGICAL: No headaches  LYMPH Nodes: No enlarged glands  ENDOCRINE: No heat or cold intolerance	    T(C): 36.9 (05-27-19 @ 04:07), Max: 36.9 (05-27-19 @ 04:07)  HR: 78 (05-27-19 @ 04:07) (73 - 82)  BP: 139/52 (05-27-19 @ 04:07) (117/90 - 157/70)  RR: 18 (05-27-19 @ 04:07) (18 - 20)  SpO2: 100% (05-27-19 @ 04:07) (95% - 100%)    PHYSICAL EXAMINATION:   Constitutional: WD, NAD  HEENT: NC, AT  Neck:  Supple  Respiratory:  Adequate airflow b/l. Not using accessory muscles of respiration.  Cardiovascular: sys murmur. S1 & S2 intact, no R/G, 2+ radial pulses b/l  Gastrointestinal: Soft, NT, ND, normoactive b.s., no organomegaly/RT/rigidity  Extremities: WWP  Neurological:  Alert and awake.  No acute focal motor deficits. Crude sensation intact.     Labs and imaging reviewed    LABS:                        7.7    12.26 )-----------( 315      ( 27 May 2019 05:45 )             27.0     05-27    141  |  105  |  47<H>  ----------------------------<  99  3.9   |  30  |  1.35<H>    Ca    8.1<L>      27 May 2019 05:45              CAPILLARY BLOOD GLUCOSE      POCT Blood Glucose.: 111 mg/dL (27 May 2019 07:47)  POCT Blood Glucose.: 141 mg/dL (26 May 2019 21:22)  POCT Blood Glucose.: 117 mg/dL (26 May 2019 16:45)  POCT Blood Glucose.: 136 mg/dL (26 May 2019 11:57)              RADIOLOGY & ADDITIONAL STUDIES:

## 2019-05-27 NOTE — PROGRESS NOTE ADULT - SUBJECTIVE AND OBJECTIVE BOX
Patient seen and examined.   Time of visit:    MEDICATIONS  (STANDING):  acetylcysteine  Oral Solution 600 milliGRAM(s) Oral every 12 hours  ALBUTerol/ipratropium for Nebulization 3 milliLiter(s) Nebulizer every 6 hours  aspirin  chewable 81 milliGRAM(s) Oral daily  atorvastatin 40 milliGRAM(s) Oral at bedtime  buDESOnide 160 MICROgram(s)/formoterol 4.5 MICROgram(s) Inhaler 2 Puff(s) Inhalation two times a day  digoxin     Tablet 0.125 milliGRAM(s) Oral daily  ergocalciferol 58776 Unit(s) Oral every week  finasteride 5 milliGRAM(s) Oral daily  insulin lispro (HumaLOG) corrective regimen sliding scale   SubCutaneous Before meals and at bedtime  iron sucrose IVPB 200 milliGRAM(s) IV Intermittent every 24 hours  metoprolol tartrate 50 milliGRAM(s) Oral two times a day  montelukast 10 milliGRAM(s) Oral daily  pantoprazole    Tablet 40 milliGRAM(s) Oral before breakfast  predniSONE   Tablet 40 milliGRAM(s) Oral daily  rivaroxaban 20 milliGRAM(s) Oral every 24 hours  sodium chloride 0.9%. 1000 milliLiter(s) (80 mL/Hr) IV Continuous <Continuous>  tamsulosin 0.8 milliGRAM(s) Oral at bedtime      MEDICATIONS  (PRN):   Medications up to date at time of exam.      PHYSICAL EXAMINATION:  Patient has no new complaints.  GENERAL: The patient is a well-developed, well-nourished, in no apparent distress.     Vital Signs Last 24 Hrs  T(C): 36.6 (27 May 2019 11:35), Max: 36.9 (27 May 2019 04:07)  T(F): 97.9 (27 May 2019 11:35), Max: 98.4 (27 May 2019 04:07)  HR: 87 (27 May 2019 11:35) (72 - 87)  BP: 170/66 (27 May 2019 11:35) (122/56 - 170/66)  BP(mean): --  RR: 18 (27 May 2019 11:35) (18 - 20)  SpO2: 99% (27 May 2019 11:35) (95% - 100%)   (if applicable)      HEENT: Head is normocephalic and atraumatic. Extraocular muscles are intact. Mucous membranes are moist.     NECK: Supple, no palpable adenopathy.    LUNGS: Clear to auscultation, no wheezing, or rhonchi.  Scattered rales at the bases     HEART: Regular rate and rhythm III/VI HSM @ LSB    ABDOMEN: Soft, nontender, and nondistended.  No hepatosplenomegaly is noted.    EXTREMITIES: Without any cyanosis, clubbing, rash, lesions or edema.    NEUROLOGIC: Awake, alert, oriented.     SKIN: Warm, dry, good turgor.      LABS:                        7.7    12.26 )-----------( 315      ( 27 May 2019 05:45 )             27.0     05-27    141  |  105  |  47<H>  ----------------------------<  99  3.9   |  30  |  1.35<H>    Ca    8.1<L>      27 May 2019 05:45                          MICROBIOLOGY: (if applicable)    RADIOLOGY & ADDITIONAL STUDIES:  EKG:   CXR:  ECHO:    IMPRESSION: 84y Male PAST MEDICAL & SURGICAL HISTORY:  Afib  Leukocytoclastic vasculitis  Hypercholesterolemia  HTN (hypertension)  CVA (cerebral vascular accident): 2015  Asthma  H/O sinus surgery   p/w     RECOMMENDATIONS:  Afib stable rate continue with present medications.  CHF improving continue with lasix   noted to have decreased hgb will need to have this issue addressed as that patient has a history of CHF and will need to maintain a higher HGB

## 2019-05-27 NOTE — PROGRESS NOTE ADULT - SUBJECTIVE AND OBJECTIVE BOX
PGY 1 Note discussed with supervising resident and primary attending    Patient is a 84y old  Male who presents with a chief complaint of Shortness of breath (27 May 2019 08:06)      INTERVAL HPI/OVERNIGHT EVENTS: Pt was seen and examined at bedside, pt is asymptomatic.     MEDICATIONS  (STANDING):  acetylcysteine  Oral Solution 600 milliGRAM(s) Oral every 12 hours  ALBUTerol/ipratropium for Nebulization 3 milliLiter(s) Nebulizer every 6 hours  aspirin  chewable 81 milliGRAM(s) Oral daily  atorvastatin 40 milliGRAM(s) Oral at bedtime  buDESOnide 160 MICROgram(s)/formoterol 4.5 MICROgram(s) Inhaler 2 Puff(s) Inhalation two times a day  digoxin     Tablet 0.125 milliGRAM(s) Oral daily  ergocalciferol 30269 Unit(s) Oral every week  finasteride 5 milliGRAM(s) Oral daily  insulin lispro (HumaLOG) corrective regimen sliding scale   SubCutaneous Before meals and at bedtime  iron sucrose IVPB 200 milliGRAM(s) IV Intermittent every 24 hours  metoprolol tartrate 50 milliGRAM(s) Oral two times a day  montelukast 10 milliGRAM(s) Oral daily  pantoprazole    Tablet 40 milliGRAM(s) Oral before breakfast  predniSONE   Tablet 40 milliGRAM(s) Oral daily  rivaroxaban 20 milliGRAM(s) Oral every 24 hours  sodium chloride 0.9%. 1000 milliLiter(s) (80 mL/Hr) IV Continuous <Continuous>  tamsulosin 0.8 milliGRAM(s) Oral at bedtime    MEDICATIONS  (PRN):      __________________________________________________  REVIEW OF SYSTEMS:    CONSTITUTIONAL: No fever,   EYES: no acute visual disturbances  NECK: No pain or stiffness  RESPIRATORY: No cough; No shortness of breath  CARDIOVASCULAR: No chest pain, no palpitations  GASTROINTESTINAL: No pain. No nausea or vomiting; No diarrhea   NEUROLOGICAL: No headache or numbness, no tremors  MUSCULOSKELETAL: No joint pain, no muscle pain  GENITOURINARY: no dysuria, no frequency, no hesitancy  PSYCHIATRY: no depression , no anxiety  ALL OTHER  ROS negative        Vital Signs Last 24 Hrs  T(C): 36.6 (27 May 2019 11:35), Max: 36.9 (27 May 2019 04:07)  T(F): 97.9 (27 May 2019 11:35), Max: 98.4 (27 May 2019 04:07)  HR: 87 (27 May 2019 11:35) (72 - 87)  BP: 170/66 (27 May 2019 11:35) (122/56 - 170/66)  BP(mean): --  RR: 18 (27 May 2019 11:35) (18 - 20)  SpO2: 99% (27 May 2019 11:35) (95% - 100%)    ________________________________________________  PHYSICAL EXAM:  GENERAL: NAD  HEENT: Normocephalic;  conjunctivae and sclerae clear; moist mucous membranes;   NECK : supple  CHEST/LUNG: Clear to auscultation bilaterally with good air entry   HEART: S1 S2  regular; no murmurs, gallops or rubs  ABDOMEN: Soft, Nontender, Nondistended; Bowel sounds present  EXTREMITIES: no cyanosis; no edema; no calf tenderness  SKIN: warm and dry; no rash  NERVOUS SYSTEM:  Awake and alert; Oriented  to place, person and time ; no new deficits    _________________________________________________  LABS:                        7.7    12.26 )-----------( 315      ( 27 May 2019 05:45 )             27.0     05-27    141  |  105  |  47<H>  ----------------------------<  99  3.9   |  30  |  1.35<H>    Ca    8.1<L>      27 May 2019 05:45          CAPILLARY BLOOD GLUCOSE      POCT Blood Glucose.: 129 mg/dL (27 May 2019 11:07)  POCT Blood Glucose.: 111 mg/dL (27 May 2019 07:47)  POCT Blood Glucose.: 141 mg/dL (26 May 2019 21:22)  POCT Blood Glucose.: 117 mg/dL (26 May 2019 16:45)        RADIOLOGY & ADDITIONAL TESTS:    Imaging Personally Reviewed:  YES    Consultant(s) Notes Reviewed:   YES    Care Discussed with Consultants : YES    Plan of care was discussed with patient and /or primary care giver; all questions and concerns were addressed and care was aligned with patient's wishes.

## 2019-05-28 LAB
ANION GAP SERPL CALC-SCNC: 7 MMOL/L — SIGNIFICANT CHANGE UP (ref 5–17)
BUN SERPL-MCNC: 40 MG/DL — HIGH (ref 7–18)
CALCIUM SERPL-MCNC: 8.4 MG/DL — SIGNIFICANT CHANGE UP (ref 8.4–10.5)
CHLORIDE SERPL-SCNC: 106 MMOL/L — SIGNIFICANT CHANGE UP (ref 96–108)
CO2 SERPL-SCNC: 28 MMOL/L — SIGNIFICANT CHANGE UP (ref 22–31)
CREAT SERPL-MCNC: 1.41 MG/DL — HIGH (ref 0.5–1.3)
GLUCOSE BLDC GLUCOMTR-MCNC: 105 MG/DL — HIGH (ref 70–99)
GLUCOSE BLDC GLUCOMTR-MCNC: 108 MG/DL — HIGH (ref 70–99)
GLUCOSE BLDC GLUCOMTR-MCNC: 115 MG/DL — HIGH (ref 70–99)
GLUCOSE BLDC GLUCOMTR-MCNC: 137 MG/DL — HIGH (ref 70–99)
GLUCOSE SERPL-MCNC: 106 MG/DL — HIGH (ref 70–99)
HCT VFR BLD CALC: 26.9 % — LOW (ref 39–50)
HGB BLD-MCNC: 7.7 G/DL — LOW (ref 13–17)
MCHC RBC-ENTMCNC: 19.9 PG — LOW (ref 27–34)
MCHC RBC-ENTMCNC: 28.6 GM/DL — LOW (ref 32–36)
MCV RBC AUTO: 69.7 FL — LOW (ref 80–100)
NRBC # BLD: 0 /100 WBCS — SIGNIFICANT CHANGE UP (ref 0–0)
PLATELET # BLD AUTO: 309 K/UL — SIGNIFICANT CHANGE UP (ref 150–400)
POTASSIUM SERPL-MCNC: 3.4 MMOL/L — LOW (ref 3.5–5.3)
POTASSIUM SERPL-SCNC: 3.4 MMOL/L — LOW (ref 3.5–5.3)
RBC # BLD: 3.86 M/UL — LOW (ref 4.2–5.8)
RBC # FLD: 18.4 % — HIGH (ref 10.3–14.5)
SODIUM SERPL-SCNC: 141 MMOL/L — SIGNIFICANT CHANGE UP (ref 135–145)
WBC # BLD: 12.61 K/UL — HIGH (ref 3.8–10.5)
WBC # FLD AUTO: 12.61 K/UL — HIGH (ref 3.8–10.5)

## 2019-05-28 RX ORDER — DEXTROSE MONOHYDRATE, SODIUM CHLORIDE, AND POTASSIUM CHLORIDE 50; .745; 4.5 G/1000ML; G/1000ML; G/1000ML
1000 INJECTION, SOLUTION INTRAVENOUS
Refills: 0 | Status: DISCONTINUED | OUTPATIENT
Start: 2019-05-28 | End: 2019-05-30

## 2019-05-28 RX ORDER — POTASSIUM CHLORIDE 20 MEQ
40 PACKET (EA) ORAL ONCE
Refills: 0 | Status: COMPLETED | OUTPATIENT
Start: 2019-05-28 | End: 2019-05-28

## 2019-05-28 RX ADMIN — Medication 0.12 MILLIGRAM(S): at 05:49

## 2019-05-28 RX ADMIN — Medication 325 MILLIGRAM(S): at 12:02

## 2019-05-28 RX ADMIN — Medication 40 MILLIEQUIVALENT(S): at 08:52

## 2019-05-28 RX ADMIN — BUDESONIDE AND FORMOTEROL FUMARATE DIHYDRATE 2 PUFF(S): 160; 4.5 AEROSOL RESPIRATORY (INHALATION) at 21:54

## 2019-05-28 RX ADMIN — FINASTERIDE 5 MILLIGRAM(S): 5 TABLET, FILM COATED ORAL at 12:03

## 2019-05-28 RX ADMIN — Medication 3 MILLILITER(S): at 21:45

## 2019-05-28 RX ADMIN — Medication 3 MILLILITER(S): at 08:11

## 2019-05-28 RX ADMIN — PANTOPRAZOLE SODIUM 40 MILLIGRAM(S): 20 TABLET, DELAYED RELEASE ORAL at 05:49

## 2019-05-28 RX ADMIN — Medication 600 MILLIGRAM(S): at 05:50

## 2019-05-28 RX ADMIN — Medication 600 MILLIGRAM(S): at 18:39

## 2019-05-28 RX ADMIN — DEXTROSE MONOHYDRATE, SODIUM CHLORIDE, AND POTASSIUM CHLORIDE 60 MILLILITER(S): 50; .745; 4.5 INJECTION, SOLUTION INTRAVENOUS at 16:03

## 2019-05-28 RX ADMIN — IRON SUCROSE 110 MILLIGRAM(S): 20 INJECTION, SOLUTION INTRAVENOUS at 08:09

## 2019-05-28 RX ADMIN — Medication 50 MILLIGRAM(S): at 18:39

## 2019-05-28 RX ADMIN — RIVAROXABAN 20 MILLIGRAM(S): KIT at 18:38

## 2019-05-28 RX ADMIN — ATORVASTATIN CALCIUM 40 MILLIGRAM(S): 80 TABLET, FILM COATED ORAL at 21:53

## 2019-05-28 RX ADMIN — BUDESONIDE AND FORMOTEROL FUMARATE DIHYDRATE 2 PUFF(S): 160; 4.5 AEROSOL RESPIRATORY (INHALATION) at 12:02

## 2019-05-28 RX ADMIN — Medication 50 MILLIGRAM(S): at 05:49

## 2019-05-28 RX ADMIN — Medication 40 MILLIGRAM(S): at 05:49

## 2019-05-28 RX ADMIN — TAMSULOSIN HYDROCHLORIDE 0.8 MILLIGRAM(S): 0.4 CAPSULE ORAL at 21:53

## 2019-05-28 RX ADMIN — MONTELUKAST 10 MILLIGRAM(S): 4 TABLET, CHEWABLE ORAL at 12:03

## 2019-05-28 RX ADMIN — Medication 81 MILLIGRAM(S): at 12:02

## 2019-05-28 NOTE — PROGRESS NOTE ADULT - PROBLEM SELECTOR PLAN 4
Baseline Hb of 11  Presented with microcytic anemia of 8.2  FOBT negative  Pt was advised to get outpatient colonoscopy last month which he has not been able to get  F/u Anemia panel and may get benefit with IV iron- low iron, elevated transferrin   microcytic anemia  c/w venofer Iron deficiency anemia   Baseline Hb of 11  Presented with microcytic anemia of 7.7  FOBT negative  Pt was advised to get outpatient colonoscopy last month which he has not been able to get    microcytic anemia  c/w venofer  would transfuse 1 unit of blood.  GI Dr. Coronel

## 2019-05-28 NOTE — PROGRESS NOTE ADULT - PROBLEM SELECTOR PLAN 3
Acute exacerbation  Bilateral wheezing and shortness of breath  Peak flow was 100 in ED with poor study  Started Solumedrol 40 q12 and bronchodilators   Monitor peak flow daily  Consulted Dr Carmona resolved  c/w tapering prednisone   Consulted Dr Carmona

## 2019-05-28 NOTE — PROGRESS NOTE ADULT - ATTENDING COMMENTS
I agree with the above information  of note and in addition to the above information, pt clinically progressing well, AM labs pending, TRINI improving, receiving prophylaxis for contrast induced nephropathy for CT study. Holding lasix for CT contrast study.  Will plan to resume lasix ~24 hrs post imaging pending no additional acute events noted.  Asthma exacerbation progressing well. Continue with steroids and its taper.  PT. OOB to chair, safely ambulate with assistance. All consultants and medicala team members management appreciated. I agree with the above information  of note and in addition to the above information, pt clinically progressing well, AM labs pending, TRINI improving, receiving prophylaxis for contrast induced nephropathy for CT study. Holding lasix for CT contrast study.  Will plan to resume lasix ~24 hrs post imaging pending no additional acute events noted.  Asthma exacerbation progressing well. Continue with steroids and its taper.  Microcytic Anemia c/w Iron Deficiency Anemia - on venofer. PT. OOB to chair, safely ambulate with assistance. All consultants and medicala team members management appreciated.

## 2019-05-28 NOTE — CONSULT NOTE ADULT - SUBJECTIVE AND OBJECTIVE BOX
GI INITIAL CONSULT    HPI: 84M w/multiple medical comorbidities p/w dyspnea and was found to have microcytic anemia with Hb below his baseline. Reports intermittent hematochezia, but is an unreliable historian. Pt was seen in the office in Jan 2018 for the same issue and was sent for a virtual colonoscopy, but never went. Pt was again seen as a GI consult in this hospital for colitis seen on CT scan last month and was scheduled for an optical colonoscopy, but refused to prep for the test and signed out AMA prior to completing a full work-up. Pt and his family were strongly advised to follow-up in the office in order to get a referral for another virtual colonoscopy, but the pt never showed up. Now pt has persistent microscopic anemia and FOBT (-) stools.    PMH: asthma, HTN, HDL, BPH, CAD, CVA, moderate AS, AFib on AC, B/L carotid stenosis, CHF  PSH: sinus surgery    Meds: MEDICATIONS  (STANDING):  acetylcysteine  Oral Solution 600 milliGRAM(s) Oral every 12 hours  ALBUTerol/ipratropium for Nebulization 3 milliLiter(s) Nebulizer every 6 hours  aspirin  chewable 81 milliGRAM(s) Oral daily  atorvastatin 40 milliGRAM(s) Oral at bedtime  buDESOnide 160 MICROgram(s)/formoterol 4.5 MICROgram(s) Inhaler 2 Puff(s) Inhalation two times a day  digoxin     Tablet 0.125 milliGRAM(s) Oral daily  ergocalciferol 07706 Unit(s) Oral every week  ferrous    sulfate 325 milliGRAM(s) Oral daily  finasteride 5 milliGRAM(s) Oral daily  insulin lispro (HumaLOG) corrective regimen sliding scale   SubCutaneous Before meals and at bedtime  metoprolol tartrate 50 milliGRAM(s) Oral two times a day  montelukast 10 milliGRAM(s) Oral daily  pantoprazole    Tablet 40 milliGRAM(s) Oral before breakfast  predniSONE   Tablet 40 milliGRAM(s) Oral daily  rivaroxaban 20 milliGRAM(s) Oral every 24 hours  sodium chloride 0.9% with potassium chloride 20 mEq/L 1000 milliLiter(s) (60 mL/Hr) IV Continuous <Continuous>  sodium chloride 0.9%. 1000 milliLiter(s) (80 mL/Hr) IV Continuous <Continuous>  tamsulosin 0.8 milliGRAM(s) Oral at bedtime    SH: noncontributory  FH: noncontributory  ROS:  CONSTITUTIONAL: No fever, weight loss, or fatigue  EYES: No eye pain, visual disturbances, or discharge  ENT:  No difficulty hearing, tinnitus, vertigo; No sinus or throat pain  NECK: No pain or stiffness  RESPIRATORY: No cough, wheezing, chills or hemoptysis, shortness of Breath  CARDIOVASCULAR: No chest pain, palpitations, passing out, dizziness, or leg swelling  GASTROINTESTINAL: see HPI  GENITOURINARY: No dysuria, frequency, hematuria, or incontinence  NEUROLOGICAL: No headaches, memory loss, loss of strength, numbness, or tremors  SKIN: No itching, burning, rashes, or lesions   MUSCULOSKELETAL: No arthralgia, myalgia, or back pain.     Vitals: T(C): 36.8 (05-28-19 @ 16:13)  T(F): 98.2 (05-28-19 @ 16:13), Max: 98.6 (05-28-19 @ 05:05)  HR: 81 (05-28-19 @ 16:13) (62 - 89)  BP: 149/61 (05-28-19 @ 16:13) (121/50 - 154/59)    Gen: NAD  CVS: S1/S2  Chest: coarse BS B/L  Abd: S/NT/ND                        7.7    12.61 )-----------( 309      ( 28 May 2019 07:03 )             26.9   05-28    141  |  106  |  40<H>  ----------------------------<  106<H>  3.4<L>   |  28  |  1.41<H>    Ca    8.4      28 May 2019 07:03    Imaging: no GI imaging done on this admission

## 2019-05-28 NOTE — PROGRESS NOTE ADULT - SUBJECTIVE AND OBJECTIVE BOX
pt s- new complaints. Denies plegias, or dysarthria  ICU Vital Signs Last 24 Hrs  T(C): 37 (28 May 2019 05:05), Max: 37 (28 May 2019 05:05)  T(F): 98.6 (28 May 2019 05:05), Max: 98.6 (28 May 2019 05:05)  HR: 75 (28 May 2019 08:40) (75 - 89)  BP: 154/59 (28 May 2019 05:05) (143/64 - 170/66)  BP(mean): --  ABP: --  ABP(mean): --  RR: 18 (28 May 2019 05:05) (16 - 18)  SpO2: 97% (28 May 2019 08:40) (95% - 99%)    sensation intact bilat extremities and 5/5 motor strength bilat                          7.7    12.61 )-----------( 309      ( 28 May 2019 07:03 )             26.9   05-28    141  |  106  |  40<H>  ----------------------------<  106<H>  3.4<L>   |  28  |  1.41<H>    Ca    8.4      28 May 2019 07:03

## 2019-05-28 NOTE — CONSULT NOTE ADULT - ASSESSMENT
84M w/multiple severe medical comorbidities p/w dyspnea and found to have microcytic anemia.  -GI vs. hematological cause of anemia  -given pt's multiple debilitating medical comorbidities and the need for ASA and AC, risks of invasive procedures such as EGD and colonoscopy may outweigh the benefits in this pt  -would obtain UGIS in order to evaluate pt's upper GI tract  -pt is refusing to prep for an optical colonoscopy citing his inability to drink the required amount of Golytely  -would discuss with radiology department whether a virtual colonoscopy can be done on an inpt basis in order to evaluate pt's lower GI tract; a virtual colonoscopy is preferable to an optical colonoscopy given pt's comorbidities

## 2019-05-28 NOTE — PROGRESS NOTE ADULT - SUBJECTIVE AND OBJECTIVE BOX
Patient seen and examined.   Time of visit:    MEDICATIONS  (STANDING):  acetylcysteine  Oral Solution 600 milliGRAM(s) Oral every 12 hours  ALBUTerol/ipratropium for Nebulization 3 milliLiter(s) Nebulizer every 6 hours  aspirin  chewable 81 milliGRAM(s) Oral daily  atorvastatin 40 milliGRAM(s) Oral at bedtime  buDESOnide 160 MICROgram(s)/formoterol 4.5 MICROgram(s) Inhaler 2 Puff(s) Inhalation two times a day  digoxin     Tablet 0.125 milliGRAM(s) Oral daily  ergocalciferol 14608 Unit(s) Oral every week  ferrous    sulfate 325 milliGRAM(s) Oral daily  finasteride 5 milliGRAM(s) Oral daily  insulin lispro (HumaLOG) corrective regimen sliding scale   SubCutaneous Before meals and at bedtime  metoprolol tartrate 50 milliGRAM(s) Oral two times a day  montelukast 10 milliGRAM(s) Oral daily  pantoprazole    Tablet 40 milliGRAM(s) Oral before breakfast  predniSONE   Tablet 40 milliGRAM(s) Oral daily  rivaroxaban 20 milliGRAM(s) Oral every 24 hours  sodium chloride 0.9% with potassium chloride 20 mEq/L 1000 milliLiter(s) (60 mL/Hr) IV Continuous <Continuous>  sodium chloride 0.9%. 1000 milliLiter(s) (80 mL/Hr) IV Continuous <Continuous>  tamsulosin 0.8 milliGRAM(s) Oral at bedtime      MEDICATIONS  (PRN):   Medications up to date at time of exam.      PHYSICAL EXAMINATION:  Patient has no new complaints.  GENERAL: The patient is a well-developed, well-nourished, in no apparent distress.     Vital Signs Last 24 Hrs  T(C): 36.7 (28 May 2019 20:14), Max: 37 (28 May 2019 05:05)  T(F): 98 (28 May 2019 20:14), Max: 98.6 (28 May 2019 05:05)  HR: 88 (28 May 2019 20:14) (62 - 88)  BP: 155/58 (28 May 2019 20:14) (121/50 - 155/58)  BP(mean): --  RR: 16 (28 May 2019 20:14) (16 - 19)  SpO2: 98% (28 May 2019 20:14) (95% - 98%)   (if applicable)      HEENT: Head is normocephalic and atraumatic. Extraocular muscles are intact. Mucous membranes are moist.     NECK: Supple, no palpable adenopathy.    LUNGS: Clear to auscultation, no wheezing, rales, or rhonchi.    HEART: irregularly irregular without murmur.    ABDOMEN: Soft, nontender, and nondistended.  No hepatosplenomegaly is noted.    EXTREMITIES: Without any cyanosis, clubbing, rash, lesions or edema.    NEUROLOGIC: Awake, alert, oriented.     SKIN: Warm, dry, good turgor.      LABS:                        7.7    12.61 )-----------( 309      ( 28 May 2019 07:03 )             26.9     05-28    141  |  106  |  40<H>  ----------------------------<  106<H>  3.4<L>   |  28  |  1.41<H>    Ca    8.4      28 May 2019 07:03          MICROBIOLOGY: (if applicable)    RADIOLOGY & ADDITIONAL STUDIES:  EKG:   CXR:  ECHO:    IMPRESSION: 84y Male PAST MEDICAL & SURGICAL HISTORY:  CAD (coronary artery disease)  Afib  Leukocytoclastic vasculitis  Hypercholesterolemia  HTN (hypertension)  CVA (cerebral vascular accident): 2015  Asthma  H/O sinus surgery    RECOMMENDATIONS:    Afib stable rate continue with present medications  SOB improved, lasix on hold for TRINI  noted to have decreased hgb will need to have this issue addressed as that patient has a history of CHF and will need to maintain a higher HGB

## 2019-05-28 NOTE — CONSULT NOTE ADULT - ASSESSMENT
Iron Def Anemia with low ferritin  He needs GI workup  He has been on antiplatelet and anticoagulation therapy    In the  meantime agree with IV venofer. He can get a total of 1000, ( ie 200 mg iv for five days)    Nasir Gondal  [phone]

## 2019-05-28 NOTE — PROGRESS NOTE ADULT - SUBJECTIVE AND OBJECTIVE BOX
Time of Visit:  Patient seen and examined.     MEDICATIONS  (STANDING):  acetylcysteine  Oral Solution 600 milliGRAM(s) Oral every 12 hours  ALBUTerol/ipratropium for Nebulization 3 milliLiter(s) Nebulizer every 6 hours  aspirin  chewable 81 milliGRAM(s) Oral daily  atorvastatin 40 milliGRAM(s) Oral at bedtime  buDESOnide 160 MICROgram(s)/formoterol 4.5 MICROgram(s) Inhaler 2 Puff(s) Inhalation two times a day  digoxin     Tablet 0.125 milliGRAM(s) Oral daily  ergocalciferol 01878 Unit(s) Oral every week  ferrous    sulfate 325 milliGRAM(s) Oral daily  finasteride 5 milliGRAM(s) Oral daily  insulin lispro (HumaLOG) corrective regimen sliding scale   SubCutaneous Before meals and at bedtime  metoprolol tartrate 50 milliGRAM(s) Oral two times a day  montelukast 10 milliGRAM(s) Oral daily  pantoprazole    Tablet 40 milliGRAM(s) Oral before breakfast  predniSONE   Tablet 40 milliGRAM(s) Oral daily  rivaroxaban 20 milliGRAM(s) Oral every 24 hours  sodium chloride 0.9%. 1000 milliLiter(s) (80 mL/Hr) IV Continuous <Continuous>  tamsulosin 0.8 milliGRAM(s) Oral at bedtime      MEDICATIONS  (PRN):     Medications up to date at time of exam.    ROS; No fever, chills, SOB, cough, congestion.   PHYSICAL EXAMINATION:  Vital Signs Last 24 Hrs  T(C): 36.4 (28 May 2019 11:42), Max: 37 (28 May 2019 05:05)  T(F): 97.6 (28 May 2019 11:42), Max: 98.6 (28 May 2019 05:05)  HR: 62 (28 May 2019 11:42) (62 - 89)  BP: 121/50 (28 May 2019 11:42) (121/50 - 154/59)  BP(mean): --  RR: 19 (28 May 2019 11:42) (16 - 19)  SpO2: 98% (28 May 2019 11:42) (95% - 98%)   (if applicable)    General: Alert and oriented. No acute distress. Able to answer question with no SOB.     HEENT: Head is normocephalic and atraumatic. No nasal tenderness. Extraocular muscles are intact. Moist mucosa.     NECK: Supple, no palpable adenopathy.    LUNGS: Clear to auscultation, no wheezing, rales, or rhonchi. No use of accessory muscle.     HEART: S1 S2 irregular. No click/ rub.     ABDOMEN: Soft, nontender, and nondistended.  No hepatosplenomegaly is noted. Active bowel sounds.     EXTREMITIES: Without any cyanosis, clubbing, rash, lesions or edema.    NEUROLOGIC: Awake, alert, oriented.     SKIN: Warm and moist. Non diaphoretic.       LABS:                        7.7    12.61 )-----------( 309      ( 28 May 2019 07:03 )             26.9     05-28    141  |  106  |  40<H>  ----------------------------<  106<H>  3.4<L>   |  28  |  1.41<H>    Ca    8.4      28 May 2019 07:03    RADIOLOGY & ADDITIONAL STUDIES:  EKG:   CXR: < from: Xray Chest 1 View-PORTABLE IMMEDIATE (05.23.19 @ 17:33) >  PROCEDURE DATE:  05/23/2019          INTERPRETATION:  AP semierect chest on May 23, 2019 at 4:30 PM. Patient   has chest pain.    Heart is significantly enlarged.    There are mild lower lung field congestive changes best seen at the right   base because the heart obscures the left base.    The congestion is new since April 16 of this year.    IMPRESSION: Heart enlargement and mild basilar congestive findings.      IMPRESSION: 84y Male PAST MEDICAL & SURGICAL HISTORY:  CAD (coronary artery disease)  Afib  Leukocytoclastic vasculitis  Hypercholesterolemia  HTN (hypertension)  CVA (cerebral vascular accident): 2015  Asthma  H/O sinus surgery   Impression; 85 Y/O Male with multiple chronic conditions. Presented with shortness of breath and weakness for 2 weeks. Patient was last admitted in [facility] for shortness of breath and had stress test which showed reversible changes with normal EF. On cardiac cath, his EF was 35% on radionuclide angiography with no obstructive disease. Currently he can not walk more than 2 blocks due to dyspnea and uses 1 pillow at night. SOB secondary to Pulmonary Edema. Has Asthma but no exacerbation on exam.       Suggestion;  O2 saturation 94% room air.  Continue DuoNeb Q 6 hours.  Continue Budesonide Twice Daily.   Continue Singulair Daily.  Continue Prednisone 40 mg Daily.  DVT/ GI prophylactic.   On Rivaroxaban 20 mg Daily.   Aspiration precautions. Time of Visit:  Patient seen and examined.     MEDICATIONS  (STANDING):  acetylcysteine  Oral Solution 600 milliGRAM(s) Oral every 12 hours  ALBUTerol/ipratropium for Nebulization 3 milliLiter(s) Nebulizer every 6 hours  aspirin  chewable 81 milliGRAM(s) Oral daily  atorvastatin 40 milliGRAM(s) Oral at bedtime  buDESOnide 160 MICROgram(s)/formoterol 4.5 MICROgram(s) Inhaler 2 Puff(s) Inhalation two times a day  digoxin     Tablet 0.125 milliGRAM(s) Oral daily  ergocalciferol 56425 Unit(s) Oral every week  ferrous    sulfate 325 milliGRAM(s) Oral daily  finasteride 5 milliGRAM(s) Oral daily  insulin lispro (HumaLOG) corrective regimen sliding scale   SubCutaneous Before meals and at bedtime  metoprolol tartrate 50 milliGRAM(s) Oral two times a day  montelukast 10 milliGRAM(s) Oral daily  pantoprazole    Tablet 40 milliGRAM(s) Oral before breakfast  predniSONE   Tablet 40 milliGRAM(s) Oral daily  rivaroxaban 20 milliGRAM(s) Oral every 24 hours  sodium chloride 0.9%. 1000 milliLiter(s) (80 mL/Hr) IV Continuous <Continuous>  tamsulosin 0.8 milliGRAM(s) Oral at bedtime      MEDICATIONS  (PRN):     Medications up to date at time of exam.    ROS; No fever, chills, SOB, cough, congestion.   PHYSICAL EXAMINATION:  Vital Signs Last 24 Hrs  T(C): 36.4 (28 May 2019 11:42), Max: 37 (28 May 2019 05:05)  T(F): 97.6 (28 May 2019 11:42), Max: 98.6 (28 May 2019 05:05)  HR: 62 (28 May 2019 11:42) (62 - 89)  BP: 121/50 (28 May 2019 11:42) (121/50 - 154/59)  BP(mean): --  RR: 19 (28 May 2019 11:42) (16 - 19)  SpO2: 98% (28 May 2019 11:42) (95% - 98%)   (if applicable)    General: Alert and oriented. No acute distress. Able to answer question with no SOB.     HEENT: Head is normocephalic and atraumatic. No nasal tenderness. Extraocular muscles are intact. Moist mucosa.     NECK: Supple, no palpable adenopathy.    LUNGS: Clear to auscultation, no wheezing, rales, or rhonchi. No use of accessory muscle.     HEART: S1 S2 irregular. No click/ rub.     ABDOMEN: Soft, nontender, and nondistended.  No hepatosplenomegaly is noted. Active bowel sounds.     EXTREMITIES: Without any cyanosis, clubbing, rash, lesions or edema.    NEUROLOGIC: Awake, alert, oriented.     SKIN: Warm and moist. Non diaphoretic.       LABS:                        7.7    12.61 )-----------( 309      ( 28 May 2019 07:03 )             26.9     05-28    141  |  106  |  40<H>  ----------------------------<  106<H>  3.4<L>   |  28  |  1.41<H>    Ca    8.4      28 May 2019 07:03    RADIOLOGY & ADDITIONAL STUDIES:  EKG:   CXR: < from: Xray Chest 1 View-PORTABLE IMMEDIATE (05.23.19 @ 17:33) >  PROCEDURE DATE:  05/23/2019          INTERPRETATION:  AP semierect chest on May 23, 2019 at 4:30 PM. Patient   has chest pain.    Heart is significantly enlarged.    There are mild lower lung field congestive changes best seen at the right   base because the heart obscures the left base.    The congestion is new since April 16 of this year.    IMPRESSION: Heart enlargement and mild basilar congestive findings.      IMPRESSION: 84y Male PAST MEDICAL & SURGICAL HISTORY:  CAD (coronary artery disease)  Afib  Leukocytoclastic vasculitis  Hypercholesterolemia  HTN (hypertension)  CVA (cerebral vascular accident): 2015  Asthma  H/O sinus surgery   Impression; 85 Y/O Male with multiple chronic conditions. Presented with shortness of breath and weakness for 2 weeks. Patient was last admitted in [facility] for shortness of breath and had stress test which showed reversible changes with normal EF. On cardiac cath, his EF was 35% on radionuclide angiography with no obstructive disease. Currently he can not walk more than 2 blocks due to dyspnea and uses 1 pillow at night. SOB secondary to Pulmonary Edema. Has Asthma but no exacerbation on exam.     Asthma stable      Suggestion;  O2 saturation 94% room air.  Continue DuoNeb Q 6 hours.  Continue Budesonide Twice Daily.   Continue Singulair Daily.  Continue Prednisone 40 mg Daily. x 3 days total  DVT/ GI prophylactic.   On Rivaroxaban 20 mg Daily.   Aspiration precautions.

## 2019-05-28 NOTE — PROGRESS NOTE ADULT - PROBLEM SELECTOR PLAN 1
pt is asymptomatic at the moment   echo noted ef 50%  will hold furosemide for now secondary to zehra and pt is euvolemic on exam   will give hydration pt is asymptomatic at the moment   echo noted ef 50%  will hold furosemide for now secondary to zehra and pt is euvolemic on exam   would give him a unit of blood

## 2019-05-28 NOTE — CONSULT NOTE ADULT - SUBJECTIVE AND OBJECTIVE BOX
Patient is a 84y old  Male who presents with a chief complaint of Shortness of breath (28 May 2019 10:54)    Pt was found to have low ferritin. asked to see the patient. he is now on IV iron  Rest as per follows    HPI:  84 Male with PMH of Asthma, CVA (2015), BPH, Non-obstructive CAD, Afib (Xarelto), Bilateral carotid stenosis, Moderate AS, Colitis came to hospital for shortness of breath and weakness for 2 weeks. Symptoms were associated with poor oral intake and more frequent use of home nebulizers. Pt was last admitted in [facility] for shortness of breath and had stress test which showed reversible changes with normal EF. On cardiac cath, his EF was 35% on radionuclide angiography with no obstructive disease. Currently he can not walk more than 2 blocks due to dyspnea and uses 1 pillow at night.     SH: Lives with daughter. Uses cane. No smoking and alcohol.    ED Course: Hemodynamically stable. Labs showed anemia of 8.2 and cardiac enzymes of 0.057. BNP was 7500 with mild bilateral congested CXR. EKG showed chronic AFib @ 90 bpm. Pt was given 80 IV lasix. (23 May 2019 21:05)       ROS:  Has weight loss, no headaches, no chest pain at this time. rest is compromised by language barrier    PAST MEDICAL & SURGICAL HISTORY:  CAD (coronary artery disease)  Afib  Leukocytoclastic vasculitis  Hypercholesterolemia  HTN (hypertension)  CVA (cerebral vascular accident): 2015  Asthma  H/O sinus surgery      SOCIAL HISTORY: lives at home    FAMILY HISTORY:  No pertinent family history in first degree relatives      MEDICATIONS  (STANDING):  acetylcysteine  Oral Solution 600 milliGRAM(s) Oral every 12 hours  ALBUTerol/ipratropium for Nebulization 3 milliLiter(s) Nebulizer every 6 hours  aspirin  chewable 81 milliGRAM(s) Oral daily  atorvastatin 40 milliGRAM(s) Oral at bedtime  buDESOnide 160 MICROgram(s)/formoterol 4.5 MICROgram(s) Inhaler 2 Puff(s) Inhalation two times a day  digoxin     Tablet 0.125 milliGRAM(s) Oral daily  ergocalciferol 98308 Unit(s) Oral every week  ferrous    sulfate 325 milliGRAM(s) Oral daily  finasteride 5 milliGRAM(s) Oral daily  insulin lispro (HumaLOG) corrective regimen sliding scale   SubCutaneous Before meals and at bedtime  metoprolol tartrate 50 milliGRAM(s) Oral two times a day  montelukast 10 milliGRAM(s) Oral daily  pantoprazole    Tablet 40 milliGRAM(s) Oral before breakfast  predniSONE   Tablet 40 milliGRAM(s) Oral daily  rivaroxaban 20 milliGRAM(s) Oral every 24 hours  sodium chloride 0.9%. 1000 milliLiter(s) (80 mL/Hr) IV Continuous <Continuous>  tamsulosin 0.8 milliGRAM(s) Oral at bedtime    MEDICATIONS  (PRN):      Allergies    No Known Allergies    Intolerances        Vital Signs Last 24 Hrs  T(C): 36.4 (28 May 2019 11:42), Max: 37 (28 May 2019 05:05)  T(F): 97.6 (28 May 2019 11:42), Max: 98.6 (28 May 2019 05:05)  HR: 62 (28 May 2019 11:42) (62 - 89)  BP: 121/50 (28 May 2019 11:42) (121/50 - 154/59)  BP(mean): --  RR: 19 (28 May 2019 11:42) (16 - 19)  SpO2: 98% (28 May 2019 11:42) (95% - 98%)    PHYSICAL EXAM  General: adult in NAD, cachectic  HEENT: clear oropharynx, anicteric sclera, pink conjunctiva  Neck: supple  CV: irr irr  Lungs: decreased breath sounds  Abdomen: soft non-tender non-distended, no hepatosplenomegaly  Ext: no clubbing cyanosis or edema  Skin: no rashes and no petechiae  Neuro: alert and oriented X 3, no focal deficits      LABS:                          7.7    12.61 )-----------( 309      ( 28 May 2019 07:03 )             26.9         Mean Cell Volume : 69.7 fl  Mean Cell Hemoglobin : 19.9 pg  Mean Cell Hemoglobin Concentration : 28.6 gm/dL  Auto Neutrophil # : x  Auto Lymphocyte # : x  Auto Monocyte # : x  Auto Eosinophil # : x  Auto Basophil # : x  Auto Neutrophil % : x  Auto Lymphocyte % : x  Auto Monocyte % : x  Auto Eosinophil % : x  Auto Basophil % : x      Serial CBC's  05-28 @ 07:03  Hct-26.9 / Hgb-7.7 / Plat-309 / RBC-3.86 / WBC-12.61  Serial CBC's  05-27 @ 05:45  Hct-27.0 / Hgb-7.7 / Plat-315 / RBC-3.88 / WBC-12.26  Serial CBC's  05-25 @ 06:22  Hct-29.3 / Hgb-8.7 / Plat-439 / RBC-4.37 / WBC-14.53      05-28    141  |  106  |  40<H>  ----------------------------<  106<H>  3.4<L>   |  28  |  1.41<H>    Ca    8.4      28 May 2019 07:03            Ferritin, Serum: 16 ng/mL (05-24 @ 09:52)  Folate, Serum: 14.1 ng/mL (05-24 @ 09:52)  Vitamin B12, Serum: 970 pg/mL (05-24 @ 09:52)  Iron - Total Binding Capacity.: 488 ug/dL (05-24 @ 06:59)

## 2019-05-28 NOTE — PROGRESS NOTE ADULT - SUBJECTIVE AND OBJECTIVE BOX
PGY 1 Note discussed with supervising resident and primary attending    Patient is a 84y old  Male who presents with a chief complaint of Shortness of breath (27 May 2019 12:20)      INTERVAL HPI/OVERNIGHT EVENTS: offers no new complaints; current symptoms resolving    MEDICATIONS  (STANDING):  acetylcysteine  Oral Solution 600 milliGRAM(s) Oral every 12 hours  ALBUTerol/ipratropium for Nebulization 3 milliLiter(s) Nebulizer every 6 hours  aspirin  chewable 81 milliGRAM(s) Oral daily  atorvastatin 40 milliGRAM(s) Oral at bedtime  buDESOnide 160 MICROgram(s)/formoterol 4.5 MICROgram(s) Inhaler 2 Puff(s) Inhalation two times a day  digoxin     Tablet 0.125 milliGRAM(s) Oral daily  ergocalciferol 91896 Unit(s) Oral every week  ferrous    sulfate 325 milliGRAM(s) Oral daily  finasteride 5 milliGRAM(s) Oral daily  insulin lispro (HumaLOG) corrective regimen sliding scale   SubCutaneous Before meals and at bedtime  iron sucrose IVPB 200 milliGRAM(s) IV Intermittent every 24 hours  metoprolol tartrate 50 milliGRAM(s) Oral two times a day  montelukast 10 milliGRAM(s) Oral daily  pantoprazole    Tablet 40 milliGRAM(s) Oral before breakfast  predniSONE   Tablet 40 milliGRAM(s) Oral daily  rivaroxaban 20 milliGRAM(s) Oral every 24 hours  sodium chloride 0.9%. 1000 milliLiter(s) (80 mL/Hr) IV Continuous <Continuous>  tamsulosin 0.8 milliGRAM(s) Oral at bedtime    MEDICATIONS  (PRN):      __________________________________________________  REVIEW OF SYSTEMS:    CONSTITUTIONAL: No fever,   EYES: no acute visual disturbances  NECK: No pain or stiffness  RESPIRATORY: No cough; No shortness of breath  CARDIOVASCULAR: No chest pain, no palpitations  GASTROINTESTINAL: No pain. No nausea or vomiting; No diarrhea   NEUROLOGICAL: No headache or numbness, no tremors  MUSCULOSKELETAL: No joint pain, no muscle pain  GENITOURINARY: no dysuria, no frequency, no hesitancy  PSYCHIATRY: no depression , no anxiety  ALL OTHER  ROS negative        Vital Signs Last 24 Hrs  T(C): 37 (28 May 2019 05:05), Max: 37 (28 May 2019 05:05)  T(F): 98.6 (28 May 2019 05:05), Max: 98.6 (28 May 2019 05:05)  HR: 88 (28 May 2019 05:05) (72 - 89)  BP: 154/59 (28 May 2019 05:05) (122/56 - 170/66)  BP(mean): --  RR: 18 (28 May 2019 05:05) (16 - 18)  SpO2: 95% (28 May 2019 05:05) (95% - 100%)    ________________________________________________  PHYSICAL EXAM:  GENERAL: NAD  HEENT: Normocephalic;  conjunctivae and sclerae clear; moist mucous membranes;   NECK : supple  CHEST/LUNG: Clear to auscultation bilaterally with good air entry   HEART: S1 S2  regular; no murmurs, gallops or rubs  ABDOMEN: Soft, Nontender, Nondistended; Bowel sounds present  EXTREMITIES: no cyanosis; no edema; no calf tenderness  SKIN: warm and dry; no rash  NERVOUS SYSTEM:  Awake and alert; Oriented  to place, person and time ; no new deficits    _________________________________________________  LABS:                        7.7    12.26 )-----------( 315      ( 27 May 2019 05:45 )             27.0     05-27    141  |  105  |  47<H>  ----------------------------<  99  3.9   |  30  |  1.35<H>    Ca    8.1<L>      27 May 2019 05:45          CAPILLARY BLOOD GLUCOSE      POCT Blood Glucose.: 142 mg/dL (27 May 2019 21:23)  POCT Blood Glucose.: 145 mg/dL (27 May 2019 16:36)  POCT Blood Glucose.: 129 mg/dL (27 May 2019 11:07)  POCT Blood Glucose.: 111 mg/dL (27 May 2019 07:47)        RADIOLOGY & ADDITIONAL TESTS:    Imaging Personally Reviewed:  YES/NO    Consultant(s) Notes Reviewed:   YES/ No    Care Discussed with Consultants :     Plan of care was discussed with patient and /or primary care giver; all questions and concerns were addressed and care was aligned with patient's wishes. PGY 1 Note discussed with supervising resident and primary attending    Patient is a 84y old  Male who presents with a chief complaint of Shortness of breath (27 May 2019 12:20)      INTERVAL HPI/OVERNIGHT EVENTS: pt was seen and examined at bedside, pt is not offering an complains. Hb is stable at 7.7, would transfuse a unit of blood, c/w Venofer. Pt is saturating well on room air >95%.     MEDICATIONS  (STANDING):  acetylcysteine  Oral Solution 600 milliGRAM(s) Oral every 12 hours  ALBUTerol/ipratropium for Nebulization 3 milliLiter(s) Nebulizer every 6 hours  aspirin  chewable 81 milliGRAM(s) Oral daily  atorvastatin 40 milliGRAM(s) Oral at bedtime  buDESOnide 160 MICROgram(s)/formoterol 4.5 MICROgram(s) Inhaler 2 Puff(s) Inhalation two times a day  digoxin     Tablet 0.125 milliGRAM(s) Oral daily  ergocalciferol 98334 Unit(s) Oral every week  ferrous    sulfate 325 milliGRAM(s) Oral daily  finasteride 5 milliGRAM(s) Oral daily  insulin lispro (HumaLOG) corrective regimen sliding scale   SubCutaneous Before meals and at bedtime  iron sucrose IVPB 200 milliGRAM(s) IV Intermittent every 24 hours  metoprolol tartrate 50 milliGRAM(s) Oral two times a day  montelukast 10 milliGRAM(s) Oral daily  pantoprazole    Tablet 40 milliGRAM(s) Oral before breakfast  predniSONE   Tablet 40 milliGRAM(s) Oral daily  rivaroxaban 20 milliGRAM(s) Oral every 24 hours  sodium chloride 0.9%. 1000 milliLiter(s) (80 mL/Hr) IV Continuous <Continuous>  tamsulosin 0.8 milliGRAM(s) Oral at bedtime    MEDICATIONS  (PRN):      __________________________________________________  REVIEW OF SYSTEMS:    CONSTITUTIONAL: No fever,   EYES: no acute visual disturbances  NECK: No pain or stiffness  RESPIRATORY: No cough; No shortness of breath  CARDIOVASCULAR: No chest pain, no palpitations  GASTROINTESTINAL: No pain. No nausea or vomiting; No diarrhea   NEUROLOGICAL: No headache or numbness, no tremors  MUSCULOSKELETAL: No joint pain, no muscle pain  GENITOURINARY: no dysuria, no frequency, no hesitancy  PSYCHIATRY: no depression , no anxiety  ALL OTHER  ROS negative        Vital Signs Last 24 Hrs  T(C): 37 (28 May 2019 05:05), Max: 37 (28 May 2019 05:05)  T(F): 98.6 (28 May 2019 05:05), Max: 98.6 (28 May 2019 05:05)  HR: 88 (28 May 2019 05:05) (72 - 89)  BP: 154/59 (28 May 2019 05:05) (122/56 - 170/66)  BP(mean): --  RR: 18 (28 May 2019 05:05) (16 - 18)  SpO2: 95% (28 May 2019 05:05) (95% - 100%)    ________________________________________________  PHYSICAL EXAM:  GENERAL: NAD  HEENT: Normocephalic;  conjunctivae and sclerae clear; moist mucous membranes;   NECK : supple  CHEST/LUNG: Clear to auscultation bilaterally with good air entry   HEART: S1 S2  regular; no murmurs, gallops or rubs  ABDOMEN: Soft, Nontender, Nondistended; Bowel sounds present  EXTREMITIES: no cyanosis; no edema; no calf tenderness  SKIN: warm and dry; no rash  NERVOUS SYSTEM:  Awake and alert; Oriented  to place, person and time ; no new deficits    _________________________________________________  LABS:                        7.7    12.26 )-----------( 315      ( 27 May 2019 05:45 )             27.0     05-27    141  |  105  |  47<H>  ----------------------------<  99  3.9   |  30  |  1.35<H>    Ca    8.1<L>      27 May 2019 05:45          CAPILLARY BLOOD GLUCOSE      POCT Blood Glucose.: 142 mg/dL (27 May 2019 21:23)  POCT Blood Glucose.: 145 mg/dL (27 May 2019 16:36)  POCT Blood Glucose.: 129 mg/dL (27 May 2019 11:07)  POCT Blood Glucose.: 111 mg/dL (27 May 2019 07:47)        RADIOLOGY & ADDITIONAL TESTS:    Imaging Personally Reviewed:  YES    Consultant(s) Notes Reviewed:   YES    Care Discussed with Consultants :     Plan of care was discussed with patient and /or primary care giver; all questions and concerns were addressed and care was aligned with patient's wishes.

## 2019-05-29 DIAGNOSIS — J45.901 UNSPECIFIED ASTHMA WITH (ACUTE) EXACERBATION: ICD-10-CM

## 2019-05-29 LAB
ANION GAP SERPL CALC-SCNC: 7 MMOL/L — SIGNIFICANT CHANGE UP (ref 5–17)
BUN SERPL-MCNC: 34 MG/DL — HIGH (ref 7–18)
CALCIUM SERPL-MCNC: 8.2 MG/DL — LOW (ref 8.4–10.5)
CHLORIDE SERPL-SCNC: 107 MMOL/L — SIGNIFICANT CHANGE UP (ref 96–108)
CO2 SERPL-SCNC: 29 MMOL/L — SIGNIFICANT CHANGE UP (ref 22–31)
CREAT SERPL-MCNC: 1.13 MG/DL — SIGNIFICANT CHANGE UP (ref 0.5–1.3)
GLUCOSE BLDC GLUCOMTR-MCNC: 106 MG/DL — HIGH (ref 70–99)
GLUCOSE BLDC GLUCOMTR-MCNC: 117 MG/DL — HIGH (ref 70–99)
GLUCOSE BLDC GLUCOMTR-MCNC: 118 MG/DL — HIGH (ref 70–99)
GLUCOSE BLDC GLUCOMTR-MCNC: 99 MG/DL — SIGNIFICANT CHANGE UP (ref 70–99)
GLUCOSE SERPL-MCNC: 87 MG/DL — SIGNIFICANT CHANGE UP (ref 70–99)
HCT VFR BLD CALC: 28.7 % — LOW (ref 39–50)
HGB BLD-MCNC: 8 G/DL — LOW (ref 13–17)
MCHC RBC-ENTMCNC: 19.7 PG — LOW (ref 27–34)
MCHC RBC-ENTMCNC: 27.9 GM/DL — LOW (ref 32–36)
MCV RBC AUTO: 70.7 FL — LOW (ref 80–100)
NRBC # BLD: 0 /100 WBCS — SIGNIFICANT CHANGE UP (ref 0–0)
PLATELET # BLD AUTO: 327 K/UL — SIGNIFICANT CHANGE UP (ref 150–400)
POTASSIUM SERPL-MCNC: 3.7 MMOL/L — SIGNIFICANT CHANGE UP (ref 3.5–5.3)
POTASSIUM SERPL-SCNC: 3.7 MMOL/L — SIGNIFICANT CHANGE UP (ref 3.5–5.3)
RBC # BLD: 4.06 M/UL — LOW (ref 4.2–5.8)
RBC # FLD: 18.7 % — HIGH (ref 10.3–14.5)
SODIUM SERPL-SCNC: 143 MMOL/L — SIGNIFICANT CHANGE UP (ref 135–145)
WBC # BLD: 11.85 K/UL — HIGH (ref 3.8–10.5)
WBC # FLD AUTO: 11.85 K/UL — HIGH (ref 3.8–10.5)

## 2019-05-29 PROCEDURE — 70498 CT ANGIOGRAPHY NECK: CPT | Mod: 26

## 2019-05-29 RX ORDER — IRON SUCROSE 20 MG/ML
200 INJECTION, SOLUTION INTRAVENOUS ONCE
Refills: 0 | Status: COMPLETED | OUTPATIENT
Start: 2019-05-29 | End: 2019-05-29

## 2019-05-29 RX ORDER — SODIUM CHLORIDE 9 MG/ML
1000 INJECTION, SOLUTION INTRAVENOUS
Refills: 0 | Status: DISCONTINUED | OUTPATIENT
Start: 2019-05-29 | End: 2019-05-30

## 2019-05-29 RX ADMIN — Medication 50 MILLIGRAM(S): at 17:00

## 2019-05-29 RX ADMIN — BUDESONIDE AND FORMOTEROL FUMARATE DIHYDRATE 2 PUFF(S): 160; 4.5 AEROSOL RESPIRATORY (INHALATION) at 12:13

## 2019-05-29 RX ADMIN — Medication 81 MILLIGRAM(S): at 12:13

## 2019-05-29 RX ADMIN — BUDESONIDE AND FORMOTEROL FUMARATE DIHYDRATE 2 PUFF(S): 160; 4.5 AEROSOL RESPIRATORY (INHALATION) at 21:25

## 2019-05-29 RX ADMIN — Medication 3 MILLILITER(S): at 14:09

## 2019-05-29 RX ADMIN — Medication 325 MILLIGRAM(S): at 12:13

## 2019-05-29 RX ADMIN — Medication 3 MILLILITER(S): at 20:31

## 2019-05-29 RX ADMIN — TAMSULOSIN HYDROCHLORIDE 0.8 MILLIGRAM(S): 0.4 CAPSULE ORAL at 21:25

## 2019-05-29 RX ADMIN — MONTELUKAST 10 MILLIGRAM(S): 4 TABLET, CHEWABLE ORAL at 12:13

## 2019-05-29 RX ADMIN — IRON SUCROSE 110 MILLIGRAM(S): 20 INJECTION, SOLUTION INTRAVENOUS at 20:33

## 2019-05-29 RX ADMIN — Medication 3 MILLILITER(S): at 08:05

## 2019-05-29 RX ADMIN — FINASTERIDE 5 MILLIGRAM(S): 5 TABLET, FILM COATED ORAL at 12:13

## 2019-05-29 RX ADMIN — Medication 600 MILLIGRAM(S): at 05:12

## 2019-05-29 RX ADMIN — PANTOPRAZOLE SODIUM 40 MILLIGRAM(S): 20 TABLET, DELAYED RELEASE ORAL at 05:11

## 2019-05-29 RX ADMIN — Medication 50 MILLIGRAM(S): at 05:12

## 2019-05-29 RX ADMIN — ATORVASTATIN CALCIUM 40 MILLIGRAM(S): 80 TABLET, FILM COATED ORAL at 21:25

## 2019-05-29 RX ADMIN — RIVAROXABAN 20 MILLIGRAM(S): KIT at 17:00

## 2019-05-29 RX ADMIN — Medication 0.12 MILLIGRAM(S): at 05:12

## 2019-05-29 RX ADMIN — Medication 40 MILLIGRAM(S): at 05:12

## 2019-05-29 NOTE — PROGRESS NOTE ADULT - SUBJECTIVE AND OBJECTIVE BOX
Patient seen and examined at bedside  No acute events noted overnight  Case discussed with medical team    HPI:  84 Male with PMH of Asthma, CVA (2015), BPH, Non-obstructive CAD, Afib (Xarelto), Bilateral carotid stenosis, Moderate AS, Colitis came to hospital for shortness of breath and weakness for 2 weeks. Symptoms were associated with poor oral intake and more frequent use of home nebulizers. Pt was last admitted in [facility] for shortness of breath and had stress test which showed reversible changes with normal EF. On cardiac cath, his EF was 35% on radionuclide angiography with no obstructive disease. Currently he can not walk more than 2 blocks due to dyspnea and uses 1 pillow at night.     SH: Lives with daughter. Uses cane. No smoking and alcohol.    ED Course: Hemodynamically stable. Labs showed anemia of 8.2 and cardiac enzymes of 0.057. BNP was 7500 with mild bilateral congested CXR. EKG showed chronic AFib @ 90 bpm. Pt was given 80 IV lasix. (23 May 2019 21:05)      PAST MEDICAL & SURGICAL HISTORY:  CAD (coronary artery disease)  Afib  Leukocytoclastic vasculitis  Hypercholesterolemia  HTN (hypertension)  CVA (cerebral vascular accident): 2015  Asthma  H/O sinus surgery      No Known Allergies       MEDICATIONS  (STANDING):  ALBUTerol/ipratropium for Nebulization 3 milliLiter(s) Nebulizer every 6 hours  aspirin  chewable 81 milliGRAM(s) Oral daily  atorvastatin 40 milliGRAM(s) Oral at bedtime  buDESOnide 160 MICROgram(s)/formoterol 4.5 MICROgram(s) Inhaler 2 Puff(s) Inhalation two times a day  digoxin     Tablet 0.125 milliGRAM(s) Oral daily  ergocalciferol 08168 Unit(s) Oral every week  ferrous    sulfate 325 milliGRAM(s) Oral daily  finasteride 5 milliGRAM(s) Oral daily  insulin lispro (HumaLOG) corrective regimen sliding scale   SubCutaneous Before meals and at bedtime  metoprolol tartrate 50 milliGRAM(s) Oral two times a day  montelukast 10 milliGRAM(s) Oral daily  pantoprazole    Tablet 40 milliGRAM(s) Oral before breakfast  predniSONE   Tablet 40 milliGRAM(s) Oral daily  rivaroxaban 20 milliGRAM(s) Oral every 24 hours  sodium chloride 0.9% with potassium chloride 20 mEq/L 1000 milliLiter(s) (60 mL/Hr) IV Continuous <Continuous>  sodium chloride 0.9%. 1000 milliLiter(s) (80 mL/Hr) IV Continuous <Continuous>  tamsulosin 0.8 milliGRAM(s) Oral at bedtime    MEDICATIONS  (PRN):      REVIEW OF SYSTEMS:  CONSTITUTIONAL: (+) malaise. gen weakness.   EYES: No acute change in vision   ENT:  No tinnitus  NECK: No stiffness  RESPIRATORY: schroeder. No hemoptysis  CARDIOVASCULAR: dec exercise tolerance. sob on exertion. No chest pain, palpitations, syncope  GASTROINTESTINAL: No hematemesis, diarrhea, melena, or hematochezia.  GENITOURINARY: No hematuria  NEUROLOGICAL: No headaches  LYMPH Nodes: No enlarged glands  ENDOCRINE: No heat or cold intolerance	    T(C): 36.6 (05-29-19 @ 04:52), Max: 36.8 (05-28-19 @ 11:41)  HR: 91 (05-29-19 @ 04:52) (62 - 91)  BP: 154/78 (05-29-19 @ 04:52) (121/50 - 172/56)  RR: 18 (05-29-19 @ 04:52) (16 - 19)  SpO2: 100% (05-29-19 @ 04:52) (96% - 100%)    PHYSICAL EXAMINATION:   Constitutional: WD, NAD  HEENT: NC, AT  Neck:  Supple  Respiratory:  Adequate airflow b/l. Not using accessory muscles of respiration.  Cardiovascular: sys murmur, S1 & S2 intact, no R/G, 2+ radial pulses b/l  Gastrointestinal: Soft, NT, ND, normoactive b.s., no organomegaly/RT/rigidity  Extremities: WWP  Neurological:  Alert and awake.  No acute focal motor deficits. Crude sensation intact.     Labs and imaging reviewed    LABS:                        8.0    11.85 )-----------( 327      ( 29 May 2019 06:10 )             28.7     05-29    143  |  107  |  34<H>  ----------------------------<  87  3.7   |  29  |  1.13    Ca    8.2<L>      29 May 2019 06:10              CAPILLARY BLOOD GLUCOSE      POCT Blood Glucose.: 117 mg/dL (29 May 2019 07:58)  POCT Blood Glucose.: 105 mg/dL (28 May 2019 21:35)  POCT Blood Glucose.: 108 mg/dL (28 May 2019 16:39)  POCT Blood Glucose.: 137 mg/dL (28 May 2019 11:49)              RADIOLOGY & ADDITIONAL STUDIES:

## 2019-05-29 NOTE — PROGRESS NOTE ADULT - SUBJECTIVE AND OBJECTIVE BOX
PGY 1 Note discussed with supervising resident and primary attending    Patient is a 84y old  Male who presents with a chief complaint of Shortness of breath (29 May 2019 08:25)      INTERVAL HPI/OVERNIGHT EVENTS: offers no new complaints; current symptoms resolving    MEDICATIONS  (STANDING):  ALBUTerol/ipratropium for Nebulization 3 milliLiter(s) Nebulizer every 6 hours  aspirin  chewable 81 milliGRAM(s) Oral daily  atorvastatin 40 milliGRAM(s) Oral at bedtime  buDESOnide 160 MICROgram(s)/formoterol 4.5 MICROgram(s) Inhaler 2 Puff(s) Inhalation two times a day  digoxin     Tablet 0.125 milliGRAM(s) Oral daily  ergocalciferol 19172 Unit(s) Oral every week  ferrous    sulfate 325 milliGRAM(s) Oral daily  finasteride 5 milliGRAM(s) Oral daily  insulin lispro (HumaLOG) corrective regimen sliding scale   SubCutaneous Before meals and at bedtime  metoprolol tartrate 50 milliGRAM(s) Oral two times a day  montelukast 10 milliGRAM(s) Oral daily  pantoprazole    Tablet 40 milliGRAM(s) Oral before breakfast  predniSONE   Tablet 40 milliGRAM(s) Oral daily  rivaroxaban 20 milliGRAM(s) Oral every 24 hours  sodium chloride 0.9% with potassium chloride 20 mEq/L 1000 milliLiter(s) (60 mL/Hr) IV Continuous <Continuous>  sodium chloride 0.9%. 1000 milliLiter(s) (80 mL/Hr) IV Continuous <Continuous>  tamsulosin 0.8 milliGRAM(s) Oral at bedtime    MEDICATIONS  (PRN):      __________________________________________________  REVIEW OF SYSTEMS:    CONSTITUTIONAL: No fever,   EYES: no acute visual disturbances  NECK: No pain or stiffness  RESPIRATORY: No cough; No shortness of breath  CARDIOVASCULAR: No chest pain, no palpitations  GASTROINTESTINAL: No pain. No nausea or vomiting; No diarrhea   NEUROLOGICAL: No headache or numbness, no tremors  MUSCULOSKELETAL: No joint pain, no muscle pain  GENITOURINARY: no dysuria, no frequency, no hesitancy  PSYCHIATRY: no depression , no anxiety  ALL OTHER  ROS negative        Vital Signs Last 24 Hrs  T(C): 36.2 (29 May 2019 11:45), Max: 36.8 (28 May 2019 16:13)  T(F): 97.2 (29 May 2019 11:45), Max: 98.2 (28 May 2019 16:13)  HR: 81 (29 May 2019 11:45) (80 - 92)  BP: 152/65 (29 May 2019 11:45) (138/72 - 172/56)  BP(mean): --  RR: 18 (29 May 2019 11:45) (16 - 18)  SpO2: 98% (29 May 2019 11:45) (97% - 100%)    ________________________________________________  PHYSICAL EXAM:  GENERAL: NAD  HEENT: Normocephalic;  conjunctivae and sclerae clear; moist mucous membranes;   NECK : supple  CHEST/LUNG: Clear to auscultation bilaterally with good air entry   HEART: S1 S2  regular; no murmurs, gallops or rubs  ABDOMEN: Soft, Nontender, Nondistended; Bowel sounds present  EXTREMITIES: no cyanosis; no edema; no calf tenderness  SKIN: warm and dry; no rash  NERVOUS SYSTEM:  Awake and alert; Oriented  to place, person and time ; no new deficits    _________________________________________________  LABS:                        8.0    11.85 )-----------( 327      ( 29 May 2019 06:10 )             28.7     05-29    143  |  107  |  34<H>  ----------------------------<  87  3.7   |  29  |  1.13    Ca    8.2<L>      29 May 2019 06:10          CAPILLARY BLOOD GLUCOSE      POCT Blood Glucose.: 118 mg/dL (29 May 2019 11:32)  POCT Blood Glucose.: 117 mg/dL (29 May 2019 07:58)  POCT Blood Glucose.: 105 mg/dL (28 May 2019 21:35)  POCT Blood Glucose.: 108 mg/dL (28 May 2019 16:39)        RADIOLOGY & ADDITIONAL TESTS:    Imaging Personally Reviewed:  YES    Consultant(s) Notes Reviewed:   YES    Care Discussed with Consultants : YES    Plan of care was discussed with patient and /or primary care giver; all questions and concerns were addressed and care was aligned with patient's wishes.

## 2019-05-29 NOTE — PROGRESS NOTE ADULT - PROBLEM SELECTOR PLAN 1
pt is asymptomatic at the moment   echo noted ef 50%  will hold furosemide for now secondary to zehra and pt is euvolemic on exam

## 2019-05-29 NOTE — PROGRESS NOTE ADULT - SUBJECTIVE AND OBJECTIVE BOX
Patient seen and examined.     MEDICATIONS  (STANDING):  ALBUTerol/ipratropium for Nebulization 3 milliLiter(s) Nebulizer every 6 hours  aspirin  chewable 81 milliGRAM(s) Oral daily  atorvastatin 40 milliGRAM(s) Oral at bedtime  buDESOnide 160 MICROgram(s)/formoterol 4.5 MICROgram(s) Inhaler 2 Puff(s) Inhalation two times a day  digoxin     Tablet 0.125 milliGRAM(s) Oral daily  ergocalciferol 60810 Unit(s) Oral every week  ferrous    sulfate 325 milliGRAM(s) Oral daily  finasteride 5 milliGRAM(s) Oral daily  insulin lispro (HumaLOG) corrective regimen sliding scale   SubCutaneous Before meals and at bedtime  metoprolol tartrate 50 milliGRAM(s) Oral two times a day  montelukast 10 milliGRAM(s) Oral daily  pantoprazole    Tablet 40 milliGRAM(s) Oral before breakfast  predniSONE   Tablet 40 milliGRAM(s) Oral daily  rivaroxaban 20 milliGRAM(s) Oral every 24 hours  sodium chloride 0.9% with potassium chloride 20 mEq/L 1000 milliLiter(s) (60 mL/Hr) IV Continuous <Continuous>  sodium chloride 0.9%. 1000 milliLiter(s) (80 mL/Hr) IV Continuous <Continuous>  tamsulosin 0.8 milliGRAM(s) Oral at bedtime      MEDICATIONS  (PRN):     Medications up to date at time of exam.    PHYSICAL EXAMINATION:  Patient has no new complaints.  GENERAL: The patient is a well-developed, well-nourished, in no apparent distress.     Vital Signs Last 24 Hrs  T(C): 36.2 (29 May 2019 11:45), Max: 36.8 (28 May 2019 16:13)  T(F): 97.2 (29 May 2019 11:45), Max: 98.2 (28 May 2019 16:13)  HR: 81 (29 May 2019 11:45) (80 - 92)  BP: 152/65 (29 May 2019 11:45) (138/72 - 172/56)  BP(mean): --  RR: 18 (29 May 2019 11:45) (16 - 18)  SpO2: 98% (29 May 2019 11:45) (97% - 100%)   (if applicable)    Chest Tube (if applicable)    HEENT: Head is normocephalic and atraumatic.     NECK: Supple, no palpable adenopathy.    LUNGS: Clear to auscultation, no wheezing, rales, or rhonchi.    HEART: Regular rate and rhythm without murmur.    ABDOMEN: Soft, nontender, and nondistended.      EXTREMITIES: Without any cyanosis, clubbing, rash, lesions or edema.    NEUROLOGIC: Awake, alert.    SKIN: Warm, dry, good turgor.    LABS:                        8.0    11.85 )-----------( 327      ( 29 May 2019 06:10 )             28.7     05-29    143  |  107  |  34<H>  ----------------------------<  87  3.7   |  29  |  1.13    Ca    8.2<L>      29 May 2019 06:10        MICROBIOLOGY: (if applicable)    RADIOLOGY & ADDITIONAL STUDIES:  EKG:   CXR:  ECHO:    IMPRESSION: 84y Male PAST MEDICAL & SURGICAL HISTORY:  CAD (coronary artery disease)  Afib  Leukocytoclastic vasculitis  Hypercholesterolemia  HTN (hypertension)  CVA (cerebral vascular accident): 2015  Asthma  H/O sinus surgery       84 Male with PMH of Asthma, CVA (2015), BPH, Non-obstructive CAD, Afib (Xarelto), Bilateral carotid stenosis, Moderate AS, Colitis came to hospital for shortness of breath and weakness for 2 weeks. Symptoms were associated with poor oral intake and more frequent use of home nebulizers. Pt was last admitted in [facility] for shortness of breath and had stress test which showed reversible changes with normal EF. On cardiac cath, his EF was 35% on radionuclide angiography with no obstructive disease. Currently he can not walk more than 2 blocks due to dyspnea and uses 1 pillow at night.     --    SOB 2/2 pulm edema, symptomatic anemia  asthma stable, no wheezing  carotid artery stenosis    patient clinically improved    SUGGESTION:   - bronchodilators, o2 supp prn    - GI follow up   - aspiration precautions   - DVT and GI prophylaxis. Patient seen and examined.     MEDICATIONS  (STANDING):  ALBUTerol/ipratropium for Nebulization 3 milliLiter(s) Nebulizer every 6 hours  aspirin  chewable 81 milliGRAM(s) Oral daily  atorvastatin 40 milliGRAM(s) Oral at bedtime  buDESOnide 160 MICROgram(s)/formoterol 4.5 MICROgram(s) Inhaler 2 Puff(s) Inhalation two times a day  digoxin     Tablet 0.125 milliGRAM(s) Oral daily  ergocalciferol 59545 Unit(s) Oral every week  ferrous    sulfate 325 milliGRAM(s) Oral daily  finasteride 5 milliGRAM(s) Oral daily  insulin lispro (HumaLOG) corrective regimen sliding scale   SubCutaneous Before meals and at bedtime  metoprolol tartrate 50 milliGRAM(s) Oral two times a day  montelukast 10 milliGRAM(s) Oral daily  pantoprazole    Tablet 40 milliGRAM(s) Oral before breakfast  predniSONE   Tablet 40 milliGRAM(s) Oral daily  rivaroxaban 20 milliGRAM(s) Oral every 24 hours  sodium chloride 0.9% with potassium chloride 20 mEq/L 1000 milliLiter(s) (60 mL/Hr) IV Continuous <Continuous>  sodium chloride 0.9%. 1000 milliLiter(s) (80 mL/Hr) IV Continuous <Continuous>  tamsulosin 0.8 milliGRAM(s) Oral at bedtime      MEDICATIONS  (PRN):     Medications up to date at time of exam.    PHYSICAL EXAMINATION:  Patient has no new complaints.  GENERAL: The patient is a well-developed, well-nourished, in no apparent distress.     Vital Signs Last 24 Hrs  T(C): 36.2 (29 May 2019 11:45), Max: 36.8 (28 May 2019 16:13)  T(F): 97.2 (29 May 2019 11:45), Max: 98.2 (28 May 2019 16:13)  HR: 81 (29 May 2019 11:45) (80 - 92)  BP: 152/65 (29 May 2019 11:45) (138/72 - 172/56)  BP(mean): --  RR: 18 (29 May 2019 11:45) (16 - 18)  SpO2: 98% (29 May 2019 11:45) (97% - 100%)   (if applicable)    Chest Tube (if applicable)    HEENT: Head is normocephalic and atraumatic.     NECK: Supple, no palpable adenopathy.    LUNGS: Clear to auscultation, no wheezing, rales, or rhonchi.    HEART: Regular rate and rhythm without murmur.    ABDOMEN: Soft, nontender, and nondistended.      EXTREMITIES: Without any cyanosis, clubbing, rash, lesions or edema.    NEUROLOGIC: Awake, alert.    SKIN: Warm, dry, good turgor.    LABS:                        8.0    11.85 )-----------( 327      ( 29 May 2019 06:10 )             28.7     05-29    143  |  107  |  34<H>  ----------------------------<  87  3.7   |  29  |  1.13    Ca    8.2<L>      29 May 2019 06:10        MICROBIOLOGY: (if applicable)    RADIOLOGY & ADDITIONAL STUDIES:  EKG:   CXR:  ECHO:    IMPRESSION: 84y Male PAST MEDICAL & SURGICAL HISTORY:  CAD (coronary artery disease)  Afib  Leukocytoclastic vasculitis  Hypercholesterolemia  HTN (hypertension)  CVA (cerebral vascular accident): 2015  Asthma  H/O sinus surgery       84 Male with PMH of Asthma, CVA (2015), BPH, Non-obstructive CAD, Afib (Xarelto), Bilateral carotid stenosis, Moderate AS, Colitis came to hospital for shortness of breath and weakness for 2 weeks. Symptoms were associated with poor oral intake and more frequent use of home nebulizers. Pt was last admitted in [facility] for shortness of breath and had stress test which showed reversible changes with normal EF. On cardiac cath, his EF was 35% on radionuclide angiography with no obstructive disease. Currently he can not walk more than 2 blocks due to dyspnea and uses 1 pillow at night.     --    SOB 2/2 pulm edema, symptomatic anemia  asthma stable, no wheezing  carotid artery stenosis    patient clinically improved    SUGGESTION:   - bronchodilators, o2 supp prn    - GI follow up   - aspiration precautions   - DVT and GI prophylaxis.     Agree with above assessment and plan as transcribed.

## 2019-05-29 NOTE — PROGRESS NOTE ADULT - PROBLEM SELECTOR PLAN 4
Iron deficiency anemia   Baseline Hb of 11  Presented with microcytic anemia of 7.7  FOBT negative  Pt was advised to get outpatient colonoscopy last month which he has not been able to get  microcytic anemia  c/w milena  GI Dr. Coronel consult noted will do upper gi series and will f/u with radiology for virtual colonoscopy.

## 2019-05-29 NOTE — PROGRESS NOTE ADULT - ASSESSMENT
84 Male with PMH of Asthma, CVA (2015), BPH, Non-obstructive CAD, Afib (Xarelto), Bilateral carotid stenosis, Moderate AS, Colitis came to hospital for shortness of breath and weakness for 2 weeks

## 2019-05-29 NOTE — PROGRESS NOTE ADULT - SUBJECTIVE AND OBJECTIVE BOX
Patient is a 84y old  Male who presents with a chief complaint of Shortness of breath (29 May 2019 14:09)       Subjective:     MEDICATIONS  (STANDING):  ALBUTerol/ipratropium for Nebulization 3 milliLiter(s) Nebulizer every 6 hours  aspirin  chewable 81 milliGRAM(s) Oral daily  atorvastatin 40 milliGRAM(s) Oral at bedtime  buDESOnide 160 MICROgram(s)/formoterol 4.5 MICROgram(s) Inhaler 2 Puff(s) Inhalation two times a day  digoxin     Tablet 0.125 milliGRAM(s) Oral daily  ergocalciferol 89502 Unit(s) Oral every week  ferrous    sulfate 325 milliGRAM(s) Oral daily  finasteride 5 milliGRAM(s) Oral daily  insulin lispro (HumaLOG) corrective regimen sliding scale   SubCutaneous Before meals and at bedtime  metoprolol tartrate 50 milliGRAM(s) Oral two times a day  montelukast 10 milliGRAM(s) Oral daily  pantoprazole    Tablet 40 milliGRAM(s) Oral before breakfast  predniSONE   Tablet 40 milliGRAM(s) Oral daily  rivaroxaban 20 milliGRAM(s) Oral every 24 hours  sodium chloride 0.9% with potassium chloride 20 mEq/L 1000 milliLiter(s) (60 mL/Hr) IV Continuous <Continuous>  sodium chloride 0.9%. 1000 milliLiter(s) (80 mL/Hr) IV Continuous <Continuous>  tamsulosin 0.8 milliGRAM(s) Oral at bedtime    MEDICATIONS  (PRN):      Allergies    No Known Allergies    Intolerances        Vital Signs Last 24 Hrs  T(C): 36.7 (29 May 2019 15:38), Max: 36.7 (28 May 2019 20:14)  T(F): 98.1 (29 May 2019 15:38), Max: 98.1 (29 May 2019 15:38)  HR: 89 (29 May 2019 15:38) (80 - 92)  BP: 156/56 (29 May 2019 15:38) (138/72 - 172/56)  BP(mean): --  RR: 18 (29 May 2019 15:38) (16 - 18)  SpO2: 97% (29 May 2019 15:38) (97% - 100%)    PHYSICAL EXAM  General: adult in NAD  HEENT: clear oropharynx, anicteric sclera, pink conjunctiva  Neck: supple  CV: normal S1/S2 iireg  Lungs: positive air movement b/l   Abdomen: soft non-tender non-distended, no hepatosplenomegaly  Ext: no clubbing cyanosis or edema  Skin: no rashes and no petechiae  Neuro: alert  LABS:                          8.0    11.85 )-----------( 327      ( 29 May 2019 06:10 )             28.7         Mean Cell Volume : 70.7 fl  Mean Cell Hemoglobin : 19.7 pg  Mean Cell Hemoglobin Concentration : 27.9 gm/dL  Auto Neutrophil # : x  Auto Lymphocyte # : x  Auto Monocyte # : x  Auto Eosinophil # : x  Auto Basophil # : x  Auto Neutrophil % : x  Auto Lymphocyte % : x  Auto Monocyte % : x  Auto Eosinophil % : x  Auto Basophil % : x    Serial CBC's  05-29 @ 06:10  Hct-28.7 / Hgb-8.0 / Plat-327 / RBC-4.06 / WBC-11.85          Serial CBC's  05-28 @ 07:03  Hct-26.9 / Hgb-7.7 / Plat-309 / RBC-3.86 / WBC-12.61          Serial CBC's  05-27 @ 05:45  Hct-27.0 / Hgb-7.7 / Plat-315 / RBC-3.88 / WBC-12.26            05-29    143  |  107  |  34<H>  ----------------------------<  87  3.7   |  29  |  1.13    Ca    8.2<L>      29 May 2019 06:10                                    RADIOLOGY & ADDITIONAL STUDIES:

## 2019-05-29 NOTE — PROGRESS NOTE ADULT - PROBLEM SELECTOR PLAN 1
zehra improved, received ppx for contrast induced nephropathy, f/u ct w/ contrast, all medical team members management appreciated  ->F/u neuro consult

## 2019-05-29 NOTE — PROGRESS NOTE ADULT - ASSESSMENT
imp/rec    SILVINA likely sec to GI blood loss. Virtual vs optical luminal eval  On Venofer w 600 mg ordered ( 200 x3). Would extend to 1000 mg ( 200 x 5)    CAD    Asthma    Afib/anticoag

## 2019-05-30 DIAGNOSIS — I48.91 UNSPECIFIED ATRIAL FIBRILLATION: ICD-10-CM

## 2019-05-30 LAB
ANION GAP SERPL CALC-SCNC: 8 MMOL/L — SIGNIFICANT CHANGE UP (ref 5–17)
BUN SERPL-MCNC: 31 MG/DL — HIGH (ref 7–18)
CALCIUM SERPL-MCNC: 8.2 MG/DL — LOW (ref 8.4–10.5)
CHLORIDE SERPL-SCNC: 106 MMOL/L — SIGNIFICANT CHANGE UP (ref 96–108)
CK MB BLD-MCNC: 2.4 % — SIGNIFICANT CHANGE UP (ref 0–3.5)
CK MB CFR SERPL CALC: 1 NG/ML — SIGNIFICANT CHANGE UP (ref 0–3.6)
CK SERPL-CCNC: 42 U/L — SIGNIFICANT CHANGE UP (ref 35–232)
CO2 SERPL-SCNC: 26 MMOL/L — SIGNIFICANT CHANGE UP (ref 22–31)
CREAT SERPL-MCNC: 1.06 MG/DL — SIGNIFICANT CHANGE UP (ref 0.5–1.3)
GLUCOSE BLDC GLUCOMTR-MCNC: 110 MG/DL — HIGH (ref 70–99)
GLUCOSE BLDC GLUCOMTR-MCNC: 129 MG/DL — HIGH (ref 70–99)
GLUCOSE BLDC GLUCOMTR-MCNC: 132 MG/DL — HIGH (ref 70–99)
GLUCOSE BLDC GLUCOMTR-MCNC: 148 MG/DL — HIGH (ref 70–99)
GLUCOSE SERPL-MCNC: 124 MG/DL — HIGH (ref 70–99)
HCT VFR BLD CALC: 29.2 % — LOW (ref 39–50)
HGB BLD-MCNC: 8.4 G/DL — LOW (ref 13–17)
MCHC RBC-ENTMCNC: 20.2 PG — LOW (ref 27–34)
MCHC RBC-ENTMCNC: 28.8 GM/DL — LOW (ref 32–36)
MCV RBC AUTO: 70.2 FL — LOW (ref 80–100)
NRBC # BLD: 0 /100 WBCS — SIGNIFICANT CHANGE UP (ref 0–0)
PLATELET # BLD AUTO: 357 K/UL — SIGNIFICANT CHANGE UP (ref 150–400)
POTASSIUM SERPL-MCNC: 3.4 MMOL/L — LOW (ref 3.5–5.3)
POTASSIUM SERPL-SCNC: 3.4 MMOL/L — LOW (ref 3.5–5.3)
RBC # BLD: 4.16 M/UL — LOW (ref 4.2–5.8)
RBC # FLD: 19.6 % — HIGH (ref 10.3–14.5)
SODIUM SERPL-SCNC: 140 MMOL/L — SIGNIFICANT CHANGE UP (ref 135–145)
TROPONIN I SERPL-MCNC: 0.03 NG/ML — SIGNIFICANT CHANGE UP (ref 0–0.04)
TROPONIN I SERPL-MCNC: 0.04 NG/ML — SIGNIFICANT CHANGE UP (ref 0–0.04)
WBC # BLD: 14.51 K/UL — HIGH (ref 3.8–10.5)
WBC # FLD AUTO: 14.51 K/UL — HIGH (ref 3.8–10.5)

## 2019-05-30 PROCEDURE — 93010 ELECTROCARDIOGRAM REPORT: CPT

## 2019-05-30 PROCEDURE — 71045 X-RAY EXAM CHEST 1 VIEW: CPT | Mod: 26

## 2019-05-30 PROCEDURE — 74018 RADEX ABDOMEN 1 VIEW: CPT | Mod: 26

## 2019-05-30 RX ORDER — METOPROLOL TARTRATE 50 MG
75 TABLET ORAL
Refills: 0 | Status: DISCONTINUED | OUTPATIENT
Start: 2019-05-31 | End: 2019-05-31

## 2019-05-30 RX ORDER — FUROSEMIDE 40 MG
40 TABLET ORAL DAILY
Refills: 0 | Status: DISCONTINUED | OUTPATIENT
Start: 2019-05-30 | End: 2019-05-31

## 2019-05-30 RX ORDER — SODIUM CHLORIDE 9 MG/ML
1000 INJECTION, SOLUTION INTRAVENOUS
Refills: 0 | Status: DISCONTINUED | OUTPATIENT
Start: 2019-05-30 | End: 2019-05-31

## 2019-05-30 RX ORDER — POTASSIUM CHLORIDE 20 MEQ
40 PACKET (EA) ORAL ONCE
Refills: 0 | Status: COMPLETED | OUTPATIENT
Start: 2019-05-30 | End: 2019-05-30

## 2019-05-30 RX ORDER — IRON SUCROSE 20 MG/ML
200 INJECTION, SOLUTION INTRAVENOUS ONCE
Refills: 0 | Status: COMPLETED | OUTPATIENT
Start: 2019-05-30 | End: 2019-05-30

## 2019-05-30 RX ADMIN — IRON SUCROSE 110 MILLIGRAM(S): 20 INJECTION, SOLUTION INTRAVENOUS at 18:50

## 2019-05-30 RX ADMIN — ATORVASTATIN CALCIUM 40 MILLIGRAM(S): 80 TABLET, FILM COATED ORAL at 21:55

## 2019-05-30 RX ADMIN — RIVAROXABAN 20 MILLIGRAM(S): KIT at 17:39

## 2019-05-30 RX ADMIN — Medication 0.12 MILLIGRAM(S): at 05:27

## 2019-05-30 RX ADMIN — Medication 50 MILLIGRAM(S): at 17:39

## 2019-05-30 RX ADMIN — Medication 3 MILLILITER(S): at 09:17

## 2019-05-30 RX ADMIN — Medication 3 MILLILITER(S): at 20:39

## 2019-05-30 RX ADMIN — Medication 40 MILLIEQUIVALENT(S): at 12:17

## 2019-05-30 RX ADMIN — Medication 3 MILLILITER(S): at 15:21

## 2019-05-30 RX ADMIN — Medication 40 MILLIGRAM(S): at 05:27

## 2019-05-30 RX ADMIN — BUDESONIDE AND FORMOTEROL FUMARATE DIHYDRATE 2 PUFF(S): 160; 4.5 AEROSOL RESPIRATORY (INHALATION) at 12:18

## 2019-05-30 RX ADMIN — Medication 325 MILLIGRAM(S): at 12:18

## 2019-05-30 RX ADMIN — BUDESONIDE AND FORMOTEROL FUMARATE DIHYDRATE 2 PUFF(S): 160; 4.5 AEROSOL RESPIRATORY (INHALATION) at 21:55

## 2019-05-30 RX ADMIN — TAMSULOSIN HYDROCHLORIDE 0.8 MILLIGRAM(S): 0.4 CAPSULE ORAL at 21:55

## 2019-05-30 RX ADMIN — Medication 50 MILLIGRAM(S): at 05:27

## 2019-05-30 RX ADMIN — SODIUM CHLORIDE 60 MILLILITER(S): 9 INJECTION, SOLUTION INTRAVENOUS at 00:00

## 2019-05-30 RX ADMIN — Medication 40 MILLIGRAM(S): at 17:40

## 2019-05-30 RX ADMIN — PANTOPRAZOLE SODIUM 40 MILLIGRAM(S): 20 TABLET, DELAYED RELEASE ORAL at 05:27

## 2019-05-30 RX ADMIN — Medication 81 MILLIGRAM(S): at 12:18

## 2019-05-30 RX ADMIN — MONTELUKAST 10 MILLIGRAM(S): 4 TABLET, CHEWABLE ORAL at 12:18

## 2019-05-30 RX ADMIN — FINASTERIDE 5 MILLIGRAM(S): 5 TABLET, FILM COATED ORAL at 12:17

## 2019-05-30 NOTE — PROGRESS NOTE ADULT - SUBJECTIVE AND OBJECTIVE BOX
Patient is a 84y old  Male with iron def anemia  Was to get GI workup but he refused once in the procedure    Offers no complaints at this time    MEDICATIONS  (STANDING):  ALBUTerol/ipratropium for Nebulization 3 milliLiter(s) Nebulizer every 6 hours  aspirin  chewable 81 milliGRAM(s) Oral daily  atorvastatin 40 milliGRAM(s) Oral at bedtime  buDESOnide 160 MICROgram(s)/formoterol 4.5 MICROgram(s) Inhaler 2 Puff(s) Inhalation two times a day  dextrose 5% + sodium chloride 0.9%. 1000 milliLiter(s) (60 mL/Hr) IV Continuous <Continuous>  digoxin     Tablet 0.125 milliGRAM(s) Oral daily  ergocalciferol 28606 Unit(s) Oral every week  ferrous    sulfate 325 milliGRAM(s) Oral daily  finasteride 5 milliGRAM(s) Oral daily  furosemide    Tablet 40 milliGRAM(s) Oral daily  insulin lispro (HumaLOG) corrective regimen sliding scale   SubCutaneous Before meals and at bedtime  iron sucrose IVPB 200 milliGRAM(s) IV Intermittent once  metoprolol tartrate 50 milliGRAM(s) Oral two times a day  montelukast 10 milliGRAM(s) Oral daily  pantoprazole    Tablet 40 milliGRAM(s) Oral before breakfast  rivaroxaban 20 milliGRAM(s) Oral every 24 hours  tamsulosin 0.8 milliGRAM(s) Oral at bedtime    MEDICATIONS  (PRN):      Allergies    No Known Allergies    Intolerances        Vital Signs Last 24 Hrs  T(C): 37.7 (30 May 2019 16:14), Max: 37.7 (30 May 2019 16:14)  T(F): 99.9 (30 May 2019 16:14), Max: 99.9 (30 May 2019 16:14)  HR: 124 (30 May 2019 16:14) (81 - 124)  BP: 194/81 (30 May 2019 16:14) (120/70 - 195/72)  BP(mean): --  RR: 18 (30 May 2019 16:14) (17 - 18)  SpO2: 100% (30 May 2019 16:14) (95% - 100%)    PHYSICAL EXAM  General: adult in NAD  rest deferred      LABS:                          8.4    14.51 )-----------( 357      ( 30 May 2019 07:35 )             29.2         Mean Cell Volume : 70.2 fl  Mean Cell Hemoglobin : 20.2 pg  Mean Cell Hemoglobin Concentration : 28.8 gm/dL  Auto Neutrophil # : x  Auto Lymphocyte # : x  Auto Monocyte # : x  Auto Eosinophil # : x  Auto Basophil # : x  Auto Neutrophil % : x  Auto Lymphocyte % : x  Auto Monocyte % : x  Auto Eosinophil % : x  Auto Basophil % : x      Serial CBC's  05-30 @ 07:35  Hct-29.2 / Hgb-8.4 / Plat-357 / RBC-4.16 / WBC-14.51  Serial CBC's  05-29 @ 06:10  Hct-28.7 / Hgb-8.0 / Plat-327 / RBC-4.06 / WBC-11.85  Serial CBC's  05-28 @ 07:03  Hct-26.9 / Hgb-7.7 / Plat-309 / RBC-3.86 / WBC-12.61  Serial CBC's  05-27 @ 05:45  Hct-27.0 / Hgb-7.7 / Plat-315 / RBC-3.88 / WBC-12.26      05-30    140  |  106  |  31<H>  ----------------------------<  124<H>  3.4<L>   |  26  |  1.06    Ca    8.2<L>      30 May 2019 07:35            Ferritin, Serum: 16 ng/mL (05-24 @ 09:52)  Folate, Serum: 14.1 ng/mL (05-24 @ 09:52)  Vitamin B12, Serum: 970 pg/mL (05-24 @ 09:52)  Iron - Total Binding Capacity.: 488 ug/dL (05-24 @ 06:59)

## 2019-05-30 NOTE — PROGRESS NOTE ADULT - ASSESSMENT
SILVINA  Continue IV iron to a total of 1000 mg, 200/day  Await for repeat GI workup    Rest as per prior notes    Nasir Gondal  [phone]

## 2019-05-30 NOTE — PROGRESS NOTE ADULT - SUBJECTIVE AND OBJECTIVE BOX
PGY 1 Note discussed with supervising resident and primary attending    Patient is a 84y old  Male who presents with a chief complaint of Shortness of breath (30 May 2019 06:38)      INTERVAL HPI/OVERNIGHT EVENTS: Pt was seen and examined at bedside, pt had an episode of chest pain, ekg was showing afib with rvr, cardiac enzymes were sent which are negative. pt was npo after midnight for upper gi series, which he refused.      MEDICATIONS  (STANDING):  ALBUTerol/ipratropium for Nebulization 3 milliLiter(s) Nebulizer every 6 hours  aspirin  chewable 81 milliGRAM(s) Oral daily  atorvastatin 40 milliGRAM(s) Oral at bedtime  buDESOnide 160 MICROgram(s)/formoterol 4.5 MICROgram(s) Inhaler 2 Puff(s) Inhalation two times a day  digoxin     Tablet 0.125 milliGRAM(s) Oral daily  ergocalciferol 20148 Unit(s) Oral every week  ferrous    sulfate 325 milliGRAM(s) Oral daily  finasteride 5 milliGRAM(s) Oral daily  furosemide    Tablet 40 milliGRAM(s) Oral daily  insulin lispro (HumaLOG) corrective regimen sliding scale   SubCutaneous Before meals and at bedtime  metoprolol tartrate 50 milliGRAM(s) Oral two times a day  montelukast 10 milliGRAM(s) Oral daily  pantoprazole    Tablet 40 milliGRAM(s) Oral before breakfast  rivaroxaban 20 milliGRAM(s) Oral every 24 hours  tamsulosin 0.8 milliGRAM(s) Oral at bedtime    MEDICATIONS  (PRN):      __________________________________________________  REVIEW OF SYSTEMS:    CONSTITUTIONAL: No fever,   EYES: no acute visual disturbances  NECK: No pain or stiffness  RESPIRATORY: No cough; No shortness of breath  CARDIOVASCULAR: No chest pain, no palpitations  GASTROINTESTINAL: No pain. No nausea or vomiting; No diarrhea   NEUROLOGICAL: No headache or numbness, no tremors  MUSCULOSKELETAL: No joint pain, no muscle pain  GENITOURINARY: no dysuria, no frequency, no hesitancy  PSYCHIATRY: no depression , no anxiety  ALL OTHER  ROS negative        Vital Signs Last 24 Hrs  T(C): 36.9 (30 May 2019 12:12), Max: 36.9 (30 May 2019 05:15)  T(F): 98.4 (30 May 2019 12:12), Max: 98.5 (30 May 2019 05:15)  HR: 89 (30 May 2019 12:12) (81 - 105)  BP: 190/65 (30 May 2019 12:12) (120/70 - 195/72)  BP(mean): --  RR: 18 (30 May 2019 12:12) (17 - 18)  SpO2: 97% (30 May 2019 12:12) (95% - 100%)    ________________________________________________  PHYSICAL EXAM:  GENERAL: NAD  HEENT: Normocephalic;  conjunctivae and sclerae clear; moist mucous membranes;   NECK : supple  CHEST/LUNG: Clear to auscultation bilaterally with good air entry   HEART: S1 S2  regular; no murmurs, gallops or rubs  ABDOMEN: Soft, Nontender, Nondistended; Bowel sounds present  EXTREMITIES: no cyanosis; no edema; no calf tenderness  SKIN: warm and dry; no rash  NERVOUS SYSTEM:  Awake and alert; Oriented  to place, person and time ; no new deficits    _________________________________________________  LABS:                        8.4    14.51 )-----------( 357      ( 30 May 2019 07:35 )             29.2     05-30    140  |  106  |  31<H>  ----------------------------<  124<H>  3.4<L>   |  26  |  1.06    Ca    8.2<L>      30 May 2019 07:35          CAPILLARY BLOOD GLUCOSE      POCT Blood Glucose.: 132 mg/dL (30 May 2019 12:07)  POCT Blood Glucose.: 148 mg/dL (30 May 2019 07:33)  POCT Blood Glucose.: 99 mg/dL (29 May 2019 21:04)  POCT Blood Glucose.: 106 mg/dL (29 May 2019 16:31)        RADIOLOGY & ADDITIONAL TESTS:    Imaging Personally Reviewed:  YES    Consultant(s) Notes Reviewed:   YES    Care Discussed with Consultants : YES    Plan of care was discussed with patient and /or primary care giver; all questions and concerns were addressed and care was aligned with patient's wishes.

## 2019-05-30 NOTE — PROGRESS NOTE ADULT - PROBLEM SELECTOR PLAN 4
Iron deficiency anemia   Baseline Hb of 11  Presented with microcytic anemia of 7.7  FOBT negative  Pt was advised to get outpatient colonoscopy last month which he has not been able to get  microcytic anemia  c/w venofer  pt did not cooperate for upper gi series   d/w radiologist [facility] does not do virtual colonoscopy

## 2019-05-30 NOTE — PROGRESS NOTE ADULT - SUBJECTIVE AND OBJECTIVE BOX
Patient seen and examined at bedside  This morning patient c/o chest pain, tele afib rvr  Case discussed with medical team    HPI:  84 Male with PMH of Asthma, CVA (2015), BPH, Non-obstructive CAD, Afib (Xarelto), Bilateral carotid stenosis, Moderate AS, Colitis came to hospital for shortness of breath and weakness for 2 weeks. Symptoms were associated with poor oral intake and more frequent use of home nebulizers. Pt was last admitted in [facility] for shortness of breath and had stress test which showed reversible changes with normal EF. On cardiac cath, his EF was 35% on radionuclide angiography with no obstructive disease. Currently he can not walk more than 2 blocks due to dyspnea and uses 1 pillow at night.     SH: Lives with daughter. Uses cane. No smoking and alcohol.    ED Course: Hemodynamically stable. Labs showed anemia of 8.2 and cardiac enzymes of 0.057. BNP was 7500 with mild bilateral congested CXR. EKG showed chronic AFib @ 90 bpm. Pt was given 80 IV lasix. (23 May 2019 21:05)      PAST MEDICAL & SURGICAL HISTORY:  CAD (coronary artery disease)  Afib  Leukocytoclastic vasculitis  Hypercholesterolemia  HTN (hypertension)  CVA (cerebral vascular accident): 2015  Asthma  H/O sinus surgery      No Known Allergies       MEDICATIONS  (STANDING):  ALBUTerol/ipratropium for Nebulization 3 milliLiter(s) Nebulizer every 6 hours  aspirin  chewable 81 milliGRAM(s) Oral daily  atorvastatin 40 milliGRAM(s) Oral at bedtime  buDESOnide 160 MICROgram(s)/formoterol 4.5 MICROgram(s) Inhaler 2 Puff(s) Inhalation two times a day  digoxin     Tablet 0.125 milliGRAM(s) Oral daily  ergocalciferol 41379 Unit(s) Oral every week  ferrous    sulfate 325 milliGRAM(s) Oral daily  finasteride 5 milliGRAM(s) Oral daily  furosemide    Tablet 40 milliGRAM(s) Oral daily  insulin lispro (HumaLOG) corrective regimen sliding scale   SubCutaneous Before meals and at bedtime  metoprolol tartrate 50 milliGRAM(s) Oral two times a day  montelukast 10 milliGRAM(s) Oral daily  pantoprazole    Tablet 40 milliGRAM(s) Oral before breakfast  rivaroxaban 20 milliGRAM(s) Oral every 24 hours  tamsulosin 0.8 milliGRAM(s) Oral at bedtime    MEDICATIONS  (PRN):      REVIEW OF SYSTEMS:  CONSTITUTIONAL: (+) malaise.   EYES: No acute change in vision   ENT:  No tinnitus  NECK: No stiffness  RESPIRATORY: No hemoptysis  CARDIOVASCULAR: chest pain  GASTROINTESTINAL: No hematemesis, diarrhea, melena, or hematochezia.  GENITOURINARY: No hematuria  NEUROLOGICAL: No headaches  LYMPH Nodes: No enlarged glands  ENDOCRINE: No heat or cold intolerance	    T(C): 36.9 (05-30-19 @ 05:15), Max: 36.9 (05-30-19 @ 05:15)  HR: 81 (05-30-19 @ 05:15) (81 - 92)  BP: 144/47 (05-30-19 @ 05:15) (138/72 - 182/70)  RR: 18 (05-30-19 @ 05:15) (17 - 18)  SpO2: 100% (05-30-19 @ 05:15) (97% - 100%)    PHYSICAL EXAMINATION:   Constitutional: mild distress  HEENT: NC, AT  Neck:  Supple  Respiratory:  Adequate airflow b/l. Not using accessory muscles of respiration.  Cardiovascular: stable sys murmur, S1 & S2 intact, no R/G, 2+ radial pulses b/l  Gastrointestinal: Soft, NT, ND, normoactive b.s., no organomegaly/RT/rigidity  Extremities: WWP  Neurological:  Alert and awake.  No acute focal motor deficits. Crude sensation intact.     Labs and imaging reviewed    LABS:                        8.0    11.85 )-----------( 327      ( 29 May 2019 06:10 )             28.7     05-29    143  |  107  |  34<H>  ----------------------------<  87  3.7   |  29  |  1.13    Ca    8.2<L>      29 May 2019 06:10              CAPILLARY BLOOD GLUCOSE      POCT Blood Glucose.: 99 mg/dL (29 May 2019 21:04)  POCT Blood Glucose.: 106 mg/dL (29 May 2019 16:31)  POCT Blood Glucose.: 118 mg/dL (29 May 2019 11:32)  POCT Blood Glucose.: 117 mg/dL (29 May 2019 07:58)              RADIOLOGY & ADDITIONAL STUDIES:

## 2019-05-30 NOTE — PROGRESS NOTE ADULT - SUBJECTIVE AND OBJECTIVE BOX
Time of Visit:  Patient seen and examined.     MEDICATIONS  (STANDING):  ALBUTerol/ipratropium for Nebulization 3 milliLiter(s) Nebulizer every 6 hours  aspirin  chewable 81 milliGRAM(s) Oral daily  atorvastatin 40 milliGRAM(s) Oral at bedtime  buDESOnide 160 MICROgram(s)/formoterol 4.5 MICROgram(s) Inhaler 2 Puff(s) Inhalation two times a day  digoxin     Tablet 0.125 milliGRAM(s) Oral daily  ergocalciferol 60975 Unit(s) Oral every week  ferrous    sulfate 325 milliGRAM(s) Oral daily  finasteride 5 milliGRAM(s) Oral daily  furosemide    Tablet 40 milliGRAM(s) Oral daily  insulin lispro (HumaLOG) corrective regimen sliding scale   SubCutaneous Before meals and at bedtime  metoprolol tartrate 50 milliGRAM(s) Oral two times a day  montelukast 10 milliGRAM(s) Oral daily  pantoprazole    Tablet 40 milliGRAM(s) Oral before breakfast  rivaroxaban 20 milliGRAM(s) Oral every 24 hours  tamsulosin 0.8 milliGRAM(s) Oral at bedtime      MEDICATIONS  (PRN):       Medications up to date at time of exam.    ROS; No fever, chills, cough, congestion, SOB.   PHYSICAL EXAMINATION:    Vital Signs Last 24 Hrs  T(C): 36.9 (30 May 2019 12:12), Max: 36.9 (30 May 2019 05:15)  T(F): 98.4 (30 May 2019 12:12), Max: 98.5 (30 May 2019 05:15)  HR: 89 (30 May 2019 12:12) (81 - 105)  BP: 190/65 (30 May 2019 12:12) (120/70 - 195/72)  BP(mean): --  RR: 18 (30 May 2019 12:12) (17 - 18)  SpO2: 97% (30 May 2019 12:12) (95% - 100%)   (if applicable)    General; Alert and oriented. No acute distress.     HEENT: Head is normocephalic and atraumatic. Extraocular muscles are intact. Moist mucosa.     NECK: Supple, no palpable adenopathy.    LUNGS: Clear to auscultation, no wheezing, rales, or rhonchi. No use of accessory muscle.     HEART: S1 S2 Regular rate and no click/ rub.     ABDOMEN: Soft, nontender, and nondistended.  No hepatosplenomegaly is noted. Active bowel sounds.     EXTREMITIES: Without any cyanosis, clubbing, rash, lesions or edema.    NEUROLOGIC: Awake, alert, oriented.     SKIN: Warm and moist. Non diaphoretic.       LABS:                        8.4    14.51 )-----------( 357      ( 30 May 2019 07:35 )             29.2     05-30    140  |  106  |  31<H>  ----------------------------<  124<H>  3.4<L>   |  26  |  1.06    Ca    8.2<L>      30 May 2019 07:35            CARDIAC MARKERS ( 30 May 2019 09:33 )  x     / x     / 42 U/L / x     / 1.0 ng/mL  CARDIAC MARKERS ( 30 May 2019 09:32 )  0.032 ng/mL / x     / x     / x     / x      RADIOLOGY & ADDITIONAL STUDIES:  EKG:   CXR: < from: Xray Chest 1 View- PORTABLE-Urgent (05.30.19 @ 06:57) >  EXAM:  XR CHEST PORTABLE URGENT 1V                            PROCEDURE DATE:  05/30/2019          INTERPRETATION:  CLINICAL INDICATION: 84 years  Male with r/o pulm edema.    COMPARISON: 5/23/2019    AP view of the chest demonstrates the right lung to be clear. There is a   questionable retrocardiac infiltrate or atelectasis. There is no pleural   effusion. There is no pneumothorax.    The heart is moderately enlarged. The aorta is tortuous and   atherosclerotic.. There is no mediastinal or hilar mass.     The pulmonary vasculature is normal.     Mild thoracic degenerative changes are present.    IMPRESSION:    Questionable retrocardiac infiltrate versus atelectasis. Recommend PA and   lateral views.    Cardiomegaly.    IMPRESSION: 84y Male PAST MEDICAL & SURGICAL HISTORY:  CAD (coronary artery disease)  Afib  Leukocytoclastic vasculitis  Hypercholesterolemia  HTN (hypertension)  CVA (cerebral vascular accident): 2015  Asthma  H/O sinus surgery     Impression; 85 Y/O Male with multiple chronic conditions. Presented with shortness of breath and weakness for 2 weeks. Patient was last admitted in [facility] for shortness of breath and had stress test which showed reversible changes with normal EF. On cardiac cath, his EF was 35% on radionuclide angiography with no obstructive disease. Currently he can not walk more than 2 blocks due to dyspnea and uses 1 pillow at night. SOB secondary to Pulmonary Edema. Has Asthma but no exacerbation on exam. Asthma stable.       Suggestion;  O2 saturation 96% room air.  Continue DuoNeb Q 6 hours.  Continue Budesonide Twice Daily.   Continue Singulair Daily.  DVT/ GI prophylactic.   On Rivaroxaban 20 mg Daily.   Aspiration precautions. Time of Visit:  Patient seen and examined.     MEDICATIONS  (STANDING):  ALBUTerol/ipratropium for Nebulization 3 milliLiter(s) Nebulizer every 6 hours  aspirin  chewable 81 milliGRAM(s) Oral daily  atorvastatin 40 milliGRAM(s) Oral at bedtime  buDESOnide 160 MICROgram(s)/formoterol 4.5 MICROgram(s) Inhaler 2 Puff(s) Inhalation two times a day  digoxin     Tablet 0.125 milliGRAM(s) Oral daily  ergocalciferol 37379 Unit(s) Oral every week  ferrous    sulfate 325 milliGRAM(s) Oral daily  finasteride 5 milliGRAM(s) Oral daily  furosemide    Tablet 40 milliGRAM(s) Oral daily  insulin lispro (HumaLOG) corrective regimen sliding scale   SubCutaneous Before meals and at bedtime  metoprolol tartrate 50 milliGRAM(s) Oral two times a day  montelukast 10 milliGRAM(s) Oral daily  pantoprazole    Tablet 40 milliGRAM(s) Oral before breakfast  rivaroxaban 20 milliGRAM(s) Oral every 24 hours  tamsulosin 0.8 milliGRAM(s) Oral at bedtime      MEDICATIONS  (PRN):       Medications up to date at time of exam.    ROS; No fever, chills, cough, congestion, SOB.   PHYSICAL EXAMINATION:    Vital Signs Last 24 Hrs  T(C): 36.9 (30 May 2019 12:12), Max: 36.9 (30 May 2019 05:15)  T(F): 98.4 (30 May 2019 12:12), Max: 98.5 (30 May 2019 05:15)  HR: 89 (30 May 2019 12:12) (81 - 105)  BP: 190/65 (30 May 2019 12:12) (120/70 - 195/72)  BP(mean): --  RR: 18 (30 May 2019 12:12) (17 - 18)  SpO2: 97% (30 May 2019 12:12) (95% - 100%)   (if applicable)    General; Alert and oriented. No acute distress.     HEENT: Head is normocephalic and atraumatic. Extraocular muscles are intact. Moist mucosa.     NECK: Supple, no palpable adenopathy.    LUNGS: Clear to auscultation, no wheezing, rales, or rhonchi. No use of accessory muscle.     HEART: S1 S2 Regular rate and no click/ rub.     ABDOMEN: Soft, nontender, and nondistended.  No hepatosplenomegaly is noted. Active bowel sounds.     EXTREMITIES: Without any cyanosis, clubbing, rash, lesions or edema.    NEUROLOGIC: Awake, alert, oriented.     SKIN: Warm and moist. Non diaphoretic.       LABS:                        8.4    14.51 )-----------( 357      ( 30 May 2019 07:35 )             29.2     05-30    140  |  106  |  31<H>  ----------------------------<  124<H>  3.4<L>   |  26  |  1.06    Ca    8.2<L>      30 May 2019 07:35            CARDIAC MARKERS ( 30 May 2019 09:33 )  x     / x     / 42 U/L / x     / 1.0 ng/mL  CARDIAC MARKERS ( 30 May 2019 09:32 )  0.032 ng/mL / x     / x     / x     / x      RADIOLOGY & ADDITIONAL STUDIES:  EKG:   CXR: < from: Xray Chest 1 View- PORTABLE-Urgent (05.30.19 @ 06:57) >  EXAM:  XR CHEST PORTABLE URGENT 1V                            PROCEDURE DATE:  05/30/2019          INTERPRETATION:  CLINICAL INDICATION: 84 years  Male with r/o pulm edema.    COMPARISON: 5/23/2019    AP view of the chest demonstrates the right lung to be clear. There is a   questionable retrocardiac infiltrate or atelectasis. There is no pleural   effusion. There is no pneumothorax.    The heart is moderately enlarged. The aorta is tortuous and   atherosclerotic.. There is no mediastinal or hilar mass.     The pulmonary vasculature is normal.     Mild thoracic degenerative changes are present.    IMPRESSION:    Questionable retrocardiac infiltrate versus atelectasis. Recommend PA and   lateral views.    Cardiomegaly.    IMPRESSION: 84y Male PAST MEDICAL & SURGICAL HISTORY:  CAD (coronary artery disease)  Afib  Leukocytoclastic vasculitis  Hypercholesterolemia  HTN (hypertension)  CVA (cerebral vascular accident): 2015  Asthma  H/O sinus surgery     Impression; 83 Y/O Male with multiple chronic conditions. Presented with shortness of breath and weakness for 2 weeks. Patient was last admitted in [facility] for shortness of breath and had stress test which showed reversible changes with normal EF. On cardiac cath, his EF was 35% on radionuclide angiography with no obstructive disease. Currently he can not walk more than 2 blocks due to dyspnea and uses 1 pillow at night. SOB secondary to Pulmonary Edema. Has Asthma but no exacerbation on exam. Asthma stable.       Suggestion;  O2 saturation 96% room air.  Continue DuoNeb Q 6 hours.  Continue Budesonide Twice Daily.   Continue Singulair Daily.  DVT/ GI prophylactic.   On Rivaroxaban 20 mg Daily.   Aspiration precautions.       Agree with above assessment and plan as transcribed.

## 2019-05-30 NOTE — PROGRESS NOTE ADULT - SUBJECTIVE AND OBJECTIVE BOX
Patient seen and examined.       MEDICATIONS  (STANDING):  ALBUTerol/ipratropium for Nebulization 3 milliLiter(s) Nebulizer every 6 hours  aspirin  chewable 81 milliGRAM(s) Oral daily  atorvastatin 40 milliGRAM(s) Oral at bedtime  buDESOnide 160 MICROgram(s)/formoterol 4.5 MICROgram(s) Inhaler 2 Puff(s) Inhalation two times a day  dextrose 5% + sodium chloride 0.9%. 1000 milliLiter(s) (60 mL/Hr) IV Continuous <Continuous>  digoxin     Tablet 0.125 milliGRAM(s) Oral daily  ergocalciferol 29581 Unit(s) Oral every week  ferrous    sulfate 325 milliGRAM(s) Oral daily  finasteride 5 milliGRAM(s) Oral daily  furosemide    Tablet 40 milliGRAM(s) Oral daily  insulin lispro (HumaLOG) corrective regimen sliding scale   SubCutaneous Before meals and at bedtime  metoprolol tartrate 50 milliGRAM(s) Oral two times a day  montelukast 10 milliGRAM(s) Oral daily  pantoprazole    Tablet 40 milliGRAM(s) Oral before breakfast  rivaroxaban 20 milliGRAM(s) Oral every 24 hours  tamsulosin 0.8 milliGRAM(s) Oral at bedtime      MEDICATIONS  (PRN):   Medications up to date at time of exam.      PHYSICAL EXAMINATION:  Patient has no new complaints.  GENERAL: The patient is a well-developed, well-nourished, in no apparent distress.     Vital Signs Last 24 Hrs  T(C): 36.6 (30 May 2019 20:26), Max: 37.7 (30 May 2019 16:14)  T(F): 97.9 (30 May 2019 20:26), Max: 99.9 (30 May 2019 16:14)  HR: 76 (30 May 2019 20:26) (76 - 124)  BP: 152/66 (30 May 2019 20:26) (120/70 - 195/72)  BP(mean): --  RR: 18 (30 May 2019 20:26) (17 - 18)  SpO2: 98% (30 May 2019 20:26) (95% - 100%)   (if applicable)      HEENT: Head is normocephalic and atraumatic.     NECK: Supple, no palpable adenopathy.    LUNGS: Clear to auscultation, no wheezing, rales, or rhonchi.    HEART: Regular rate and rhythm without murmur.    ABDOMEN: Soft, nontender, and nondistended.  No hepatosplenomegaly is noted.    EXTREMITIES: Without any cyanosis, clubbing, rash, lesions or edema.    NEUROLOGIC: Awake, alert.    SKIN: Warm, dry, good turgor.      LABS:                        8.4    14.51 )-----------( 357      ( 30 May 2019 07:35 )             29.2     05-30    140  |  106  |  31<H>  ----------------------------<  124<H>  3.4<L>   |  26  |  1.06    Ca    8.2<L>      30 May 2019 07:35            CARDIAC MARKERS ( 30 May 2019 19:33 )  0.040 ng/mL / x     / x     / x     / x      CARDIAC MARKERS ( 30 May 2019 09:33 )  x     / x     / 42 U/L / x     / 1.0 ng/mL  CARDIAC MARKERS ( 30 May 2019 09:32 )  0.032 ng/mL / x     / x     / x     / x                    MICROBIOLOGY: (if applicable)    RADIOLOGY & ADDITIONAL STUDIES:  EKG:   CXR:  ECHO:    IMPRESSION: 84y Male PAST MEDICAL & SURGICAL HISTORY:  CAD (coronary artery disease)  Afib  Leukocytoclastic vasculitis  Hypercholesterolemia  HTN (hypertension)  CVA (cerebral vascular accident): 2015  Asthma  H/O sinus surgery       RECOMMENDATIONS:    Afib overall stable rate w/ episodes of tachycardia, this is likely in the setting of anemia.   Can increase metoprolol for better rate and BP control.   GI follow up for low H/H.

## 2019-05-30 NOTE — PROGRESS NOTE ADULT - PROBLEM SELECTOR PLAN 1
c/o chest pain this morning, tele (+) afib rvr, p.e. (+) mild bibasilar inspiratory rales. Administer AM BB and restart lasix. F/u EKG, CXR, cardiac enzymes, AM labs and ensure pt doesn't have TRINI.  Tele. Adjust management accordingly.

## 2019-05-31 ENCOUNTER — TRANSCRIPTION ENCOUNTER (OUTPATIENT)
Age: 84
End: 2019-05-31

## 2019-05-31 VITALS
RESPIRATION RATE: 18 BRPM | OXYGEN SATURATION: 97 % | HEART RATE: 96 BPM | TEMPERATURE: 98 F | DIASTOLIC BLOOD PRESSURE: 65 MMHG | SYSTOLIC BLOOD PRESSURE: 124 MMHG

## 2019-05-31 LAB
ANION GAP SERPL CALC-SCNC: 7 MMOL/L — SIGNIFICANT CHANGE UP (ref 5–17)
BUN SERPL-MCNC: 38 MG/DL — HIGH (ref 7–18)
CALCIUM SERPL-MCNC: 8.1 MG/DL — LOW (ref 8.4–10.5)
CHLORIDE SERPL-SCNC: 108 MMOL/L — SIGNIFICANT CHANGE UP (ref 96–108)
CO2 SERPL-SCNC: 27 MMOL/L — SIGNIFICANT CHANGE UP (ref 22–31)
CREAT SERPL-MCNC: 1.13 MG/DL — SIGNIFICANT CHANGE UP (ref 0.5–1.3)
GLUCOSE BLDC GLUCOMTR-MCNC: 117 MG/DL — HIGH (ref 70–99)
GLUCOSE BLDC GLUCOMTR-MCNC: 129 MG/DL — HIGH (ref 70–99)
GLUCOSE BLDC GLUCOMTR-MCNC: 160 MG/DL — HIGH (ref 70–99)
GLUCOSE SERPL-MCNC: 85 MG/DL — SIGNIFICANT CHANGE UP (ref 70–99)
HCT VFR BLD CALC: 27.6 % — LOW (ref 39–50)
HGB BLD-MCNC: 7.9 G/DL — LOW (ref 13–17)
MAGNESIUM SERPL-MCNC: 2.2 MG/DL — SIGNIFICANT CHANGE UP (ref 1.6–2.6)
MCHC RBC-ENTMCNC: 20.6 PG — LOW (ref 27–34)
MCHC RBC-ENTMCNC: 28.6 GM/DL — LOW (ref 32–36)
MCV RBC AUTO: 71.9 FL — LOW (ref 80–100)
NRBC # BLD: 0 /100 WBCS — SIGNIFICANT CHANGE UP (ref 0–0)
PHOSPHATE SERPL-MCNC: 3.3 MG/DL — SIGNIFICANT CHANGE UP (ref 2.5–4.5)
PLATELET # BLD AUTO: 292 K/UL — SIGNIFICANT CHANGE UP (ref 150–400)
POTASSIUM SERPL-MCNC: 3.6 MMOL/L — SIGNIFICANT CHANGE UP (ref 3.5–5.3)
POTASSIUM SERPL-SCNC: 3.6 MMOL/L — SIGNIFICANT CHANGE UP (ref 3.5–5.3)
RBC # BLD: 3.84 M/UL — LOW (ref 4.2–5.8)
RBC # FLD: 21.1 % — HIGH (ref 10.3–14.5)
SODIUM SERPL-SCNC: 142 MMOL/L — SIGNIFICANT CHANGE UP (ref 135–145)
WBC # BLD: 12.67 K/UL — HIGH (ref 3.8–10.5)
WBC # FLD AUTO: 12.67 K/UL — HIGH (ref 3.8–10.5)

## 2019-05-31 PROCEDURE — 82272 OCCULT BLD FECES 1-3 TESTS: CPT

## 2019-05-31 PROCEDURE — 84100 ASSAY OF PHOSPHORUS: CPT

## 2019-05-31 PROCEDURE — 83550 IRON BINDING TEST: CPT

## 2019-05-31 PROCEDURE — 85610 PROTHROMBIN TIME: CPT

## 2019-05-31 PROCEDURE — 82553 CREATINE MB FRACTION: CPT

## 2019-05-31 PROCEDURE — 84466 ASSAY OF TRANSFERRIN: CPT

## 2019-05-31 PROCEDURE — 85730 THROMBOPLASTIN TIME PARTIAL: CPT

## 2019-05-31 PROCEDURE — 71045 X-RAY EXAM CHEST 1 VIEW: CPT

## 2019-05-31 PROCEDURE — 80061 LIPID PANEL: CPT

## 2019-05-31 PROCEDURE — 94640 AIRWAY INHALATION TREATMENT: CPT

## 2019-05-31 PROCEDURE — 85027 COMPLETE CBC AUTOMATED: CPT

## 2019-05-31 PROCEDURE — 99285 EMERGENCY DEPT VISIT HI MDM: CPT | Mod: 25

## 2019-05-31 PROCEDURE — 74241: CPT | Mod: 26

## 2019-05-31 PROCEDURE — 80053 COMPREHEN METABOLIC PANEL: CPT

## 2019-05-31 PROCEDURE — 36415 COLL VENOUS BLD VENIPUNCTURE: CPT

## 2019-05-31 PROCEDURE — 93005 ELECTROCARDIOGRAM TRACING: CPT

## 2019-05-31 PROCEDURE — 80048 BASIC METABOLIC PNL TOTAL CA: CPT

## 2019-05-31 PROCEDURE — 82607 VITAMIN B-12: CPT

## 2019-05-31 PROCEDURE — 82728 ASSAY OF FERRITIN: CPT

## 2019-05-31 PROCEDURE — 83615 LACTATE (LD) (LDH) ENZYME: CPT

## 2019-05-31 PROCEDURE — 93306 TTE W/DOPPLER COMPLETE: CPT

## 2019-05-31 PROCEDURE — 83880 ASSAY OF NATRIURETIC PEPTIDE: CPT

## 2019-05-31 PROCEDURE — 82746 ASSAY OF FOLIC ACID SERUM: CPT

## 2019-05-31 PROCEDURE — 83540 ASSAY OF IRON: CPT

## 2019-05-31 PROCEDURE — 70498 CT ANGIOGRAPHY NECK: CPT

## 2019-05-31 PROCEDURE — 84443 ASSAY THYROID STIM HORMONE: CPT

## 2019-05-31 PROCEDURE — 85379 FIBRIN DEGRADATION QUANT: CPT

## 2019-05-31 PROCEDURE — 82962 GLUCOSE BLOOD TEST: CPT

## 2019-05-31 PROCEDURE — 84436 ASSAY OF TOTAL THYROXINE: CPT

## 2019-05-31 PROCEDURE — 96374 THER/PROPH/DIAG INJ IV PUSH: CPT

## 2019-05-31 PROCEDURE — 84484 ASSAY OF TROPONIN QUANT: CPT

## 2019-05-31 PROCEDURE — 82550 ASSAY OF CK (CPK): CPT

## 2019-05-31 PROCEDURE — 74241: CPT

## 2019-05-31 PROCEDURE — 74018 RADEX ABDOMEN 1 VIEW: CPT

## 2019-05-31 PROCEDURE — 83735 ASSAY OF MAGNESIUM: CPT

## 2019-05-31 PROCEDURE — 83036 HEMOGLOBIN GLYCOSYLATED A1C: CPT

## 2019-05-31 RX ORDER — FUROSEMIDE 40 MG
1 TABLET ORAL
Qty: 30 | Refills: 0
Start: 2019-05-31 | End: 2019-06-29

## 2019-05-31 RX ORDER — METOPROLOL TARTRATE 50 MG
1 TABLET ORAL
Qty: 60 | Refills: 0
Start: 2019-05-31 | End: 2019-06-29

## 2019-05-31 RX ADMIN — ERGOCALCIFEROL 50000 UNIT(S): 1.25 CAPSULE ORAL at 12:12

## 2019-05-31 RX ADMIN — Medication 325 MILLIGRAM(S): at 12:12

## 2019-05-31 RX ADMIN — Medication 81 MILLIGRAM(S): at 12:12

## 2019-05-31 RX ADMIN — Medication 3 MILLILITER(S): at 08:27

## 2019-05-31 RX ADMIN — SODIUM CHLORIDE 60 MILLILITER(S): 9 INJECTION, SOLUTION INTRAVENOUS at 05:34

## 2019-05-31 RX ADMIN — Medication 0.12 MILLIGRAM(S): at 05:28

## 2019-05-31 RX ADMIN — Medication 75 MILLIGRAM(S): at 17:54

## 2019-05-31 RX ADMIN — FINASTERIDE 5 MILLIGRAM(S): 5 TABLET, FILM COATED ORAL at 12:11

## 2019-05-31 RX ADMIN — BUDESONIDE AND FORMOTEROL FUMARATE DIHYDRATE 2 PUFF(S): 160; 4.5 AEROSOL RESPIRATORY (INHALATION) at 12:12

## 2019-05-31 RX ADMIN — MONTELUKAST 10 MILLIGRAM(S): 4 TABLET, CHEWABLE ORAL at 12:12

## 2019-05-31 RX ADMIN — PANTOPRAZOLE SODIUM 40 MILLIGRAM(S): 20 TABLET, DELAYED RELEASE ORAL at 05:50

## 2019-05-31 RX ADMIN — RIVAROXABAN 20 MILLIGRAM(S): KIT at 17:53

## 2019-05-31 RX ADMIN — Medication 75 MILLIGRAM(S): at 05:29

## 2019-05-31 RX ADMIN — Medication 40 MILLIGRAM(S): at 06:11

## 2019-05-31 NOTE — DIETITIAN INITIAL EVALUATION ADULT. - MD RECOMMEND
Ensure Enlive 1can daily as medically feasible (350kcal, 20g protein ) if persistent poor intake as medically feasible

## 2019-05-31 NOTE — PROGRESS NOTE ADULT - PROBLEM SELECTOR PROBLEM 1
CHF (congestive heart failure)
Atrial fibrillation with RVR
CHF (congestive heart failure)
Carotid artery stenosis
Carotid artery stenosis, symptomatic
Anemia
CHF (congestive heart failure)
CHF (congestive heart failure)
Carotid artery stenosis, symptomatic
CHF (congestive heart failure)

## 2019-05-31 NOTE — DISCHARGE NOTE PROVIDER - PROVIDER TOKENS
PROVIDER:[TOKEN:[[id number]]],PROVIDER:[TOKEN:[[id number]]],PROVIDER:[TOKEN:[[id number]]],PROVIDER:[TOKEN:[[id number]]],PROVIDER:[TOKEN:[[id number]]],PROVIDER:[TOKEN:[[id number]]]

## 2019-05-31 NOTE — PROGRESS NOTE ADULT - SUBJECTIVE AND OBJECTIVE BOX
Patient seen and examined.       MEDICATIONS  (STANDING):  ALBUTerol/ipratropium for Nebulization 3 milliLiter(s) Nebulizer every 6 hours  aspirin  chewable 81 milliGRAM(s) Oral daily  atorvastatin 40 milliGRAM(s) Oral at bedtime  buDESOnide 160 MICROgram(s)/formoterol 4.5 MICROgram(s) Inhaler 2 Puff(s) Inhalation two times a day  dextrose 5% + sodium chloride 0.9%. 1000 milliLiter(s) (60 mL/Hr) IV Continuous <Continuous>  digoxin     Tablet 0.125 milliGRAM(s) Oral daily  ergocalciferol 34071 Unit(s) Oral every week  ferrous    sulfate 325 milliGRAM(s) Oral daily  finasteride 5 milliGRAM(s) Oral daily  furosemide    Tablet 40 milliGRAM(s) Oral daily  insulin lispro (HumaLOG) corrective regimen sliding scale   SubCutaneous Before meals and at bedtime  metoprolol tartrate 75 milliGRAM(s) Oral two times a day  montelukast 10 milliGRAM(s) Oral daily  pantoprazole    Tablet 40 milliGRAM(s) Oral before breakfast  rivaroxaban 20 milliGRAM(s) Oral every 24 hours  tamsulosin 0.8 milliGRAM(s) Oral at bedtime      MEDICATIONS  (PRN):   Medications up to date at time of exam.      PHYSICAL EXAMINATION:  Patient has no new complaints.  GENERAL: The patient is a well-developed, well-nourished, in no apparent distress.     Vital Signs Last 24 Hrs  T(C): 36.5 (31 May 2019 11:20), Max: 37.7 (30 May 2019 16:14)  T(F): 97.7 (31 May 2019 11:20), Max: 99.9 (30 May 2019 16:14)  HR: 78 (31 May 2019 11:20) (76 - 124)  BP: 149/57 (31 May 2019 11:20) (149/57 - 194/81)  BP(mean): --  RR: 18 (31 May 2019 11:20) (17 - 18)  SpO2: 97% (31 May 2019 11:20) (95% - 100%)   (if applicable)      HEENT: Head is normocephalic and atraumatic.     NECK: Supple, no palpable adenopathy.    LUNGS: Clear to auscultation, no wheezing, rales, or rhonchi.    HEART: irregularly irregular without murmur.    ABDOMEN: Soft, nontender, and nondistended.  No hepatosplenomegaly is noted.    EXTREMITIES: Without any cyanosis, clubbing, rash, lesions or edema.    NEUROLOGIC: Awake, alert.    SKIN: Warm, dry, good turgor.      LABS:                        7.9    12.67 )-----------( 292      ( 31 May 2019 06:26 )             27.6     05-31    142  |  108  |  38<H>  ----------------------------<  85  3.6   |  27  |  1.13    Ca    8.1<L>      31 May 2019 06:26  Phos  3.3     05-31  Mg     2.2     05-31            CARDIAC MARKERS ( 30 May 2019 19:33 )  0.040 ng/mL / x     / x     / x     / x      CARDIAC MARKERS ( 30 May 2019 09:33 )  x     / x     / 42 U/L / x     / 1.0 ng/mL  CARDIAC MARKERS ( 30 May 2019 09:32 )  0.032 ng/mL / x     / x     / x     / x                    MICROBIOLOGY: (if applicable)    RADIOLOGY & ADDITIONAL STUDIES:  EKG:   CXR:  ECHO:    IMPRESSION: 84y Male PAST MEDICAL & SURGICAL HISTORY:  CAD (coronary artery disease)  Afib  Leukocytoclastic vasculitis  Hypercholesterolemia  HTN (hypertension)  CVA (cerebral vascular accident): 2015  Asthma  H/O sinus surgery         RECOMMENDATIONS:    Afib overall stable rate w/ episodes of tachycardia, this is likely in the setting of anemia.   BP and HR improved w/ increased dose of beta blocker.  GI follow up for low H/H.  Can d/c tele.

## 2019-05-31 NOTE — PROGRESS NOTE ADULT - SUBJECTIVE AND OBJECTIVE BOX
PGY 1 Note discussed with supervising resident and primary attending    Patient is a 84y old  Male who presents with a chief complaint of Shortness of breath (31 May 2019 14:00)      INTERVAL HPI/OVERNIGHT EVENTS: Pt was seen and examined at bedside, pt had upper gi series did not show any significant finding except mild gerd.    MEDICATIONS  (STANDING):  ALBUTerol/ipratropium for Nebulization 3 milliLiter(s) Nebulizer every 6 hours  aspirin  chewable 81 milliGRAM(s) Oral daily  atorvastatin 40 milliGRAM(s) Oral at bedtime  buDESOnide 160 MICROgram(s)/formoterol 4.5 MICROgram(s) Inhaler 2 Puff(s) Inhalation two times a day  dextrose 5% + sodium chloride 0.9%. 1000 milliLiter(s) (60 mL/Hr) IV Continuous <Continuous>  digoxin     Tablet 0.125 milliGRAM(s) Oral daily  ergocalciferol 98317 Unit(s) Oral every week  ferrous    sulfate 325 milliGRAM(s) Oral daily  finasteride 5 milliGRAM(s) Oral daily  furosemide    Tablet 40 milliGRAM(s) Oral daily  insulin lispro (HumaLOG) corrective regimen sliding scale   SubCutaneous Before meals and at bedtime  metoprolol tartrate 75 milliGRAM(s) Oral two times a day  montelukast 10 milliGRAM(s) Oral daily  pantoprazole    Tablet 40 milliGRAM(s) Oral before breakfast  rivaroxaban 20 milliGRAM(s) Oral every 24 hours  tamsulosin 0.8 milliGRAM(s) Oral at bedtime    MEDICATIONS  (PRN):      __________________________________________________  REVIEW OF SYSTEMS:    CONSTITUTIONAL: No fever,   EYES: no acute visual disturbances  NECK: No pain or stiffness  RESPIRATORY: No cough; No shortness of breath  CARDIOVASCULAR: No chest pain, no palpitations  GASTROINTESTINAL: No pain. No nausea or vomiting; No diarrhea   NEUROLOGICAL: No headache or numbness, no tremors  MUSCULOSKELETAL: No joint pain, no muscle pain  GENITOURINARY: no dysuria, no frequency, no hesitancy  PSYCHIATRY: no depression , no anxiety  ALL OTHER  ROS negative        Vital Signs Last 24 Hrs  T(C): 36.5 (31 May 2019 11:20), Max: 36.9 (31 May 2019 04:57)  T(F): 97.7 (31 May 2019 11:20), Max: 98.5 (31 May 2019 07:10)  HR: 78 (31 May 2019 11:20) (76 - 84)  BP: 149/57 (31 May 2019 11:20) (149/57 - 152/66)  BP(mean): --  RR: 18 (31 May 2019 11:20) (17 - 18)  SpO2: 97% (31 May 2019 11:20) (96% - 99%)    ________________________________________________  PHYSICAL EXAM:  GENERAL: NAD  HEENT: Normocephalic;  conjunctivae and sclerae clear; moist mucous membranes;   NECK : supple  CHEST/LUNG: Clear to auscultation bilaterally with good air entry   HEART: S1 S2  regular; no murmurs, gallops or rubs  ABDOMEN: Soft, Nontender, Nondistended; Bowel sounds present  EXTREMITIES: no cyanosis; no edema; no calf tenderness  SKIN: warm and dry; no rash  NERVOUS SYSTEM:  Awake and alert; Oriented  to place, person and time ; no new deficits    _________________________________________________  LABS:                        7.9    12.67 )-----------( 292      ( 31 May 2019 06:26 )             27.6     05-31    142  |  108  |  38<H>  ----------------------------<  85  3.6   |  27  |  1.13    Ca    8.1<L>      31 May 2019 06:26  Phos  3.3     05-31  Mg     2.2     05-31          CAPILLARY BLOOD GLUCOSE      POCT Blood Glucose.: 117 mg/dL (31 May 2019 11:43)  POCT Blood Glucose.: 160 mg/dL (31 May 2019 08:07)  POCT Blood Glucose.: 110 mg/dL (30 May 2019 21:25)  POCT Blood Glucose.: 129 mg/dL (30 May 2019 16:37)        RADIOLOGY & ADDITIONAL TESTS:    Imaging Personally Reviewed:  YES    Consultant(s) Notes Reviewed:   YES    Care Discussed with Consultants : YES    Plan of care was discussed with patient and /or primary care giver; all questions and concerns were addressed and care was aligned with patient's wishes.

## 2019-05-31 NOTE — DISCHARGE NOTE PROVIDER - CARE PROVIDERS DIRECT ADDRESSES
,DirectAddress_Unknown,DirectAddress_Unknown,DirectAddress_Unknown,DirectAddress_Unknown,DirectAddress_Unknown,[email or web address]

## 2019-05-31 NOTE — DIETITIAN INITIAL EVALUATION ADULT. - PERTINENT MEDS FT
reviewed   MEDICATIONS  (STANDING):  ALBUTerol/ipratropium for Nebulization 3 milliLiter(s) Nebulizer every 6 hours  aspirin  chewable 81 milliGRAM(s) Oral daily  atorvastatin 40 milliGRAM(s) Oral at bedtime  buDESOnide 160 MICROgram(s)/formoterol 4.5 MICROgram(s) Inhaler 2 Puff(s) Inhalation two times a day  dextrose 5% + sodium chloride 0.9%. 1000 milliLiter(s) (60 mL/Hr) IV Continuous <Continuous>  digoxin     Tablet 0.125 milliGRAM(s) Oral daily  ergocalciferol 33395 Unit(s) Oral every week  ferrous    sulfate 325 milliGRAM(s) Oral daily  finasteride 5 milliGRAM(s) Oral daily  furosemide    Tablet 40 milliGRAM(s) Oral daily  insulin lispro (HumaLOG) corrective regimen sliding scale   SubCutaneous Before meals and at bedtime  metoprolol tartrate 75 milliGRAM(s) Oral two times a day  montelukast 10 milliGRAM(s) Oral daily  pantoprazole    Tablet 40 milliGRAM(s) Oral before breakfast  rivaroxaban 20 milliGRAM(s) Oral every 24 hours  tamsulosin 0.8 milliGRAM(s) Oral at bedtime    MEDICATIONS  (PRN):

## 2019-05-31 NOTE — DIETITIAN INITIAL EVALUATION ADULT. - OTHER INFO
nutrition assessment for length of stay; lives home with family; skin intact; recent admission noted; appetite good, tolerating 100% of meals at times reported, but varied depending on food served, prefers food from home;  no GI distress, chewing or swallowing problem reported at present; wt data from [facility] reviewed: 153.8 lb 4/18/19-->169.5 lb 5/31/19, ? wt changes, may partly due to scale variance or fluid shift

## 2019-05-31 NOTE — PROGRESS NOTE ADULT - PROBLEM SELECTOR PLAN 8
Improve score 2  Xarelto for afib
as above
as above
Improve score 2  Xarelto for afib
C/w ASA and Lipitor
C/w ASA and Lipitor
Improve score 2  Xarelto for afib
as above
as above
Improve score 2  Xarelto for afib
C/w ASA and Lipitor

## 2019-05-31 NOTE — PROGRESS NOTE ADULT - PROBLEM SELECTOR PROBLEM 8
Acute on chronic systolic CHF (congestive heart failure)
Need for prophylactic measure
Acute on chronic systolic CHF (congestive heart failure)
Need for prophylactic measure
Acute on chronic systolic CHF (congestive heart failure)
Acute on chronic systolic CHF (congestive heart failure)
CAD (coronary artery disease)
CAD (coronary artery disease)
Need for prophylactic measure
CAD (coronary artery disease)

## 2019-05-31 NOTE — PROGRESS NOTE ADULT - PROVIDER SPECIALTY LIST ADULT
Cardiology
Heme/Onc
Internal Medicine
Pulmonology
Vascular Surgery
Vascular Surgery
Internal Medicine

## 2019-05-31 NOTE — PROGRESS NOTE ADULT - I WAS PHYSICALLY PRESENT FOR THE KEY PORTIONS OF THE EVALUATION AND MANAGEMENT (E/M) SERVICE PROVIDED.  I AGREE WITH THE ABOVE HISTORY, PHYSICAL, AND PLAN WHICH I HAVE REVIEWED AND EDITED WHERE APPROPRIATE
Statement Selected
stated
Statement Selected

## 2019-05-31 NOTE — DISCHARGE NOTE PROVIDER - HOSPITAL COURSE
HPI:    84 Male with PMH of Asthma, CVA (2015), BPH, Non-obstructive CAD, Afib (Xarelto), Bilateral carotid stenosis, Moderate AS, Colitis came to hospital for shortness of breath and weakness for 2 weeks. Symptoms were associated with poor oral intake and more frequent use of home nebulizers. Pt was last admitted in [facility] for shortness of breath and had stress test which showed reversible changes with normal EF. On cardiac cath, his EF was 35% on radionuclide angiography with no obstructive disease. Currently he can not walk more than 2 blocks due to dyspnea and uses 1 pillow at night.         SH: Lives with daughter. Uses cane. No smoking and alcohol.        ED Course: Hemodynamically stable. Labs showed anemia of 8.2 and cardiac enzymes of 0.057. BNP was 7500 with mild bilateral congested CXR. EKG showed chronic AFib @ 90 bpm. Pt was given 80 IV lasix. (23 May 2019 21:05)        Pt was admitted for chf exacerbation pt was seen by Dr. Pham  had echocardiogram and was monitored on tele.     Afib meds were adjusted     Anemia pt was seen by Dr. Coronel pt was offered colonoscopy but pt refused pt had upper gi series which was unremarkable.     pt was given iv venofer x4 during the hospitalization     Pt had ct neck with iv contrast which showed 40% carotid stenosis on right side    TRINI likely form iv lasix, which was resolved with iv fluids    ashtma pt was treated with solumedrol and steroid.

## 2019-05-31 NOTE — DISCHARGE NOTE PROVIDER - CARE PROVIDER_API CALL
Yg Paul)  Urology  [address] [facility]nd [place] [address]  Phone: [phone]  Fax: [phone]  Follow Up Time:     Bo Fitzgerald)  Internal Medicine  [address] [place] [address]  Phone: [phone]  Fax: [phone]  Follow Up Time:     Ramón Vazquez)  Cardiology Medicine  [address] [place] [address]  Phone: [phone]  Fax: [phone]  Follow Up Time:     Ky Mace)  Internal Medicine; Medical Oncology  [address] [facility]rd [place] [address]  Phone: [phone]  Fax: [phone]  Follow Up Time:     Edy Carmona)  Medicine  Dept Director  [address] [place] [address]  Phone: [phone]  Fax: [phone]  Follow Up Time:     Liu Chapa)  Vascular Surgery  [address] [facility], [address] [place] [address]  Phone: [phone]  Fax: [phone]  Follow Up Time:

## 2019-05-31 NOTE — PROGRESS NOTE ADULT - PROBLEM SELECTOR PLAN 7
C/w ASA and Lipitor
Stress test positive in April and cath showed no obstructive disease  C/w ASA and Lipitor
C/w ASA and Lipitor
Stress test positive in April and cath showed no obstructive disease  C/w ASA and Lipitor
C/w ASA and Lipitor
C/w Flomax and Proscar
C/w Flomax and Proscar
Improve score 2  Xarelto for afib
Improve score 2  Xarelto for afib
Stress test positive in April and cath showed no obstructive disease  C/w ASA and Lipitor
C/w Flomax and Proscar

## 2019-05-31 NOTE — PROGRESS NOTE ADULT - SUBJECTIVE AND OBJECTIVE BOX
Time of Visit:  Patient seen and examined.     MEDICATIONS  (STANDING):  ALBUTerol/ipratropium for Nebulization 3 milliLiter(s) Nebulizer every 6 hours  aspirin  chewable 81 milliGRAM(s) Oral daily  atorvastatin 40 milliGRAM(s) Oral at bedtime  buDESOnide 160 MICROgram(s)/formoterol 4.5 MICROgram(s) Inhaler 2 Puff(s) Inhalation two times a day  dextrose 5% + sodium chloride 0.9%. 1000 milliLiter(s) (60 mL/Hr) IV Continuous <Continuous>  digoxin     Tablet 0.125 milliGRAM(s) Oral daily  ergocalciferol 25148 Unit(s) Oral every week  ferrous    sulfate 325 milliGRAM(s) Oral daily  finasteride 5 milliGRAM(s) Oral daily  furosemide    Tablet 40 milliGRAM(s) Oral daily  insulin lispro (HumaLOG) corrective regimen sliding scale   SubCutaneous Before meals and at bedtime  metoprolol tartrate 75 milliGRAM(s) Oral two times a day  montelukast 10 milliGRAM(s) Oral daily  pantoprazole    Tablet 40 milliGRAM(s) Oral before breakfast  rivaroxaban 20 milliGRAM(s) Oral every 24 hours  tamsulosin 0.8 milliGRAM(s) Oral at bedtime      MEDICATIONS  (PRN):       Medications up to date at time of exam.    ROS; No fever, chills, SOB, cough, congestion.   PHYSICAL EXAMINATION:      Vital Signs Last 24 Hrs  T(C): 36.5 (31 May 2019 11:20), Max: 37.7 (30 May 2019 16:14)  T(F): 97.7 (31 May 2019 11:20), Max: 99.9 (30 May 2019 16:14)  HR: 78 (31 May 2019 11:20) (76 - 124)  BP: 149/57 (31 May 2019 11:20) (149/57 - 194/81)  BP(mean): --  RR: 18 (31 May 2019 11:20) (17 - 18)  SpO2: 97% (31 May 2019 11:20) (95% - 100%)   (if applicable)    General; Alert and oriented. No acute distress.     HEENT: Head is normocephalic and atraumatic. No nasal tenderness. Extraocular muscles are intact. Moist mucosa.     NECK: Supple, no palpable adenopathy.    LUNGS: Clear to auscultation B/L. No wheezing, rales, or rhonchi. No use of accessory muscle.     HEART: S1 S2 Regular rate and no click/ rub.     ABDOMEN: Soft, nontender, and nondistended.  No abdominal guarding. Active bowel sounds.     EXTREMITIES: Without any cyanosis, clubbing, rash, lesions or edema.    NEUROLOGIC: Awake, alert, oriented.     SKIN: Warm and moist. Non diaphoretic.       LABS:                        7.9    12.67 )-----------( 292      ( 31 May 2019 06:26 )             27.6     05-31    142  |  108  |  38<H>  ----------------------------<  85  3.6   |  27  |  1.13    Ca    8.1<L>      31 May 2019 06:26  Phos  3.3     05-31  Mg     2.2     05-31            CARDIAC MARKERS ( 30 May 2019 19:33 )  0.040 ng/mL / x     / x     / x     / x      CARDIAC MARKERS ( 30 May 2019 09:33 )  x     / x     / 42 U/L / x     / 1.0 ng/mL  CARDIAC MARKERS ( 30 May 2019 09:32 )  0.032 ng/mL / x     / x     / x     / x          RADIOLOGY & ADDITIONAL STUDIES:  EKG:   CXR: < from: Xray Chest 1 View- PORTABLE-Urgent (05.30.19 @ 06:57) >  PROCEDURE DATE:  05/30/2019          INTERPRETATION:  CLINICAL INDICATION: 84 years  Male with r/o pulm edema.    COMPARISON: 5/23/2019    AP view of the chest demonstrates the right lung to be clear. There is a   questionable retrocardiac infiltrate or atelectasis. There is no pleural   effusion. There is no pneumothorax.    The heart is moderately enlarged. The aorta is tortuous and   atherosclerotic.. There is no mediastinal or hilar mass.     The pulmonary vasculature is normal.     Mild thoracic degenerative changes are present.    IMPRESSION:    Questionable retrocardiac infiltrate versus atelectasis. Recommend PA and   lateral views.    Cardiomegaly.        IMPRESSION: 84y Male PAST MEDICAL & SURGICAL HISTORY:  CAD (coronary artery disease)  Afib  Leukocytoclastic vasculitis  Hypercholesterolemia  HTN (hypertension)  CVA (cerebral vascular accident): 2015  Asthma  H/O sinus surgery     Impression; 85 Y/O Male with multiple chronic conditions. Presented with shortness of breath and weakness for 2 weeks. Patient was last admitted in [facility] for shortness of breath and had stress test which showed reversible changes with normal EF. On cardiac cath, his EF was 35% on radionuclide angiography with no obstructive disease. Currently he can not walk more than 2 blocks due to dyspnea and uses 1 pillow at night. SOB secondary to Pulmonary Edema. Has Asthma but no exacerbation on exam. Asthma stable.       Suggestion;  O2 saturation 97% room air.  Continue DuoNeb Q 6 hours.  Continue Budesonide Twice Daily.   Continue Singulair Daily.  DVT/ GI prophylactic.   On Rivaroxaban 20 mg Daily.   Aspiration precautions. Time of Visit:  Patient seen and examined.     MEDICATIONS  (STANDING):  ALBUTerol/ipratropium for Nebulization 3 milliLiter(s) Nebulizer every 6 hours  aspirin  chewable 81 milliGRAM(s) Oral daily  atorvastatin 40 milliGRAM(s) Oral at bedtime  buDESOnide 160 MICROgram(s)/formoterol 4.5 MICROgram(s) Inhaler 2 Puff(s) Inhalation two times a day  dextrose 5% + sodium chloride 0.9%. 1000 milliLiter(s) (60 mL/Hr) IV Continuous <Continuous>  digoxin     Tablet 0.125 milliGRAM(s) Oral daily  ergocalciferol 69995 Unit(s) Oral every week  ferrous    sulfate 325 milliGRAM(s) Oral daily  finasteride 5 milliGRAM(s) Oral daily  furosemide    Tablet 40 milliGRAM(s) Oral daily  insulin lispro (HumaLOG) corrective regimen sliding scale   SubCutaneous Before meals and at bedtime  metoprolol tartrate 75 milliGRAM(s) Oral two times a day  montelukast 10 milliGRAM(s) Oral daily  pantoprazole    Tablet 40 milliGRAM(s) Oral before breakfast  rivaroxaban 20 milliGRAM(s) Oral every 24 hours  tamsulosin 0.8 milliGRAM(s) Oral at bedtime      MEDICATIONS  (PRN):       Medications up to date at time of exam.    ROS; No fever, chills, SOB, cough, congestion.   PHYSICAL EXAMINATION:      Vital Signs Last 24 Hrs  T(C): 36.5 (31 May 2019 11:20), Max: 37.7 (30 May 2019 16:14)  T(F): 97.7 (31 May 2019 11:20), Max: 99.9 (30 May 2019 16:14)  HR: 78 (31 May 2019 11:20) (76 - 124)  BP: 149/57 (31 May 2019 11:20) (149/57 - 194/81)  BP(mean): --  RR: 18 (31 May 2019 11:20) (17 - 18)  SpO2: 97% (31 May 2019 11:20) (95% - 100%)   (if applicable)    General; Alert and oriented. No acute distress.     HEENT: Head is normocephalic and atraumatic. No nasal tenderness. Extraocular muscles are intact. Moist mucosa.     NECK: Supple, no palpable adenopathy.    LUNGS: Clear to auscultation B/L. No wheezing, rales, or rhonchi. No use of accessory muscle.     HEART: S1 S2 Regular rate and no click/ rub.     ABDOMEN: Soft, nontender, and nondistended.  No abdominal guarding. Active bowel sounds.     EXTREMITIES: Without any cyanosis, clubbing, rash, lesions or edema.    NEUROLOGIC: Awake, alert, oriented.     SKIN: Warm and moist. Non diaphoretic.       LABS:                        7.9    12.67 )-----------( 292      ( 31 May 2019 06:26 )             27.6     05-31    142  |  108  |  38<H>  ----------------------------<  85  3.6   |  27  |  1.13    Ca    8.1<L>      31 May 2019 06:26  Phos  3.3     05-31  Mg     2.2     05-31            CARDIAC MARKERS ( 30 May 2019 19:33 )  0.040 ng/mL / x     / x     / x     / x      CARDIAC MARKERS ( 30 May 2019 09:33 )  x     / x     / 42 U/L / x     / 1.0 ng/mL  CARDIAC MARKERS ( 30 May 2019 09:32 )  0.032 ng/mL / x     / x     / x     / x          RADIOLOGY & ADDITIONAL STUDIES:  EKG:   CXR: < from: Xray Chest 1 View- PORTABLE-Urgent (05.30.19 @ 06:57) >  PROCEDURE DATE:  05/30/2019          INTERPRETATION:  CLINICAL INDICATION: 84 years  Male with r/o pulm edema.    COMPARISON: 5/23/2019    AP view of the chest demonstrates the right lung to be clear. There is a   questionable retrocardiac infiltrate or atelectasis. There is no pleural   effusion. There is no pneumothorax.    The heart is moderately enlarged. The aorta is tortuous and   atherosclerotic.. There is no mediastinal or hilar mass.     The pulmonary vasculature is normal.     Mild thoracic degenerative changes are present.    IMPRESSION:    Questionable retrocardiac infiltrate versus atelectasis. Recommend PA and   lateral views.    Cardiomegaly.        IMPRESSION: 84y Male PAST MEDICAL & SURGICAL HISTORY:  CAD (coronary artery disease)  Afib  Leukocytoclastic vasculitis  Hypercholesterolemia  HTN (hypertension)  CVA (cerebral vascular accident): 2015  Asthma  H/O sinus surgery     Impression; 83 Y/O Male with multiple chronic conditions. Presented with shortness of breath and weakness for 2 weeks. Patient was last admitted in [facility] for shortness of breath and had stress test which showed reversible changes with normal EF. On cardiac cath, his EF was 35% on radionuclide angiography with no obstructive disease. Currently he can not walk more than 2 blocks due to dyspnea and uses 1 pillow at night. SOB secondary to Pulmonary Edema. Has Asthma but no exacerbation on exam. Asthma stable.       Suggestion;  O2 saturation 97% room air.  Continue DuoNeb Q 6 hours.  Continue Budesonide Twice Daily.   Continue Singulair Daily.  DVT/ GI prophylactic.   On Rivaroxaban 20 mg Daily.   Aspiration precautions.       Agree with above assessment and plan as transcribed.

## 2019-05-31 NOTE — PROGRESS NOTE ADULT - PROBLEM SELECTOR PLAN 2
vascular consulted  ct a noted
Acute exacerbation  Bilateral wheezing and shortness of breath  Peak flow was 100 in ED with poor study  Started Solumedrol 40 q12 and bronchodilators   Monitor peak flow daily  Consulted Dr Carmona
Acute exacerbation  cardiac asthma exacerbation  iv lasix, steroids, taper accordingly, ihss  all consultants management appreciated
Left ICA occlusion, mild right sided PAD  vasc sx on board, likely no surgical intervention for left side and only medication management. Will f/u recs of consultants and adjust management accordingly.
improving  F/u BMP for renal function. Adjust rx as needed   cardiac meds  tele  monitor closely
progressing well  c/w cardiac meds, will plan to restart lasix post imaging  otherwise c/w cardiac meds and adjust per consultants
vascular consulted  ct a noted
vascular consulted  recc ct a head and neck
vascular consulted  recc ct a head and neck  will do tomorrow if zehra resolves
improved  no additional complaints  c/w cardiac meds  discharge pending cards clearance
vascular consulted  ct a noted
vascular consulted  recc ct a head and neck  will do tomorrow if zehra resolves
improving  Adjust rx as needed   cardiac meds  tele  monitor closely
Acute exacerbation  improving  tapering steroids, ihss  all consultants management appreciated

## 2019-05-31 NOTE — PROGRESS NOTE ADULT - PROBLEM SELECTOR PLAN 5
Rate control with Digoxin  Anticoagulation with Xarelto  EKG: Rate controlled @ 90 bpm
c/w cardiac meds
c/w cardiac meds
Rate control with Digoxin  Anticoagulation with Xarelto  EKG: Rate controlled @ 90 bpm
Rate control with Digoxin  Anticoagulation with Xarelto  EKG: Rate controlled @ 90 bpm
C/w Flomax and Proscar
C/w Flomax and Proscar
Rate control with Digoxin  Anticoagulation with Xarelto  EKG: Rate controlled @ 90 bpm
c/w cardiac meds
gi on board  upper gi series  ?virtual colonoscopy  ferrous sulfate
improved
improving  c/w steroids with tapering, nebs

## 2019-05-31 NOTE — PROGRESS NOTE ADULT - PROBLEM SELECTOR PROBLEM 4
Anemia
Afib
Afib
Anemia
Asthma exacerbation
TRIIN (acute kidney injury)
TRINI (acute kidney injury)
Acute on chronic systolic CHF (congestive heart failure)
Anemia
TRIIN (acute kidney injury)

## 2019-05-31 NOTE — PROGRESS NOTE ADULT - REASON FOR ADMISSION

## 2019-05-31 NOTE — PROGRESS NOTE ADULT - PROBLEM SELECTOR PLAN 1
gi on board, management per gi.   upper gi series pending  ferrous sulfate  d/c planning pending gi/cards clearance

## 2019-05-31 NOTE — PROGRESS NOTE ADULT - SUBJECTIVE AND OBJECTIVE BOX
Patient seen and examined at bedside  No acute events noted overnight  Case discussed with medical team    HPI:  84 Male with PMH of Asthma, CVA (2015), BPH, Non-obstructive CAD, Afib (Xarelto), Bilateral carotid stenosis, Moderate AS, Colitis came to hospital for shortness of breath and weakness for 2 weeks. Symptoms were associated with poor oral intake and more frequent use of home nebulizers. Pt was last admitted in [facility] for shortness of breath and had stress test which showed reversible changes with normal EF. On cardiac cath, his EF was 35% on radionuclide angiography with no obstructive disease. Currently he can not walk more than 2 blocks due to dyspnea and uses 1 pillow at night.     SH: Lives with daughter. Uses cane. No smoking and alcohol.    ED Course: Hemodynamically stable. Labs showed anemia of 8.2 and cardiac enzymes of 0.057. BNP was 7500 with mild bilateral congested CXR. EKG showed chronic AFib @ 90 bpm. Pt was given 80 IV lasix. (23 May 2019 21:05)      PAST MEDICAL & SURGICAL HISTORY:  CAD (coronary artery disease)  Afib  Leukocytoclastic vasculitis  Hypercholesterolemia  HTN (hypertension)  CVA (cerebral vascular accident): 2015  Asthma  H/O sinus surgery      No Known Allergies       MEDICATIONS  (STANDING):  ALBUTerol/ipratropium for Nebulization 3 milliLiter(s) Nebulizer every 6 hours  aspirin  chewable 81 milliGRAM(s) Oral daily  atorvastatin 40 milliGRAM(s) Oral at bedtime  buDESOnide 160 MICROgram(s)/formoterol 4.5 MICROgram(s) Inhaler 2 Puff(s) Inhalation two times a day  dextrose 5% + sodium chloride 0.9%. 1000 milliLiter(s) (60 mL/Hr) IV Continuous <Continuous>  digoxin     Tablet 0.125 milliGRAM(s) Oral daily  ergocalciferol 72749 Unit(s) Oral every week  ferrous    sulfate 325 milliGRAM(s) Oral daily  finasteride 5 milliGRAM(s) Oral daily  furosemide    Tablet 40 milliGRAM(s) Oral daily  insulin lispro (HumaLOG) corrective regimen sliding scale   SubCutaneous Before meals and at bedtime  metoprolol tartrate 75 milliGRAM(s) Oral two times a day  montelukast 10 milliGRAM(s) Oral daily  pantoprazole    Tablet 40 milliGRAM(s) Oral before breakfast  rivaroxaban 20 milliGRAM(s) Oral every 24 hours  tamsulosin 0.8 milliGRAM(s) Oral at bedtime    MEDICATIONS  (PRN):      REVIEW OF SYSTEMS:  CONSTITUTIONAL: (+) malaise.   EYES: No acute change in vision   ENT:  No tinnitus  NECK: No stiffness  RESPIRATORY: No hemoptysis  CARDIOVASCULAR: No chest pain, palpitations, syncope  GASTROINTESTINAL: No hematemesis, diarrhea, melena, or hematochezia.  GENITOURINARY: No hematuria  NEUROLOGICAL: No headaches  LYMPH Nodes: No enlarged glands  ENDOCRINE: No heat or cold intolerance	    T(C): 36.9 (05-31-19 @ 07:10), Max: 37.7 (05-30-19 @ 16:14)  HR: 82 (05-31-19 @ 08:52) (76 - 124)  BP: 150/60 (05-31-19 @ 07:10) (149/57 - 194/81)  RR: 17 (05-31-19 @ 07:10) (17 - 18)  SpO2: 97% (05-31-19 @ 08:52) (95% - 100%)    PHYSICAL EXAMINATION:   Constitutional: WD, NAD  HEENT: NC, AT  Neck:  Supple  Respiratory:  Adequate airflow b/l. Not using accessory muscles of respiration.  Cardiovascular:  S1 & S2 intact, no R/G, 2+ radial pulses b/l  Gastrointestinal: Soft, NT, ND, normoactive b.s., no organomegaly/RT/rigidity  Extremities: WWP  Neurological:  Alert and awake.  No acute focal motor deficits. Crude sensation intact.     Labs and imaging reviewed    LABS:                        7.9    12.67 )-----------( 292      ( 31 May 2019 06:26 )             27.6     05-31    142  |  108  |  38<H>  ----------------------------<  85  3.6   |  27  |  1.13    Ca    8.1<L>      31 May 2019 06:26  Phos  3.3     05-31  Mg     2.2     05-31      CARDIAC MARKERS ( 30 May 2019 19:33 )  0.040 ng/mL / x     / x     / x     / x      CARDIAC MARKERS ( 30 May 2019 09:33 )  x     / x     / 42 U/L / x     / 1.0 ng/mL  CARDIAC MARKERS ( 30 May 2019 09:32 )  0.032 ng/mL / x     / x     / x     / x              CAPILLARY BLOOD GLUCOSE      POCT Blood Glucose.: 160 mg/dL (31 May 2019 08:07)  POCT Blood Glucose.: 110 mg/dL (30 May 2019 21:25)  POCT Blood Glucose.: 129 mg/dL (30 May 2019 16:37)  POCT Blood Glucose.: 132 mg/dL (30 May 2019 12:07)              RADIOLOGY & ADDITIONAL STUDIES:

## 2019-05-31 NOTE — PROGRESS NOTE ADULT - PROBLEM SELECTOR PROBLEM 3
Asthma
Acute on chronic systolic CHF (congestive heart failure)
Anemia
Anemia
Asthma
Asthma exacerbation
Carotid artery stenosis
Asthma
TRINI (acute kidney injury)

## 2019-05-31 NOTE — PROGRESS NOTE ADULT - PROBLEM SELECTOR PROBLEM 5
Afib
Anemia
BPH (benign prostatic hyperplasia)
BPH (benign prostatic hyperplasia)
TRINI (acute kidney injury)
Asthma exacerbation
Afib

## 2019-05-31 NOTE — PROGRESS NOTE ADULT - PROBLEM SELECTOR PLAN 6
C/w Flomax and Proscar
C/w ASA and Lipitor
C/w Flomax and Proscar
Stress test positive in April and cath showed no obstructive disease  C/w ASA and Lipitor
c/w cardiac meds
gi on board, management per gi.   ferrous sulfate
C/w Flomax and Proscar
improved

## 2019-05-31 NOTE — PROGRESS NOTE ADULT - PROBLEM SELECTOR PROBLEM 7
CAD (coronary artery disease)
BPH (benign prostatic hyperplasia)
BPH (benign prostatic hyperplasia)
CAD (coronary artery disease)
Need for prophylactic measure
Need for prophylactic measure
CAD (coronary artery disease)
BPH (benign prostatic hyperplasia)

## 2019-05-31 NOTE — PROGRESS NOTE ADULT - PROBLEM SELECTOR PLAN 4
Iron deficiency anemia   Baseline Hb of 11  Presented with microcytic anemia of 7.7  FOBT negative  Pt was advised to get outpatient colonoscopy last month which he has not been able to get  microcytic anemia  c/w venofer  upper gi series did not show ulcer or mucosal lesion   d/w radiologist [facility] does not do virtual colonoscopy  d/w Dr. Coronel rec out pt colonoscopy

## 2019-05-31 NOTE — PROGRESS NOTE ADULT - PROBLEM SELECTOR PROBLEM 6
BPH (benign prostatic hyperplasia)
Afib
Anemia
BPH (benign prostatic hyperplasia)
CAD (coronary artery disease)
CAD (coronary artery disease)
BPH (benign prostatic hyperplasia)
TRINI (acute kidney injury)

## 2019-05-31 NOTE — PROGRESS NOTE ADULT - PROBLEM SELECTOR PROBLEM 2
Carotid artery stenosis
Acute on chronic systolic CHF (congestive heart failure)
Asthma
Asthma
CHF (congestive heart failure)
Carotid artery stenosis
Atrial fibrillation with RVR
Carotid artery stenosis
Carotid artery stenosis
CHF (congestive heart failure)
Asthma

## 2019-06-02 ENCOUNTER — INPATIENT (INPATIENT)
Facility: HOSPITAL | Age: 84
LOS: 0 days | DRG: 871 | End: 2019-06-03
Attending: SURGERY | Admitting: SURGERY
Payer: MEDICARE

## 2019-06-02 VITALS
TEMPERATURE: 100 F | DIASTOLIC BLOOD PRESSURE: 84 MMHG | HEART RATE: 149 BPM | RESPIRATION RATE: 30 BRPM | HEIGHT: 68 IN | OXYGEN SATURATION: 100 % | WEIGHT: 145.06 LBS | SYSTOLIC BLOOD PRESSURE: 139 MMHG

## 2019-06-02 DIAGNOSIS — Z98.890 OTHER SPECIFIED POSTPROCEDURAL STATES: Chronic | ICD-10-CM

## 2019-06-02 LAB
APTT BLD: 34.2 SEC — SIGNIFICANT CHANGE UP (ref 27.5–36.3)
INR BLD: 3.05 RATIO — HIGH (ref 0.88–1.16)
PROTHROM AB SERPL-ACNC: 35.1 SEC — HIGH (ref 10–12.9)

## 2019-06-02 RX ORDER — ETOMIDATE 2 MG/ML
20 INJECTION INTRAVENOUS ONCE
Refills: 0 | Status: COMPLETED | OUTPATIENT
Start: 2019-06-02 | End: 2019-06-02

## 2019-06-02 RX ORDER — SODIUM CHLORIDE 9 MG/ML
1000 INJECTION INTRAMUSCULAR; INTRAVENOUS; SUBCUTANEOUS ONCE
Refills: 0 | Status: COMPLETED | OUTPATIENT
Start: 2019-06-02 | End: 2019-06-02

## 2019-06-02 RX ORDER — SUCCINYLCHOLINE CHLORIDE 100 MG/5ML
100 SYRINGE (ML) INTRAVENOUS ONCE
Refills: 0 | Status: COMPLETED | OUTPATIENT
Start: 2019-06-02 | End: 2019-06-02

## 2019-06-02 RX ADMIN — ETOMIDATE 20 MILLIGRAM(S): 2 INJECTION INTRAVENOUS at 23:30

## 2019-06-02 RX ADMIN — Medication 100 MILLIGRAM(S): at 23:30

## 2019-06-02 NOTE — ED ADULT TRIAGE NOTE - CHIEF COMPLAINT QUOTE
BIBA as a notification, pt in rapid a-fib, w/ decreased mental status, family states pt had a syncopal episode 2 hours prior

## 2019-06-03 VITALS
HEART RATE: 65 BPM | RESPIRATION RATE: 14 BRPM | SYSTOLIC BLOOD PRESSURE: 50 MMHG | DIASTOLIC BLOOD PRESSURE: 36 MMHG | OXYGEN SATURATION: 87 %

## 2019-06-03 DIAGNOSIS — R41.89 OTHER SYMPTOMS AND SIGNS INVOLVING COGNITIVE FUNCTIONS AND AWARENESS: ICD-10-CM

## 2019-06-03 DIAGNOSIS — N17.9 ACUTE KIDNEY FAILURE, UNSPECIFIED: ICD-10-CM

## 2019-06-03 DIAGNOSIS — I65.29 OCCLUSION AND STENOSIS OF UNSPECIFIED CAROTID ARTERY: ICD-10-CM

## 2019-06-03 DIAGNOSIS — Z29.9 ENCOUNTER FOR PROPHYLACTIC MEASURES, UNSPECIFIED: ICD-10-CM

## 2019-06-03 DIAGNOSIS — R74.8 ABNORMAL LEVELS OF OTHER SERUM ENZYMES: ICD-10-CM

## 2019-06-03 DIAGNOSIS — A41.9 SEPSIS, UNSPECIFIED ORGANISM: ICD-10-CM

## 2019-06-03 DIAGNOSIS — I48.91 UNSPECIFIED ATRIAL FIBRILLATION: ICD-10-CM

## 2019-06-03 DIAGNOSIS — Z71.89 OTHER SPECIFIED COUNSELING: ICD-10-CM

## 2019-06-03 PROBLEM — I25.10 ATHEROSCLEROTIC HEART DISEASE OF NATIVE CORONARY ARTERY WITHOUT ANGINA PECTORIS: Chronic | Status: ACTIVE | Noted: 2019-05-27

## 2019-06-03 LAB
ALBUMIN SERPL ELPH-MCNC: 2.1 G/DL — LOW (ref 3.5–5)
ALBUMIN SERPL ELPH-MCNC: 2.8 G/DL — LOW (ref 3.5–5)
ALP SERPL-CCNC: 35 U/L — LOW (ref 40–120)
ALP SERPL-CCNC: 44 U/L — SIGNIFICANT CHANGE UP (ref 40–120)
ALT FLD-CCNC: 16 U/L DA — SIGNIFICANT CHANGE UP (ref 10–60)
ALT FLD-CCNC: 20 U/L DA — SIGNIFICANT CHANGE UP (ref 10–60)
ANION GAP SERPL CALC-SCNC: 12 MMOL/L — SIGNIFICANT CHANGE UP (ref 5–17)
ANION GAP SERPL CALC-SCNC: 18 MMOL/L — HIGH (ref 5–17)
ANION GAP SERPL CALC-SCNC: 22 MMOL/L — HIGH (ref 5–17)
APPEARANCE UR: CLEAR — SIGNIFICANT CHANGE UP
AST SERPL-CCNC: 13 U/L — SIGNIFICANT CHANGE UP (ref 10–40)
AST SERPL-CCNC: 24 U/L — SIGNIFICANT CHANGE UP (ref 10–40)
BASE EXCESS BLDA CALC-SCNC: -10.2 MMOL/L — LOW (ref -2–2)
BASE EXCESS BLDV CALC-SCNC: -3.6 MMOL/L — LOW (ref -2–2)
BASOPHILS # BLD AUTO: 0.02 K/UL — SIGNIFICANT CHANGE UP (ref 0–0.2)
BASOPHILS # BLD AUTO: 0.05 K/UL — SIGNIFICANT CHANGE UP (ref 0–0.2)
BASOPHILS NFR BLD AUTO: 0.1 % — SIGNIFICANT CHANGE UP (ref 0–2)
BASOPHILS NFR BLD AUTO: 0.2 % — SIGNIFICANT CHANGE UP (ref 0–2)
BILIRUB SERPL-MCNC: 0.6 MG/DL — SIGNIFICANT CHANGE UP (ref 0.2–1.2)
BILIRUB SERPL-MCNC: 0.8 MG/DL — SIGNIFICANT CHANGE UP (ref 0.2–1.2)
BILIRUB UR-MCNC: NEGATIVE — SIGNIFICANT CHANGE UP
BLOOD GAS COMMENTS ARTERIAL: SIGNIFICANT CHANGE UP
BUN SERPL-MCNC: 28 MG/DL — HIGH (ref 7–18)
BUN SERPL-MCNC: 32 MG/DL — HIGH (ref 7–18)
BUN SERPL-MCNC: 33 MG/DL — HIGH (ref 7–18)
CALCIUM SERPL-MCNC: 10 MG/DL — SIGNIFICANT CHANGE UP (ref 8.4–10.5)
CALCIUM SERPL-MCNC: 6.4 MG/DL — CRITICAL LOW (ref 8.4–10.5)
CALCIUM SERPL-MCNC: 8 MG/DL — LOW (ref 8.4–10.5)
CHLORIDE SERPL-SCNC: 101 MMOL/L — SIGNIFICANT CHANGE UP (ref 96–108)
CHLORIDE SERPL-SCNC: 109 MMOL/L — HIGH (ref 96–108)
CHLORIDE SERPL-SCNC: 110 MMOL/L — HIGH (ref 96–108)
CK MB BLD-MCNC: 4.4 % — HIGH (ref 0–3.5)
CK MB BLD-MCNC: 7.7 % — HIGH (ref 0–3.5)
CK MB CFR SERPL CALC: 3.7 NG/ML — HIGH (ref 0–3.6)
CK MB CFR SERPL CALC: 9.4 NG/ML — HIGH (ref 0–3.6)
CK SERPL-CCNC: 214 U/L — SIGNIFICANT CHANGE UP (ref 35–232)
CK SERPL-CCNC: 48 U/L — SIGNIFICANT CHANGE UP (ref 35–232)
CO2 SERPL-SCNC: 14 MMOL/L — LOW (ref 22–31)
CO2 SERPL-SCNC: 17 MMOL/L — LOW (ref 22–31)
CO2 SERPL-SCNC: 19 MMOL/L — LOW (ref 22–31)
COLOR SPEC: YELLOW — SIGNIFICANT CHANGE UP
CREAT ?TM UR-MCNC: 63 MG/DL — SIGNIFICANT CHANGE UP
CREAT SERPL-MCNC: 1.49 MG/DL — HIGH (ref 0.5–1.3)
CREAT SERPL-MCNC: 1.73 MG/DL — HIGH (ref 0.5–1.3)
CREAT SERPL-MCNC: 1.94 MG/DL — HIGH (ref 0.5–1.3)
DIFF PNL FLD: ABNORMAL
DIGOXIN SERPL-MCNC: 0.7 NG/ML — LOW (ref 0.8–2)
EOSINOPHIL # BLD AUTO: 0 K/UL — SIGNIFICANT CHANGE UP (ref 0–0.5)
EOSINOPHIL # BLD AUTO: 0.02 K/UL — SIGNIFICANT CHANGE UP (ref 0–0.5)
EOSINOPHIL NFR BLD AUTO: 0 % — SIGNIFICANT CHANGE UP (ref 0–6)
EOSINOPHIL NFR BLD AUTO: 0.1 % — SIGNIFICANT CHANGE UP (ref 0–6)
GLUCOSE SERPL-MCNC: 183 MG/DL — HIGH (ref 70–99)
GLUCOSE SERPL-MCNC: 198 MG/DL — HIGH (ref 70–99)
GLUCOSE SERPL-MCNC: 87 MG/DL — SIGNIFICANT CHANGE UP (ref 70–99)
GLUCOSE UR QL: 50 MG/DL
HCO3 BLDA-SCNC: 14 MMOL/L — LOW (ref 23–27)
HCO3 BLDV-SCNC: 22 MMOL/L — SIGNIFICANT CHANGE UP (ref 21–29)
HCT VFR BLD CALC: 19.3 % — CRITICAL LOW (ref 39–50)
HCT VFR BLD CALC: 20.7 % — CRITICAL LOW (ref 39–50)
HCT VFR BLD CALC: 28.2 % — LOW (ref 39–50)
HGB BLD-MCNC: 5.2 G/DL — CRITICAL LOW (ref 13–17)
HGB BLD-MCNC: 5.7 G/DL — CRITICAL LOW (ref 13–17)
HGB BLD-MCNC: 7.8 G/DL — LOW (ref 13–17)
HOROWITZ INDEX BLDA+IHG-RTO: 100 — SIGNIFICANT CHANGE UP
HOROWITZ INDEX BLDV+IHG-RTO: 50 — SIGNIFICANT CHANGE UP
IMM GRANULOCYTES NFR BLD AUTO: 1.5 % — SIGNIFICANT CHANGE UP (ref 0–1.5)
IMM GRANULOCYTES NFR BLD AUTO: 7.5 % — HIGH (ref 0–1.5)
INR BLD: 3.78 RATIO — HIGH (ref 0.88–1.16)
KETONES UR-MCNC: NEGATIVE — SIGNIFICANT CHANGE UP
LACTATE SERPL-SCNC: 11.5 MMOL/L — CRITICAL HIGH (ref 0.7–2)
LACTATE SERPL-SCNC: 15.7 MMOL/L — CRITICAL HIGH (ref 0.7–2)
LACTATE SERPL-SCNC: 5.5 MMOL/L — CRITICAL HIGH (ref 0.7–2)
LEUKOCYTE ESTERASE UR-ACNC: ABNORMAL
LYMPHOCYTES # BLD AUTO: 14 % — SIGNIFICANT CHANGE UP (ref 13–44)
LYMPHOCYTES # BLD AUTO: 17.3 % — SIGNIFICANT CHANGE UP (ref 13–44)
LYMPHOCYTES # BLD AUTO: 3.21 K/UL — SIGNIFICANT CHANGE UP (ref 1–3.3)
LYMPHOCYTES # BLD AUTO: 5.11 K/UL — HIGH (ref 1–3.3)
MAGNESIUM SERPL-MCNC: 2.1 MG/DL — SIGNIFICANT CHANGE UP (ref 1.6–2.6)
MAGNESIUM SERPL-MCNC: 2.6 MG/DL — SIGNIFICANT CHANGE UP (ref 1.6–2.6)
MCHC RBC-ENTMCNC: 21.3 PG — LOW (ref 27–34)
MCHC RBC-ENTMCNC: 21.4 PG — LOW (ref 27–34)
MCHC RBC-ENTMCNC: 21.9 PG — LOW (ref 27–34)
MCHC RBC-ENTMCNC: 26.9 GM/DL — LOW (ref 32–36)
MCHC RBC-ENTMCNC: 27.5 GM/DL — LOW (ref 32–36)
MCHC RBC-ENTMCNC: 27.7 GM/DL — LOW (ref 32–36)
MCV RBC AUTO: 77.2 FL — LOW (ref 80–100)
MCV RBC AUTO: 77.5 FL — LOW (ref 80–100)
MCV RBC AUTO: 81.4 FL — SIGNIFICANT CHANGE UP (ref 80–100)
MONOCYTES # BLD AUTO: 1.3 K/UL — HIGH (ref 0–0.9)
MONOCYTES # BLD AUTO: 1.43 K/UL — HIGH (ref 0–0.9)
MONOCYTES NFR BLD AUTO: 4.4 % — SIGNIFICANT CHANGE UP (ref 2–14)
MONOCYTES NFR BLD AUTO: 6.2 % — SIGNIFICANT CHANGE UP (ref 2–14)
NEUTROPHILS # BLD AUTO: 17.89 K/UL — HIGH (ref 1.8–7.4)
NEUTROPHILS # BLD AUTO: 20.93 K/UL — HIGH (ref 1.8–7.4)
NEUTROPHILS NFR BLD AUTO: 70.5 % — SIGNIFICANT CHANGE UP (ref 43–77)
NEUTROPHILS NFR BLD AUTO: 78.2 % — HIGH (ref 43–77)
NITRITE UR-MCNC: NEGATIVE — SIGNIFICANT CHANGE UP
NRBC # BLD: 0 /100 WBCS — SIGNIFICANT CHANGE UP (ref 0–0)
OSMOLALITY UR: 438 MOSM/KG — SIGNIFICANT CHANGE UP (ref 50–1200)
PCO2 BLDA: 27 MMHG — LOW (ref 32–46)
PCO2 BLDV: 43 MMHG — SIGNIFICANT CHANGE UP (ref 35–50)
PH BLDA: 7.34 — LOW (ref 7.35–7.45)
PH BLDV: 7.32 — LOW (ref 7.35–7.45)
PH UR: 6 — SIGNIFICANT CHANGE UP (ref 5–8)
PHOSPHATE SERPL-MCNC: 3.9 MG/DL — SIGNIFICANT CHANGE UP (ref 2.5–4.5)
PHOSPHATE SERPL-MCNC: 8.9 MG/DL — HIGH (ref 2.5–4.5)
PLATELET # BLD AUTO: 232 K/UL — SIGNIFICANT CHANGE UP (ref 150–400)
PLATELET # BLD AUTO: 264 K/UL — SIGNIFICANT CHANGE UP (ref 150–400)
PLATELET # BLD AUTO: 328 K/UL — SIGNIFICANT CHANGE UP (ref 150–400)
PO2 BLDA: 582 MMHG — HIGH (ref 74–108)
PO2 BLDV: 35 MMHG — SIGNIFICANT CHANGE UP (ref 25–45)
POTASSIUM SERPL-MCNC: 4.1 MMOL/L — SIGNIFICANT CHANGE UP (ref 3.5–5.3)
POTASSIUM SERPL-MCNC: 4.4 MMOL/L — SIGNIFICANT CHANGE UP (ref 3.5–5.3)
POTASSIUM SERPL-MCNC: 4.7 MMOL/L — SIGNIFICANT CHANGE UP (ref 3.5–5.3)
POTASSIUM SERPL-SCNC: 4.1 MMOL/L — SIGNIFICANT CHANGE UP (ref 3.5–5.3)
POTASSIUM SERPL-SCNC: 4.4 MMOL/L — SIGNIFICANT CHANGE UP (ref 3.5–5.3)
POTASSIUM SERPL-SCNC: 4.7 MMOL/L — SIGNIFICANT CHANGE UP (ref 3.5–5.3)
POTASSIUM UR-SCNC: 58 MMOL/L — SIGNIFICANT CHANGE UP (ref 25–125)
PROCALCITONIN SERPL-MCNC: 0.78 NG/ML — HIGH (ref 0.02–0.1)
PROT SERPL-MCNC: 4.8 G/DL — LOW (ref 6–8.3)
PROT SERPL-MCNC: 6.2 G/DL — SIGNIFICANT CHANGE UP (ref 6–8.3)
PROT UR-MCNC: 100
PROTHROM AB SERPL-ACNC: 43.7 SEC — HIGH (ref 10–12.9)
RBC # BLD: 2.37 M/UL — LOW (ref 4.2–5.8)
RBC # BLD: 2.68 M/UL — LOW (ref 4.2–5.8)
RBC # BLD: 3.64 M/UL — LOW (ref 4.2–5.8)
RBC # FLD: 25.3 % — HIGH (ref 10.3–14.5)
RBC # FLD: 25.7 % — HIGH (ref 10.3–14.5)
RBC # FLD: 26 % — HIGH (ref 10.3–14.5)
SAO2 % BLDA: 98 % — HIGH (ref 92–96)
SAO2 % BLDV: 56 % — LOW (ref 67–88)
SODIUM SERPL-SCNC: 136 MMOL/L — SIGNIFICANT CHANGE UP (ref 135–145)
SODIUM SERPL-SCNC: 141 MMOL/L — SIGNIFICANT CHANGE UP (ref 135–145)
SODIUM SERPL-SCNC: 145 MMOL/L — SIGNIFICANT CHANGE UP (ref 135–145)
SODIUM UR-SCNC: 54 MMOL/L — SIGNIFICANT CHANGE UP (ref 40–220)
SP GR SPEC: 1.02 — SIGNIFICANT CHANGE UP (ref 1.01–1.02)
TROPONIN I SERPL-MCNC: 0.15 NG/ML — HIGH (ref 0–0.04)
TROPONIN I SERPL-MCNC: 0.46 NG/ML — HIGH (ref 0–0.04)
TROPONIN I SERPL-MCNC: 0.71 NG/ML — HIGH (ref 0–0.04)
URATE UR-MCNC: 82.2 MG/DL — SIGNIFICANT CHANGE UP
UROBILINOGEN FLD QL: 1
UUN UR-MCNC: 450 MG/DL — SIGNIFICANT CHANGE UP
WBC # BLD: 19.18 K/UL — HIGH (ref 3.8–10.5)
WBC # BLD: 22.89 K/UL — HIGH (ref 3.8–10.5)
WBC # BLD: 29.62 K/UL — HIGH (ref 3.8–10.5)
WBC # FLD AUTO: 19.18 K/UL — HIGH (ref 3.8–10.5)
WBC # FLD AUTO: 22.89 K/UL — HIGH (ref 3.8–10.5)
WBC # FLD AUTO: 29.62 K/UL — HIGH (ref 3.8–10.5)

## 2019-06-03 PROCEDURE — 86901 BLOOD TYPING SEROLOGIC RH(D): CPT

## 2019-06-03 PROCEDURE — 99291 CRITICAL CARE FIRST HOUR: CPT

## 2019-06-03 PROCEDURE — 71045 X-RAY EXAM CHEST 1 VIEW: CPT

## 2019-06-03 PROCEDURE — 81001 URINALYSIS AUTO W/SCOPE: CPT

## 2019-06-03 PROCEDURE — 84484 ASSAY OF TROPONIN QUANT: CPT

## 2019-06-03 PROCEDURE — 71045 X-RAY EXAM CHEST 1 VIEW: CPT | Mod: 26,76

## 2019-06-03 PROCEDURE — 82553 CREATINE MB FRACTION: CPT

## 2019-06-03 PROCEDURE — 84540 ASSAY OF URINE/UREA-N: CPT

## 2019-06-03 PROCEDURE — 82550 ASSAY OF CK (CPK): CPT

## 2019-06-03 PROCEDURE — 82803 BLOOD GASES ANY COMBINATION: CPT

## 2019-06-03 PROCEDURE — 83935 ASSAY OF URINE OSMOLALITY: CPT

## 2019-06-03 PROCEDURE — 74176 CT ABD & PELVIS W/O CONTRAST: CPT | Mod: 26

## 2019-06-03 PROCEDURE — 70450 CT HEAD/BRAIN W/O DYE: CPT

## 2019-06-03 PROCEDURE — 86923 COMPATIBILITY TEST ELECTRIC: CPT

## 2019-06-03 PROCEDURE — 36415 COLL VENOUS BLD VENIPUNCTURE: CPT

## 2019-06-03 PROCEDURE — 86900 BLOOD TYPING SEROLOGIC ABO: CPT

## 2019-06-03 PROCEDURE — 85730 THROMBOPLASTIN TIME PARTIAL: CPT

## 2019-06-03 PROCEDURE — 85027 COMPLETE CBC AUTOMATED: CPT

## 2019-06-03 PROCEDURE — 99221 1ST HOSP IP/OBS SF/LOW 40: CPT

## 2019-06-03 PROCEDURE — 70450 CT HEAD/BRAIN W/O DYE: CPT | Mod: 26

## 2019-06-03 PROCEDURE — 96374 THER/PROPH/DIAG INJ IV PUSH: CPT

## 2019-06-03 PROCEDURE — 83605 ASSAY OF LACTIC ACID: CPT

## 2019-06-03 PROCEDURE — 84145 PROCALCITONIN (PCT): CPT

## 2019-06-03 PROCEDURE — 87040 BLOOD CULTURE FOR BACTERIA: CPT

## 2019-06-03 PROCEDURE — 74176 CT ABD & PELVIS W/O CONTRAST: CPT

## 2019-06-03 PROCEDURE — 96375 TX/PRO/DX INJ NEW DRUG ADDON: CPT

## 2019-06-03 PROCEDURE — 84300 ASSAY OF URINE SODIUM: CPT

## 2019-06-03 PROCEDURE — 99292 CRITICAL CARE ADDL 30 MIN: CPT

## 2019-06-03 PROCEDURE — 84133 ASSAY OF URINE POTASSIUM: CPT

## 2019-06-03 PROCEDURE — 99291 CRITICAL CARE FIRST HOUR: CPT | Mod: 25

## 2019-06-03 PROCEDURE — 86850 RBC ANTIBODY SCREEN: CPT

## 2019-06-03 PROCEDURE — 31500 INSERT EMERGENCY AIRWAY: CPT

## 2019-06-03 PROCEDURE — 85610 PROTHROMBIN TIME: CPT

## 2019-06-03 PROCEDURE — 80162 ASSAY OF DIGOXIN TOTAL: CPT

## 2019-06-03 PROCEDURE — 93005 ELECTROCARDIOGRAM TRACING: CPT

## 2019-06-03 PROCEDURE — 80053 COMPREHEN METABOLIC PANEL: CPT

## 2019-06-03 PROCEDURE — 82570 ASSAY OF URINE CREATININE: CPT

## 2019-06-03 PROCEDURE — 83735 ASSAY OF MAGNESIUM: CPT

## 2019-06-03 PROCEDURE — 84100 ASSAY OF PHOSPHORUS: CPT

## 2019-06-03 PROCEDURE — 80048 BASIC METABOLIC PNL TOTAL CA: CPT

## 2019-06-03 PROCEDURE — 82962 GLUCOSE BLOOD TEST: CPT

## 2019-06-03 PROCEDURE — 94002 VENT MGMT INPAT INIT DAY: CPT

## 2019-06-03 PROCEDURE — 84560 ASSAY OF URINE/URIC ACID: CPT

## 2019-06-03 RX ORDER — ASPIRIN/CALCIUM CARB/MAGNESIUM 324 MG
81 TABLET ORAL DAILY
Refills: 0 | Status: DISCONTINUED | OUTPATIENT
Start: 2019-06-03 | End: 2019-06-03

## 2019-06-03 RX ORDER — CEFEPIME 1 G/1
2000 INJECTION, POWDER, FOR SOLUTION INTRAMUSCULAR; INTRAVENOUS EVERY 12 HOURS
Refills: 0 | Status: DISCONTINUED | OUTPATIENT
Start: 2019-06-03 | End: 2019-06-03

## 2019-06-03 RX ORDER — PANTOPRAZOLE SODIUM 20 MG/1
40 TABLET, DELAYED RELEASE ORAL
Refills: 0 | Status: DISCONTINUED | OUTPATIENT
Start: 2019-06-03 | End: 2019-06-03

## 2019-06-03 RX ORDER — DIGOXIN 250 MCG
0.12 TABLET ORAL DAILY
Refills: 0 | Status: DISCONTINUED | OUTPATIENT
Start: 2019-06-03 | End: 2019-06-03

## 2019-06-03 RX ORDER — PROPOFOL 10 MG/ML
50 INJECTION, EMULSION INTRAVENOUS ONCE
Refills: 0 | Status: COMPLETED | OUTPATIENT
Start: 2019-06-03 | End: 2019-06-03

## 2019-06-03 RX ORDER — PIPERACILLIN AND TAZOBACTAM 4; .5 G/20ML; G/20ML
3.38 INJECTION, POWDER, LYOPHILIZED, FOR SOLUTION INTRAVENOUS ONCE
Refills: 0 | Status: COMPLETED | OUTPATIENT
Start: 2019-06-03 | End: 2019-06-03

## 2019-06-03 RX ORDER — SODIUM CHLORIDE 9 MG/ML
1000 INJECTION INTRAMUSCULAR; INTRAVENOUS; SUBCUTANEOUS ONCE
Refills: 0 | Status: COMPLETED | OUTPATIENT
Start: 2019-06-03 | End: 2019-06-03

## 2019-06-03 RX ORDER — RIVAROXABAN 15 MG-20MG
20 KIT ORAL EVERY 24 HOURS
Refills: 0 | Status: DISCONTINUED | OUTPATIENT
Start: 2019-06-03 | End: 2019-06-03

## 2019-06-03 RX ORDER — PIPERACILLIN AND TAZOBACTAM 4; .5 G/20ML; G/20ML
3.38 INJECTION, POWDER, LYOPHILIZED, FOR SOLUTION INTRAVENOUS EVERY 8 HOURS
Refills: 0 | Status: DISCONTINUED | OUTPATIENT
Start: 2019-06-03 | End: 2019-06-03

## 2019-06-03 RX ORDER — MONTELUKAST 4 MG/1
10 TABLET, CHEWABLE ORAL DAILY
Refills: 0 | Status: DISCONTINUED | OUTPATIENT
Start: 2019-06-03 | End: 2019-06-03

## 2019-06-03 RX ORDER — METOPROLOL TARTRATE 50 MG
5 TABLET ORAL ONCE
Refills: 0 | Status: COMPLETED | OUTPATIENT
Start: 2019-06-03 | End: 2019-06-03

## 2019-06-03 RX ORDER — PANTOPRAZOLE SODIUM 20 MG/1
80 TABLET, DELAYED RELEASE ORAL ONCE
Refills: 0 | Status: COMPLETED | OUTPATIENT
Start: 2019-06-03 | End: 2019-06-03

## 2019-06-03 RX ORDER — FENTANYL CITRATE 50 UG/ML
5 INJECTION INTRAVENOUS ONCE
Refills: 0 | Status: DISCONTINUED | OUTPATIENT
Start: 2019-06-03 | End: 2019-06-03

## 2019-06-03 RX ORDER — ERGOCALCIFEROL 1.25 MG/1
50000 CAPSULE ORAL
Refills: 0 | Status: DISCONTINUED | OUTPATIENT
Start: 2019-06-03 | End: 2019-06-03

## 2019-06-03 RX ORDER — FENTANYL CITRATE 50 UG/ML
0.5 INJECTION INTRAVENOUS
Qty: 2500 | Refills: 0 | Status: DISCONTINUED | OUTPATIENT
Start: 2019-06-03 | End: 2019-06-03

## 2019-06-03 RX ORDER — VANCOMYCIN HCL 1 G
1000 VIAL (EA) INTRAVENOUS ONCE
Refills: 0 | Status: COMPLETED | OUTPATIENT
Start: 2019-06-03 | End: 2019-06-03

## 2019-06-03 RX ORDER — METOPROLOL TARTRATE 50 MG
25 TABLET ORAL
Refills: 0 | Status: DISCONTINUED | OUTPATIENT
Start: 2019-06-03 | End: 2019-06-03

## 2019-06-03 RX ORDER — PANTOPRAZOLE SODIUM 20 MG/1
40 TABLET, DELAYED RELEASE ORAL DAILY
Refills: 0 | Status: DISCONTINUED | OUTPATIENT
Start: 2019-06-03 | End: 2019-06-03

## 2019-06-03 RX ORDER — ATORVASTATIN CALCIUM 80 MG/1
40 TABLET, FILM COATED ORAL AT BEDTIME
Refills: 0 | Status: DISCONTINUED | OUTPATIENT
Start: 2019-06-03 | End: 2019-06-03

## 2019-06-03 RX ORDER — CHLORHEXIDINE GLUCONATE 213 G/1000ML
1 SOLUTION TOPICAL
Refills: 0 | Status: DISCONTINUED | OUTPATIENT
Start: 2019-06-03 | End: 2019-06-03

## 2019-06-03 RX ORDER — METOPROLOL TARTRATE 50 MG
5 TABLET ORAL EVERY 6 HOURS
Refills: 0 | Status: DISCONTINUED | OUTPATIENT
Start: 2019-06-03 | End: 2019-06-03

## 2019-06-03 RX ORDER — VANCOMYCIN HCL 1 G
1000 VIAL (EA) INTRAVENOUS EVERY 24 HOURS
Refills: 0 | Status: DISCONTINUED | OUTPATIENT
Start: 2019-06-03 | End: 2019-06-03

## 2019-06-03 RX ORDER — PROPOFOL 10 MG/ML
5 INJECTION, EMULSION INTRAVENOUS
Qty: 1000 | Refills: 0 | Status: DISCONTINUED | OUTPATIENT
Start: 2019-06-03 | End: 2019-06-03

## 2019-06-03 RX ORDER — NOREPINEPHRINE BITARTRATE/D5W 8 MG/250ML
0.05 PLASTIC BAG, INJECTION (ML) INTRAVENOUS
Qty: 16 | Refills: 0 | Status: DISCONTINUED | OUTPATIENT
Start: 2019-06-03 | End: 2019-06-03

## 2019-06-03 RX ADMIN — PANTOPRAZOLE SODIUM 40 MILLIGRAM(S): 20 TABLET, DELAYED RELEASE ORAL at 06:43

## 2019-06-03 RX ADMIN — PROPOFOL 1.97 MICROGRAM(S)/KG/MIN: 10 INJECTION, EMULSION INTRAVENOUS at 00:35

## 2019-06-03 RX ADMIN — PROPOFOL 1.97 MICROGRAM(S)/KG/MIN: 10 INJECTION, EMULSION INTRAVENOUS at 00:34

## 2019-06-03 RX ADMIN — SODIUM CHLORIDE 1714.29 MILLILITER(S): 9 INJECTION INTRAMUSCULAR; INTRAVENOUS; SUBCUTANEOUS at 06:30

## 2019-06-03 RX ADMIN — SODIUM CHLORIDE 1000 MILLILITER(S): 9 INJECTION INTRAMUSCULAR; INTRAVENOUS; SUBCUTANEOUS at 00:33

## 2019-06-03 RX ADMIN — PIPERACILLIN AND TAZOBACTAM 25 GRAM(S): 4; .5 INJECTION, POWDER, LYOPHILIZED, FOR SOLUTION INTRAVENOUS at 06:41

## 2019-06-03 RX ADMIN — Medication 5 MILLIGRAM(S): at 04:19

## 2019-06-03 RX ADMIN — PIPERACILLIN AND TAZOBACTAM 200 GRAM(S): 4; .5 INJECTION, POWDER, LYOPHILIZED, FOR SOLUTION INTRAVENOUS at 00:33

## 2019-06-03 RX ADMIN — Medication 250 MILLIGRAM(S): at 01:05

## 2019-06-03 RX ADMIN — PIPERACILLIN AND TAZOBACTAM 3.38 GRAM(S): 4; .5 INJECTION, POWDER, LYOPHILIZED, FOR SOLUTION INTRAVENOUS at 01:05

## 2019-06-03 RX ADMIN — PROPOFOL 1.97 MICROGRAM(S)/KG/MIN: 10 INJECTION, EMULSION INTRAVENOUS at 02:16

## 2019-06-03 RX ADMIN — CHLORHEXIDINE GLUCONATE 1 APPLICATION(S): 213 SOLUTION TOPICAL at 06:41

## 2019-06-03 RX ADMIN — Medication 3.08 MICROGRAM(S)/KG/MIN: at 02:04

## 2019-06-03 RX ADMIN — FENTANYL CITRATE 3.29 MICROGRAM(S)/KG/HR: 50 INJECTION INTRAVENOUS at 03:56

## 2019-06-03 RX ADMIN — PANTOPRAZOLE SODIUM 80 MILLIGRAM(S): 20 TABLET, DELAYED RELEASE ORAL at 04:18

## 2019-06-03 RX ADMIN — SODIUM CHLORIDE 1000 MILLILITER(S): 9 INJECTION INTRAMUSCULAR; INTRAVENOUS; SUBCUTANEOUS at 01:07

## 2019-06-03 RX ADMIN — Medication 1000 MILLIGRAM(S): at 03:25

## 2019-06-03 RX ADMIN — FENTANYL CITRATE 5 MICROGRAM(S): 50 INJECTION INTRAVENOUS at 02:00

## 2019-06-03 RX ADMIN — PROPOFOL 50 MILLIGRAM(S): 10 INJECTION, EMULSION INTRAVENOUS at 00:15

## 2019-06-03 RX ADMIN — PROPOFOL 1.97 MICROGRAM(S)/KG/MIN: 10 INJECTION, EMULSION INTRAVENOUS at 00:36

## 2019-06-03 RX ADMIN — SODIUM CHLORIDE 1714.29 MILLILITER(S): 9 INJECTION INTRAMUSCULAR; INTRAVENOUS; SUBCUTANEOUS at 02:49

## 2019-06-03 RX ADMIN — FENTANYL CITRATE 5 MICROGRAM(S): 50 INJECTION INTRAVENOUS at 03:27

## 2019-06-03 NOTE — PROGRESS NOTE ADULT - ASSESSMENT
84y.o. Male with reducible right inguinal hernia, annular descending colon lesion    -Poor surgical candidate  -Conservative treatment, NGT decompression  -Con't care per ICU team

## 2019-06-03 NOTE — H&P ADULT - PROBLEM SELECTOR PLAN 4
Likely 2/2 to demand ischemia  Recent cardiac cath at [facility] shows clean coronaries as per chart  EKG- A fib w/ RVR  - trend trops

## 2019-06-03 NOTE — CONSULT NOTE ADULT - SUBJECTIVE AND OBJECTIVE BOX
HPI:  85 y/o M with PMHx of Asthma, CVA (in 2015), BPH, Non-obstructive CAD, Afib (Xarelto), Bilateral carotid stenosis, Moderate AS,  and Colitis was BIBA for unresponsiveness. As per son at bedside, patient had an episode of syncope today followed by unresponsiveness. In the ED, patient was intubated for airway protection. Limited history, as the patient lives with the daughter who was not present at bedside. Son had little information and did not witness the syncopal episode.     Patient was recently admitted at [facility] for SOB 2/2 to CHF exacerbation and anemia. He had recent cardiac cath done at [facility] showing EF of 35%, and no obstructive disease. He also had anemia, GI was consulted but family refused colonoscopy. GI series was done which was unremarkable.  He was discharged from [facility] on 19. As per son, patient lives with  his daughter and since discharge he was been complaining of generalized abdominal pain and had poor appetite. He had one episode of vomiting today. Denies fever, diarrhea, cough, or any urinary complaints. (2019 01:13)    Pt has coded twice in ICU.      PAST MEDICAL & SURGICAL HISTORY:  CAD (coronary artery disease)  Afib  Leukocytoclastic vasculitis  Hypercholesterolemia  HTN (hypertension)  CVA (cerebral vascular accident): 2015  Asthma  H/O sinus surgery      MEDICATIONS  (STANDING):  chlorhexidine 4% Liquid 1 Application(s) Topical <User Schedule>  digoxin  Injectable 0.125 milliGRAM(s) IV Push daily  fentaNYL   Infusion. 0.5 MICROgram(s)/kG/Hr (3.289 mL/Hr) IV Continuous <Continuous>  metoprolol tartrate Injectable 5 milliGRAM(s) IV Push every 6 hours  norepinephrine Infusion 0.05 MICROgram(s)/kG/Min (3.083 mL/Hr) IV Continuous <Continuous>  pantoprazole  Injectable 40 milliGRAM(s) IV Push two times a day  piperacillin/tazobactam IVPB. 3.375 Gram(s) IV Intermittent every 8 hours  vancomycin  IVPB 1000 milliGRAM(s) IV Intermittent every 24 hours    MEDICATIONS  (PRN):      Allergies    No Known Allergies    Intolerances        SOCIAL HISTORY:  No drug use    FAMILY HISTORY:  No pertinent family history in first degree relatives    Father without Cardiac history.    REVIEW OF SYSTEMS:  CONSTITUTIONAL: Unarousable    Vital Signs Last 24 Hrs  T(C): 35.6 (2019 04:00), Max: 37.8 (2019 23:41)  T(F): 96 (2019 04:00), Max: 100 (2019 23:41)  HR: 136 (2019 05:19) (31 - 162)  BP: 118/65 (2019 05:19) (55/37 - 155/56)  BP(mean): 72 (2019 05:19) (41 - 124)  RR: 43 (2019 05:19) (12 - 43)  SpO2: 96% (2019 05:19) (69% - 100%)    Daily Height in cm: 172.72 (2019 23:41)    Daily Weight in k.3 (2019 03:27)    PHYSICAL EXAM:  CONSTITIONAL/GENERAL: NAD, well-groomed, well-developed  HEAD:  Atraumatic, Normocephalic  VISUAL/EYES: EOMI, PERRL, conjunctiva and sclera clear  ENMT: Moist mucous membranes, Good dentition, No lesions  NECK: Supple, No JVD  NERVOUS SYSTEM:  Alert & Oriented X3, Good concentration  RESPIRATORY/CHEST: Clear to percussion bilaterally; Normal respiratory effort  CARDIOVASCULAR/HEART: Regular rate and rhythm  GASTROINTESTINAL/ABDOMEN: Soft, Nontender, Nondistended; Bowel sounds present  Reducible RIH  GENITOURINARY: normal external genitalia  BREASTS: no breast lumps  MUSCULOSKELETAL/EXTREMITIES:  No clubbing, cyanosis, or edema  VASCULAR: equal peripheral pulses  LYMPHATIC: No lymphadenopathy noted  SKIN: No rashes or lesions  PSYCHIATRIC: normal affect      LABS:                        5.7    22.89 )-----------( 264      ( 2019 04:29 )             20.7     Neutrophil %:   06-03    141  |  110<H>  |  33<H>  ----------------------------<  183<H>  4.4   |  19<L>  |  1.49<H>    Ca    6.4<LL>      2019 04:29  Phos  3.9     06-03  Mg     2.1     06-03    TPro  4.8<L>  /  Alb  2.1<L>  /  TBili  0.6  /  DBili  x   /  AST  24  /  ALT  20  /  AlkPhos  35<L>      PT/INR - ( 2019 23:46 )   PT: 35.1 sec;   INR: 3.05 ratio         PTT - ( 2019 23:46 )  PTT:34.2 sec  Urinalysis Basic - ( 2019 00:58 )    Color: Yellow / Appearance: Clear / S.020 / pH: x  Gluc: x / Ketone: Negative  / Bili: Negative / Urobili: 1   Blood: x / Protein: 100 / Nitrite: Negative   Leuk Esterase: Trace / RBC: 10-25 /HPF / WBC 3-5 /HPF   Sq Epi: x / Non Sq Epi: x / Bacteria: x          Lactate, Blood: 5.5 mmol/L ( @ 04:29)  Lactate, Blood: 11.5 mmol/L ( @ 23:46)        RADIOLOGY & ADDITIONAL STUDIES:  < from: CT Abdomen and Pelvis No Cont (19 @ 03:17) >  ABDOMEN:    Liver: Normal. No mass.   Gallbladder and bile ducts: Normal. No calcified stones. No ductal   dilation.    Pancreas: Normal. No ductal dilation.    Spleen: The spleen is unremarkable.    Adrenals: The adrenal glands appear unremarkable.    Kidneys and ureters: Hypodense lesion right kidney similar to prior   study   19 should be further evaluated with sonography to exclude solid   components.    Stomach and bowel: As on the prior study of 19, there is distention   of   the colon with colonic wall thickening suggestive of pancolitis. The   stomach   appears thick walled and fluid filled. Due to motion artifact and   artifact from   barium in colon the area of the justine hepatis duodenal sweep and proximal   small   bowel and pancreas are unable to be assessed. Annular lesion lower   descending   colon is highly suspect-see coronal image 35 series 602.    Appendix: No evidence of appendicitis.     PELVIS:    Bladder: There is a Julien catheter in the urinary bladder and area in   bladder   likely introduced at time of Julien placement. There is bladder wall   thickening.   Any clinical evidence of cystitis?    Reproductive: Unremarkable as visualized.     ABDOMEN and PELVIS:    Intraperitoneal space: Extensive mesenteric infiltration and fluid   throughout   the upper abdomen particularly surrounding the liver and spleen.    Bones/joints: No acute fracture. No dislocation.    Soft tissues: Since the prior study of 19, there has been marked   distention of the right inguinal hernia now with apparent fluid within as   well   as fat possibly mesenteric extending into the right scrotum. Clinical   correlation for possible incarceration and inability to reduce the   hernia.   There is some moderate distention of small bowel in abdomen and pelvis    Vasculature: Normal. No abdominal aortic aneurysm.    Lymph nodes: Normal. No enlarged lymph nodes.     IMPRESSION:   1. Coronal image 36 of series 602 demonstrates abrupt annular narrowing   of the   lower descending colon extending into the sigmoid colon possibly   progressed   compared to 19 likely due to annular lesion such as neoplasm.   Colonoscopy   is needed. Proximal distention of colon with somewhat thickened wall   likely   related to this distal annular lesion.   2. Since the prior study of 19, there has been marked distention of   the   right inguinal hernia now with apparent fluid within as well as   fat-likely   mesenteric extending into the right scrotum. Clinical correlation for   possible   incarceration/inability to reduce the hernia. Note is made that there is   some   moderate distention of loops of small bowel with air-fluid levels which   raises   the question of possible subtotal obstruction at level of hernia versus   ileus.   3. Sections through the lung bases demonstrate moderate-sized bilateral   pleural   effusions likely with compressive atelectasis below which has a worsened   since   prior study of 19.   4. Extensive mesenteric infiltration and fluid has developed since prior   study  throughout the upper abdomen particularly surrounding the liver and   spleen.The   stomach appears thick walled and fluid filled. Due to motion artifact and   artifact from barium in colon the area of the justine hepatis duodenal   sweep and   proximal small bowel and pancreas are unable to be reliably assessed.   5. There is a Julien catheter in the urinary bladder and area in bladder   likely   introduced at time of Julien placement. There is bladder wall thickening.   Any   clinical evidence of cystitis?          ******PRELIMINARY REPORT******    ******PRELIMINARY REPORT******            < end of copied text >      A/P    Patient unresponsive  Coded twice in ICU  CT suggestive of Descending colon neoplasm, Ileus vs SBO, mesenteric infiltration,   Colonic wall thickening=?Pancolitis    Poor prognosis, Non operative candidate  Supportive measures  Bowel rest, NGT  IV Abx  Fluid Resuscitation  Discussed with Dr Mcpherson.  Critical care

## 2019-06-03 NOTE — ED ADULT NURSE NOTE - EENT WDL
Fall Risk;
Eyes with no visual disturbances.  Ears clean and dry and no hearing difficulties. Nose with pink mucosa and no drainage.  Mouth mucous membranes moist and pink.  No tenderness or swelling to throat or neck.

## 2019-06-03 NOTE — ED PROVIDER NOTE - CLINICAL SUMMARY MEDICAL DECISION MAKING FREE TEXT BOX
x patient presented with ams, minimally responsive. intubated for airway protection. will obtain lab, maintain vent, ct head. fluids, abx, icu consult

## 2019-06-03 NOTE — H&P ADULT - PROBLEM SELECTOR PLAN 2
Unknown source  CXR- unchanged  UA- negative  Lactate- 11 with Leukocytosis of 19 and tachypnea- 20s  s/p 3L NS bolus in ED  RIJ placed  - Will treat with Vanc and cefepime due to recent hospitalization  - trend lactate  - f/u blood cx  - f/u procalcitonin  - f/u CT a/p w/o contrast  - c/w levophed for pressure support Unknown source  CXR- unchanged  UA- negative  Lactate- 11 with Leukocytosis of 19 and tachypnea- 20s  s/p 3L NS bolus in ED  RIJ placed  - Will treat with Vanc and zosyn due to recent hospitalization  - trend lactate  - f/u blood cx  - f/u procalcitonin  - f/u CT a/p w/o contrast  - c/w levophed for pressure support

## 2019-06-03 NOTE — PROGRESS NOTE ADULT - SUBJECTIVE AND OBJECTIVE BOX
INTERVAL HPI/OVERNIGHT EVENTS:       Antimicrobial:  piperacillin/tazobactam IVPB. 3.375 Gram(s) IV Intermittent every 8 hours  vancomycin  IVPB 1000 milliGRAM(s) IV Intermittent every 24 hours    Cardiovascular:  digoxin  Injectable 0.125 milliGRAM(s) IV Push daily  metoprolol tartrate Injectable 5 milliGRAM(s) IV Push every 6 hours  norepinephrine Infusion 0.05 MICROgram(s)/kG/Min IV Continuous <Continuous>    Pulmonary:    Hematalogic:    Other:  chlorhexidine 4% Liquid 1 Application(s) Topical <User Schedule>  fentaNYL   Infusion. 0.5 MICROgram(s)/kG/Hr IV Continuous <Continuous>  pantoprazole  Injectable 40 milliGRAM(s) IV Push two times a day      Drug Dosing Weight  Height (cm): 172.72 (2019 23:41)  Weight (kg): 64.3 (2019 03:27)  BMI (kg/m2): 21.6 (2019 03:27)  BSA (m2): 1.77 (2019 03:27)    CENTRAL LINE: [ ] YES [ ] NO  LOCATION:   DATE INSERTED:    GIRALDO: [ ] YES [ ] NO    DATE INSERTED:    A-LINE:  [ ] YES [ ] NO  LOCATION:   DATE INSERTED:    PMH/Social Hx/Fam Hx -reviewed admission note, no change since admission  PAST MEDICAL & SURGICAL HISTORY:  CAD (coronary artery disease)  Afib  Leukocytoclastic vasculitis  Hypercholesterolemia  HTN (hypertension)  CVA (cerebral vascular accident):   Asthma  H/O sinus surgery      T(C): 35.6 (19 @ 04:00), Max: 37.8 (19 @ 23:41)  HR: 65 (19 @ 08:00)  BP: 50/36 (19 @ 08:00)  BP(mean): 39 (19 @ 08:00)  ABP: --  ABP(mean): --  RR: 14 (19 @ 08:00)  SpO2: 87% (19 @ 08:00)  Wt(kg): --    ABG - ( 2019 00:26 )  pH, Arterial: 7.34  pH, Blood: x     /  pCO2: 27    /  pO2: 582   / HCO3: 14    / Base Excess: -10.2 /  SaO2: 98                     @ 07:01  -   @ 07:00  --------------------------------------------------------  IN: 1048.6 mL / OUT: 0 mL / NET: 1048.6 mL        Mode: AC/ CMV (Assist Control/ Continuous Mandatory Ventilation)  RR (machine): 12  TV (machine): 450  FiO2: 100  PEEP: 5  ITime: 1  MAP: 9  PIP: 18      PHYSICAL EXAM:    GENERAL: No signs of distress, comfortable  HEAD: Atraumatic, Normocephalic  EYES: EOMI, PERRLA  ENMT: No erythema, exudates, or enlargement, Moist mucous membranes +ETT  NECK: Supple, normal appearance, No JVD; [  ] central line (if applicable)  CHEST/LUNG: No chest deformity, fair bilateral air entry; No rales, rhonchi, wheezing; crackles  HEART: Regular rate and rhythm; No murmurs, rubs, or gallops;   ABDOMEN: Soft, Nontender, Nondistended; Bowel sounds present  EXTREMITIES:  + Peripheral Pulses,  or + edema  NERVOUS SYSTEM: somatose  LYMPH: No lymphadenopathy noted  SKIN: No rashes or lesions; good turgor, warm, dry      LABS:  CBC Full  -  ( 2019 05:57 )  WBC Count : 29.62 K/uL  RBC Count : 2.37 M/uL  Hemoglobin : 5.2 g/dL  Hematocrit : 19.3 %  Platelet Count - Automated : 232 K/uL  Mean Cell Volume : 81.4 fl  Mean Cell Hemoglobin : 21.9 pg  Mean Cell Hemoglobin Concentration : 26.9 gm/dL  Auto Neutrophil # : 20.93 K/uL  Auto Lymphocyte # : 5.11 K/uL  Auto Monocyte # : 1.30 K/uL  Auto Eosinophil # : 0.02 K/uL  Auto Basophil # : 0.05 K/uL  Auto Neutrophil % : 70.5 %  Auto Lymphocyte % : 17.3 %  Auto Monocyte % : 4.4 %  Auto Eosinophil % : 0.1 %  Auto Basophil % : 0.2 %    -03    145  |  109<H>  |  32<H>  ----------------------------<  87  4.7   |  14<L>  |  1.94<H>    Ca    10.0      2019 05:57  Phos  8.9     06-  Mg     2.6     06-    TPro  4.8<L>  /  Alb  2.1<L>  /  TBili  0.6  /  DBili  x   /  AST  24  /  ALT  20  /  AlkPhos  35<L>  06-    PT/INR - ( 2019 06:23 )   PT: 43.7 sec;   INR: 3.78 ratio         PTT - ( 2019 23:46 )  PTT:34.2 sec  Urinalysis Basic - ( 2019 00:58 )    Color: Yellow / Appearance: Clear / S.020 / pH: x  Gluc: x / Ketone: Negative  / Bili: Negative / Urobili: 1   Blood: x / Protein: 100 / Nitrite: Negative   Leuk Esterase: Trace / RBC: 10-25 /HPF / WBC 3-5 /HPF   Sq Epi: x / Non Sq Epi: x / Bacteria: x          RADIOLOGY & ADDITIONAL STUDIES REVIEWED         IMPRESSION:  PAST MEDICAL & SURGICAL HISTORY:  CAD (coronary artery disease)  Afib  Leukocytoclastic vasculitis  Hypercholesterolemia  HTN (hypertension)  CVA (cerebral vascular accident):   Asthma  H/O sinus surgery   p/w       Assessment and Plan:   · Assessment		  83 y/o M with PMHx of Asthma, CVA (in ), BPH, Non-obstructive CAD, Afib (Xarelto), Bilateral carotid stenosis, Moderate AS,  and Colitis was BIBA for unresponsiveness. As per son at bedside, patient had an episode of syncope today followed by unresponsiveness. In the ED, patient was intubated for airway protection. Limited history, as the patient lives with the daughter who was not present at bedside. Son had little information and did not witness the syncopal episode.  Pat coded seven times in icu       Problem/Plan - 1:  ·  Problem: Cardiac arrest  PEA x 7  Multiple epinephrine, bicarbs and glucose pushes       Problem/Plan - 2:  ·  Problem: Septic shock likely from peritonitis.   Lactic acidosis  Started Vanc and Zosyn  On IV Levophed       Problem/Plan - 3:  ·  Problem: UGIB  Acute blood loss anemia  Ordered 2 units prbc which patient didn't receive             GOALS OF CARE DISCUSSION WITH PATIENT/FAMILY/PROXY  CRITICAL CARE TIME SPENT: 58  minutes total

## 2019-06-03 NOTE — H&P ADULT - ATTENDING COMMENTS
Patient is an 83 y/o male with PMH of asthma, CVA, AFIB, carotid stenosis and moderate AS. He recently had a cardiac catheterization which showed clean coronary arteries but a reduced EF ( 35%). He was brought to the ED after being found unresponsive at home. A CT of his head showed an old stroke on the left but no acute CVA. Because he was tachypneic and unresponsive he was intubated in the ED and an ICU consult for admission was requested. His labs came back with a leukocytosis of 19K and serum lactate of 11mmol/l. troponin 0.15ng/ml. The ABG post intubation was 7.34/27/582. Because of his altered mental state and respiratory rate >30 with elevated lactate sepsis is under consideration as a working Dx. The problem is that there is not an obvious source. Will CT scan his abdomen. A central line was placed in the ED in anticipation of need for aggressive resuscitation to decrease lactate. Patient is an 83 y/o male with PMH of asthma, CVA, AFIB, carotid stenosis and moderate AS. He recently had a cardiac catheterization which showed clean coronary arteries but a reduced EF ( 35%). He was brought to the ED after being found unresponsive at home. A CT of his head showed an old stroke on the left but no acute CVA. Because he was tachypneic and unresponsive he was intubated in the ED and an ICU consult for admission was requested. His labs came back with a leukocytosis of 19K and serum lactate of 11mmol/l. troponin 0.15ng/ml. The ABG post intubation was 7.34/27/582. Because of his altered mental state and respiratory rate >30 with elevated lactate sepsis is under consideration as a working Dx. The problem is that there is not an obvious source. Will CT scan his abdomen. A central line was placed in the ED in anticipation of need for aggressive resuscitation to decrease lactate. Critical care time 90 minutes.

## 2019-06-03 NOTE — CONSULT NOTE ADULT - ATTENDING COMMENTS
pt was examined in am  Abdomen soft with a reducible right inguinal hernia  He is moribund state with very poor prognosis  Had code blue few times  High moratality pt was examined in am  Abdomen soft with a reducible right inguinal hernia  He is moribund state with very poor prognosis  Had code blue few times  High mortality

## 2019-06-03 NOTE — CHART NOTE - NSCHARTNOTEFT_GEN_A_CORE
1st Code blue: was called at 4:48am. Patient was bradycardic to 20s and then went into asystole. CPR was immediately started. Patient received 2 rounds of epi and 1 of bicarb. ROSC at 4:54am.     2nd Code blue:  Patient again went into PEA at 5:15am. He was given 2 rounds of epi, calcium chloride x 2, and bicarb x 1. ROSC achieved  at 5:19am.     Dr. Baron spoke to the son at bedside. Son aware of current clinical condition. Patient is currently full code.   He also had an episode of coffee ground emesis. Unable to place NG due to nasal septal deviation. Therefore OG was placed. Xeralto was held. Patient was given 80 IV protonix STAT, and ordered for protonix 40 IV BID.   Prelim read of CT a/p noted. Surgery team (Dr. Frank) consulted.    Vital Signs Last 24 Hrs  T(C): 35.6 (03 Jun 2019 04:00), Max: 37.8 (02 Jun 2019 23:41)  T(F): 96 (03 Jun 2019 04:00), Max: 100 (02 Jun 2019 23:41)  HR: 162 (03 Jun 2019 05:00) (64 - 162)  BP: 131/68 (03 Jun 2019 02:25) (106/52 - 139/84)  BP(mean): --  RR: 30 (02 Jun 2019 23:41) (30 - 30)  SpO2: 94% (03 Jun 2019 05:00) (82% - 100%)    GENERAL: intubated  HEAD:  Atraumatic, Normocephalic  ENMT: dry mucous membranes, dry blood around mouth  NERVOUS SYSTEM:  sedated  CHEST/LUNG: Clear to auscultation bilaterally-/*  HEART: Regular rate and rhythm; No murmurs, rubs, or gallops  ABDOMEN: Soft,  Nondistended  EXTREMITIES:  2+ Peripheral Pulses, No clubbing, cyanosis, or edema    Plan:  Unclear etiology of code sepsis at this time  Electrolyte abnormalities vs septic shock vs UGIB  - Hb- 5.5 from the labs sent prior to the code: dilutional vs chronic GIB (patient on xeralto at home). Will transfuse 2U PRBC + 1U FFP. C/w protonix  - Corrected calcium from BMP sent prior to cardiac arrest-  7.9. Patient received 2gm of calcium chloride during code blue. Will f/u repeat labs  - Lactate improving from 11-->5. Given the low EF of 35%, and CT showing evidence of b/l pleural effusion, will hold off to further IVF. Will give blood transfusion and trend lactate  - STAT EKG  - Repeat all labs including CBC, BMP, Mg, Phos, Lactate, cardiac enzymes, coags, type and screen  - replete electrolytes as needed  - f/u surgery recs  - closely monitor 1st Code blue: was called at 4:48am. Patient was bradycardic to 20s and then went into asystole. CPR was immediately started. Patient received 2 rounds of epi and 1 of bicarb. ROSC at 4:54am.     2nd Code blue:  Patient again went into PEA at 5:15am. He was given 2 rounds of epi, calcium chloride x 2, and bicarb x 1. ROSC achieved  at 5:19am.     Dr. Baron spoke to the son at bedside. Son aware of current clinical condition. Patient is currently full code.   He also had an episode of coffee ground emesis. Unable to place NG due to nasal septal deviation. Therefore OG was placed. Xeralto was held. Patient was given 80 IV protonix STAT, and ordered for protonix 40 IV BID.   Prelim read of CT a/p noted. Surgery team (Dr. Frank) consulted.    Vital Signs Last 24 Hrs  T(C): 35.6 (03 Jun 2019 04:00), Max: 37.8 (02 Jun 2019 23:41)  T(F): 96 (03 Jun 2019 04:00), Max: 100 (02 Jun 2019 23:41)  HR: 162 (03 Jun 2019 05:00) (64 - 162)  BP: 131/68 (03 Jun 2019 02:25) (106/52 - 139/84)  BP(mean): --  RR: 30 (02 Jun 2019 23:41) (30 - 30)  SpO2: 94% (03 Jun 2019 05:00) (82% - 100%)    GENERAL: intubated  HEAD:  Atraumatic, Normocephalic  ENMT: dry mucous membranes, dry blood around mouth  NERVOUS SYSTEM:  sedated  CHEST/LUNG: Clear to auscultation bilaterally-/*  HEART: Regular rate and rhythm; No murmurs, rubs, or gallops  ABDOMEN: Soft,  Nondistended  EXTREMITIES:  2+ Peripheral Pulses, No clubbing, cyanosis, or edema    Plan:  Unclear etiology of code blue at this time  Electrolyte abnormalities vs septic shock vs UGIB  - Hb- 5.5 from the labs sent prior to the code: dilutional vs chronic GIB (patient on xeralto at home). Will transfuse 2U PRBC + 1U FFP. C/w protonix  - Corrected calcium from BMP sent prior to cardiac arrest-  7.9. Patient received 2gm of calcium chloride during code blue. Will f/u repeat labs  - Lactate improving from 11-->5. Given the low EF of 35%, and CT showing evidence of b/l pleural effusion, will hold off to further IVF. Will give blood transfusion and trend lactate  - STAT EKG  - Repeat all labs including CBC, BMP, Mg, Phos, Lactate, cardiac enzymes, coags, type and screen  - replete electrolytes as needed  - f/u surgery recs  - closely monitor

## 2019-06-03 NOTE — H&P ADULT - PROBLEM SELECTOR PLAN 1
Intubated for airway protection  CT head- negative  Post intubation ABG- ABG - pH: 7.34,  pH, pCO2: 27, pO2: 582, HCO3: 14     - c/w sedation  - ABG daily  - f/u CXR Intubated for airway protection  CT head- negative  Post intubation ABG- ABG - pH: 7.34,  pH, pCO2: 27, pO2: 582, HCO3: 14     - continue sedation w/ fentanyl  - ABG daily  - f/u CXR

## 2019-06-03 NOTE — PROGRESS NOTE ADULT - SUBJECTIVE AND OBJECTIVE BOX
INTERVAL HPI/OVERNIGHT EVENTS:  Pt intubated. 5 code blues overnight. As per note, pt remains full code.  On pressor.    MEDICATIONS  (STANDING):  chlorhexidine 4% Liquid 1 Application(s) Topical <User Schedule>  digoxin  Injectable 0.125 milliGRAM(s) IV Push daily  fentaNYL   Infusion. 0.5 MICROgram(s)/kG/Hr (3.289 mL/Hr) IV Continuous <Continuous>  metoprolol tartrate Injectable 5 milliGRAM(s) IV Push every 6 hours  norepinephrine Infusion 0.05 MICROgram(s)/kG/Min (3.083 mL/Hr) IV Continuous <Continuous>  pantoprazole  Injectable 40 milliGRAM(s) IV Push two times a day  piperacillin/tazobactam IVPB. 3.375 Gram(s) IV Intermittent every 8 hours  vancomycin  IVPB 1000 milliGRAM(s) IV Intermittent every 24 hours    Vital Signs Last 24 Hrs  T(C): 35.6 (03 Jun 2019 04:00), Max: 37.8 (02 Jun 2019 23:41)  T(F): 96 (03 Jun 2019 04:00), Max: 100 (02 Jun 2019 23:41)  HR: 137 (03 Jun 2019 07:30) (31 - 162)  BP: 60/46 (03 Jun 2019 07:30) (55/37 - 160/92)  BP(mean): 49 (03 Jun 2019 07:30) (41 - 124)  RR: 28 (03 Jun 2019 07:30) (12 - 48)  SpO2: 84% (03 Jun 2019 07:30) (69% - 100%)    Physical:  General: Intubated.  Abdomen: Soft nondistended, no tenderness elicited.     I&O's Detail    02 Jun 2019 07:01  -  03 Jun 2019 07:00  --------------------------------------------------------  IN:    fentaNYL Infusion.: 6.4 mL    norepinephrine Infusion: 42.2 mL    Sodium Chloride 0.9% IV Bolus: 1000 mL  Total IN: 1048.6 mL    OUT:  Total OUT: 0 mL    Total NET: 1048.6 mL    LABS:                        5.2    29.62 )-----------( 232      ( 03 Jun 2019 05:57 )             19.3             06-03    145  |  109<H>  |  32<H>  ----------------------------<  87  4.7   |  14<L>  |  1.94<H>    Ca    10.0      03 Jun 2019 05:57  Phos  8.9     06-03  Mg     2.6     06-03    TPro  4.8<L>  /  Alb  2.1<L>  /  TBili  0.6  /  DBili  x   /  AST  24  /  ALT  20  /  AlkPhos  35<L>  06-03

## 2019-06-03 NOTE — H&P ADULT - ASSESSMENT
83 y/o M with PMHx of Asthma, CVA (in 2015), BPH, Non-obstructive CAD, Afib (Xarelto), Bilateral carotid stenosis, Moderate AS,  and Colitis was BIBA for unresponsiveness. As per son at bedside, patient had an episode of syncope today followed by unresponsiveness. In the ED, patient was intubated for airway protection. Limited history, as the patient lives with the daughter who was not present at bedside. Son had little information and did not witness the syncopal episode.

## 2019-06-03 NOTE — PATIENT PROFILE ADULT - FUNCTIONAL SCREEN CURRENT LEVEL: COMMUNICATION, MLM
0 = understands/communicates without difficulty 3 = unable to understand or speak (not related to language barrier)

## 2019-06-03 NOTE — ED PROVIDER NOTE - OBJECTIVE STATEMENT
x 84 y.o w/ pmh of afib presenting with altered mental status. per ems, family called ems, on arrival patient did not want to go to hospital, after ems left, family states that patient began to say he does not feel well but did not specify what symptoms he was experiencing and became minimally responsive. patient family states that he is full code. on arrival patient minimal responsive, respiratory rate 35-40 with accessory muscle use, patient intubated given concern for airway protection.

## 2019-06-03 NOTE — DISCHARGE NOTE FOR THE EXPIRED PATIENT - HOSPITAL COURSE
HPI: 85 y/o M with PMHx of Asthma, CVA (in 2015), BPH, Non-obstructive CAD, Afib (Xarelto), Bilateral carotid stenosis, Moderate AS,  and Colitis was BIBA for unresponsiveness. As per son at bedside, patient had an episode of syncope today followed by unresponsiveness. In the ED, patient was intubated for airway protection. Limited history, as the patient lives with the daughter who was not present at bedside. Son had little information and did not witness the syncopal episode.       Pt came for unresponsive and was intubated for airway protection. Due to abdominal pain and an episode of vomiting CT abdomen was performed which showed annular narrowing of the   lower descending colon extending into the sigmoid colon possibly progressed compared to 16-19 likely due to annular lesion such as neoplasm.   PEA x 7  Multiple epinephrine, bicarbs and glucose pushes       Problem/Plan - 2:  ·  Problem: Septic shock likely from peritonitis.   Lactic acidosis  Started Vanc and Zosyn  On IV Levophed       Problem/Plan - 3:  ·  Problem: UGIB  Acute blood loss anemia  Ordered 2 units prbc which patient didn't receive        Problem/Plan - 4:  ·  Problem: Need for prophylactic measure  SCD boots only for UGIB    Pt is . HPI: 85 y/o M with PMHx of Asthma, CVA (in 2015), BPH, Non-obstructive CAD, Afib (Xarelto), Bilateral carotid stenosis, Moderate AS,  and Colitis was BIBA for unresponsiveness. As per son at bedside, patient had an episode of syncope today followed by unresponsiveness. In the ED, patient was intubated for airway protection. Limited history, as the patient lives with the daughter who was not present at bedside. Son had little information and did not witness the syncopal episode.     Pt came for unresponsive and was intubated for airway protection. Due to abdominal pain and an episode of vomiting CT abdomen was performed which showed annular narrowing of the   lower descending colon extending into the sigmoid colon possibly progressed compared to 16-19 likely due to annular lesion such as neoplasm. There was increased ascitis and bilateral pulmonary congestion. Pt got central line for hypotensive shock and was started on Vanc and zosyn for presumed peritonitis. Surgery was consulted who recommended medical management due to unstable condition. Pt underwent cardiac arrest x 7 with PEA. Multiple epinephrine, bicarbs and glucose pushes. Pt  in the last code blue at 8.24 AM. Family refused autopsy. Organ donation was called. Daughter at bedside. HPI: 85 y/o M with PMHx of Asthma, CVA (in 2015), BPH, Non-obstructive CAD, Afib (Xarelto), Bilateral carotid stenosis, Moderate AS,  and Colitis was BIBA for unresponsiveness. As per son at bedside, patient had an episode of syncope today followed by unresponsiveness. In the ED, patient was intubated for airway protection. Limited history, as the patient lives with the daughter who was not present at bedside. Son had little information and did not witness the syncopal episode.     Pt came for unresponsive and was intubated for airway protection. Due to abdominal pain and an episode of vomiting CT abdomen was performed which showed annular narrowing of the   lower descending colon extending into the sigmoid colon possibly progressed compared to 16-19 likely due to annular lesion such as neoplasm. There was increased ascitis and bilateral pulmonary congestion. Pt got central line for hypotensive shock and was started on Vanc and zosyn for presumed peritonitis. Surgery was consulted who recommended medical management due to unstable condition. Pt underwent cardiac arrest x 7 with PEA. Multiple epinephrine, bicarbs and glucose pushes. Pt  in the last code blue at 8.24 AM. Family refused autopsy. Organ donation was called. Daughter at bedside.       for a full account of hospital course please refer to actual medical records for this is a brief summery

## 2019-06-03 NOTE — H&P ADULT - NSHPPHYSICALEXAM_GEN_ALL_CORE
Vital Signs Last 24 Hrs  T(C): 37.8 (02 Jun 2019 23:41), Max: 37.8 (02 Jun 2019 23:41)  T(F): 100 (02 Jun 2019 23:41), Max: 100 (02 Jun 2019 23:41)  HR: 111 (03 Jun 2019 02:25) (110 - 149)  BP: 131/68 (03 Jun 2019 02:25) (106/52 - 139/84)  BP(mean): --  RR: 30 (02 Jun 2019 23:41) (30 - 30)  SpO2: 100% (03 Jun 2019 01:59) (100% - 100%)    GENERAL: NAD, sedated  HEAD:  Atraumatic, Normocephalic  EYES: EOMI, PERRLA, conjunctiva and sclera clear  NERVOUS SYSTEM:  sedated, grimaces to pain, pupils round and reactive to light  CHEST/LUNG: Clear to auscultation bilaterally  HEART: Regular rate and rhythm; No murmurs, rubs, or gallops  ABDOMEN: Soft, Nondistended; Bowel sounds present  EXTREMITIES:  2+ Peripheral Pulses, No clubbing, cyanosis, or edema

## 2019-06-03 NOTE — H&P ADULT - HISTORY OF PRESENT ILLNESS
85 y/o M with PMHx of Asthma, CVA (in 2015), BPH, Non-obstructive CAD, Afib (Xarelto), Bilateral carotid stenosis, Moderate AS,  and Colitis was BIBA for unresponsiveness. As per son at bedside, patient had an episode of syncope today followed by unresponsiveness. In the ED, patient was intubated for airway protection. Limited history, as the patient lives with the daughter who was not present at bedside. Son had little information and did not witness the syncopal episode.     Patient was recently admitted at [facility] for SOB 2/2 to CHF exacerbation and anemia. He had recent cardiac cath done at [facility] showing EF of 35%, and no obstructive disease. He also had anemia, GI was consulted but family refused colonoscopy. GI series was done which was unremarkable.  He was discharged from [facility] on 05/31/19. As per son, patient lives with  his daughter and since discharge he was been complaining of generalized abdominal pain and had poor appetite. He had one episode of vomiting today. Denies fever, diarrhea, cough, or any urinary complaints.

## 2019-06-03 NOTE — H&P ADULT - PROBLEM SELECTOR PLAN 6
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 Mild (approximately 40%) stenosis at the origin and proximal right internal carotid artery.  This may have been the etiology of syncope on this admission  - As per chart, vascular was consulted on last admission and recommended neuro consult and CTA neck. No followup notes since then.    - continue to monitor

## 2019-06-03 NOTE — PROGRESS NOTE ADULT - ASSESSMENT
85 y/o M with PMHx of Asthma, CVA (in 2015), BPH, Non-obstructive CAD, Afib (Xarelto), Bilateral carotid stenosis, Moderate AS,  and Colitis was BIBA for unresponsiveness. As per son at bedside, patient had an episode of syncope today followed by unresponsiveness. In the ED, patient was intubated for airway protection. Limited history, as the patient lives with the daughter who was not present at bedside. Son had little information and did not witness the syncopal episode.        Problem/Plan - 1:  ·  Problem: Unresponsive.  Plan: Intubated for airway protection  CT head- negative  Post intubation ABG- ABG - pH: 7.34,  pH, pCO2: 27, pO2: 582, HCO3: 14     - continue sedation w/ fentanyl  - ABG daily  - f/u CXR.      Problem/Plan - 2:  ·  Problem: Septic shock.  Plan: Unknown source  CXR- unchanged  UA- negative  Lactate- 11 with Leukocytosis of 19 and tachypnea- 20s  s/p 3L NS bolus in ED  RIJ placed  - Will treat with Vanc and zosyn due to recent hospitalization  - trend lactate  - f/u blood cx  - f/u procalcitonin  - f/u CT a/p w/o contrast  - c/w levophed for pressure support.      Problem/Plan - 3:  ·  Problem: Afib.  Plan: on metoprolol and xeralto at home  - Will reduce metoprolol from 75 to 25 bid at this time given the patient came with RVR. Also to prevent reflex tachycardia.   - Titrate to home dose once the patient is off pressure support  - c/w xeralto.      Problem/Plan - 4:  ·  Problem: Elevated troponin.  Plan: Likely 2/2 to demand ischemia  Recent cardiac cath at [facility] shows clean coronaries as per chart  EKG- A fib w/ RVR  - trend trops.      Problem/Plan - 5:  ·  Problem: TRINI (acute kidney injury).  Plan: Likely pre-renal  s/p 3L NS bolus  - f/u urine lytes  - continue to monitor.      Problem/Plan - 6:  Problem: Carotid artery stenosis. 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 Mild (approximately 40%) stenosis at the origin and proximal right internal carotid artery.  This may have been the etiology of syncope on this admission  - As per chart, vascular was consulted on last admission and recommended neuro consult and CTA neck. No followup notes since then.    - continue to monitor.     Problem/Plan - 7:  ·  Problem: Goals of care, counseling/discussion.  Plan: Full code  SonMariann Patel [phone].      Problem/Plan - 8:  ·  Problem: Need for prophylactic measure  SCD boots only for UGIB 83 y/o M with PMHx of Asthma, CVA (in 2015), BPH, Non-obstructive CAD, Afib (Xarelto), Bilateral carotid stenosis, Moderate AS,  and Colitis was BIBA for unresponsiveness. As per son at bedside, patient had an episode of syncope today followed by unresponsiveness. In the ED, patient was intubated for airway protection. Limited history, as the patient lives with the daughter who was not present at bedside. Son had little information and did not witness the syncopal episode.        Problem/Plan - 1:  ·  Problem: Cardiac arrest  PEA x 7  Multiple epinephrine, bicarbs and glucose pushes       Problem/Plan - 2:  ·  Problem: Septic shock likely from peritonitis.   Lactic acidosis  Started Vanc and Zosyn  On IV Levophed       Problem/Plan - 3:  ·  Problem: UGIB  Acute blood loss anemia  Ordered 2 units prbc which patient didn't receive        Problem/Plan - 4:  ·  Problem: Need for prophylactic measure  SCD boots only for UGIB

## 2019-06-03 NOTE — ED PROVIDER NOTE - PMH
Afib    Asthma    CAD (coronary artery disease)    CVA (cerebral vascular accident)  2015  HTN (hypertension)    Hypercholesterolemia    Leukocytoclastic vasculitis

## 2019-06-03 NOTE — H&P ADULT - NSHPLABSRESULTS_GEN_ALL_CORE
7.8    19.18 )-----------( 328      ( 02 Jun 2019 23:46 )             28.2     06-02    136  |  101  |  28<H>  ----------------------------<  198<H>  4.1   |  17<L>  |  1.73<H>    Ca    8.0<L>      02 Jun 2019 23:46    TPro  6.2  /  Alb  2.8<L>  /  TBili  0.8  /  DBili  x   /  AST  13  /  ALT  16  /  AlkPhos  44  06-02    < from: CT Head No Cont (06.03.19 @ 00:23) >    IMPRESSION:    No acute intracranial hemorrhage, mass effect, or CT evidence of a large   vascular territory infarct. Atrophy and chronic microvascular ischemic   gliotic changes. If symptoms persist,consider short-term follow-up or   MRI may be obtained for further evaluation provided no MR   contraindications.    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      < end of copied text >

## 2019-06-03 NOTE — H&P ADULT - PROBLEM SELECTOR PLAN 8
[] Previous VTE                                                3  [] Thrombophilia                                             2  [] Lower limb paralysis                                   2    [] Current Cancer                                             2   [X] Immobilization > 24 hrs                              1  [X] ICU/CCU stay > 24 hours                             1  [X] Age > 60                                                         1    IMPROVE VTE Score: 3  c/w xeralto

## 2019-06-03 NOTE — PROGRESS NOTE ADULT - SUBJECTIVE AND OBJECTIVE BOX
INTERVAL HPI/OVERNIGHT EVENTS: ***    PRESSORS: [ ] YES [ ] NO  WHICH:    ANTIBIOTICS:                  DATE STARTED:  ANTIBIOTICS:                  DATE STARTED:  ANTIBIOTICS:                  DATE STARTED:    Antimicrobial:  piperacillin/tazobactam IVPB. 3.375 Gram(s) IV Intermittent every 8 hours  vancomycin  IVPB 1000 milliGRAM(s) IV Intermittent every 24 hours    Cardiovascular:  digoxin  Injectable 0.125 milliGRAM(s) IV Push daily  metoprolol tartrate Injectable 5 milliGRAM(s) IV Push every 6 hours  norepinephrine Infusion 0.05 MICROgram(s)/kG/Min IV Continuous <Continuous>    Pulmonary:    Hematalogic:    Other:  chlorhexidine 4% Liquid 1 Application(s) Topical <User Schedule>  fentaNYL   Infusion. 0.5 MICROgram(s)/kG/Hr IV Continuous <Continuous>  pantoprazole  Injectable 40 milliGRAM(s) IV Push two times a day    chlorhexidine 4% Liquid 1 Application(s) Topical <User Schedule>  digoxin  Injectable 0.125 milliGRAM(s) IV Push daily  fentaNYL   Infusion. 0.5 MICROgram(s)/kG/Hr IV Continuous <Continuous>  metoprolol tartrate Injectable 5 milliGRAM(s) IV Push every 6 hours  norepinephrine Infusion 0.05 MICROgram(s)/kG/Min IV Continuous <Continuous>  pantoprazole  Injectable 40 milliGRAM(s) IV Push two times a day  piperacillin/tazobactam IVPB. 3.375 Gram(s) IV Intermittent every 8 hours  vancomycin  IVPB 1000 milliGRAM(s) IV Intermittent every 24 hours    Drug Dosing Weight  Height (cm): 172.72 (2019 23:41)  Weight (kg): 64.3 (2019 03:27)  BMI (kg/m2): 21.6 (2019 03:27)  BSA (m2): 1.77 (2019 03:27)    CENTRAL LINE: [ ] YES [ ] NO  LOCATION:   DATE INSERTED:    GIRALDO: [ ] YES [ ] NO    DATE INSERTED:    A-LINE:  [ ] YES [ ] NO  LOCATION:   DATE INSERTED:    ICU Vital Signs Last 24 Hrs  T(C): 35.6 (2019 04:00), Max: 37.8 (2019 23:41)  T(F): 96 (2019 04:00), Max: 100 (2019 23:41)  HR: 145 (2019 06:36) (31 - 162)  BP: 75/55 (2019 06:36) (55/37 - 160/92)  BP(mean): 60 (2019 06:36) (41 - 124)  ABP: --  ABP(mean): --  RR: 48 (2019 06:36) (12 - 48)  SpO2: 74% (2019 05:45) (69% - 100%)      ABG - ( 2019 00:26 )  pH, Arterial: 7.34  pH, Blood: x     /  pCO2: 27    /  pO2: 582   / HCO3: 14    / Base Excess: -10.2 /  SaO2: 98                      Mode: AC/ CMV (Assist Control/ Continuous Mandatory Ventilation)  RR (machine): 12  TV (machine): 450  FiO2: 100  PEEP: 5  ITime: 1  MAP: 9  PIP: 18      PHYSICAL EXAM:    >>> <<<    LABS:  CBC Full  -  ( 2019 05:57 )  WBC Count : 29.62 K/uL  RBC Count : 2.37 M/uL  Hemoglobin : 5.2 g/dL  Hematocrit : 19.3 %  Platelet Count - Automated : 232 K/uL  Mean Cell Volume : 81.4 fl  Mean Cell Hemoglobin : 21.9 pg  Mean Cell Hemoglobin Concentration : 26.9 gm/dL  Auto Neutrophil # : 20.93 K/uL  Auto Lymphocyte # : 5.11 K/uL  Auto Monocyte # : 1.30 K/uL  Auto Eosinophil # : 0.02 K/uL  Auto Basophil # : 0.05 K/uL  Auto Neutrophil % : 70.5 %  Auto Lymphocyte % : 17.3 %  Auto Monocyte % : 4.4 %  Auto Eosinophil % : 0.1 %  Auto Basophil % : 0.2 %    0603    145  |  109<H>  |  32<H>  ----------------------------<  87  4.7   |  14<L>  |  1.94<H>    Ca    10.0      2019 05:57  Phos  8.9     06-  Mg     2.6     06-03    TPro  4.8<L>  /  Alb  2.1<L>  /  TBili  0.6  /  DBili  x   /  AST  24  /  ALT  20  /  AlkPhos  35<L>  06    PT/INR - ( 2019 06:23 )   PT: 43.7 sec;   INR: 3.78 ratio         PTT - ( 2019 23:46 )  PTT:34.2 sec  Urinalysis Basic - ( 2019 00:58 )    Color: Yellow / Appearance: Clear / S.020 / pH: x  Gluc: x / Ketone: Negative  / Bili: Negative / Urobili: 1   Blood: x / Protein: 100 / Nitrite: Negative   Leuk Esterase: Trace / RBC: 10-25 /HPF / WBC 3-5 /HPF   Sq Epi: x / Non Sq Epi: x / Bacteria: x          RADIOLOGY & ADDITIONAL STUDIES REVIEWED:  ***    [ ]GOALS OF CARE DISCUSSION WITH PATIENT/FAMILY/PROXY:    CRITICAL CARE TIME SPENT: 35 minutes INTERVAL HPI/OVERNIGHT EVENTS: Cardiac arrest x 6. PEA. Multiple epinephrine pushes with bicarbs and glucose.     PRESSORS: [x] YES [ ] NO  Levophed    ANTIBIOTICS:  Vanc and Zosyn                DATE STARTED: Lindsay 3    Antimicrobial:  piperacillin/tazobactam IVPB. 3.375 Gram(s) IV Intermittent every 8 hours  vancomycin  IVPB 1000 milliGRAM(s) IV Intermittent every 24 hours    Cardiovascular:  digoxin  Injectable 0.125 milliGRAM(s) IV Push daily  metoprolol tartrate Injectable 5 milliGRAM(s) IV Push every 6 hours  norepinephrine Infusion 0.05 MICROgram(s)/kG/Min IV Continuous <Continuous>      Other:  chlorhexidine 4% Liquid 1 Application(s) Topical <User Schedule>  fentaNYL   Infusion. 0.5 MICROgram(s)/kG/Hr IV Continuous <Continuous>  pantoprazole  Injectable 40 milliGRAM(s) IV Push two times a day    chlorhexidine 4% Liquid 1 Application(s) Topical <User Schedule>  digoxin  Injectable 0.125 milliGRAM(s) IV Push daily  fentaNYL   Infusion. 0.5 MICROgram(s)/kG/Hr IV Continuous <Continuous>  metoprolol tartrate Injectable 5 milliGRAM(s) IV Push every 6 hours  norepinephrine Infusion 0.05 MICROgram(s)/kG/Min IV Continuous <Continuous>  pantoprazole  Injectable 40 milliGRAM(s) IV Push two times a day  piperacillin/tazobactam IVPB. 3.375 Gram(s) IV Intermittent every 8 hours  vancomycin  IVPB 1000 milliGRAM(s) IV Intermittent every 24 hours    Drug Dosing Weight  Height (cm): 172.72 (2019 23:41)  Weight (kg): 64.3 (2019 03:27)  BMI (kg/m2): 21.6 (2019 03:27)  BSA (m2): 1.77 (2019 03:27)    CENTRAL LINE: [x] YES [ ] NO  LOCATION:   DATE INSERTED:    GIRALDO: [x] YES [ ] NO    DATE INSERTED:    A-LINE:  [ ] YES [x] NO  LOCATION:   DATE INSERTED:    ICU Vital Signs Last 24 Hrs  T(C): 35.6 (2019 04:00), Max: 37.8 (2019 23:41)  T(F): 96 (2019 04:00), Max: 100 (2019 23:41)  HR: 145 (2019 06:36) (31 - 162)  BP: 75/55 (2019 06:36) (55/37 - 160/92)  BP(mean): 60 (2019 06:36) (41 - 124)  RR: 48 (2019 06:36) (12 - 48)  SpO2: 74% (2019 05:45) (69% - 100%)      ABG - ( 2019 00:26 )  pH, Arterial: 7.34  pH, Blood: x     /  pCO2: 27    /  pO2: 582   / HCO3: 14    / Base Excess: -10.2 /  SaO2: 98                      Mode: AC/ CMV (Assist Control/ Continuous Mandatory Ventilation)  RR (machine): 12  TV (machine): 450  FiO2: 100  PEEP: 5  ITime: 1  MAP: 9  PIP: 18      PHYSICAL EXAM:    .  GENERAL: Well developed, Elderly male, sedated  HEENT:  Normocephalic/Atraumatic, reactive light reflex, moist mucous membranes  NECK: Supple, no JVD  RESP: Symmetric movement of the chest, clear to auscultation bilaterally, no wheeze, no rhonchi, no crackles noted  CVS: + irregular rate and rhythm, S1 and S2 audible, no murmur, rubs or gallops noted  GI: Normal active bowel sounds present, abdomen soft, non tender, non distended  EXTREMITIES:  No edema, no clubbing, cyanosis  MSK: 0/5 strength bilateral upper and lower extremities    NEURO: Alert and oriented x 0      LABS:  CBC Full  -  ( 2019 05:57 )  WBC Count : 29.62 K/uL  RBC Count : 2.37 M/uL  Hemoglobin : 5.2 g/dL  Hematocrit : 19.3 %  Platelet Count - Automated : 232 K/uL  Mean Cell Volume : 81.4 fl  Mean Cell Hemoglobin : 21.9 pg  Mean Cell Hemoglobin Concentration : 26.9 gm/dL  Auto Neutrophil # : 20.93 K/uL  Auto Lymphocyte # : 5.11 K/uL  Auto Monocyte # : 1.30 K/uL  Auto Eosinophil # : 0.02 K/uL  Auto Basophil # : 0.05 K/uL  Auto Neutrophil % : 70.5 %  Auto Lymphocyte % : 17.3 %  Auto Monocyte % : 4.4 %  Auto Eosinophil % : 0.1 %  Auto Basophil % : 0.2 %        145  |  109<H>  |  32<H>  ----------------------------<  87  4.7   |  14<L>  |  1.94<H>    Ca    10.0      2019 05:57  Phos  8.9       Mg     2.6         TPro  4.8<L>  /  Alb  2.1<L>  /  TBili  0.6  /  DBili  x   /  AST  24  /  ALT  20  /  AlkPhos  35<L>      PT/INR - ( 2019 06:23 )   PT: 43.7 sec;   INR: 3.78 ratio         PTT - ( 2019 23:46 )  PTT:34.2 sec  Urinalysis Basic - ( 2019 00:58 )    Color: Yellow / Appearance: Clear / S.020 / pH: x  Gluc: x / Ketone: Negative  / Bili: Negative / Urobili: 1   Blood: x / Protein: 100 / Nitrite: Negative   Leuk Esterase: Trace / RBC: 10-25 /HPF / WBC 3-5 /HPF   Sq Epi: x / Non Sq Epi: x / Bacteria: x          [ ]GOALS OF CARE DISCUSSION WITH PATIENT/FAMILY/PROXY: Full code    CRITICAL CARE TIME SPENT: 35 minutes INTERVAL HPI/OVERNIGHT EVENTS: Cardiac arrest x 7. PEA. Multiple epinephrine pushes with bicarbs and glucose.     PRESSORS: [x] YES [ ] NO  Levophed    ANTIBIOTICS:  Vanc and Zosyn                DATE STARTED: Lindsay 3    Antimicrobial:  piperacillin/tazobactam IVPB. 3.375 Gram(s) IV Intermittent every 8 hours  vancomycin  IVPB 1000 milliGRAM(s) IV Intermittent every 24 hours    Cardiovascular:  digoxin  Injectable 0.125 milliGRAM(s) IV Push daily  metoprolol tartrate Injectable 5 milliGRAM(s) IV Push every 6 hours  norepinephrine Infusion 0.05 MICROgram(s)/kG/Min IV Continuous <Continuous>      Other:  chlorhexidine 4% Liquid 1 Application(s) Topical <User Schedule>  fentaNYL   Infusion. 0.5 MICROgram(s)/kG/Hr IV Continuous <Continuous>  pantoprazole  Injectable 40 milliGRAM(s) IV Push two times a day    chlorhexidine 4% Liquid 1 Application(s) Topical <User Schedule>  digoxin  Injectable 0.125 milliGRAM(s) IV Push daily  fentaNYL   Infusion. 0.5 MICROgram(s)/kG/Hr IV Continuous <Continuous>  metoprolol tartrate Injectable 5 milliGRAM(s) IV Push every 6 hours  norepinephrine Infusion 0.05 MICROgram(s)/kG/Min IV Continuous <Continuous>  pantoprazole  Injectable 40 milliGRAM(s) IV Push two times a day  piperacillin/tazobactam IVPB. 3.375 Gram(s) IV Intermittent every 8 hours  vancomycin  IVPB 1000 milliGRAM(s) IV Intermittent every 24 hours    Drug Dosing Weight  Height (cm): 172.72 (2019 23:41)  Weight (kg): 64.3 (2019 03:27)  BMI (kg/m2): 21.6 (2019 03:27)  BSA (m2): 1.77 (2019 03:27)    CENTRAL LINE: [x] YES [ ] NO  LOCATION:   DATE INSERTED:    GIRALDO: [x] YES [ ] NO    DATE INSERTED:    A-LINE:  [ ] YES [x] NO  LOCATION:   DATE INSERTED:    ICU Vital Signs Last 24 Hrs  T(C): 35.6 (2019 04:00), Max: 37.8 (2019 23:41)  T(F): 96 (2019 04:00), Max: 100 (2019 23:41)  HR: 145 (2019 06:36) (31 - 162)  BP: 75/55 (2019 06:36) (55/37 - 160/92)  BP(mean): 60 (2019 06:36) (41 - 124)  RR: 48 (2019 06:36) (12 - 48)  SpO2: 74% (2019 05:45) (69% - 100%)      ABG - ( 2019 00:26 )  pH, Arterial: 7.34  pH, Blood: x     /  pCO2: 27    /  pO2: 582   / HCO3: 14    / Base Excess: -10.2 /  SaO2: 98                      Mode: AC/ CMV (Assist Control/ Continuous Mandatory Ventilation)  RR (machine): 12  TV (machine): 450  FiO2: 100  PEEP: 5  ITime: 1  MAP: 9  PIP: 18      PHYSICAL EXAM:    .  GENERAL: Well developed, Elderly male, sedated  HEENT:  Normocephalic/Atraumatic, reactive light reflex, moist mucous membranes  NECK: Supple, no JVD  RESP: Symmetric movement of the chest, clear to auscultation bilaterally, no wheeze, no rhonchi, no crackles noted  CVS: + irregular rate and rhythm, S1 and S2 audible, no murmur, rubs or gallops noted  GI: Normal active bowel sounds present, abdomen soft, non tender, non distended  EXTREMITIES:  No edema, no clubbing, cyanosis  MSK: 0/5 strength bilateral upper and lower extremities    NEURO: Alert and oriented x 0      LABS:  CBC Full  -  ( 2019 05:57 )  WBC Count : 29.62 K/uL  RBC Count : 2.37 M/uL  Hemoglobin : 5.2 g/dL  Hematocrit : 19.3 %  Platelet Count - Automated : 232 K/uL  Mean Cell Volume : 81.4 fl  Mean Cell Hemoglobin : 21.9 pg  Mean Cell Hemoglobin Concentration : 26.9 gm/dL  Auto Neutrophil # : 20.93 K/uL  Auto Lymphocyte # : 5.11 K/uL  Auto Monocyte # : 1.30 K/uL  Auto Eosinophil # : 0.02 K/uL  Auto Basophil # : 0.05 K/uL  Auto Neutrophil % : 70.5 %  Auto Lymphocyte % : 17.3 %  Auto Monocyte % : 4.4 %  Auto Eosinophil % : 0.1 %  Auto Basophil % : 0.2 %        145  |  109<H>  |  32<H>  ----------------------------<  87  4.7   |  14<L>  |  1.94<H>    Ca    10.0      2019 05:57  Phos  8.9       Mg     2.6         TPro  4.8<L>  /  Alb  2.1<L>  /  TBili  0.6  /  DBili  x   /  AST  24  /  ALT  20  /  AlkPhos  35<L>      PT/INR - ( 2019 06:23 )   PT: 43.7 sec;   INR: 3.78 ratio         PTT - ( 2019 23:46 )  PTT:34.2 sec  Urinalysis Basic - ( 2019 00:58 )    Color: Yellow / Appearance: Clear / S.020 / pH: x  Gluc: x / Ketone: Negative  / Bili: Negative / Urobili: 1   Blood: x / Protein: 100 / Nitrite: Negative   Leuk Esterase: Trace / RBC: 10-25 /HPF / WBC 3-5 /HPF   Sq Epi: x / Non Sq Epi: x / Bacteria: x          [ ]GOALS OF CARE DISCUSSION WITH PATIENT/FAMILY/PROXY: Full code    CRITICAL CARE TIME SPENT: 35 minutes

## 2019-06-03 NOTE — H&P ADULT - PROBLEM SELECTOR PLAN 3
on metoprolol and xeralto at home on metoprolol and xeralto at home  - Will reduce metoprolol from 75 to 25 bid at this time given the patient came with RVR. Also to prevent reflex tachycardia.   - Titrate to home dose once the patient is off pressure support  - c/w xeralto

## 2019-06-03 NOTE — CHART NOTE - NSCHARTNOTEFT_GEN_A_CORE
3rd Code blue called at 5:56am. Patient was in PEA. Epi given x 3, calcium chloride x 1, and bicarb x 1. ROSC achieved at 6:02am. 3rd Code blue called at 5:56am. Patient was in PEA. Epi given x 3, calcium chloride x 1, and bicarb x 1. ROSC achieved at 6:02am.    4th code blue: called at 6:12am. Patient was in PEA. Epi given x 2, calcium chloride x 1. ROSC at 6:16am. 3rd Code blue called at 5:56am. Patient was in PEA. Epi given x 3, calcium chloride x 1, and bicarb x 1. ROSC achieved at 6:02am.    4th code blue: called at 6:12am. Patient was in PEA. Epi given x 2, calcium chloride x 1. ROSC at 6:16am.    5th code blue: at 6:31am. Rhythm was PEA. Epi given x 2. Bicarb x 1. ROSC at 6:36 am. CXR ordered.     Dr. Baron spoke to the son and the RABI. Patient remains full code.

## 2019-06-03 NOTE — H&P ADULT - NSICDXPASTMEDICALHX_GEN_ALL_CORE_FT
PAST MEDICAL HISTORY:  Afib     Asthma     CAD (coronary artery disease)     CVA (cerebral vascular accident) 2015    HTN (hypertension)     Hypercholesterolemia     Leukocytoclastic vasculitis

## 2019-06-03 NOTE — CHART NOTE - NSCHARTNOTEFT_GEN_A_CORE
Patient had 6th code blue on 08:08 AM today, patient rhythm was in PEA, nurse and resident at bedside when patient coded. 2 dose of epinephrine and 1 sodium bicarb and 1 dextrose 50mg.   Patient had ROSC after 7 min at 08:15 AM.     patient had another cardiac arrest (7th code blue) with PEA on 08:22. 1 dose of epinephrine given. Patient with NO ROSC and pupil dilated, patient  08:24.     Patient has total time of cardiac arrest  34 min.   Resident spoke to patient daughter Patient had 6th code blue on 08:08 AM today, patient rhythm was in PEA, nurse and resident at bedside when patient coded. 2 dose of epinephrine and 1 sodium bicarb and 1 dextrose 50mg.   Patient had ROSC after 7 min at 08:15 AM.     patient had another cardiac arrest (7th code blue) with PEA on 08:22. 1 dose of epinephrine given. Patient with NO ROSC and pupil dilated, patient  08:24.     Patient has total time of cardiac arrest  34 min.   Resident spoke to patient daughter. Patient is not a candidate for organ donation, spoke to Fidelina Shahid from organ donation and confirmation number [id number] Patient had 6th code blue on 08:08 AM today, patient rhythm was in PEA, nurse and resident at bedside when patient coded. 2 dose of epinephrine and 1 sodium bicarb and 1 dextrose 50mg.   Patient had ROSC after 7 min at 08:15 AM.     patient had another cardiac arrest (7th code blue) with PEA on 08:22. 1 dose of epinephrine given. Patient with NO ROSC and pupil dilated, patient  08:24.     Patient has total time of cardiac arrest  34 min.   Resident spoke to patient daughter. Patient is not a candidate for organ donation, spoke to Fidelina Shahid from organ donation and confirmation number [id number]..

## 2019-06-05 NOTE — PATIENT PROFILE ADULT - DO YOU FEEL THREATENED BY OTHERS?
HISTORY OF PRESENT ILLNESS:  A 70 years old, left knee pain, had injection close   to a year ago, responded well, requesting repeat injection.    Exam shows tenderness at the joint line without signs of infection or   instability.    X-rays show arthritic changes.    ASSESSMENT:  Left knee arthrosis.    PLAN:  Kenalog injection to the left knee.  We will also give thoracolumbar   corset.  We will see her back in our clinic as needed.      PBB/HN  dd: 06/05/2019 12:20:26 (CDT)  td: 06/06/2019 06:39:42 (CDT)  Doc ID   #[id number]  Job ID #[id number]    CC:     Further History  Aching pain  Worse with activity  Relieved with rest  No other associated symptoms  No other radiation    Further Exam  Alert and oriented  Pleasant  Contralateral limb has appropriate range of motion for age and condition  Contralateral limb has appropriate strength for age and condition  Contralateral limb has appropriate stability  for age and condition  No adenopathy  Pulses are appropriate for current condition  Skin is intact        Chief Complaint    Chief Complaint   Patient presents with    Left Knee - Pain       HPI  Jacquelin Strickland is a 70 y.o.  female who presents with       Past Medical History  Past Medical History:   Diagnosis Date    Allergy     Amblyopia     Anticoagulant long-term use     aspirin    Balance disorder     walks with cane    Cancer     skin on nose    Cataract     Cervical polyp     Chalazion of right upper eyelid     DDD (degenerative disc disease), lumbar     Herniated disc, cervical     HTN (hypertension)     Hx of colonic polyps     Hyperlipidemia     Mobility impaired     use a walker r/t to balance    Scoliosis        Past Surgical History  Past Surgical History:   Procedure Laterality Date    BIOPSY-BONE MARROW routine bilateral bone marrow bx, spoke with davon 3/30 at 3 pm Bilateral 1/30/2017    [facility]    BREAST BIOPSY      CARPAL TUNNEL RELEASE Right      cervical injection       SECTION, LOW TRANSVERSE      CHALAZION - MULTIPLE, SAME LID Right     CHOLECYSTECTOMY  2019    COLONOSCOPY N/A 8/10/2015    Performed by [facility] Jr[facility] at [facility]    COLONOSCOPY W/ POLYPECTOMY  ?  12?  [place]    SALOME-TRANSFORAMINAL L2 and L3 Right 2016    [facility] OR    HYSTERECTOMY      total    Injection-steroid-epidural-cervical N/A 2018    [facility]    INJECTION-STEROID-EPIDURAL-CERVICAL N/A 2016    [facility]    INJECTION-STEROID-EPIDURAL-CERVICAL  N/A 2016    [facility]    LUMBAR EPIDURAL INJECTION      Pain management    OOPHORECTOMY      TONSILLECTOMY         Medications  Current Outpatient Medications   Medication Sig    amLODIPine (NORVASC) 10 MG tablet Take 1 tablet (10 mg total) by mouth once daily.    aspirin (ECOTRIN) 81 MG EC tablet Take 81 mg by mouth once daily.    B-complex with vitamin C (Z-BEC OR EQUIV) tablet Take 1 tablet by mouth.    cetirizine (ZYRTEC) 10 MG tablet Take 1 tablet (10 mg total) by mouth once daily.    CINNAMON BARK (CINNAMON ORAL) Take by mouth once daily.     COCONUT OIL ORAL Take 4 capsules by mouth once daily.    cranberry fruit 475 mg Cap Take by mouth.    alva prim-linoleic-gamolenic ac (PRIMROSE OIL) 1,000 mg Cap Take 1 tablet by mouth once daily.    fluticasone (FLONASE) 50 mcg/actuation nasal spray 2 sprays (100 mcg total) by Each Nare route once daily.    meloxicam (MOBIC) 15 MG tablet TAKE 1 TABLET (15 MG TOTAL) BY MOUTH ONCE DAILY.    pravastatin (PRAVACHOL) 20 MG tablet TAKE ONE TABLET BY MOUTH EVERY DAY    TURMERIC, BULK, MISC by Misc.(Non-Drug; Combo Route) route.     No current facility-administered medications for this visit.        Allergies  Review of patient's allergies indicates:   Allergen Reactions    Ace  inhibitors Swelling    Fish containing products      Catfish, flounder, and perch       Family History  Family History   Problem Relation Age of Onset    Lung cancer Mother     Blindness Mother         Due to brain tumor, blind in one eye    COPD Father     Diabetes Sister     Lung cancer Sister     COPD Sister     Asthma Sister     Kidney cancer Maternal Grandfather     Amblyopia Neg Hx     Cataracts Neg Hx     Glaucoma Neg Hx     Hypertension Neg Hx     Macular degeneration Neg Hx     Retinal detachment Neg Hx     Strabismus Neg Hx     Stroke Neg Hx     Thyroid disease Neg Hx        Social History  Social History     Socioeconomic History    Marital status:      Spouse name: Not on file    Number of children: Not on file    Years of education: Not on file    Highest education level: Not on file   Occupational History    Not on file   Social Needs    Financial resource strain: Not hard at all    Food insecurity:     Worry: Never true     Inability: Never true    Transportation needs:     Medical: No     Non-medical: No   Tobacco Use    Smoking status: Never Smoker    Smokeless tobacco: Never Used   Substance and Sexual Activity    Alcohol use: Yes     Frequency: Monthly or less     Drinks per session: 1 or 2     Binge frequency: Never     Comment: rare    Drug use: No    Sexual activity: Not on file   Lifestyle    Physical activity:     Days per week: 0 days     Minutes per session: 0 min    Stress: Only a little   Relationships    Social connections:     Talks on phone: More than three times a week     Gets together: Once a week     Attends Judaism service: Not on file     Active member of club or organization: Yes     Attends meetings of clubs or organizations: 1 to 4 times per year     Relationship status:    Other Topics Concern    Not on file   Social History Narrative    Not on file               Review of Systems     Constitutional: Negative    HENT:  Negative  Eyes: Negative  Respiratory: Negative  Cardiovascular: Negative  Musculoskeletal: HPI  Skin: Negative  Neurological: Negative  Hematological: Negative  Endocrine: Negative                 Physical Exam    There were no vitals filed for this visit.  Body mass index is 45.26 kg/m².  Physical Examination:     General appearance -  well appearing, and in no distress  Mental status - awake  Neck - supple  Chest -  symmetric air entry  Heart - normal rate   Abdomen - soft      Assessment     1. Primary osteoarthritis of left knee    2. Chronic pain of left knee          Plan   no

## 2019-08-28 NOTE — PROGRESS NOTE ADULT - RESPIRATORY
Breath Sounds equal & clear to percussion & auscultation, no accessory muscle use
denies pain/discomfort

## 2019-10-28 NOTE — PATIENT PROFILE ADULT. - VISION (WITH CORRECTIVE LENSES IF THE PATIENT USUALLY WEARS THEM):
Normal vision: sees adequately in most situations; can see medication labels, newsprint Localized Dermabrasion With Wire Brush Text: The patient was draped in routine manner.  Localized dermabrasion using 3 x 17 mm wire brush was performed in routine manner to papillary dermis. This spot dermabrasion is being performed to complete skin cancer reconstruction. It also will eliminate the other sun damaged precancerous cells that are known to be part of the regional effect of a lifetime's worth of sun exposure. This localized dermabrasion is therapeutic and should not be considered cosmetic in any regard. Localized Dermabrasion Text: The patient was draped in routine manner.  Localized dermabrasion using 3 x 17 mm wire brush was performed in routine manner to papillary dermis. This spot dermabrasion is being performed to complete skin cancer reconstruction. It also will eliminate the other sun damaged precancerous cells that are known to be part of the regional effect of a lifetime's worth of sun exposure. This localized dermabrasion is therapeutic and should not be considered cosmetic in any regard.

## 2021-03-16 NOTE — ED ADULT NURSE REASSESSMENT NOTE - NS ED NURSE REASSESS COMMENT FT1
Neuro: AA&Ox4.     Cardiac: SR. Continues to have soft BPs 70s over 40s, baseline for Pt.    Respiratory: on RA with no resp distress. Infrequent productive cough    GI/: Anuric. On HD    Diet/appetite: Tolerating regular diet. No nausea, no vomiting.     Activity: Up independent in room    Pain: reports well controlled pain with scheduled Tylenol    Skin: No new deficits noted. Surgical incision in midline abdomen with capped drain and urostomy stoma in left abdomen. Sites covered with drainage sponge  CDI.    LDA's: Right dialysis internal jugular. Drain in midline abdominal incision  is capped and draining to gravity.L hand PIV removed; R PIV to be removed right prior to discharge.     Discharge:irrigation teaching done at bedside and pt demonstrated understanding of how to properly irrigate.Irrigation syringe and drainage sponges were provided. Pt's daughter on her way to pick-up pt.     patient  awake and alert, breathing not distressed. IV access in-situ. Patient was medicated as ordered y MD. Vitals charted, is being admitted, awaiting bed availability

## 2021-04-19 NOTE — ED ADULT NURSE REASSESSMENT NOTE - COMFORT CARE
plan of care explained
Patient unable to complete
[Follow-Up] : a follow-up visit for
[Patient] : patient
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

## 2021-05-12 NOTE — PHYSICAL THERAPY INITIAL EVALUATION ADULT - PLANNED THERAPY INTERVENTIONS, PT EVAL
balance training/gait training/strengthening Trilobed Flap Text: The defect edges were debeveled with a #15 scalpel blade.  Given the location of the defect and the proximity to free margins a trilobed flap was deemed most appropriate.  Using a sterile surgical marker, an appropriate trilobed flap drawn around the defect.    The area thus outlined was incised deep to adipose tissue with a #15 scalpel blade.  The skin margins were undermined to an appropriate distance in all directions utilizing iris scissors.

## 2021-10-04 NOTE — PATIENT PROFILE ADULT - FUNCTIONAL SCREEN CURRENT LEVEL: BATHING, MLM
[facility] Surgery    Contrast has not progressed on SBFT after 1 hour. Will wait one more additional hour with delayed imaging, if no progression place NGT back to LIS and continue TPN. Will attempt low dose reglan however it will likely be ineffective as her surgery was 9/9.     Electronically signed by Romy Quintana MD on 10/4/2021 at 2:16 PM 0 = independent

## 2022-02-24 NOTE — PATIENT PROFILE ADULT - BRADEN SCORE
Start low-dose Lyrica.  Follow-up with pain management and Neurology.  Patient may need updated nerve study.  Continue controlling diabetes and hypertension.    Discussed risk and benefits of medication use.The patient was checked in the [facility] Board of Pharmacy's  Prescription Monitoring Program. There appears to be no incongruities with the patient's verbalized history.      22

## 2022-04-30 NOTE — PROGRESS NOTE ADULT - ASSESSMENT
ADMITTING DIAGNOSIS:  Need for age-appropriate screening colonoscopy.    PROCEDURE:  Colonoscopy to the cecum.    POSTOPERATIVE DIAGNOSES:    1. Normal colonoscopy to the cecum.    2. Floppy redundant splenic flexure.    3. Grade 2 internal hemorrhoids.    BRIEF HISTORY:  Patient is a 65-year-old  male with a history of anxiety, asthma, COPD, benign prostatic hypertrophy.  The patient smokes a pack a day for about 50 years.  Recently needed a colon screening.  Patient underwent colonoscopy to the cecum under sedation with a flexible endoscope.    DESCRIPTION OF PROCEDURE:  Cecum, ascending colon, transverse colon, descending colon were normal.  Rectosigmoid was unremarkable.  Digital exam revealed good tone, no masses, no other abnormalities were seen.  Overall, the patient did very well had no problems or difficulties, was awakened and sent to recovery in good condition.       I appreciate the consultation referral from her nurse practitioner, Ms. Oumou Obando, and will notify on findings.        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  Skin and Cr improving on prednisone.    Rec:  decrease prednisone to 20mg bid  calc +D   Can f/u with rheum as outpatient to further taper steroids

## 2022-05-09 NOTE — ED PROVIDER NOTE - EKG ADDITIONAL QUESTION - PERFORMED INDEPENDENT VISUALIZATION
Writer relayed message of care being taken over by Dr ayala. Pt states he can't get into that Dr. Arroyo is having pain and would like an appt with dr mckenzie or antionette. Pt states he booked the appt today on fri on pt portal and looked today and it was gone. Pt would like a call    Please advise    [phone]   Yes

## 2022-08-01 NOTE — ED PROVIDER NOTE - CHPI ED SYMPTOMS POS
[Time Spent: ___ minutes] : I have spent [unfilled] minutes of time on the encounter.
SHORTNESS OF BREATH/decreased oral intake, weakness

## 2022-10-03 NOTE — DISCHARGE NOTE PROVIDER - NSDCCPCAREPLAN_GEN_ALL_CORE_FT
PRINCIPAL DISCHARGE DIAGNOSIS  Diagnosis: Dyspnea  Assessment and Plan of Treatment: You were amditted to the hospital for shortness of breath could be from chf exacerbation or ashtma exacerbation. You hear failure medications were adjusted. You were treated with steroids for ashtma exacerbation. Please take all your medications as advsied and follow up with your cardioloigist in a week.      SECONDARY DISCHARGE DIAGNOSES  Diagnosis: Carotid artery stenosis, symptomatic  Assessment and Plan of Treatment: You were seen by vascular surgery for carotid artery stenosis and you had CT angio neck which showed 40% right internal carotid artery stenosis. Please follow up with your vascular surgeon as out patient.    Diagnosis: Acute on chronic systolic CHF (congestive heart failure)  Assessment and Plan of Treatment: Pleas etake all your medications as advised and follow up with your cardiologist as out patient.    Diagnosis: Anemia  Assessment and Plan of Treatment: You were seen by Dr. Coronel, you had upper gi series which did not show any gatric ulcer or mucosal lesion. You are advised to follow up with Dr. Coronel as out patient and have endoscopy and colonoscopyas out patient.    Diagnosis: Atrial fibrillation with RVR  Assessment and Plan of Treatment: Continue with your Atrial fibrillation medications as advised.
02-Oct-2022 22:14

## 2023-01-27 NOTE — ED ADULT NURSE NOTE - NS ED NOTE ABUSE SUSPICION NEGLECT YN
No Perilesional Excision Additional Text (Leave Blank If You Do Not Want): The margin was drawn around the clinically apparent lesion. Incisions were then made along these lines to the appropriate tissue plane and the lesion was extirpated.

## 2023-02-17 NOTE — ED PROVIDER NOTE - ENMT, MLM
CHECKED BLOOD GLUCOSE -62 GAVE PT JUICES TO DRINK Airway patent, Nasal mucosa clear. Mouth with normal mucosa. Throat has no vesicles, no oropharyngeal exudates and uvula is midline.

## 2023-11-08 NOTE — PROGRESS NOTE ADULT - ASSESSMENT
84M with PMH of asthma, HTN, HLD, BPH, and CVA (2015)  who presents with chest pain x 1, found to have new A fib with RVR 84

## 2024-02-29 NOTE — H&P ADULT. - PROBLEM/PLAN-3
Patient's cholesterol levels are good and liver liver enzymes remain normal so she will stay on atorvastatin 10 mg daily.   DISPLAY PLAN FREE TEXT

## 2024-05-29 NOTE — ED ADULT TRIAGE NOTE - AS HEIGHT TYPE
Last appointment: 3/21/24  Next appointment: 9/25/24  Previous refill encounter(s): 4/25/24 #3ml    Requested Prescriptions     Pending Prescriptions Disp Refills    REPATHA SURECLICK 140 MG/ML SOAJ [Pharmacy Med Name: REPATHA 140 MG/ML SURECLICK[**^R]] 6 mL 0     Sig: INJECT 140 MG UNDER THE SKIN EVERY TWO WEEKS INTO THE THIGH, STOMACH, OR UPPER ARM         For Pharmacy Admin Tracking Only    Program: Medication Refill  CPA in place:    Recommendation Provided To:   Intervention Detail: New Rx: 1, reason: Patient Preference  Intervention Accepted By:   Gap Closed?:    Time Spent (min): 5  
stated

## 2024-07-16 NOTE — DIETITIAN INITIAL EVALUATION ADULT. - NS AS NUTRI INTERV STRATEGIES
Quality 226: Preventive Care And Screening: Tobacco Use: Screening And Cessation Intervention: Patient screened for tobacco use and is an ex/non-smoker Detail Level: Detailed Quality 130: Documentation Of Current Medications In The Medical Record: Current Medications Documented Quality 431: Preventive Care And Screening: Unhealthy Alcohol Use - Screening: Patient not identified as an unhealthy alcohol user when screened for unhealthy alcohol use using a systematic screening method Nutrition re-evaluation when feasible

## 2024-07-30 NOTE — PATIENT PROFILE ADULT. - FUNCTIONAL SCREEN CURRENT LEVEL: EATING, MLM
(0) independent
Communicate Risk of Fall with Harm to all staff/Reinforce activity limits and safety measures with patient and family/Tailored Fall Risk Interventions/Visual Cue: Yellow wristband and red socks/Bed in lowest position, wheels locked, appropriate side rails in place/Call bell, personal items and telephone in reach/Instruct patient to call for assistance before getting out of bed or chair/Non-slip footwear when patient is out of bed/[place] to call system/Physically safe environment - no spills, clutter or unnecessary equipment/Purposeful Proactive Rounding/Room/bathroom lighting operational, light cord in reach

## 2024-11-07 NOTE — PATIENT PROFILE ADULT. - AS SC BRADEN ACTIVITY
Per Dr. Donnelly: stop carvedilol and switch back to lopressor 25 BID and proceed with ABPM for further changes     Attempted to contact patient.  No answer, left call back number.   (3) walks occasionally

## 2025-03-05 NOTE — ED PROVIDER NOTE - PSH
Patient as an order coming from [facility] but it will take a few days and she will run out before then. Asking for a week supply to be sent to [facility]   H/O sinus surgery